# Patient Record
Sex: FEMALE | Race: WHITE | NOT HISPANIC OR LATINO | Employment: OTHER | URBAN - METROPOLITAN AREA
[De-identification: names, ages, dates, MRNs, and addresses within clinical notes are randomized per-mention and may not be internally consistent; named-entity substitution may affect disease eponyms.]

---

## 2024-02-21 ENCOUNTER — MEDICAL CORRESPONDENCE (OUTPATIENT)
Dept: HEALTH INFORMATION MANAGEMENT | Facility: CLINIC | Age: 39
End: 2024-02-21

## 2024-02-23 ENCOUNTER — REFERRAL (OUTPATIENT)
Dept: TRANSPLANT | Facility: CLINIC | Age: 39
End: 2024-02-23

## 2024-02-23 DIAGNOSIS — K86.1 CHRONIC RECURRENT PANCREATITIS (H): Primary | ICD-10-CM

## 2024-02-23 NOTE — LETTER
Ciera Perkins  [address] [facility] [address] [address]    Dear Ciera,    Thank you for your interest in the Transplant Center at [facility]. We look forward to being a part of your care team and assisting you through the transplant process.    As we discussed, your transplant coordinator is Vidal Perdue ([place]).  You may call your coordinator at any time with questions or concerns.  Your first scheduled call will be on 3/5 between 9:00-12:00 .  If this needs to change, call [phone].    Please complete the following.    Fill out and return the enclosed forms  Authorization for Electronic Communication  Authorization to Discuss Protected Health Information  Authorization for Release of Protected Health Information    Sign up for:  [email or web address], access to your electronic medical record (see enclosed pamphlet)       My Transplant Place     You can use these tools to learn more about your transplant, communicate with your care team, and track your medical details  Sincerely,  [facility] Transplant  [facility]  cc: Referring Physician PCP

## 2024-02-28 VITALS — WEIGHT: 137 LBS | HEIGHT: 66 IN | BODY MASS INDEX: 22.02 KG/M2

## 2024-02-28 PROBLEM — K86.1 CHRONIC RECURRENT PANCREATITIS (H): Status: ACTIVE | Noted: 2021-11-25

## 2024-02-28 PROBLEM — K86.89 DIABETES MELLITUS SECONDARY TO PANCREATIC INSUFFICIENCY (H): Status: ACTIVE | Noted: 2023-01-31

## 2024-02-28 PROBLEM — E08.9 DIABETES MELLITUS SECONDARY TO PANCREATIC INSUFFICIENCY (H): Status: ACTIVE | Noted: 2023-01-31

## 2024-02-28 NOTE — TELEPHONE ENCOUNTER
The following questions were asked during patients intake call.       PCP: Dr Houston    GI: Dr Weiss   Pain Management: Jacob aLuren   Endocrinologist: Marcelle  Mental Health Provider: marcelle  Other Specialists: no     1. Why are you considering a total pancreatectomy/islet auto-transplant?  constant pain frequent hospitalization    2. Have you been in the hospital in the last six months? yes  If yes:  a. Please give the name and address of the hospital:  [facility]              b. When were you there and why? pacnritis  3. Have you had any surgeries or procedures? yes     4. Have you ever smoked? former         5. Do you drink alcohol? Rarely in the past    6. Drug use Past Present Frequency: no recreational    7. Do you have a history of alcohol or other drug abuse?  no  If yes:  a. How long have you been sober or drug-free? no     8. PMH  Previous transplant - no  Heart disease - no  Diabetes - yes  Cancer - no  Biopsy - pancreas  Genetic testing - yes, CFTR gene      Insurance Information: pt was inpatient and unable to provide complete info  [facility]  Medicare A&B     Referral intake process completed.  Patient is aware that after financial approval is received, medical records will be requested.   Confirmed coordinator will discuss evaluation process in more detail at the time of their call.   Patient instructed to call assigned coordinator Vidal with questions.      Patient gave verbal consent during intake call to obtain medical records and documents outside of [facility]:  yes    Patient gave verbal consent during intake call to speak to  Dinesh Perkins regarding patient's medical condition.

## 2024-03-05 ENCOUNTER — TELEPHONE (OUTPATIENT)
Dept: TRANSPLANT | Facility: CLINIC | Age: 39
End: 2024-03-05

## 2024-03-05 NOTE — TELEPHONE ENCOUNTER
Ciera Perkins is a 39 y.o. 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 No stenting has been done. First flare was in early 2021 when she went into ED for abdominal pain and was diagnosed with upper respiratory infection and Lipase was 3,915.  She has daily now since October of last year.     She also received ketamine on an admission which helped with pain. She has previously been on methadone, suboxone, and po dilaudid alone for pain control without success and multiple celiac plexus blocks in the past.  She has been admitted 17 times within the year for acute on chronic abdominal pain, 3 attempts with celiac plexus block without sustained relief. She currently takes pain medications daily (6-7/10 with pain medications), she manages flares with dilaudid 4mg Q3 hours daily since December, and her team is talking about Ketamine now.  She used to be a smoker and quit cigarettes with her first attack in 2021 and stopped drinking then too.  She still does vape nicotine. Fecal elastase was 177. She was told once that she is pre diabetic but is on no medications currently.  She had an NJ placed in past that did not work and 3 failed GJ tubes attempts through [facility]    A1C 3/12/24: 5.8  F Elastase 5/6/21: 177    CT Aorta Abdomen Pelvis 12/3/23   IMPRESSION:   1. Mural thickening of distal small bowel loops (approximately 20 cm   upstream of the terminal ileum) similar to prior study concerning for   chronic bowel inflammation. No pneumatosis or portal venous gas. No   skip lesions, strictures,  or bowel obstruction.   2. Patent superior mesenteric artery.   3. Hepatic steatosis.     Deuce Valentine MD - 05/11/2021   EXAMINATION: MRI Abdomen without and with IV Contrast with Independent   MRCP: The gallbladder is normal and there are no gallstones. There is no intra or extrahepatic biliary dilatation.  The endoluminal contour of the ducts is smooth.  There are no ductal stones  or lesions.  The pancreatic duct is normal.     Pancreas: Previous CT of 01/08/2021 demonstrate changes of acute interstitial pancreatitis. There is currently evidence of mild pancreatic parenchymal atrophy. Within the anterior body of the pancreas, just anterior to the portal splenic confluence, there is a 10 mm localized lesion. This is somewhat heterogeneous on the T2-weighted images, with some areas of decreased T2 signal and minimal peripheral increased T2 signal. This is not simple fluid signal. However following contrast administration, a sharply defined area of nonenhancement measuring 10 mm is noted. This likely reflects a small area of walled off necrosis. There is no pancreatic ductal dilatation. There are couple of less than 5 mm nonenhancing areas in the tail the pancreas which may reflect similar findings.     CT AP w/ IVC 1/2021 noted mild acute pancreatitis, No pseudocyst or abscess, pancreas enhances normally. Also notation of fatty liver. RUQ US noted no cholelithiasis, gallbladder wall thickening, or biliary dilatation. She was seen in the ED 5/4 and 5/5 for abd pain similar to this previous episode of pancreatitis.

## 2024-03-05 NOTE — TELEPHONE ENCOUNTER
[facility] Call Center    Phone Message    May a detailed message be left on voicemail: yes     Reason for Call: Other: patient calling in to speak with coordinator. She stated she is needing all the information on what is needed to be done before surgery. Please call patient back.     Action Taken: Message routed to:  Other: RNCC    Travel Screening: Not Applicable

## 2024-03-10 ENCOUNTER — HEALTH MAINTENANCE LETTER (OUTPATIENT)
Age: 39
End: 2024-03-10

## 2024-04-24 DIAGNOSIS — E08.9 DIABETES MELLITUS SECONDARY TO PANCREATIC INSUFFICIENCY (H): ICD-10-CM

## 2024-04-24 DIAGNOSIS — M79.7 FIBROMYALGIA: ICD-10-CM

## 2024-04-24 DIAGNOSIS — F19.20 OTHER PSYCHOACTIVE SUBSTANCE DEPENDENCE, UNCOMPLICATED (H): ICD-10-CM

## 2024-04-24 DIAGNOSIS — Z51.81 ENCOUNTER FOR THERAPEUTIC DRUG LEVEL MONITORING: ICD-10-CM

## 2024-04-24 DIAGNOSIS — K86.89 DIABETES MELLITUS SECONDARY TO PANCREATIC INSUFFICIENCY (H): ICD-10-CM

## 2024-04-24 DIAGNOSIS — R79.89 OTHER SPECIFIED ABNORMAL FINDINGS OF BLOOD CHEMISTRY: ICD-10-CM

## 2024-04-24 DIAGNOSIS — R97.8 OTHER ABNORMAL TUMOR MARKERS: ICD-10-CM

## 2024-04-24 DIAGNOSIS — K86.1 CHRONIC RECURRENT PANCREATITIS (H): Primary | ICD-10-CM

## 2024-05-08 ENCOUNTER — VIRTUAL VISIT (OUTPATIENT)
Dept: PSYCHOLOGY | Facility: CLINIC | Age: 39
End: 2024-05-08
Attending: PEDIATRICS
Payer: COMMERCIAL

## 2024-05-08 ENCOUNTER — LAB (OUTPATIENT)
Dept: LAB | Facility: CLINIC | Age: 39
End: 2024-05-08
Payer: COMMERCIAL

## 2024-05-08 ENCOUNTER — ALLIED HEALTH/NURSE VISIT (OUTPATIENT)
Dept: TRANSPLANT | Facility: CLINIC | Age: 39
End: 2024-05-08
Attending: SURGERY
Payer: COMMERCIAL

## 2024-05-08 VITALS — BODY MASS INDEX: 25.05 KG/M2 | WEIGHT: 155.2 LBS

## 2024-05-08 DIAGNOSIS — R79.89 OTHER SPECIFIED ABNORMAL FINDINGS OF BLOOD CHEMISTRY: ICD-10-CM

## 2024-05-08 DIAGNOSIS — E08.9 DIABETES MELLITUS SECONDARY TO PANCREATIC INSUFFICIENCY (H): ICD-10-CM

## 2024-05-08 DIAGNOSIS — K86.89 DIABETES MELLITUS SECONDARY TO PANCREATIC INSUFFICIENCY (H): ICD-10-CM

## 2024-05-08 DIAGNOSIS — K86.1 CHRONIC RECURRENT PANCREATITIS (H): ICD-10-CM

## 2024-05-08 DIAGNOSIS — F19.20 OTHER PSYCHOACTIVE SUBSTANCE DEPENDENCE, UNCOMPLICATED (H): ICD-10-CM

## 2024-05-08 DIAGNOSIS — R97.8 OTHER ABNORMAL TUMOR MARKERS: ICD-10-CM

## 2024-05-08 DIAGNOSIS — Z51.81 ENCOUNTER FOR THERAPEUTIC DRUG LEVEL MONITORING: ICD-10-CM

## 2024-05-08 DIAGNOSIS — M79.7 FIBROMYALGIA: ICD-10-CM

## 2024-05-08 DIAGNOSIS — K86.1 CHRONIC RECURRENT PANCREATITIS (H): Primary | ICD-10-CM

## 2024-05-08 LAB
ALBUMIN SERPL BCG-MCNC: 4 G/DL (ref 3.5–5.2)
ALP SERPL-CCNC: 108 U/L (ref 40–150)
ALT SERPL W P-5'-P-CCNC: 38 U/L (ref 0–50)
AMYLASE SERPL-CCNC: 12 U/L (ref 28–100)
ANION GAP SERPL CALCULATED.3IONS-SCNC: 8 MMOL/L (ref 7–15)
AST SERPL W P-5'-P-CCNC: 36 U/L (ref 0–45)
BASOPHILS # BLD AUTO: 0 10E3/UL (ref 0–0.2)
BASOPHILS NFR BLD AUTO: 1 %
BILIRUB SERPL-MCNC: 0.5 MG/DL
BUN SERPL-MCNC: 7.2 MG/DL (ref 6–20)
C PEPTIDE SERPL-MCNC: 4.3 NG/ML (ref 0.9–6.9)
CALCIUM SERPL-MCNC: 9.2 MG/DL (ref 8.6–10)
CEA SERPL-MCNC: 1.9 NG/ML
CHLORIDE SERPL-SCNC: 104 MMOL/L (ref 98–107)
CHOLEST SERPL-MCNC: 128 MG/DL
CREAT SERPL-MCNC: 0.71 MG/DL (ref 0.51–0.95)
DEPRECATED HCO3 PLAS-SCNC: 28 MMOL/L (ref 22–29)
EGFRCR SERPLBLD CKD-EPI 2021: >90 ML/MIN/1.73M2
EOSINOPHIL # BLD AUTO: 0.2 10E3/UL (ref 0–0.7)
EOSINOPHIL NFR BLD AUTO: 4 %
ERYTHROCYTE [DISTWIDTH] IN BLOOD BY AUTOMATED COUNT: 12.6 % (ref 10–15)
FASTING STATUS PATIENT QL REPORTED: ABNORMAL
FASTING STATUS PATIENT QL REPORTED: YES
FERRITIN SERPL-MCNC: 274 NG/ML (ref 6–175)
GLUCOSE SERPL-MCNC: 122 MG/DL (ref 70–99)
GLUCOSE SERPL-MCNC: 188 MG/DL (ref 70–99)
GLUCOSE SERPL-MCNC: 198 MG/DL (ref 70–99)
HBA1C MFR BLD: 6.2 %
HCT VFR BLD AUTO: 35.5 % (ref 35–47)
HCV AB SERPL QL IA: NONREACTIVE
HDLC SERPL-MCNC: 42 MG/DL
HGB BLD-MCNC: 12.1 G/DL (ref 11.7–15.7)
IMM GRANULOCYTES # BLD: 0 10E3/UL
IMM GRANULOCYTES NFR BLD: 1 %
IRON BINDING CAPACITY (ROCHE): 299 UG/DL (ref 240–430)
IRON SATN MFR SERPL: 13 % (ref 15–46)
IRON SERPL-MCNC: 38 UG/DL (ref 37–145)
LDLC SERPL CALC-MCNC: 72 MG/DL
LIPASE SERPL-CCNC: 7 U/L (ref 13–60)
LYMPHOCYTES # BLD AUTO: 1.9 10E3/UL (ref 0.8–5.3)
LYMPHOCYTES NFR BLD AUTO: 29 %
MCH RBC QN AUTO: 29.7 PG (ref 26.5–33)
MCHC RBC AUTO-ENTMCNC: 34.1 G/DL (ref 31.5–36.5)
MCV RBC AUTO: 87 FL (ref 78–100)
MONOCYTES # BLD AUTO: 0.4 10E3/UL (ref 0–1.3)
MONOCYTES NFR BLD AUTO: 6 %
NEUTROPHILS # BLD AUTO: 3.9 10E3/UL (ref 1.6–8.3)
NEUTROPHILS NFR BLD AUTO: 59 %
NONHDLC SERPL-MCNC: 86 MG/DL
NRBC # BLD AUTO: 0 10E3/UL
NRBC BLD AUTO-RTO: 0 /100
PLATELET # BLD AUTO: 165 10E3/UL (ref 150–450)
POTASSIUM SERPL-SCNC: 4.5 MMOL/L (ref 3.4–5.3)
PROT SERPL-MCNC: 7.2 G/DL (ref 6.4–8.3)
RBC # BLD AUTO: 4.08 10E6/UL (ref 3.8–5.2)
SODIUM SERPL-SCNC: 140 MMOL/L (ref 135–145)
TRIGL SERPL-MCNC: 69 MG/DL
WBC # BLD AUTO: 6.5 10E3/UL (ref 4–11)

## 2024-05-08 PROCEDURE — 82728 ASSAY OF FERRITIN: CPT | Performed by: PATHOLOGY

## 2024-05-08 PROCEDURE — 36415 COLL VENOUS BLD VENIPUNCTURE: CPT | Performed by: PATHOLOGY

## 2024-05-08 PROCEDURE — 86301 IMMUNOASSAY TUMOR CA 19-9: CPT | Mod: 90 | Performed by: PATHOLOGY

## 2024-05-08 PROCEDURE — 83540 ASSAY OF IRON: CPT | Performed by: PATHOLOGY

## 2024-05-08 PROCEDURE — 83036 HEMOGLOBIN GLYCOSYLATED A1C: CPT | Performed by: PEDIATRICS

## 2024-05-08 PROCEDURE — 99000 SPECIMEN HANDLING OFFICE-LAB: CPT | Performed by: PATHOLOGY

## 2024-05-08 PROCEDURE — 82378 CARCINOEMBRYONIC ANTIGEN: CPT | Performed by: PEDIATRICS

## 2024-05-08 PROCEDURE — 84446 ASSAY OF VITAMIN E: CPT | Mod: 90 | Performed by: PATHOLOGY

## 2024-05-08 PROCEDURE — G0480 DRUG TEST DEF 1-7 CLASSES: HCPCS | Mod: 90 | Performed by: PATHOLOGY

## 2024-05-08 PROCEDURE — 82306 VITAMIN D 25 HYDROXY: CPT | Performed by: PEDIATRICS

## 2024-05-08 PROCEDURE — 83550 IRON BINDING TEST: CPT | Performed by: PATHOLOGY

## 2024-05-08 PROCEDURE — 80061 LIPID PANEL: CPT | Performed by: PATHOLOGY

## 2024-05-08 PROCEDURE — 99207 PR NO CHARGE LOS: CPT | Mod: 95 | Performed by: STUDENT IN AN ORGANIZED HEALTH CARE EDUCATION/TRAINING PROGRAM

## 2024-05-08 PROCEDURE — 82947 ASSAY GLUCOSE BLOOD QUANT: CPT | Mod: XU | Performed by: PATHOLOGY

## 2024-05-08 PROCEDURE — 86803 HEPATITIS C AB TEST: CPT | Performed by: PEDIATRICS

## 2024-05-08 PROCEDURE — 86341 ISLET CELL ANTIBODY: CPT | Mod: 90 | Performed by: PATHOLOGY

## 2024-05-08 PROCEDURE — 84681 ASSAY OF C-PEPTIDE: CPT | Performed by: PEDIATRICS

## 2024-05-08 PROCEDURE — 86337 INSULIN ANTIBODIES: CPT | Mod: 90 | Performed by: PATHOLOGY

## 2024-05-08 PROCEDURE — 85025 COMPLETE CBC W/AUTO DIFF WBC: CPT | Performed by: PATHOLOGY

## 2024-05-08 PROCEDURE — 80053 COMPREHEN METABOLIC PANEL: CPT | Performed by: PATHOLOGY

## 2024-05-08 PROCEDURE — 84590 ASSAY OF VITAMIN A: CPT | Mod: 90 | Performed by: PATHOLOGY

## 2024-05-08 PROCEDURE — 82150 ASSAY OF AMYLASE: CPT | Performed by: PATHOLOGY

## 2024-05-08 PROCEDURE — 83690 ASSAY OF LIPASE: CPT | Performed by: PATHOLOGY

## 2024-05-08 ASSESSMENT — PATIENT HEALTH QUESTIONNAIRE - PHQ9
10. IF YOU CHECKED OFF ANY PROBLEMS, HOW DIFFICULT HAVE THESE PROBLEMS MADE IT FOR YOU TO DO YOUR WORK, TAKE CARE OF THINGS AT HOME, OR GET ALONG WITH OTHER PEOPLE: VERY DIFFICULT
SUM OF ALL RESPONSES TO PHQ QUESTIONS 1-9: 9
SUM OF ALL RESPONSES TO PHQ QUESTIONS 1-9: 9

## 2024-05-08 ASSESSMENT — ANXIETY QUESTIONNAIRES
GAD7 TOTAL SCORE: 2
2. NOT BEING ABLE TO STOP OR CONTROL WORRYING: SEVERAL DAYS
4. TROUBLE RELAXING: NOT AT ALL
GAD7 TOTAL SCORE: 2
IF YOU CHECKED OFF ANY PROBLEMS ON THIS QUESTIONNAIRE, HOW DIFFICULT HAVE THESE PROBLEMS MADE IT FOR YOU TO DO YOUR WORK, TAKE CARE OF THINGS AT HOME, OR GET ALONG WITH OTHER PEOPLE: NOT DIFFICULT AT ALL
7. FEELING AFRAID AS IF SOMETHING AWFUL MIGHT HAPPEN: NOT AT ALL
GAD7 TOTAL SCORE: 2
3. WORRYING TOO MUCH ABOUT DIFFERENT THINGS: NOT AT ALL
1. FEELING NERVOUS, ANXIOUS, OR ON EDGE: SEVERAL DAYS
5. BEING SO RESTLESS THAT IT IS HARD TO SIT STILL: NOT AT ALL
8. IF YOU CHECKED OFF ANY PROBLEMS, HOW DIFFICULT HAVE THESE MADE IT FOR YOU TO DO YOUR WORK, TAKE CARE OF THINGS AT HOME, OR GET ALONG WITH OTHER PEOPLE?: NOT DIFFICULT AT ALL
6. BECOMING EASILY ANNOYED OR IRRITABLE: NOT AT ALL
7. FEELING AFRAID AS IF SOMETHING AWFUL MIGHT HAPPEN: NOT AT ALL

## 2024-05-08 NOTE — PROGRESS NOTES
Chief Complaint   Patient presents with    Nurse Visit     CMA Boost test     Weight 70.4 kg (155 lb 3.3 oz).    Administered Boost: 9:31am    FBS: 122    Called lab with boost time :     10:33am    11:33am    Patient is aware and given time to return to lab.    Qing Sánchez, YAHAIRA

## 2024-05-08 NOTE — PROGRESS NOTES
Presurgical evaluation partially completed, need additional information that we were not able to gather at this time due to timing of lab draws.

## 2024-05-09 ENCOUNTER — OFFICE VISIT (OUTPATIENT)
Dept: TRANSPLANT | Facility: CLINIC | Age: 39
End: 2024-05-09
Attending: SURGERY
Payer: COMMERCIAL

## 2024-05-09 ENCOUNTER — ALLIED HEALTH/NURSE VISIT (OUTPATIENT)
Dept: TRANSPLANT | Facility: CLINIC | Age: 39
End: 2024-05-09
Attending: SURGERY
Payer: COMMERCIAL

## 2024-05-09 ENCOUNTER — ALLIED HEALTH/NURSE VISIT (OUTPATIENT)
Dept: TRANSPLANT | Facility: CLINIC | Age: 39
End: 2024-05-09
Attending: SURGERY
Payer: MEDICARE

## 2024-05-09 ENCOUNTER — OFFICE VISIT (OUTPATIENT)
Dept: TRANSPLANT | Facility: CLINIC | Age: 39
End: 2024-05-09
Attending: PEDIATRICS
Payer: COMMERCIAL

## 2024-05-09 VITALS
WEIGHT: 155 LBS | DIASTOLIC BLOOD PRESSURE: 88 MMHG | OXYGEN SATURATION: 99 % | HEART RATE: 71 BPM | HEIGHT: 66 IN | SYSTOLIC BLOOD PRESSURE: 132 MMHG | BODY MASS INDEX: 24.91 KG/M2

## 2024-05-09 VITALS
SYSTOLIC BLOOD PRESSURE: 132 MMHG | OXYGEN SATURATION: 99 % | BODY MASS INDEX: 24.33 KG/M2 | TEMPERATURE: 97.5 F | HEIGHT: 67 IN | DIASTOLIC BLOOD PRESSURE: 88 MMHG | HEART RATE: 71 BPM | WEIGHT: 155 LBS

## 2024-05-09 DIAGNOSIS — K86.1 CHRONIC RECURRENT PANCREATITIS (H): Primary | ICD-10-CM

## 2024-05-09 LAB
C PEPTIDE SERPL-MCNC: 7.1 NG/ML (ref 0.9–6.9)
C PEPTIDE SERPL-MCNC: 7.8 NG/ML (ref 0.9–6.9)
CANCER AG19-9 SERPL IA-ACNC: 11 U/ML

## 2024-05-09 PROCEDURE — 97802 MEDICAL NUTRITION INDIV IN: CPT | Mod: XU | Performed by: DIETITIAN, REGISTERED

## 2024-05-09 PROCEDURE — 99211 OFF/OP EST MAY X REQ PHY/QHP: CPT | Mod: 27 | Performed by: SURGERY

## 2024-05-09 PROCEDURE — 99417 PROLNG OP E/M EACH 15 MIN: CPT | Performed by: PEDIATRICS

## 2024-05-09 PROCEDURE — 99207 PR DROP WITH A PROCEDURE: CPT

## 2024-05-09 PROCEDURE — G0463 HOSPITAL OUTPT CLINIC VISIT: HCPCS | Performed by: PEDIATRICS

## 2024-05-09 PROCEDURE — 99204 OFFICE O/P NEW MOD 45 MIN: CPT | Performed by: SURGERY

## 2024-05-09 PROCEDURE — 99205 OFFICE O/P NEW HI 60 MIN: CPT | Performed by: PEDIATRICS

## 2024-05-09 PROCEDURE — 99213 OFFICE O/P EST LOW 20 MIN: CPT | Performed by: PEDIATRICS

## 2024-05-09 RX ORDER — HYDROMORPHONE HYDROCHLORIDE 4 MG/1
4 TABLET ORAL
Status: ON HOLD | COMMUNITY
Start: 2024-05-04 | End: 2024-09-07

## 2024-05-09 NOTE — LETTER
5/9/2024         RE: Ciera Perkins  [address]  [facility] [address]        Dear Colleague,    Thank you for referring your patient, Ciera Perkins, to the [facility]. Please see a copy of my visit note below.    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  Chronic pancreatitis- following an episode of severe acute necrotizing pancreatitis in 2021 with persistent disabling pain.    Ciera is a 39 year old with pancreatitis, considering surgical management.  She has glycemic testing that is diagnostic for pre-diabetes, with notable high post-MMTT glucoses (not diagnostic but trending into DM range).  What is somewhat reassuring is that her C-peptide response, although inadequate to control glycemia, is fairly robust, meaning she has functional islets there that we can likely isolate to benefit glycemic control.  We did talk about my expectation if she has surgery is that she will be on insulin but with the goal of retaining islet function for near normal glycemic control.    We did also talk quite a bit about the long duration of pain she has experienced, and daily opioid use, and how she is likely to have central sensitization and opioid induced hyperalgesisa that will be present after surgery.  This is very common and the goal of surgery would be reduce pain to a point that she is functional and ideally can wean off opioids (slowly) but that she is unlikely to be pain free, aiming for a place where pain level is low enough that she stays out of the hospital and does 'normal life stuff'.  She has an excellent care team at home including a pain management physician and PCP who are both involved, and she does seem motivated to wean opioids and would like to become more  active after surgery.   We did also discuss physical conditioning going into surgery and would recommend being able to walk for ~1 mile per day (even if that is 1/4 mile 4 x per day around the house or block).   Will need to discontinue vaping before she could move ahead with TPIAT.    The primary purpose of today's visit was to review the patient's endocrine history and provide counseling on islet autotransplantation.  We discussed the procedure of islet autotransplant, the post-operative management, and the prognosis.  We specifically discussed that all patients are on insulin after this procedure, typically for at least several months.  About 30% of patients will become insulin independent.  However, there remains a very high lifetime risk for diabetes.  Of the patients who require life long insulin therapy, most will have some graft function and a significant portion can maintain reasonable glycemic control on once injection per day of basal insulin.  However, about  30-40% of patients will require 4-6 injections per day or an insulin pump for management.  Only 10% have no or minimal islet function;  These patients are at higher risk for a labile form of diabetes mellitus that can be difficult to manage.  The main goal of the islet autotransplant procedure is to preserve enough beta cell mass to make diabetes manageable.  Because of the high risk of diabetes mellitus, all patients must be willing to accept diabetes and insulin injections as a trade off for relief from pancreatic pain.    All surgical consults are reviewed by our multi-disciplinary team to determine if surgery is an appropriate next option.  CHRISTOPHER Marin MD  [facility]  Phone:  [phone]  Fax:  [phone]    75 minutes spent by me on the date of the encounter doing chart review, history and exam, documentation and further activities per the  note        CC:  Chronic pancreatitis, surgical evaluation    HPI:  Ciera Perkins is a 39 year old female referred by Dr. Misty Brito  for new patient consultation of endocrine function in the setting of chronic pancreatitis.  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     Pancreatitis history from the patient is as follows:  Ciera is a 39 year old female who reports a history of obesity and fertility/ gynecology complications but feels she was otherwise was doing quite well until she developed a respiratory infection 2 years ago, was treated with prednisone.  While on prednisone in 1/2021, she had acute pancreatitis, subsequently evolving into necrotizing pancreatitis which she attributes to this prednisone course.  Since then she has never recovered.     I reviewed records from 2021 from [place] and noted CT 1/2021 reported as showing mild acute pancreatitis, 3/14/21 and 4/21 showing acute necrotizing pancreatitis.   She is now in the hospital 2-3 times per month, will stay for 5 days, go home a few days, and then get readmitted.  She had genetic testing and was found to carry CFTR mutations.  She has pain daily and is on dilaudid for pain control.  She lost 50 pounds in 2021. She had a feeding tube x 3 but had a number of complications at the GJ site that led to discontinuation, though she did tolerate feeds OK for the time she was on it.  She has had 4 celiac blocks and had pain relief for a few months with the first but not the others.   No ERCP procedures.  Many relatives on mom's side with DM:  both maternal grandparents, an aunt and uncle.   Dad's side of family not known.   She has  pre-diabetes--  after Boost glucoses are notably 180-190s.  However C-peptide is moderately high.  She is motivated to get off opioids and also would like to get to running long-distances after surgical recovery.      Evaluation/ imaging/ treatments:  Elevated amylase and lipase by history:  Yes, AP confirmed by lab and imaging (at least 2-3 separate episodes vs prolonged continuous episode?) from 1/2021- 1/2022.  First lipase in Jan 2021 below.  At least 5 CT in this time period with AP.  Lipase  Order: [id number]  Component  Ref Range & Units 3 yr ago   Lipase  <300 U/L 3,915 High    Resulting Agency [facility]       Etiology of disease: there was initially a question of drug-related, but she has since been found to carry CFTR (need genetic report to confirm mutations).  Number of hospitalizations in last 1 year: too many to count- several per month  Recent imaging studies:   4/24/24 CT scan  PANCREAS: Parenchymal atrophy. Homogeneous enhancement. Similar fat   stranding surrounding the splenic and common hepatic artery origins. No   peripancreatic fluid collection. No solid mass or main ductal   dilatation.   MRI 5/5/22 (MR angiogram and not MRCP):  PANCREAS: Fatty atrophy of the pancreas. No mass or ductal dilatation.   SPLEEN: Splenomegaly measuring 15.7 cm.     Abdominal aorta:Normal in caliber.   Celiac:Widely patent.   Common hepatic: Separate origin directly from the aorta. No evidence of   stenosis.   SMA: Moderate stenosis of the proximal superior mesenteric artery with   poststenotic dilatation. Soft tissue thickening surrounding the proximal   superior mesenteric artery on prior CT exam is not as well visualized on   this MR examination due to protocol.   XAVI: Mild stenosis at the origin.   Renal arteries: There are two renal arteries bilaterally and widely   patent.     Medical treatment(s): pain management, GJ  ERCP procedures: NO;     Prior pancreatic surgery: no  + celiac  nerve block as noted above  No cholecystectomy    She did have a history of insulin treatment during necrotizing pancreatitis      Review of Systems:  A comprehensive 10 point review of systems was performed and was negative except as noted in the HPI above.    Past Medical History:  No past medical history on file.  Past Surgical History:   Procedure Laterality Date     IR NG TUBE PLACEMENT REQ RAD & FLUORO  5/24/2021     IR NG TUBE PLACEMENT REQ RAD & FLUORO  5/21/2021   PAST MEDICAL HISTORY  Past Medical History     Past Medical History:   Diagnosis Date   Acute necrotizing pancreatitis 11/16/2023   Acute on chronic pancreatitis (CMS/HCC) 11/03/2021   Allergy 1990   Aspirin and bee stings   Anxiety 2022   Chronic, continuous use of opioids 11/13/2021   COVID-19 vaccine series started   Pfizer 8/2021 x 1 dose then stated went into bradycardia and was advised not to get 2nd dose   Cyst of ovary 10/05/2021   Depression   6/15/2022: situational; no meds at present   Hyperlipidemia   Hypertriglyceridemia 10/05/2021   Last Assessment & Plan: Formatting of this note might be different from the original. Continue home statin Formatting of this note might be different from the original. Last Assessment & Plan: Formatting of this note might be different from the original. Continue home statin   Obesity with body mass index 30 or greater 10/05/2021   Pancreatitis 01/02/2021   Severe protein-calorie malnutrition (CMS/HCC) 01/19/2022   HARLAN (stress urinary incontinence, female) 07/02/2019   Formatting of this note might be different from the original. Formatting of this note might be different from the original. Recommend 3 sets of 10 Kegel's and vaginal weights daily. Formatting of this note might be different from the original. Recommend 3 sets of 10 Kegel's and vaginal weights daily.   Superior mesenteric artery stenosis (CMS/HCC) 06/23/2021   Formatting of this note might be different from the original. Last Assessment &  Plan: Formatting of this note might be different from the original. Continue home statin Has been evaluated by vascular surgery; felt that her stenosis is related to inflammation from her pancreatitis Consider re-evaluation with CT angiogram of the abdomen if patient has ongoing pain Last Assessment & Plan: Formattin   Tobacco dependence 07/02/2019   quit 2021; 1/2ppd x 8 years   Type 2 diabetes mellitus treated with insulin (CMS/[facility]) 06/14/2021   2/2 pancreatic insufficiency On amaryl 1mg daily Recent nighttime blood sugars in the 90's, not really eating or taking enteral nutrition levemir 7u nightly with blood glucose over 150 POC A1c was 5.8 today Last Assessment & Plan: Formatting of this note might be different from the original. Please stop amaryl Continue current guid   Visceral hyperalgesia 09/01/2023       PAST SURGICAL HISTORY  Past Surgical History     Past Surgical History:   Procedure Laterality Date   ANGIOGRAM N/A 7/5/2022   Procedure: ANGIOGRAM Mesenteric Fem Access (SMA), poss L brachial access, poss angioplasty, poss stent; Surgeon: Josy Casiano MD; Location: [facility] OR; Service: Vascular Surgery; Laterality: N/A;   APPENDECTOMY 09/2005   CHOLECYSTECTOMY 08/2021   IR ANGIOGRAM VISCERAL N/A 7/5/2022   Procedure: IR Visceral Angiogram; Surgeon: Josy Casiano MD; Location: [facility] OR; Service: Vascular Surgery; Laterality: N/A;   IR CT ANGIO ABDOMEN N/A 8/31/2023   Procedure: IR CT Abdomen; Surgeon: Tani Tan MD; Location:  Interventional Radiology; Service: Radiology; Laterality: N/A;   IR NERVE BLOCK N/A 8/31/2023   Procedure: IR Nerve Block; Surgeon: Tani Tan MD; Location:  Interventional Radiology; Service: Radiology; Laterality: N/A;   IR PICC INSERTION TBD in OR 6/14/2023   Procedure: IR Picc Line Insertion; Surgeon: Vernon Faith PA-C; Location:  Interventional Radiology; Service: Radiology; Laterality: TBD in OR;   IR PICC INSERTION TBD in OR  6/7/2023   Procedure: IR Picc Line Insertion; Surgeon: Anmol Kearns PA-C; Location:  Interventional Radiology; Service: Radiology; Laterality: TBD in OR;   IR PORT PLACEMENT TBD in OR 7/3/2023   Procedure: IR Port Placement; Surgeon: Vernon Faith PA-C; Location:  Interventional Radiology; Service: Radiology; Laterality: TBD in OR;   IR ULTRASOUND VASCULAR GUIDANCE N/A 6/7/2023   Procedure: IR Ultrasound Vascular Guidance; Surgeon: Anmol Kearns PA-C; Location:  Interventional Radiology; Service: Radiology; Laterality: N/A;   IR ULTRASOUND VASCULAR GUIDANCE N/A 7/3/2023   Procedure: IR Ultrasound Vascular Guidance; Surgeon: Vernon Faith PA-C; Location:  Interventional Radiology; Service: Radiology; Laterality: N/A;   LAPAROSCOPIC GJ TUBE CREATION Midline 08/2021   was removed on september 2021   PC-TOTAL ABDOMINAL HYSTERECTOMY (CORPUS & CERVIX), W/WO REMOVAL OF TUBE OR OVARY 2015   TONSILLECTOMY AND ADENOIDECTOMY   TONSILLECTOMY AND ADENOIDECTOMY 1996   VAGINAL HYSTERECTOMY 04/2016     Current medications:  Current Outpatient Medications   Medication Sig Dispense Refill     HYDROmorphone (DILAUDID) 4 MG tablet Take 4 mg by mouth every 3 hours         Family History:  The family history was reviewed with particular attention to diabetes and pancreatitis history, and updated by me as pertinent, and is as documented below.      Family History: family history includes Aneurysm in her mother; Cancer in her maternal aunt, maternal grandfather, maternal grandmother, and mother; Diabetes in her maternal aunt and maternal grandfather; High cholesterol in her maternal aunt, maternal grandfather, and mother; Stroke in her mother.     Social History:  Social History     Social History Narrative     Not on file   From [place], here with significant other.    Physical Exam:  Vitals: /88 (BP Location: Right arm, Patient Position: Chair, Cuff Size: Adult Regular)   Pulse 71   Temp 97.5  " F (36.4  C) (Oral)   Ht 1.7 m (5' 6.93\")   Wt 70.3 kg (155 lb)   SpO2 99%   BMI 24.33 kg/m    BMI= Body mass index is 24.33 kg/m .  General: in pain, laying on table  HEENT:  NC/AT, sclera appear white  Neck:  No obvious thyromegaly  CV/Lungs:  Non distressed breathing  Skin:  No apparent rashes  Neuro:  Normal mental status  Psych:  Normal affect      Results:  Mixed Meal Test:  C Peptide   Date Value Ref Range Status   05/08/2024 7.8 (H) 0.9 - 6.9 ng/mL Final   05/08/2024 7.1 (H) 0.9 - 6.9 ng/mL Final   05/08/2024 4.3 0.9 - 6.9 ng/mL Final     Glucose   Date Value Ref Range Status   05/08/2024 188 (H) 70 - 99 mg/dL Final   05/08/2024 198 (H) 70 - 99 mg/dL Final   05/08/2024 122 (H) 70 - 99 mg/dL Final       Hemoglobin A1c  Lab Results   Component Value Date    A1C 6.2 05/08/2024       Liver enzymes  Lab Results   Component Value Date    AST 36 05/08/2024     Lab Results   Component Value Date    ALT 38 05/08/2024     No results found for: \"BILICONJ\"   Lab Results   Component Value Date    BILITOTAL 0.5 05/08/2024     Lab Results   Component Value Date    ALBUMIN 4.0 05/08/2024     Lab Results   Component Value Date    PROTTOTAL 7.2 05/08/2024      Lab Results   Component Value Date    ALKPHOS 108 05/08/2024       Creatinine:  Creatinine   Date Value Ref Range Status   05/08/2024 0.71 0.51 - 0.95 mg/dL Final   ]    Complete Blood Count:  CBC RESULTS:   Recent Labs   Lab Test 05/08/24  0853   WBC 6.5   RBC 4.08   HGB 12.1   HCT 35.5   MCV 87   MCH 29.7   MCHC 34.1   RDW 12.6          Cholesterol  Lab Results   Component Value Date    CHOL 128 05/08/2024     Lab Results   Component Value Date    HDL 42 05/08/2024     Lab Results   Component Value Date    LDL 72 05/08/2024     Lab Results   Component Value Date    TRIG 69 05/08/2024     No results found for: \"CHOLHDLRATIO\"      Lab on 05/08/2024   Component Date Value Ref Range Status     Sodium 05/08/2024 140  135 - 145 mmol/L Final    Reference intervals " for this test were updated on 09/26/2023 to more accurately reflect our healthy population. There may be differences in the flagging of prior results with similar values performed with this method. Interpretation of those prior results can be made in the context of the updated reference intervals.      Potassium 05/08/2024 4.5  3.4 - 5.3 mmol/L Final     Carbon Dioxide (CO2) 05/08/2024 28  22 - 29 mmol/L Final     Anion Gap 05/08/2024 8  7 - 15 mmol/L Final     Urea Nitrogen 05/08/2024 7.2  6.0 - 20.0 mg/dL Final     Creatinine 05/08/2024 0.71  0.51 - 0.95 mg/dL Final     GFR Estimate 05/08/2024 >90  >60 mL/min/1.73m2 Final     Calcium 05/08/2024 9.2  8.6 - 10.0 mg/dL Final     Chloride 05/08/2024 104  98 - 107 mmol/L Final     Glucose 05/08/2024 122 (H)  70 - 99 mg/dL Final     Alkaline Phosphatase 05/08/2024 108  40 - 150 U/L Final    Reference intervals for this test were updated on 11/14/2023 to more accurately reflect our healthy population. There may be differences in the flagging of prior results with similar values performed with this method. Interpretation of those prior results can be made in the context of the updated reference intervals.     AST 05/08/2024 36  0 - 45 U/L Final    Reference intervals for this test were updated on 6/12/2023 to more accurately reflect our healthy population. There may be differences in the flagging of prior results with similar values performed with this method. Interpretation of those prior results can be made in the context of the updated reference intervals.     ALT 05/08/2024 38  0 - 50 U/L Final    Reference intervals for this test were updated on 6/12/2023 to more accurately reflect our healthy population. There may be differences in the flagging of prior results with similar values performed with this method. Interpretation of those prior results can be made in the context of the updated reference intervals.       Protein Total 05/08/2024 7.2  6.4 - 8.3 g/dL Final      Albumin 05/08/2024 4.0  3.5 - 5.2 g/dL Final     Bilirubin Total 05/08/2024 0.5  <=1.2 mg/dL Final     Patient Fasting > 8hrs? 05/08/2024 Yes   Final     Vitamin A 05/08/2024 0.41  0.30 - 1.20 mg/L Final     Retinol Palmitate 05/08/2024 <0.02  0.00 - 0.10 mg/L Final     Vitamin A Interp 05/08/2024 Normal   Final      This test was developed and its performance characteristics   determined by [facility]. It has not been cleared or   approved by the US Food and Drug Administration. This test   was performed in a CLIA certified laboratory and is   intended for clinical purposes.  Performed By: [facility]  [address] [place] [address]  : Marco Mayer MD, PhD  CLIA Number: [id number]     Vitamin E 05/08/2024 5.6  5.5 - 18.0 mg/L Final      This test was developed and its performance characteristics   determined by [facility]. It has not been cleared or   approved by the US Food and Drug Administration. This test   was performed in a CLIA certified laboratory and is   intended for clinical purposes.     Vitamin E Gamma 05/08/2024 1.8  0.0 - 6.0 mg/L Final    Performed By: [facility]  [address] [place] [address]  : Marco Mayer MD, PhD  CLIA Number: [id number]     Hemoglobin A1C 05/08/2024 6.2 (H)  <5.7 % Final    Normal <5.7%   Prediabetes 5.7-6.4%    Diabetes 6.5% or higher     Note: Adopted from ADA consensus guidelines.     Lipase 05/08/2024 7 (L)  13 - 60 U/L Final     Amylase 05/08/2024 12 (L)  28 - 100 U/L Final     Cholesterol 05/08/2024 128  <200 mg/dL Final     Triglycerides 05/08/2024 69  <150 mg/dL Final     Direct Measure HDL 05/08/2024 42 (L)  >=50 mg/dL Final     LDL Cholesterol Calculated 05/08/2024 72  <=100 mg/dL Final     Non HDL Cholesterol 05/08/2024 86  <130 mg/dL Final     Patient Fasting > 8hrs? 05/08/2024 Yes   Final     25 OH Vitamin D2 05/08/2024 <5  ug/L Final     25 OH Vitamin D3  05/08/2024 23  ug/L Final     25 OH Vit D Total 05/08/2024 <28  20 - 75 ug/L Final    Season, race, dietary intake, and treatment affect the concentration of 25-hydroxy-Vitamin D. Values may decrease during winter months and increase during summer months. Values 20-29 ug/L may indicate Vitamin D insufficiency and values <20 ug/L may indicate Vitamin D deficiency.     CEA 05/08/2024 1.9  ng/mL Final    Nonsmoker (past/never) <=5.0 ng/mL   Current smoker <=6.5 ng/mL     This result is obtained using the Roche Elecsys CEA method on the charbel [id number] immunoassay analyzer. Results obtained with different assay methods or kits cannot be used interchangeably.     Cancer Antigen 19-9 05/08/2024 11  <=35 U/mL Final    INTERPRETIVE INFORMATION: Cancer Antigen-GI (CA 19-9)    This test uses Roche CA 19-9 electrochemiluminescent   immunoassay. Results obtained with different test methods   or kits cannot be used interchangeably. CA 19-9 value is   useful in monitoring pancreatic, hepatobiliary, gastric,   hepatocellular, and colorectal cancer. CA 19-9 value,   regardless of level, should not be interpreted as absolute   evidence of the presence or absence of malignant disease.  Performed By: [email or web address]  [address] [id number]  : Marco Mayer MD, PhD  CLIA Number: [id number]     Glutamic Acid Decarboxylase Antibo* 05/08/2024 <5.0  0.0 - 5.0 IU/mL Final    INTERPRETIVE INFORMATION:  Glutamic Acid Decarboxylase   Antibody    A value greater than 5.0 IU/mL is considered positive for   Glutamic Acid Decarboxylase Antibody (FLORENTINO Ab). This assay   is intended for the semi-quantitative determination of the   FLORENTINO Ab in human serum. Results should be interpreted within   the context of clinical symptoms.  Performed By: [email or web address]  [address] [place] [address]  : Marco Mayer MD, PhD  CLIA Number: [id number]     Islet Cell Antibody IgG 05/08/2024  <1:4  <1:4 Final    INTERPRETIVE INFORMATION: Islet Cell Ab, IgG    Islet cell antibodies (ICAs) are associated with type 1   diabetes (TID), an autoimmune endocrine disorder. ICAs may   be present years before the onset of clinical symptoms. To   calculate Juvenile Diabetes Foundation (JDF) units:   multiply the titer x 5 (1:8  8 x 5 = 40 JDF Units).    This test was developed and its performance characteristics   determined by [facility]. It has not been cleared or   approved by the US Food and Drug Administration. This test   was performed in a CLIA certified laboratory and is   intended for clinical purposes.  Performed By: [facility]  [address] [place] [id number]  : Marco Mayer MD, PhD  CLIA Number: [id number]     C Peptide 05/08/2024 4.3  0.9 - 6.9 ng/mL Final     Iron 05/08/2024 38  37 - 145 ug/dL Final     Iron Binding Capacity 05/08/2024 299  240 - 430 ug/dL Final     Iron Sat Index 05/08/2024 13 (L)  15 - 46 % Final     Ferritin 05/08/2024 274 (H)  6 - 175 ng/mL Final     Hepatitis C Antibody 05/08/2024 Nonreactive  Nonreactive Final    A nonreactive screening test result does not exclude the possibility of exposure to or infection with HCV. Nonreactive screening test results in individuals with prior exposure to HCV may be due to antibody levels below the limit of detection of this assay or lack of reactivity to the HCV antigens used in this assay. Patients with recent HCV infections (<3 months from time of exposure) may have false-negative HCV antibody results due to the time needed for seroconversion (average of 8 to 9 weeks).     Cotinine Confirm 05/08/2024 >2000  ng/mL Final     Nicotine Confirmation Urine 05/08/2024 398  ng/mL Final    Comment: INTERPRETIVE INFORMATION: Nicotine and Metabolites,                             Urine, Quantitative    Methodology: Quantitative Liquid Chromatography-Tandem Mass   Spectrometry    Positive  cutoff:  Nicotine  15 ng/mL  Cotinine  15 ng/mL  3-OH-Cotinine 50 ng/mL  Anabasine        5 ng/mL    For medical purposes only; not valid for forensic use.     This test is designed to evaluate recent use of   nicotine-containing products.  Passive and active exposure   cannot be discriminated definitively, although a cutoff of   100 ng/mL cotinine is frequently used for surgery   qualification purposes.  For smoking cessation programs or   compliance testing, the absence of expected drug(s) and/or   drug metabolite(s) may indicate non-compliance,   inappropriate timing of specimen collection relative to   drug administration, poor drug absorption,   diluted/adulterated urine, or limitations of testing. The   concentration value must be greater than or equal to the   cutoff to be reported as                            positive. Anabasine is included as   a biomarker of tobacco use, versus nicotine replacement.    Interpretive questions should be directed to the   laboratory.     This test was developed and its performance characteristics   determined by [facility]. It has not been cleared or   approved by the US Food and Drug Administration. This test   was performed in a CLIA certified laboratory and is   intended for clinical purposes.  Performed By: [facility]  [address] [place], [place] [id number]  : Marco Mayer MD, PhD  CLIA Number: [id number]     2-QK-Yjgbdjbs, Urn, Quant 05/08/2024 >2000  ng/mL Final     Anabasine, Urn, Quant 05/08/2024 <5  ng/mL Final     PEth 16:0/18:1 (POPEth) 05/08/2024 <10  ng/mL Final    PEth 16:0/18:1 (POPEth)  Less than 10 ng/mL............Not detected  Less than 20 ng/mL............Abstinence or light alcohol   consumption  20 - 200 ng/mL................Moderate alcohol consumption  Greater than 200 ng/mL........Heavy alcohol consumption or   chronic alcohol use    (Reference: ERIK Kelly and MIKY Duncan 2018 J. Forensic Sci)     PEth  16:0/18:2 (PLPEth) 05/08/2024 <10  ng/mL Final    Reference ranges are not well established.     EER Phosphatidylethanol (PETH) 05/08/2024 See Note   Final    Authorized individuals can access the [employer]   Enhanced Report using the following link:      [email or web address]     PEth Interpretation 05/08/2024 See Comment   Final    Comment: Phosphatidylethanol (PEth) is a group of phospholipids   formed in the presence of ethanol, phospholipase D and   phosphatidylcholine. PEth is known to be a direct alcohol   biomarker. The predominant PEth homologues are PEth   16:0/18:1 (POPEth) and PEth 16:0/18:2 (PLPEth), which   account for 37-46% and 26-28% of the total PEth homologues,   respectively. PEth is incorporated into the phospholipid   membrane of red blood cells and has a general half-life of   4-10 days and a window of detection of 2-4 weeks. However,   the window of detection is longer in individuals who   chronically or excessively consume alcohol. The limit of   quantification is 10 ng/mL. Serial monitoring of PEth may   be helpful in monitoring alcohol abstinence over time. PEth   results should be interpreted in the context of the   patient's clinical and behavioral history.  Patients with advanced liver disease may have falsely   elevated PEth concentrations (Olivia SMITH et al 2018,   Alcoholism Clinical &                            Experimental Research).    This test was developed and its performance characteristics   determined by [facility]. It has not been cleared or   approved by the U.S. Food and Drug Administration. This   test was performed in a CLIA-certified laboratory and is   intended for clinical purposes.  Performed By: [facility]  [address] [place] [id number]  : Marco Mayer MD, PhD  CLIA Number: [id number]     WBC Count 05/08/2024 6.5  4.0 - 11.0 10e3/uL Final     RBC Count 05/08/2024 4.08  3.80 - 5.20 10e6/uL Final      Hemoglobin 05/08/2024 12.1  11.7 - 15.7 g/dL Final     Hematocrit 05/08/2024 35.5  35.0 - 47.0 % Final     MCV 05/08/2024 87  78 - 100 fL Final     MCH 05/08/2024 29.7  26.5 - 33.0 pg Final     MCHC 05/08/2024 34.1  31.5 - 36.5 g/dL Final     RDW 05/08/2024 12.6  10.0 - 15.0 % Final     Platelet Count 05/08/2024 165  150 - 450 10e3/uL Final     % Neutrophils 05/08/2024 59  % Final     % Lymphocytes 05/08/2024 29  % Final     % Monocytes 05/08/2024 6  % Final     % Eosinophils 05/08/2024 4  % Final     % Basophils 05/08/2024 1  % Final     % Immature Granulocytes 05/08/2024 1  % Final     NRBCs per 100 WBC 05/08/2024 0  <1 /100 Final     Absolute Neutrophils 05/08/2024 3.9  1.6 - 8.3 10e3/uL Final     Absolute Lymphocytes 05/08/2024 1.9  0.8 - 5.3 10e3/uL Final     Absolute Monocytes 05/08/2024 0.4  0.0 - 1.3 10e3/uL Final     Absolute Eosinophils 05/08/2024 0.2  0.0 - 0.7 10e3/uL Final     Absolute Basophils 05/08/2024 0.0  0.0 - 0.2 10e3/uL Final     Absolute Immature Granulocytes 05/08/2024 0.0  <=0.4 10e3/uL Final     Absolute NRBCs 05/08/2024 0.0  10e3/uL Final     C Peptide 05/08/2024 7.1 (H)  0.9 - 6.9 ng/mL Final     Glucose 05/08/2024 198 (H)  70 - 99 mg/dL Final     Patient Fasting > 8hrs? 05/08/2024 Unknown   Final     C Peptide 05/08/2024 7.8 (H)  0.9 - 6.9 ng/mL Final     Glucose 05/08/2024 188 (H)  70 - 99 mg/dL Final     Patient Fasting > 8hrs? 05/08/2024 Yes   Final   Again, thank you for allowing me to participate in the care of your patient.        Sincerely,        Dinah Marin MD

## 2024-05-09 NOTE — Clinical Note
5/9/2024         RE: Ciera Perkins  [address]  [place] [address]        Dear Colleague,    Thank you for referring your patient, Ciera Perkins, to the [facility]. Please see a copy of my visit note below.    No notes on file    Again, thank you for allowing me to participate in the care of your patient.        Sincerely,        Carlos Carmichael MD

## 2024-05-09 NOTE — PATIENT INSTRUCTIONS
Pancho Vang,  The Creon copay card website/program is accessible by [employer]  I put recommendations for dosing Creon in my note, which should be sent to your provider back home     If you have questions, please let me know    Dominique Benjamin  Registered Dietitian

## 2024-05-09 NOTE — NURSING NOTE
"Chief Complaint   Patient presents with    Pre-Op Exam     New auto islet consult       Vital signs:  Temp: 97.5  F (36.4  C) Temp src: Oral BP: 132/88 Pulse: 71     SpO2: 99 %     Height: 170 cm (5' 6.93\") Weight: 70.3 kg (155 lb)  Estimated body mass index is 24.33 kg/m  as calculated from the following:    Height as of this encounter: 1.7 m (5' 6.93\").    Weight as of this encounter: 70.3 kg (155 lb).      Zac Duncan RN on 5/9/2024 at 11:45 AM    "
within normal limits

## 2024-05-09 NOTE — PROGRESS NOTES
"[facility]  OUTPATIENT MNT: TP AIT EVALUATION    Current BMI: 25 (HT 66 in,  lbs/70 kg)  Goal BMI for TP AIT <30     TIME SPENT: 30 minutes  VISIT TYPE: Initial   REFERRING PHYSICIAN: Amol   PT ACCOMPANIED BY: her      NUTRITION ASSESSMENT  Pt has flares weekly and flares last a few days     - Previous RD education: yes  - Appetite: variable- some days can't eat at all x few days (during a flare)  - Food allergies/intolerances: none   - Issues chewing or swallowing: none   - H/o nutrition support:   Naso FT- vomits it up  GJ tube- several placements/replacements 2 years ago with many post placement issues (including wound vac)- never got to the point of being able to do feeds     Symptoms:  - Pain: 8/10 at baseline   - N/V: during a flare, compazine (helps)  - Bloating: occasional   - D/C: diarrhea daily x 5-6 x/day x 3 years- oily in nature   *Pt reports imodium or fiber have not been recommended or discussed at all     Vitamins, Supplements, Pertinent Meds: MVI, vit C, vit D  Herbal Medicines/Supplements: none    Protein Supplements: [place] Instant BF - 1x/week     PERT: Sancho Pep x 3 [id number] for ~1 month but did not feel it was helpful so came off (1071 ul/kg/meal- within therapeutic range)  Had been on Creon in the past (worked well), but too expensive so has never pursued this  **Fecal elastase LOW- 183 - 11/2023     Weight hx:   - slow weight loss of 80 lbs x 3 years  - weight continues to slowly trend down    PHYSICAL ACTIVITY  None d/t pain. Is in bed a lot.   Does own ADLs, but may need assistance if recently discharged from the hospital    DIET RECALL  On a \"good day\" eats 1 meal/day- spaghetti or pastas (includes protein- chicken, beef, pork); roast; stew  Snacks on fruit, veggies, popcorn, chips, some sweets  Katya- water, rare pop, no sports drinks (causes flare)  No alcohol     NUTRITION DIAGNOSIS  Inadequate oral intake related to altered GI function " related to chronic pancreatitis as evidenced by >80 lb weight loss x 3 years, s/sx of steatorrhea but not on PERT.    NUTRITION INTERVENTION  Nutrition education provided:  - Pt would benefit from PERT now. Recommend home provider prescribe Creon 24000 x 2 with meals and snacks (provides 686 ul/kg/meal), can titrate up as needed. Take WITH meals. Discussed Creon copay card system- [employer]   - Consider some other beverage to help provide electrolytes with diarrhea- pt suggested coconut water.     Brief overview of what to expect from nutrition standpoint post TP AIT:   -- Enteral nutrition regimen and anticipated diet advancement   -- Ongoing need for PERT  -- Vitamin/mineral needs, assessing for fat-soluble vitamin deficiencies  -- Need for DM education and brief overview of cho counting    Patient Understanding: Pt verbalized understanding of education provided.  Expected Engagement: Good  Follow-Up Plans: PRN     NUTRITION GOALS  Start Creon to help with EPI sx     Dominique Benjamin, RD, LD, CCTD

## 2024-05-10 LAB
GAD65 AB SER IA-ACNC: <5 IU/ML
LABORATORY REPORT: NORMAL
PANC ISLET CELL AB TITR SER: NORMAL {TITER}
PETH INTERPRETATION: NORMAL
PLPETH BLD-MCNC: <10 NG/ML
POPETH BLD-MCNC: <10 NG/ML

## 2024-05-11 LAB
A-TOCOPHEROL VIT E SERPL-MCNC: 5.6 MG/L
ANABASINE UR-MCNC: <5 NG/ML
ANNOTATION COMMENT IMP: NORMAL
BETA+GAMMA TOCOPHEROL SERPL-MCNC: 1.8 MG/L
COTININE UR-MCNC: >2000 NG/ML
DEPRECATED CALCIDIOL+CALCIFEROL SERPL-MC: <28 UG/L (ref 20–75)
NICOTINE UR-MCNC: 398 NG/ML
RETINYL PALMITATE SERPL-MCNC: <0.02 MG/L
TRANS-3-OH-COTININE UR-MCNC: >2000 NG/ML
VIT A SERPL-MCNC: 0.41 MG/L
VITAMIN D2 SERPL-MCNC: <5 UG/L
VITAMIN D3 SERPL-MCNC: 23 UG/L

## 2024-05-11 NOTE — PROGRESS NOTES
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  Chronic pancreatitis- following an episode of severe acute necrotizing pancreatitis in 2021 with persistent disabling pain.    Ciera is a 39 year old with pancreatitis, considering surgical management.  She has glycemic testing that is diagnostic for pre-diabetes, with notable high post-MMTT glucoses (not diagnostic but trending into DM range).  What is somewhat reassuring is that her C-peptide response, although inadequate to control glycemia, is fairly robust, meaning she has functional islets there that we can likely isolate to benefit glycemic control.  We did talk about my expectation if she has surgery is that she will be on insulin but with the goal of retaining islet function for near normal glycemic control.    We did also talk quite a bit about the long duration of pain she has experienced, and daily opioid use, and how she is likely to have central sensitization and opioid induced hyperalgesisa that will be present after surgery.  This is very common and the goal of surgery would be reduce pain to a point that she is functional and ideally can wean off opioids (slowly) but that she is unlikely to be pain free, aiming for a place where pain level is low enough that she stays out of the hospital and does 'normal life stuff'.  She has an excellent care team at home including a pain management physician and PCP who are both involved, and she does seem motivated to wean opioids and would like to become more active after surgery.   We did also discuss physical conditioning going into surgery and would recommend being able to walk for ~1 mile per day (even if that is 1/4 mile 4 x per day around the house or block).   Will need to discontinue vaping before she could  move ahead with TPIAT.    The primary purpose of today's visit was to review the patient's endocrine history and provide counseling on islet autotransplantation.  We discussed the procedure of islet autotransplant, the post-operative management, and the prognosis.  We specifically discussed that all patients are on insulin after this procedure, typically for at least several months.  About 30% of patients will become insulin independent.  However, there remains a very high lifetime risk for diabetes.  Of the patients who require life long insulin therapy, most will have some graft function and a significant portion can maintain reasonable glycemic control on once injection per day of basal insulin.  However, about  30-40% of patients will require 4-6 injections per day or an insulin pump for management.  Only 10% have no or minimal islet function;  These patients are at higher risk for a labile form of diabetes mellitus that can be difficult to manage.  The main goal of the islet autotransplant procedure is to preserve enough beta cell mass to make diabetes manageable.  Because of the high risk of diabetes mellitus, all patients must be willing to accept diabetes and insulin injections as a trade off for relief from pancreatic pain.    All surgical consults are reviewed by our multi-disciplinary team to determine if surgery is an appropriate next option.  CHRISTOPHER Marin MD  [facility] [place]  Phone:  [phone]  Fax:  [phone]    75 minutes spent by me on the date of the encounter doing chart review, history and exam, documentation and further activities per the note        CC:  Chronic pancreatitis, surgical evaluation    HPI:  Ciera Perkins is a 39 year old female referred by Dr. Misty Brito  for new patient consultation of endocrine function in the setting of chronic pancreatitis.  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     Pancreatitis history from the patient is as follows:  Ciera is a 39 year old female who reports a history of obesity and fertility/ gynecology complications but feels she was otherwise was doing quite well until she developed a respiratory infection 2 years ago, was treated with prednisone.  While on prednisone in 1/2021, she had acute pancreatitis, subsequently evolving into necrotizing pancreatitis which she attributes to this prednisone course.  Since then she has never recovered.     I reviewed records from 2021 from [place] and noted CT 1/2021 reported as showing mild acute pancreatitis, 3/14/21 and 4/21 showing acute necrotizing pancreatitis.   She is now in the hospital 2-3 times per month, will stay for 5 days, go home a few days, and then get readmitted.  She had genetic testing and was found to carry CFTR mutations.  She has pain daily and is on dilaudid for pain control.  She lost 50 pounds in 2021. She had a feeding tube x 3 but had a number of complications at the GJ site that led to discontinuation, though she did tolerate feeds OK for the time she was on it.  She has had 4 celiac blocks and had pain relief for a few months with the first but not the others.   No ERCP procedures.  Many relatives on mom's side with DM:  both maternal grandparents, an aunt and uncle.   Dad's side of family not known.   She has pre-diabetes--  after Boost glucoses are notably 180-190s.  However C-peptide is moderately high.  She is motivated to get off opioids and also would like to get to running long-distances after surgical recovery.      Evaluation/ imaging/ treatments:  Elevated amylase and lipase  by history:  Yes, AP confirmed by lab and imaging (at least 2-3 separate episodes vs prolonged continuous episode?) from 1/2021- 1/2022.  First lipase in Jan 2021 below.  At least 5 CT in this time period with AP.  Lipase  Order: [id number]  Component  Ref Range & Units 3 yr ago   Lipase  <300 U/L 3,915 High    Resulting Agency [facility]       Etiology of disease: there was initially a question of drug-related, but she has since been found to carry CFTR (need genetic report to confirm mutations).  Number of hospitalizations in last 1 year: too many to count- several per month  Recent imaging studies:   4/24/24 CT scan  PANCREAS: Parenchymal atrophy. Homogeneous enhancement. Similar fat   stranding surrounding the splenic and common hepatic artery origins. No   peripancreatic fluid collection. No solid mass or main ductal   dilatation.   MRI 5/5/22 (MR angiogram and not MRCP):  PANCREAS: Fatty atrophy of the pancreas. No mass or ductal dilatation.   SPLEEN: Splenomegaly measuring 15.7 cm.     Abdominal aorta:Normal in caliber.   Celiac:Widely patent.   Common hepatic: Separate origin directly from the aorta. No evidence of   stenosis.   SMA: Moderate stenosis of the proximal superior mesenteric artery with   poststenotic dilatation. Soft tissue thickening surrounding the proximal   superior mesenteric artery on prior CT exam is not as well visualized on   this MR examination due to protocol.   XAVI: Mild stenosis at the origin.   Renal arteries: There are two renal arteries bilaterally and widely   patent.     Medical treatment(s): pain management, GJ  ERCP procedures: NO;     Prior pancreatic surgery: no  + celiac nerve block as noted above  No cholecystectomy    She did have a history of insulin treatment during necrotizing pancreatitis      Review of Systems:  A comprehensive 10 point review of systems was performed and was negative except as noted in the HPI above.    Past Medical  History:  No past medical history on file.  Past Surgical History:   Procedure Laterality Date    IR NG TUBE PLACEMENT REQ RAD & FLUORO  5/24/2021    IR NG TUBE PLACEMENT REQ RAD & FLUORO  5/21/2021   PAST MEDICAL HISTORY  Past Medical History     Past Medical History:   Diagnosis Date   Acute necrotizing pancreatitis 11/16/2023   Acute on chronic pancreatitis (CMS/HCC) 11/03/2021   Allergy 1990   Aspirin and bee stings   Anxiety 2022   Chronic, continuous use of opioids 11/13/2021   COVID-19 vaccine series started   Pfizer 8/2021 x 1 dose then stated went into bradycardia and was advised not to get 2nd dose   Cyst of ovary 10/05/2021   Depression   6/15/2022: situational; no meds at present   Hyperlipidemia   Hypertriglyceridemia 10/05/2021   Last Assessment & Plan: Formatting of this note might be different from the original. Continue home statin Formatting of this note might be different from the original. Last Assessment & Plan: Formatting of this note might be different from the original. Continue home statin   Obesity with body mass index 30 or greater 10/05/2021   Pancreatitis 01/02/2021   Severe protein-calorie malnutrition (CMS/HCC) 01/19/2022   HARLAN (stress urinary incontinence, female) 07/02/2019   Formatting of this note might be different from the original. Formatting of this note might be different from the original. Recommend 3 sets of 10 Kegel's and vaginal weights daily. Formatting of this note might be different from the original. Recommend 3 sets of 10 Kegel's and vaginal weights daily.   Superior mesenteric artery stenosis (CMS/HCC) 06/23/2021   Formatting of this note might be different from the original. Last Assessment & Plan: Formatting of this note might be different from the original. Continue home statin Has been evaluated by vascular surgery; felt that her stenosis is related to inflammation from her pancreatitis Consider re-evaluation with CT angiogram of the abdomen if patient has  ongoing pain Last Assessment & Plan: Formattin   Tobacco dependence 07/02/2019   quit 2021; 1/2ppd x 8 years   Type 2 diabetes mellitus treated with insulin (CMS/[facility]) 06/14/2021   2/2 pancreatic insufficiency On amaryl 1mg daily Recent nighttime blood sugars in the 90's, not really eating or taking enteral nutrition levemir 7u nightly with blood glucose over 150 POC A1c was 5.8 today Last Assessment & Plan: Formatting of this note might be different from the original. Please stop amaryl Continue current guid   Visceral hyperalgesia 09/01/2023       PAST SURGICAL HISTORY  Past Surgical History     Past Surgical History:   Procedure Laterality Date   ANGIOGRAM N/A 7/5/2022   Procedure: ANGIOGRAM Mesenteric Fem Access (SMA), poss L brachial access, poss angioplasty, poss stent; Surgeon: Josy Casiano MD; Location: [facility] OR; Service: Vascular Surgery; Laterality: N/A;   APPENDECTOMY 09/2005   CHOLECYSTECTOMY 08/2021   IR ANGIOGRAM VISCERAL N/A 7/5/2022   Procedure: IR Visceral Angiogram; Surgeon: Joys Casiano MD; Location: [facility] OR; Service: Vascular Surgery; Laterality: N/A;   IR CT ANGIO ABDOMEN N/A 8/31/2023   Procedure: IR CT Abdomen; Surgeon: Tani Tan MD; Location:  Interventional Radiology; Service: Radiology; Laterality: N/A;   IR NERVE BLOCK N/A 8/31/2023   Procedure: IR Nerve Block; Surgeon: Tani Tan MD; Location:  Interventional Radiology; Service: Radiology; Laterality: N/A;   IR PICC INSERTION TBD in OR 6/14/2023   Procedure: IR Picc Line Insertion; Surgeon: Vernon Faith PA-C; Location:  Interventional Radiology; Service: Radiology; Laterality: TBD in OR;   IR PICC INSERTION TBD in OR 6/7/2023   Procedure: IR Picc Line Insertion; Surgeon: Anmol Kearns PA-C; Location:  Interventional Radiology; Service: Radiology; Laterality: TBD in OR;   IR PORT PLACEMENT TBD in OR 7/3/2023   Procedure: IR Port Placement; Surgeon: Vernon Faith PA-C;  "Location:  Interventional Radiology; Service: Radiology; Laterality: TBD in OR;   IR ULTRASOUND VASCULAR GUIDANCE N/A 6/7/2023   Procedure: IR Ultrasound Vascular Guidance; Surgeon: Anmol Kearns PA-C; Location:  Interventional Radiology; Service: Radiology; Laterality: N/A;   IR ULTRASOUND VASCULAR GUIDANCE N/A 7/3/2023   Procedure: IR Ultrasound Vascular Guidance; Surgeon: Vernon Faith PA-C; Location:  Interventional Radiology; Service: Radiology; Laterality: N/A;   LAPAROSCOPIC GJ TUBE CREATION Midline 08/2021   was removed on september 2021   PC-TOTAL ABDOMINAL HYSTERECTOMY (CORPUS & CERVIX), W/WO REMOVAL OF TUBE OR OVARY 2015   TONSILLECTOMY AND ADENOIDECTOMY   TONSILLECTOMY AND ADENOIDECTOMY 1996   VAGINAL HYSTERECTOMY 04/2016     Current medications:  Current Outpatient Medications   Medication Sig Dispense Refill    HYDROmorphone (DILAUDID) 4 MG tablet Take 4 mg by mouth every 3 hours         Family History:  The family history was reviewed with particular attention to diabetes and pancreatitis history, and updated by me as pertinent, and is as documented below.      Family History: family history includes Aneurysm in her mother; Cancer in her maternal aunt, maternal grandfather, maternal grandmother, and mother; Diabetes in her maternal aunt and maternal grandfather; High cholesterol in her maternal aunt, maternal grandfather, and mother; Stroke in her mother.     Social History:  Social History     Social History Narrative    Not on file   From [place], here with significant other.    Physical Exam:  Vitals: /88 (BP Location: Right arm, Patient Position: Chair, Cuff Size: Adult Regular)   Pulse 71   Temp 97.5  F (36.4  C) (Oral)   Ht 1.7 m (5' 6.93\")   Wt 70.3 kg (155 lb)   SpO2 99%   BMI 24.33 kg/m    BMI= Body mass index is 24.33 kg/m .  General: in pain, laying on table  HEENT:  NC/AT, sclera appear white  Neck:  No obvious thyromegaly  CV/Lungs:  Non distressed " "breathing  Skin:  No apparent rashes  Neuro:  Normal mental status  Psych:  Normal affect      Results:  Mixed Meal Test:  C Peptide   Date Value Ref Range Status   05/08/2024 7.8 (H) 0.9 - 6.9 ng/mL Final   05/08/2024 7.1 (H) 0.9 - 6.9 ng/mL Final   05/08/2024 4.3 0.9 - 6.9 ng/mL Final     Glucose   Date Value Ref Range Status   05/08/2024 188 (H) 70 - 99 mg/dL Final   05/08/2024 198 (H) 70 - 99 mg/dL Final   05/08/2024 122 (H) 70 - 99 mg/dL Final       Hemoglobin A1c  Lab Results   Component Value Date    A1C 6.2 05/08/2024       Liver enzymes  Lab Results   Component Value Date    AST 36 05/08/2024     Lab Results   Component Value Date    ALT 38 05/08/2024     No results found for: \"BILICONJ\"   Lab Results   Component Value Date    BILITOTAL 0.5 05/08/2024     Lab Results   Component Value Date    ALBUMIN 4.0 05/08/2024     Lab Results   Component Value Date    PROTTOTAL 7.2 05/08/2024      Lab Results   Component Value Date    ALKPHOS 108 05/08/2024       Creatinine:  Creatinine   Date Value Ref Range Status   05/08/2024 0.71 0.51 - 0.95 mg/dL Final   ]    Complete Blood Count:  CBC RESULTS:   Recent Labs   Lab Test 05/08/24  0853   WBC 6.5   RBC 4.08   HGB 12.1   HCT 35.5   MCV 87   MCH 29.7   MCHC 34.1   RDW 12.6          Cholesterol  Lab Results   Component Value Date    CHOL 128 05/08/2024     Lab Results   Component Value Date    HDL 42 05/08/2024     Lab Results   Component Value Date    LDL 72 05/08/2024     Lab Results   Component Value Date    TRIG 69 05/08/2024     No results found for: \"CHOLHDLRATIO\"      Lab on 05/08/2024   Component Date Value Ref Range Status    Sodium 05/08/2024 140  135 - 145 mmol/L Final    Reference intervals for this test were updated on 09/26/2023 to more accurately reflect our healthy population. There may be differences in the flagging of prior results with similar values performed with this method. Interpretation of those prior results can be made in the context of " the updated reference intervals.     Potassium 05/08/2024 4.5  3.4 - 5.3 mmol/L Final    Carbon Dioxide (CO2) 05/08/2024 28  22 - 29 mmol/L Final    Anion Gap 05/08/2024 8  7 - 15 mmol/L Final    Urea Nitrogen 05/08/2024 7.2  6.0 - 20.0 mg/dL Final    Creatinine 05/08/2024 0.71  0.51 - 0.95 mg/dL Final    GFR Estimate 05/08/2024 >90  >60 mL/min/1.73m2 Final    Calcium 05/08/2024 9.2  8.6 - 10.0 mg/dL Final    Chloride 05/08/2024 104  98 - 107 mmol/L Final    Glucose 05/08/2024 122 (H)  70 - 99 mg/dL Final    Alkaline Phosphatase 05/08/2024 108  40 - 150 U/L Final    Reference intervals for this test were updated on 11/14/2023 to more accurately reflect our healthy population. There may be differences in the flagging of prior results with similar values performed with this method. Interpretation of those prior results can be made in the context of the updated reference intervals.    AST 05/08/2024 36  0 - 45 U/L Final    Reference intervals for this test were updated on 6/12/2023 to more accurately reflect our healthy population. There may be differences in the flagging of prior results with similar values performed with this method. Interpretation of those prior results can be made in the context of the updated reference intervals.    ALT 05/08/2024 38  0 - 50 U/L Final    Reference intervals for this test were updated on 6/12/2023 to more accurately reflect our healthy population. There may be differences in the flagging of prior results with similar values performed with this method. Interpretation of those prior results can be made in the context of the updated reference intervals.      Protein Total 05/08/2024 7.2  6.4 - 8.3 g/dL Final    Albumin 05/08/2024 4.0  3.5 - 5.2 g/dL Final    Bilirubin Total 05/08/2024 0.5  <=1.2 mg/dL Final    Patient Fasting > 8hrs? 05/08/2024 Yes   Final    Vitamin A 05/08/2024 0.41  0.30 - 1.20 mg/L Final    Retinol Palmitate 05/08/2024 <0.02  0.00 - 0.10 mg/L Final    Vitamin A  Interp 05/08/2024 Normal   Final      This test was developed and its performance characteristics   determined by [facility]. It has not been cleared or   approved by the US Food and Drug Administration. This test   was performed in a CLIA certified laboratory and is   intended for clinical purposes.  Performed By: [facility]  [address] [place] [address]  : Marco Mayer MD, PhD  CLIA Number: [id number]    Vitamin E 05/08/2024 5.6  5.5 - 18.0 mg/L Final      This test was developed and its performance characteristics   determined by [facility]. It has not been cleared or   approved by the US Food and Drug Administration. This test   was performed in a CLIA certified laboratory and is   intended for clinical purposes.    Vitamin E Gamma 05/08/2024 1.8  0.0 - 6.0 mg/L Final    Performed By: [facility]  [address] [place] [address]  : Marco Mayer MD, PhD  CLIA Number: [id number]    Hemoglobin A1C 05/08/2024 6.2 (H)  <5.7 % Final    Normal <5.7%   Prediabetes 5.7-6.4%    Diabetes 6.5% or higher     Note: Adopted from ADA consensus guidelines.    Lipase 05/08/2024 7 (L)  13 - 60 U/L Final    Amylase 05/08/2024 12 (L)  28 - 100 U/L Final    Cholesterol 05/08/2024 128  <200 mg/dL Final    Triglycerides 05/08/2024 69  <150 mg/dL Final    Direct Measure HDL 05/08/2024 42 (L)  >=50 mg/dL Final    LDL Cholesterol Calculated 05/08/2024 72  <=100 mg/dL Final    Non HDL Cholesterol 05/08/2024 86  <130 mg/dL Final    Patient Fasting > 8hrs? 05/08/2024 Yes   Final    25 OH Vitamin D2 05/08/2024 <5  ug/L Final    25 OH Vitamin D3 05/08/2024 23  ug/L Final    25 OH Vit D Total 05/08/2024 <28  20 - 75 ug/L Final    Season, race, dietary intake, and treatment affect the concentration of 25-hydroxy-Vitamin D. Values may decrease during winter months and increase during summer months. Values 20-29 ug/L may indicate Vitamin D  insufficiency and values <20 ug/L may indicate Vitamin D deficiency.    CEA 05/08/2024 1.9  ng/mL Final    Nonsmoker (past/never) <=5.0 ng/mL   Current smoker <=6.5 ng/mL     This result is obtained using the Roche Elecsys CEA method on the charbel [id number] immunoassay analyzer. Results obtained with different assay methods or kits cannot be used interchangeably.    Cancer Antigen 19-9 05/08/2024 11  <=35 U/mL Final    INTERPRETIVE INFORMATION: Cancer Antigen-GI (CA 19-9)    This test uses Roche CA 19-9 electrochemiluminescent   immunoassay. Results obtained with different test methods   or kits cannot be used interchangeably. CA 19-9 value is   useful in monitoring pancreatic, hepatobiliary, gastric,   hepatocellular, and colorectal cancer. CA 19-9 value,   regardless of level, should not be interpreted as absolute   evidence of the presence or absence of malignant disease.  Performed By: [facility]  [address] [place] [address]  : Marco Mayer MD, PhD  CLIA Number: [id number]    Glutamic Acid Decarboxylase Antibo* 05/08/2024 <5.0  0.0 - 5.0 IU/mL Final    INTERPRETIVE INFORMATION:  Glutamic Acid Decarboxylase   Antibody    A value greater than 5.0 IU/mL is considered positive for   Glutamic Acid Decarboxylase Antibody (FLORENTINO Ab). This assay   is intended for the semi-quantitative determination of the   FLORENTINO Ab in human serum. Results should be interpreted within   the context of clinical symptoms.  Performed By: [facility]  [address] [place] [address]  : Marco Mayer MD, PhD  CLIA Number: [id number]    Islet Cell Antibody IgG 05/08/2024 <1:4  <1:4 Final    INTERPRETIVE INFORMATION: Islet Cell Ab, IgG    Islet cell antibodies (ICAs) are associated with type 1   diabetes (TID), an autoimmune endocrine disorder. ICAs may   be present years before the onset of clinical symptoms. To   calculate Juvenile Diabetes Foundation (JDF) units:    multiply the titer x 5 (1:8  8 x 5 = 40 JDF Units).    This test was developed and its performance characteristics   determined by [facility]. It has not been cleared or   approved by the US Food and Drug Administration. This test   was performed in a CLIA certified laboratory and is   intended for clinical purposes.  Performed By: [facility]  [address] [place] [id number]  : Marco Mayer MD, PhD  CLIA Number: [id number]    C Peptide 05/08/2024 4.3  0.9 - 6.9 ng/mL Final    Iron 05/08/2024 38  37 - 145 ug/dL Final    Iron Binding Capacity 05/08/2024 299  240 - 430 ug/dL Final    Iron Sat Index 05/08/2024 13 (L)  15 - 46 % Final    Ferritin 05/08/2024 274 (H)  6 - 175 ng/mL Final    Hepatitis C Antibody 05/08/2024 Nonreactive  Nonreactive Final    A nonreactive screening test result does not exclude the possibility of exposure to or infection with HCV. Nonreactive screening test results in individuals with prior exposure to HCV may be due to antibody levels below the limit of detection of this assay or lack of reactivity to the HCV antigens used in this assay. Patients with recent HCV infections (<3 months from time of exposure) may have false-negative HCV antibody results due to the time needed for seroconversion (average of 8 to 9 weeks).    Cotinine Confirm 05/08/2024 >2000  ng/mL Final    Nicotine Confirmation Urine 05/08/2024 398  ng/mL Final    Comment: INTERPRETIVE INFORMATION: Nicotine and Metabolites,                             Urine, Quantitative    Methodology: Quantitative Liquid Chromatography-Tandem Mass   Spectrometry    Positive cutoff:  Nicotine  15 ng/mL  Cotinine  15 ng/mL  3-OH-Cotinine 50 ng/mL  Anabasine        5 ng/mL    For medical purposes only; not valid for forensic use.     This test is designed to evaluate recent use of   nicotine-containing products.  Passive and active exposure   cannot be discriminated definitively, although a  cutoff of   100 ng/mL cotinine is frequently used for surgery   qualification purposes.  For smoking cessation programs or   compliance testing, the absence of expected drug(s) and/or   drug metabolite(s) may indicate non-compliance,   inappropriate timing of specimen collection relative to   drug administration, poor drug absorption,   diluted/adulterated urine, or limitations of testing. The   concentration value must be greater than or equal to the   cutoff to be reported as                            positive. Anabasine is included as   a biomarker of tobacco use, versus nicotine replacement.    Interpretive questions should be directed to the   laboratory.     This test was developed and its performance characteristics   determined by [employer]. It has not been cleared or   approved by the US Food and Drug Administration. This test   was performed in a CLIA certified laboratory and is   intended for clinical purposes.  Performed By: [employer]  [address] [place] [address]  : Marco Mayer MD, PhD  CLIA Number: [id number]    3-YC-Ypyemjwu, Urn, Quant 05/08/2024 >2000  ng/mL Final    Anabasine, Urn, Quant 05/08/2024 <5  ng/mL Final    PEth 16:0/18:1 (POPEth) 05/08/2024 <10  ng/mL Final    PEth 16:0/18:1 (POPEth)  Less than 10 ng/mL............Not detected  Less than 20 ng/mL............Abstinence or light alcohol   consumption  20 - 200 ng/mL................Moderate alcohol consumption  Greater than 200 ng/mL........Heavy alcohol consumption or   chronic alcohol use    (Reference: ERIK Kelly and MIKY Duncan 2018 J. Forensic Sci)    PEth 16:0/18:2 (PLPEth) 05/08/2024 <10  ng/mL Final    Reference ranges are not well established.    EER Phosphatidylethanol (PETH) 05/08/2024 See Note   Final    Authorized individuals can access the [email or web address]   Enhanced Report using the following link:      [email or web address]    PEth Interpretation  05/08/2024 See Comment   Final    Comment: Phosphatidylethanol (PEth) is a group of phospholipids   formed in the presence of ethanol, phospholipase D and   phosphatidylcholine. PEth is known to be a direct alcohol   biomarker. The predominant PEth homologues are PEth   16:0/18:1 (POPEth) and PEth 16:0/18:2 (PLPEth), which   account for 37-46% and 26-28% of the total PEth homologues,   respectively. PEth is incorporated into the phospholipid   membrane of red blood cells and has a general half-life of   4-10 days and a window of detection of 2-4 weeks. However,   the window of detection is longer in individuals who   chronically or excessively consume alcohol. The limit of   quantification is 10 ng/mL. Serial monitoring of PEth may   be helpful in monitoring alcohol abstinence over time. PEth   results should be interpreted in the context of the   patient's clinical and behavioral history.  Patients with advanced liver disease may have falsely   elevated PEth concentrations (Olivia SMITH et al 2018,   Alcoholism Clinical &                            Experimental Research).    This test was developed and its performance characteristics   determined by [facility]. It has not been cleared or   approved by the U.S. Food and Drug Administration. This   test was performed in a CLIA-certified laboratory and is   intended for clinical purposes.  Performed By: [facility]  [address] [place] [id number]  : Marco Mayer MD, PhD  CLIA Number: [id number]    WBC Count 05/08/2024 6.5  4.0 - 11.0 10e3/uL Final    RBC Count 05/08/2024 4.08  3.80 - 5.20 10e6/uL Final    Hemoglobin 05/08/2024 12.1  11.7 - 15.7 g/dL Final    Hematocrit 05/08/2024 35.5  35.0 - 47.0 % Final    MCV 05/08/2024 87  78 - 100 fL Final    MCH 05/08/2024 29.7  26.5 - 33.0 pg Final    MCHC 05/08/2024 34.1  31.5 - 36.5 g/dL Final    RDW 05/08/2024 12.6  10.0 - 15.0 % Final    Platelet Count 05/08/2024 165  150 - 450  10e3/uL Final    % Neutrophils 05/08/2024 59  % Final    % Lymphocytes 05/08/2024 29  % Final    % Monocytes 05/08/2024 6  % Final    % Eosinophils 05/08/2024 4  % Final    % Basophils 05/08/2024 1  % Final    % Immature Granulocytes 05/08/2024 1  % Final    NRBCs per 100 WBC 05/08/2024 0  <1 /100 Final    Absolute Neutrophils 05/08/2024 3.9  1.6 - 8.3 10e3/uL Final    Absolute Lymphocytes 05/08/2024 1.9  0.8 - 5.3 10e3/uL Final    Absolute Monocytes 05/08/2024 0.4  0.0 - 1.3 10e3/uL Final    Absolute Eosinophils 05/08/2024 0.2  0.0 - 0.7 10e3/uL Final    Absolute Basophils 05/08/2024 0.0  0.0 - 0.2 10e3/uL Final    Absolute Immature Granulocytes 05/08/2024 0.0  <=0.4 10e3/uL Final    Absolute NRBCs 05/08/2024 0.0  10e3/uL Final    C Peptide 05/08/2024 7.1 (H)  0.9 - 6.9 ng/mL Final    Glucose 05/08/2024 198 (H)  70 - 99 mg/dL Final    Patient Fasting > 8hrs? 05/08/2024 Unknown   Final    C Peptide 05/08/2024 7.8 (H)  0.9 - 6.9 ng/mL Final    Glucose 05/08/2024 188 (H)  70 - 99 mg/dL Final    Patient Fasting > 8hrs? 05/08/2024 Yes   Final

## 2024-05-14 NOTE — PROGRESS NOTES
Psychosocial Assessment For Total Pancreatectomy and Auto Islet Cell Transplant  Patient Name/ Age: Ciera Perkins 39 year old   Medical Record #: [id number]  Duration of Interview:     60 min  Process:   Face-to-Face Interview                (counseling < 50%)   Present at Appointment: Ciera and her , Dinesh         : KOKO Barrera  Date:  May 14, 2024            Current Living Situation    Location:   [address] [place] [address]  With Whom: lives with their family       Family/ Social Support:    Pt and her  live together in their home located in [place]. Their 24 year old son, Dinesh ESTEBAN lives with them too.  available, helpful   Committed relationship: Pt is  to Dinesh      stable/supportive   Other supports: Pt and Dinesh identify their close friend, Yamile as a primary support post transplant. They also mention Yamile's daughter, Ayesha is also available and willing to help with support. Pt and Dinesh report Yamile or Ayesha will primarily be the ones staying with pt post surgery. Additionally, they name friends, Rinku and Richardson as supports.   available, helpful       Activities/ Functional Ability    Current level: Pt reports it is not unusual for her to spend 90 percent of her days in bed or in the ED due to ongoing health issues. independent with ADL's     Transportation other:       Vocational/Employment/Financial     Employment   unemployed and disabled   Job Description      Income   SSD   Insurance. Medicare part A & B and BCBS through 's employer      At this time, patient can afford medication costs:  Yes  private insurance and Medicare       Medical Status    Complications Diabetes mellitus        Behavioral    Tobacco Use  Pt reports she has stopped using cigarettes but does vape. She is working on limiting use.  Chemical Dependency No         Psychiatric Impairment No. Pt denies any hx of formal MH diagnoses. No hx of ED  visits related to MH. Not currently taking MH medication or seeing OP MH providers.       Reading Ability Good  Education Level: not asked  Recent Legal History No      Coping Style/Strategies: crying, taking a nap, verbally processing with her .        Ability to Adhere to Complex Medical Regime: Yes     Adherence History: Pt reports she attends appointments as scheduled and takes medication as prescribed.        Education  _X_ Rehabilitation  _X_ Community resources  _X_ Post discharge physician (Pain Specialist, Endocrinologist)  _X_ Post discharge housing  _X_ Financial resources  _X_ Medical insurance options  _X_ Psych adjustment  _X_ Family adjustment  _X_ Health Care Directive - Provided information. NOK - , Dinesh.         Notable Items:   Discussed the need to remain locally for up to four weeks post discharge from the hospital and what lodging options are available. Also discussed availability and cost of weekly and monthly parking passes if needed. Provided patient with the Pre TP-IAT Cheat Sheet.       Final Evaluation/Assessment   Patient seemed to process information well. Appeared well informed, motivated and able to follow post transplant requirements. Behavior was appropriate during interview. Has adequate income and insurance coverage. Adequate social support. No major contraindications noted for transplant.       Recommendation  Acceptable       Signature: KOKO Barrera    Title: Clinical

## 2024-05-15 ENCOUNTER — DOCUMENTATION ONLY (OUTPATIENT)
Dept: TRANSPLANT | Facility: CLINIC | Age: 39
End: 2024-05-15
Payer: COMMERCIAL

## 2024-05-16 ENCOUNTER — TELEPHONE (OUTPATIENT)
Dept: TRANSPLANT | Facility: CLINIC | Age: 39
End: 2024-05-16
Payer: COMMERCIAL

## 2024-05-16 NOTE — TELEPHONE ENCOUNTER
[facility] Call Center    Phone Message    May a detailed message be left on voicemail: yes     Reason for Call: Other: Patient calling looking for information on the outcome of last nights meeting regarding her surgery. Patient is requesting a call back to discuss outcomes.     Action Taken: Message routed to:  Other: RNCC    Travel Screening: Not Applicable

## 2024-05-19 ENCOUNTER — HEALTH MAINTENANCE LETTER (OUTPATIENT)
Age: 39
End: 2024-05-19

## 2024-05-22 LAB — INSULIN AB SER IA-ACNC: <0.4 U/ML

## 2024-05-31 ENCOUNTER — DOCUMENTATION ONLY (OUTPATIENT)
Dept: TRANSPLANT | Facility: CLINIC | Age: 39
End: 2024-05-31
Payer: COMMERCIAL

## 2024-05-31 NOTE — PROGRESS NOTES
Patient's case discussed at [facility] Pancreatitis Working [facility] on 5/15/24.     After team discussion, it was determined that patient is a candidate for a Total Pancreatectomy-Islet Auto Transplant pending pre-hab requirements below.     Patient requirements before scheduling surgery:     -Needs to provide team with genetic testing results   -Needs hepatology consult and fibroscan  -Needs to work with PT on strength and conditioning--working towards walking one mile daily  -QUIT VAPING    Patient's primary coordinator will review prehab plan with her.       Mahnaz Buckner RN BSN  Transplant coordinator   Total Pancreatectomy and Islet Auto Transplant program     Phone: [phone]  Toll Free: [phone]  Fax: [phone]  Pager: [phone]

## 2024-06-05 ENCOUNTER — PREP FOR PROCEDURE (OUTPATIENT)
Dept: TRANSPLANT | Facility: CLINIC | Age: 39
End: 2024-06-05
Payer: COMMERCIAL

## 2024-06-05 DIAGNOSIS — K86.1 CHRONIC PANCREATITIS (H): Primary | ICD-10-CM

## 2024-06-05 DIAGNOSIS — R10.9 ABDOMINAL PAIN: ICD-10-CM

## 2024-06-06 ENCOUNTER — TRANSFERRED RECORDS (OUTPATIENT)
Dept: HEALTH INFORMATION MANAGEMENT | Facility: CLINIC | Age: 39
End: 2024-06-06
Payer: COMMERCIAL

## 2024-07-05 ENCOUNTER — HOME INFUSION (PRE-WILLOW HOME INFUSION) (OUTPATIENT)
Dept: PHARMACY | Facility: CLINIC | Age: 39
End: 2024-07-05
Payer: COMMERCIAL

## 2024-07-05 NOTE — PROGRESS NOTES
Therapy: Enteral Nutrition (Relizorb)  Insurance: [facility]    This patient has coverage for Enteral Nutrition (Relizorb) through their [facility] [place] plan, patient has a deductible of $750.00 met $750.00, once pt meets the deductible they are covered at 80%. Patient has an out of pocket of $4750.00 met $4750.00. Once the out of pocket was met pt is covered at 100%.     Enteral nutrition must require prescription. Any OTC enteral nutrition available without prescription are not covered.     In reference to Auto-Islet referral.  Please contact Intake with any questions, [phone] or In Basket pool, [facility] ([phone]).

## 2024-07-17 NOTE — PROGRESS NOTES
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 Evaluation to discuss surgical options.    Operative approach: pancreas atrophic, small duct, no evident ductal disconnect or large stone burden. Hepatic arterial anatomy normal, splenic separates from celiac very proximally. Portal, splenic, and superior mesenteric veins open but does have some collaterals around uncinate process of unknown significance.  -optimal surgery would be TPIAT with cholecystectomy. Not a drainage or partial resection candidate    Hepatic steatosis: will need hepatology consult and fibroscan prior to moving forward with TPIAT to verify safe to infuse islets into liver. On CT liver does not look cirrhotic or scarred.     Smoking cessation: will need to stop prior to surgery    Analgesic management: currently on chronic narcotics but hopeful to stop following surgery. Advised that this may persist following surgery but the goal is at least to stop acute pain episodes and make her pain manageable, if not completely resolve it over time. We will work with her home pain providers on pain management and weaning schedules after surgery.     Functional status: has had significant weight loss and debility with pancreatitis. Will need to increase exercise tolerance prior to surgery to facilitate recovery    Medical Decision Making: High  Total time: 60 minutes, 30 of which was spent in face to face counseling    Carlos Carmichael MD     __________________________________________________________________  Transplant History: Assessment of recurrent acute on chronic pancreatitis and ongoing pain demands    Interval History:   Ciera Perkins is a very pleasant 40 yo F from [place] here for assessment of recurrent acute on chronic pancreatitis. She was in her usual state of health prior to 2021, when she was treated with prednisone for an  "upper respiratory infection. During that time, she developed acute pancreatitis with evolution into necrotizing pancreatitis. Since that time, she has had a recurring/ongoing pain syndrome.    She does have baseline pain for which she uses dilaudid at home. During pain episodes, she has severe mid/upper epigastric pain with radiation into her back. She also notes nausea and limited ability to eat and drink during these episodes. Eating does not seem to trigger them, but this is unclear given her nausea and other concerns. She has had feeding tubes and celiac plexus blocks in the past in attempts to manage these symptoms without any durable success. She has never had endoscopic ductal management as her disease process does not seem to have involved ductal obstruction or stone burdens.     ROS:   A 10-point review of systems was negative except as noted above.    Curent Meds:  No current facility-administered medications for this visit.       Physical Exam:     Admit Weight: 70.3 kg (155 lb)    Current Vitals:   /88   Pulse 71   Ht 1.676 m (5' 6\")   Wt 70.3 kg (155 lb)   SpO2 99%   BMI 25.02 kg/m           Vital sign ranges:       No data found.  General Appearance: in no apparent distress.   Skin: normal, warm, dry, no suspicious lesions or rashes  Heart: regular rate and rhythm, normal S1 and S2  Lungs: clear to auscultation  Abdomen: The abdomen is abdomen soft, slightly rounded. Tender to palpation in upper/midepigastrium. No hernia. Incisions consistent with surgical history   Extremities: edema: absent.     Data:   CMP@LABRCNTIPR(na:2,potassium:2,chloride:2,co2:2,g,bun:2,cr:2,gfrestimated:2,gfrestblack:2,dany:2,icapoc:2,icaw:2,ma,phos:2,amylase:2,lipase:2,uamy24:3,albumin:2,bilitotal:2,biliconj:2,bilidelta:2,alkphos:2,ast:2,alt:2,fbili:1)@  CBC@LABRCNTIPR(hgb:2,wbc:2,a,plt:2,a1c:1)@  Coags@LABRCNTIPR(inr:2,ptt:2,Xa:2)@   Urinalysis  No lab results found.    CT reviewed personally. Findings " in assessment/plan section

## 2024-08-02 ENCOUNTER — TELEPHONE (OUTPATIENT)
Dept: TRANSPLANT | Facility: CLINIC | Age: 39
End: 2024-08-02
Payer: COMMERCIAL

## 2024-08-02 DIAGNOSIS — Z51.81 ENCOUNTER FOR THERAPEUTIC DRUG LEVEL MONITORING: ICD-10-CM

## 2024-08-02 DIAGNOSIS — K86.89 DIABETES MELLITUS SECONDARY TO PANCREATIC INSUFFICIENCY (H): ICD-10-CM

## 2024-08-02 DIAGNOSIS — E08.9 DIABETES MELLITUS SECONDARY TO PANCREATIC INSUFFICIENCY (H): ICD-10-CM

## 2024-08-02 DIAGNOSIS — K86.1 CHRONIC RECURRENT PANCREATITIS (H): Primary | ICD-10-CM

## 2024-08-02 DIAGNOSIS — E44.1 MILD PROTEIN-CALORIE MALNUTRITION (H): ICD-10-CM

## 2024-08-02 DIAGNOSIS — K86.1 CHRONIC PANCREATITIS (H): ICD-10-CM

## 2024-08-02 NOTE — TELEPHONE ENCOUNTER
Talked with Ciera to make sure things are going okay.  She has stopped all smoking and vaping now and its been a couple of weeks.  She is up to date with vaccines and her PCP will be faxing that to use.  She has lost some weight and is currently at about 140 pounds.

## 2024-08-06 ENCOUNTER — TELEPHONE (OUTPATIENT)
Dept: ENDOCRINOLOGY | Facility: CLINIC | Age: 39
End: 2024-08-06
Payer: COMMERCIAL

## 2024-08-06 NOTE — TELEPHONE ENCOUNTER
FUTURE VISIT INFORMATION      SURGERY INFORMATION:  Date: 24  Location: uu or  Surgeon:  Carlos Carmichael MD   Anesthesia Type:  general with block  Procedure: Total pancreatectomy with autologous islet transplant, cholecystectomy, splenectomy, gastrojejunostomy placement, and possible incidental appendectomy     RECORDS REQUESTED FROM:       Primary Care Provider: Hayden Houston MD - [facility]     Most recent EKG+ Tracin24- [facility]

## 2024-08-06 NOTE — TELEPHONE ENCOUNTER
Patient confirmed scheduled appointment:  Date: 10/4   Time: 3:30 pm   Visit type: New Diabetes   Provider: Becca   [place] [facility]  Testing/imaging: NA   Additional notes: Spoke to pt and scheduled soonest avail appt within time frame per below message   Irma Schultz, RN  [facility]  Please help schedule per Dr Marin ([facility] end[facility]) Post Pancreatectomy and has requested that this patient be seen within 3-3.5 weeks from surgery date of 9/13/2024. Any provider who manages diabetes/post pancreatectomy (Miko would be good fit)    Kristel Kaur on 8/6/2024 at 11:49 AM

## 2024-08-15 ENCOUNTER — TELEPHONE (OUTPATIENT)
Dept: TRANSPLANT | Facility: CLINIC | Age: 39
End: 2024-08-15
Payer: COMMERCIAL

## 2024-08-15 PROCEDURE — 99207 PR NO BILLABLE SERVICE THIS VISIT: CPT

## 2024-08-16 ENCOUNTER — TELEPHONE (OUTPATIENT)
Dept: EDUCATION SERVICES | Facility: CLINIC | Age: 39
End: 2024-08-16
Payer: COMMERCIAL

## 2024-08-16 NOTE — TELEPHONE ENCOUNTER
UTURE VISIT INFORMATION        SURGERY INFORMATION:  Date: 24  Location: uu or  Surgeon:  Carlos Carmichael MD   Anesthesia Type:  general with block  Procedure: Total pancreatectomy with autologous islet transplant, cholecystectomy, splenectomy, gastrojejunostomy placement, and possible incidental appendectomy      RECORDS REQUESTED FROM:         Primary Care Provider: Hayden Houston MD - [facility]      Most recent EKG+ Tracin24- [facility]

## 2024-08-16 NOTE — TELEPHONE ENCOUNTER
Patient confirmed scheduled appointment:  Date: 9/5   Time: 1 pm   Visit type: pre pancreectomy   Provider: shelby   Location: [facility]  Testing/imaging: NA   Additional notes: Spoke to pt and re-master 9/12 appt to 9/5 due to surgery date changing to 9/6 per below message   Peyton Clark, RN  [facility]  Could you reach out to patient and see if they can see Shelbyvamsi Renae that day.  Her schedule is held for resource so there are a variety of times that can be offered.  It is a two hour appt.    Thanks!  Peyton Kaur on 8/16/2024 at 1:12 PM

## 2024-08-20 ENCOUNTER — DOCUMENTATION ONLY (OUTPATIENT)
Dept: TRANSPLANT | Facility: CLINIC | Age: 39
End: 2024-08-20
Payer: COMMERCIAL

## 2024-08-20 ENCOUNTER — TELEPHONE (OUTPATIENT)
Dept: TRANSPLANT | Facility: CLINIC | Age: 39
End: 2024-08-20
Payer: COMMERCIAL

## 2024-08-20 NOTE — TELEPHONE ENCOUNTER
Patient Call: General  Route to LPN    Reason for call: Call from Radha White of [employer], the employer of pt's .  requesting FMLA to be pt's caretaker, but Radha has the date of pt's surgery listed as 9/13. Writer advised that pt's surgery is scheduled for 9/6, but company would like confirmation from medical provider.    Please call back to confirm, or fax a letter stating date of surgery to f:[phone]    Call back needed? Yes    Return Call Needed  Same as documented in contacts section  When to return call?: Same day: Route High Priority

## 2024-09-02 LAB
ABO/RH(D): NORMAL
ANTIBODY SCREEN: NEGATIVE
SPECIMEN EXPIRATION DATE: NORMAL

## 2024-09-03 ENCOUNTER — LAB (OUTPATIENT)
Dept: LAB | Facility: CLINIC | Age: 39
End: 2024-09-03
Attending: PEDIATRICS
Payer: COMMERCIAL

## 2024-09-03 ENCOUNTER — ALLIED HEALTH/NURSE VISIT (OUTPATIENT)
Dept: TRANSPLANT | Facility: CLINIC | Age: 39
End: 2024-09-03
Attending: PEDIATRICS
Payer: MEDICARE

## 2024-09-03 VITALS — BODY MASS INDEX: 25.41 KG/M2 | WEIGHT: 157.41 LBS

## 2024-09-03 DIAGNOSIS — K86.89 DIABETES MELLITUS SECONDARY TO PANCREATIC INSUFFICIENCY (H): ICD-10-CM

## 2024-09-03 DIAGNOSIS — E08.9 DIABETES MELLITUS SECONDARY TO PANCREATIC INSUFFICIENCY (H): ICD-10-CM

## 2024-09-03 DIAGNOSIS — Z51.81 ENCOUNTER FOR THERAPEUTIC DRUG LEVEL MONITORING: ICD-10-CM

## 2024-09-03 DIAGNOSIS — K86.1 CHRONIC PANCREATITIS (H): ICD-10-CM

## 2024-09-03 DIAGNOSIS — K86.1 CHRONIC RECURRENT PANCREATITIS (H): ICD-10-CM

## 2024-09-03 DIAGNOSIS — E44.1 MILD PROTEIN-CALORIE MALNUTRITION (H): ICD-10-CM

## 2024-09-03 LAB
ALBUMIN SERPL BCG-MCNC: 4.1 G/DL (ref 3.5–5.2)
ALBUMIN UR-MCNC: 10 MG/DL
ALP SERPL-CCNC: 89 U/L (ref 40–150)
ALT SERPL W P-5'-P-CCNC: 32 U/L (ref 0–50)
ANION GAP SERPL CALCULATED.3IONS-SCNC: 12 MMOL/L (ref 7–15)
APPEARANCE UR: CLEAR
AST SERPL W P-5'-P-CCNC: 37 U/L (ref 0–45)
BASOPHILS # BLD AUTO: 0 10E3/UL (ref 0–0.2)
BASOPHILS NFR BLD AUTO: 1 %
BILIRUB SERPL-MCNC: 0.7 MG/DL
BILIRUB UR QL STRIP: NEGATIVE
BUN SERPL-MCNC: 9.6 MG/DL (ref 6–20)
C PEPTIDE SERPL-MCNC: 4.3 NG/ML (ref 0.9–6.9)
CALCIUM SERPL-MCNC: 9.1 MG/DL (ref 8.8–10.4)
CHLORIDE SERPL-SCNC: 105 MMOL/L (ref 98–107)
CHOLEST SERPL-MCNC: 148 MG/DL
COLOR UR AUTO: YELLOW
CREAT SERPL-MCNC: 0.62 MG/DL (ref 0.51–0.95)
CREAT UR-MCNC: 270 MG/DL
EGFRCR SERPLBLD CKD-EPI 2021: >90 ML/MIN/1.73M2
EOSINOPHIL # BLD AUTO: 0.1 10E3/UL (ref 0–0.7)
EOSINOPHIL NFR BLD AUTO: 2 %
ERYTHROCYTE [DISTWIDTH] IN BLOOD BY AUTOMATED COUNT: 13.2 % (ref 10–15)
FASTING STATUS PATIENT QL REPORTED: ABNORMAL
FASTING STATUS PATIENT QL REPORTED: ABNORMAL
FASTING STATUS PATIENT QL REPORTED: NO
FASTING STATUS PATIENT QL REPORTED: NO
GLUCOSE SERPL-MCNC: 179 MG/DL (ref 70–99)
GLUCOSE SERPL-MCNC: 181 MG/DL (ref 70–99)
GLUCOSE SERPL-MCNC: 207 MG/DL (ref 70–99)
GLUCOSE UR STRIP-MCNC: NEGATIVE MG/DL
HBA1C MFR BLD: 7 %
HCO3 SERPL-SCNC: 21 MMOL/L (ref 22–29)
HCT VFR BLD AUTO: 35.4 % (ref 35–47)
HDLC SERPL-MCNC: 43 MG/DL
HGB BLD-MCNC: 12.1 G/DL (ref 11.7–15.7)
HGB UR QL STRIP: NEGATIVE
IMM GRANULOCYTES # BLD: 0.1 10E3/UL
IMM GRANULOCYTES NFR BLD: 1 %
INR PPP: 1.15 (ref 0.85–1.15)
KETONES UR STRIP-MCNC: NEGATIVE MG/DL
LDLC SERPL CALC-MCNC: 76 MG/DL
LEUKOCYTE ESTERASE UR QL STRIP: NEGATIVE
LYMPHOCYTES # BLD AUTO: 1.6 10E3/UL (ref 0.8–5.3)
LYMPHOCYTES NFR BLD AUTO: 21 %
MCH RBC QN AUTO: 28.9 PG (ref 26.5–33)
MCHC RBC AUTO-ENTMCNC: 34.2 G/DL (ref 31.5–36.5)
MCV RBC AUTO: 85 FL (ref 78–100)
MICROALBUMIN UR-MCNC: <12 MG/L
MICROALBUMIN/CREAT UR: NORMAL MG/G{CREAT}
MONOCYTES # BLD AUTO: 0.5 10E3/UL (ref 0–1.3)
MONOCYTES NFR BLD AUTO: 7 %
MUCOUS THREADS #/AREA URNS LPF: PRESENT /LPF
NEUTROPHILS # BLD AUTO: 5 10E3/UL (ref 1.6–8.3)
NEUTROPHILS NFR BLD AUTO: 68 %
NITRATE UR QL: NEGATIVE
NONHDLC SERPL-MCNC: 105 MG/DL
NRBC # BLD AUTO: 0 10E3/UL
NRBC BLD AUTO-RTO: 0 /100
PH UR STRIP: 5.5 [PH] (ref 5–7)
PLATELET # BLD AUTO: 179 10E3/UL (ref 150–450)
POTASSIUM SERPL-SCNC: 3.8 MMOL/L (ref 3.4–5.3)
PREALB SERPL-MCNC: 20.9 MG/DL (ref 20–40)
PROT SERPL-MCNC: 7.3 G/DL (ref 6.4–8.3)
RBC # BLD AUTO: 4.18 10E6/UL (ref 3.8–5.2)
RBC URINE: <1 /HPF
SODIUM SERPL-SCNC: 138 MMOL/L (ref 135–145)
SP GR UR STRIP: 1.03 (ref 1–1.03)
SQUAMOUS EPITHELIAL: 10 /HPF
TRIGL SERPL-MCNC: 143 MG/DL
UROBILINOGEN UR STRIP-MCNC: NORMAL MG/DL
WBC # BLD AUTO: 7.3 10E3/UL (ref 4–11)
WBC URINE: 2 /HPF

## 2024-09-03 PROCEDURE — 99000 SPECIMEN HANDLING OFFICE-LAB: CPT | Performed by: PATHOLOGY

## 2024-09-03 PROCEDURE — 86900 BLOOD TYPING SEROLOGIC ABO: CPT

## 2024-09-03 PROCEDURE — 85025 COMPLETE CBC W/AUTO DIFF WBC: CPT | Performed by: PATHOLOGY

## 2024-09-03 PROCEDURE — 81001 URINALYSIS AUTO W/SCOPE: CPT | Performed by: PATHOLOGY

## 2024-09-03 PROCEDURE — 80061 LIPID PANEL: CPT | Performed by: PATHOLOGY

## 2024-09-03 PROCEDURE — 84590 ASSAY OF VITAMIN A: CPT | Mod: 90 | Performed by: PATHOLOGY

## 2024-09-03 PROCEDURE — 84681 ASSAY OF C-PEPTIDE: CPT | Performed by: PEDIATRICS

## 2024-09-03 PROCEDURE — 80053 COMPREHEN METABOLIC PANEL: CPT | Performed by: PATHOLOGY

## 2024-09-03 PROCEDURE — 85610 PROTHROMBIN TIME: CPT | Performed by: PATHOLOGY

## 2024-09-03 PROCEDURE — 82043 UR ALBUMIN QUANTITATIVE: CPT | Performed by: PEDIATRICS

## 2024-09-03 PROCEDURE — 84446 ASSAY OF VITAMIN E: CPT | Mod: 90 | Performed by: PATHOLOGY

## 2024-09-03 PROCEDURE — 82306 VITAMIN D 25 HYDROXY: CPT | Performed by: PEDIATRICS

## 2024-09-03 PROCEDURE — 36415 COLL VENOUS BLD VENIPUNCTURE: CPT | Performed by: PATHOLOGY

## 2024-09-03 PROCEDURE — 84134 ASSAY OF PREALBUMIN: CPT | Performed by: PEDIATRICS

## 2024-09-03 PROCEDURE — 83036 HEMOGLOBIN GLYCOSYLATED A1C: CPT | Performed by: PEDIATRICS

## 2024-09-03 NOTE — PROGRESS NOTES
Chief Complaint   Patient presents with    Allied Health Visit     Boost     Administered Boost: 9:26pm    FBS: 181 (Approved by Dr. Marin to still precede to do boost testing)    Called lab with boost time :     10:30am    11:30am    Patient is aware and given time to return to lab.    Qing Sánchez CMA

## 2024-09-04 ENCOUNTER — PRE VISIT (OUTPATIENT)
Dept: SURGERY | Facility: CLINIC | Age: 39
End: 2024-09-04

## 2024-09-04 ENCOUNTER — OFFICE VISIT (OUTPATIENT)
Dept: SURGERY | Facility: CLINIC | Age: 39
End: 2024-09-04
Payer: COMMERCIAL

## 2024-09-04 ENCOUNTER — ANESTHESIA EVENT (OUTPATIENT)
Dept: SURGERY | Facility: CLINIC | Age: 39
End: 2024-09-04
Payer: COMMERCIAL

## 2024-09-04 VITALS
DIASTOLIC BLOOD PRESSURE: 73 MMHG | HEART RATE: 69 BPM | HEIGHT: 67 IN | RESPIRATION RATE: 16 BRPM | BODY MASS INDEX: 24.71 KG/M2 | OXYGEN SATURATION: 99 % | WEIGHT: 157.4 LBS | TEMPERATURE: 98.3 F | SYSTOLIC BLOOD PRESSURE: 122 MMHG

## 2024-09-04 DIAGNOSIS — K86.1 CHRONIC RECURRENT PANCREATITIS (H): Primary | ICD-10-CM

## 2024-09-04 DIAGNOSIS — R10.9 ABDOMINAL PAIN, UNSPECIFIED ABDOMINAL LOCATION: ICD-10-CM

## 2024-09-04 LAB
C PEPTIDE SERPL-MCNC: 7.8 NG/ML (ref 0.9–6.9)
C PEPTIDE SERPL-MCNC: 9.2 NG/ML (ref 0.9–6.9)

## 2024-09-04 PROCEDURE — 99205 OFFICE O/P NEW HI 60 MIN: CPT | Performed by: NURSE PRACTITIONER

## 2024-09-04 RX ORDER — HYDROMORPHONE HYDROCHLORIDE 1 MG/ML
5 SOLUTION ORAL EVERY 4 HOURS PRN
Qty: 210 ML | Refills: 0 | Status: ON HOLD | OUTPATIENT
Start: 2024-09-04 | End: 2024-09-17

## 2024-09-04 ASSESSMENT — LIFESTYLE VARIABLES: TOBACCO_USE: 1

## 2024-09-04 ASSESSMENT — PAIN SCALES - GENERAL: PAINLEVEL: SEVERE PAIN (7)

## 2024-09-04 NOTE — PATIENT INSTRUCTIONS
Preparing for Your Surgery      Name:  Ciera Perkins   MRN:  [id number]   :  1985   Today's Date:  2024       Arriving for surgery:  Surgery date:  24  Arrival time:  5.30AM    Please come to:     Please come to:       [facility] [place] [facility]    [address] [place]  [address]     The [facility] [place] Patient/Visitor Ramp is at [address]. Patients and visitors who self-park will receive the reduced hospital parking rate. If the Patient /Visitor Ramp is full, please follow the signs to the [place] car park located at the main hospital entrance.       parking is available (24 hours/ 7 days a week)      Discounted parking pass options are available for patients and visitors. They can be purchased at the [employer] desk at the main hospital entrance.     -    Stop at the security desk and they will direct surgery patients to the Surgery Check in and Family [facility]. [phone]        - If you need directions, a wheelchair or an escort please stop at the Information/security desk in the lobby.     What can I eat or drink?  -  You may eat and drink normally up to 8 hours prior to arrival time. (Until 9.30PM)  -  You may have clear liquids until 2 hours prior to arrival time. (Until 3.30AM)    Examples of clear liquids:  Water  Clear broth  Juices (apple, white grape, white cranberry  and cider) without pulp  Noncarbonated, powder based beverages  (lemonade and Dewayne-Aid)  Sodas (Sprite, 7-Up, ginger ale and seltzer)  Coffee or tea (without milk or cream)  Gatorade    -  No Alcohol or cannabis products for at least 24 hours before surgery.     Which medicines can I take?    Hold Aspirin for 7 days before surgery.   Hold Multivitamins for 7 days before surgery.  Hold Supplements for 7 days before surgery.  Hold Ibuprofen (Advil, Motrin) for 1 day(s) before surgery--unless otherwise  directed by surgeon.  Hold Naproxen (Aleve) for 4 days before surgery.    -  DO NOT take these medications the day of surgery:  None     -  PLEASE TAKE these medications the day of surgery:  Dilaudid as needed.    How do I prepare myself?  - Please take 2 showers (one the night prior to surgery and one the morning of surgery) using Scrubcare or Hibiclens soap.    Use this soap only from the neck to your toes.     Leave the soap on your skin for one minute--then rinse thoroughly.      You may use your own shampoo and conditioner. No other hair products.   - Please remove all jewelry and body piercings.  - No lotions, deodorants or fragrance.  - No makeup or fingernail polish.   - Bring your ID and insurance card.    -If you use a CPAP machine, please bring the CPAP machine, tubing, and mask to hospital.    -If you have a Deep Brain Stimulator, Spinal Cord Stimulator, or any Neuro Stimulator device---you must bring the remote control to the hospital.      ALL PATIENTS GOING HOME THE SAME DAY OF SURGERY ARE REQUIRED TO HAVE A RESPONSIBLE ADULT TO DRIVE AND BE IN ATTENDANCE WITH THEM FOR 24 HOURS FOLLOWING SURGERY.    Covid testing policy as of 12/06/2022  Your surgeon will notify and schedule you for a COVID test if one is needed before surgery--please direct any questions or COVID symptoms to your surgeon      Questions or Concerns:    - For any questions regarding the day of surgery or your hospital stay, please contact the Pre Admission Nursing Office at [phone].       - If you have health changes between today and your surgery, please call your surgeon.       - For questions after surgery, please call your surgeons office.           Current Visitor Guidelines    You may have 2 visitors in the pre op area.    Visiting hours: 8 a.m. to 8:30 p.m.    Patients confirmed or suspected to have symptoms of COVID 19 or flu:     No visitors allowed for adult patients.   Children (under age 18) can have 1 named  visitor.     People who are sick or showing symptoms of COVID 19 or flu:    Are not allowed to visit patients--we can only make exceptions in special situations.       Please follow these guidelines for your visit:          Please maintain social distance          Masking is optional--however at times you may be asked to wear a mask for the safety of yourself and others     Clean your hands with alcohol hand . Do this when you arrive at and leave the building and patient room,    And again after you touch your mask or anything in the room.     Go directly to and from the room you are visiting.     Stay in the patient s room during your visit. Limit going to other places in the hospital as much as possible     Leave bags and jackets at home or in the car.     For everyone s health, please don t come and go during your visit. That includes for smoking   during your visit.

## 2024-09-05 ENCOUNTER — OFFICE VISIT (OUTPATIENT)
Dept: EDUCATION SERVICES | Facility: CLINIC | Age: 39
End: 2024-09-05
Attending: PEDIATRICS
Payer: COMMERCIAL

## 2024-09-05 ENCOUNTER — OFFICE VISIT (OUTPATIENT)
Dept: TRANSPLANT | Facility: CLINIC | Age: 39
End: 2024-09-05
Attending: SURGERY
Payer: MEDICARE

## 2024-09-05 VITALS
OXYGEN SATURATION: 99 % | WEIGHT: 156.2 LBS | TEMPERATURE: 98 F | BODY MASS INDEX: 25.1 KG/M2 | RESPIRATION RATE: 18 BRPM | DIASTOLIC BLOOD PRESSURE: 71 MMHG | SYSTOLIC BLOOD PRESSURE: 110 MMHG | HEART RATE: 70 BPM | HEIGHT: 66 IN

## 2024-09-05 DIAGNOSIS — K86.1 CHRONIC PANCREATITIS (H): ICD-10-CM

## 2024-09-05 DIAGNOSIS — E08.9 DIABETES MELLITUS SECONDARY TO PANCREATIC INSUFFICIENCY (H): ICD-10-CM

## 2024-09-05 DIAGNOSIS — K86.1 CHRONIC RECURRENT PANCREATITIS (H): ICD-10-CM

## 2024-09-05 DIAGNOSIS — K86.89 DIABETES MELLITUS SECONDARY TO PANCREATIC INSUFFICIENCY (H): ICD-10-CM

## 2024-09-05 DIAGNOSIS — K86.1 CHRONIC RECURRENT PANCREATITIS (H): Primary | ICD-10-CM

## 2024-09-05 DIAGNOSIS — E11.9 TYPE 2 DIABETES MELLITUS WITHOUT COMPLICATION, UNSPECIFIED WHETHER LONG TERM INSULIN USE (H): ICD-10-CM

## 2024-09-05 LAB
A-TOCOPHEROL VIT E SERPL-MCNC: 5.6 MG/L
ANNOTATION COMMENT IMP: NORMAL
BETA+GAMMA TOCOPHEROL SERPL-MCNC: 1.5 MG/L
RETINYL PALMITATE SERPL-MCNC: <0.02 MG/L
VIT A SERPL-MCNC: 0.4 MG/L

## 2024-09-05 PROCEDURE — 99207 PR PREOP VISIT IN GLOBAL PKG: CPT | Performed by: SURGERY

## 2024-09-05 PROCEDURE — 99207 PR DIABETES SELF MANAGEMENT: CPT | Performed by: DIETITIAN, REGISTERED

## 2024-09-05 PROCEDURE — G0463 HOSPITAL OUTPT CLINIC VISIT: HCPCS | Performed by: SURGERY

## 2024-09-05 ASSESSMENT — PAIN SCALES - GENERAL: PAINLEVEL: EXTREME PAIN (8)

## 2024-09-05 ASSESSMENT — LIFESTYLE VARIABLES: TOBACCO_USE: 1

## 2024-09-05 NOTE — NURSING NOTE
"Chief Complaint   Patient presents with    Pre-Op Exam     pre-op for TPIAT      Vital signs:  Temp: 98  F (36.7  C) Temp src: Oral BP: 110/71 Pulse: 70   Resp: 18 SpO2: 99 %     Height: 168.9 cm (5' 6.5\") Weight: 70.9 kg (156 lb 3.2 oz)  Estimated body mass index is 24.84 kg/m  as calculated from the following:    Height as of this encounter: 1.689 m (5' 6.5\").    Weight as of this encounter: 70.9 kg (156 lb 3.2 oz).      Rosalba Woods, YAHAIRA  9/5/2024 10:54 AM    "

## 2024-09-05 NOTE — PROGRESS NOTES
Ciera Perkins presents today for education related to diabetes secondary to total pancreatectomy with auto-islet cell transplant planned for 9.6.2024  Ciera Perknis is accompanied by spouse, Dinesh    Diagnosed with diabetes secondary to pancreatitis in the past.  Recent A1c 7.0%.  Has checked sugars in the past, but not recently.  Not on any diabetes medication.    Chronic pancreatitis diagnosed in 2021    PATIENT CONCERNS:   Interested in the glucose sensor.  Curious about the insulin pump.         ASSESSMENT:    Ciera is confident in her diabetes knowledge, checking her sugars and giving herself an insulin injection.  She has checked her sugars and given herself insulin in the past.  She was a CNA prior to getting sick and is familiar with signs/symptoms of low blood sugar and high blood sugar.  She has administered glucagon.  She understands DKA.      EDUCATION and INSTRUCTION PROVIDED AT THIS VISIT:     Expectations following surgery related to taking insulin, checking blood glucose, and need for excellent control in the period immediately following surgery and beyond.   Reviewed the normal endocrine function of the pancreas, and how exogenous insulin differs from endogenous insulin   Explained the difference between short and long acting insulin, including onset, duration, and action.   Introduced the difference between meal dosing insulin and correction insulin, and explained how she will be using both a long and short acting insulin to control BG's following surgery.   Demonstrated the administration of insulin using an insulin pen as a review so that patient's  can see the process.   Explained the difference and similarities between a blood glucose meter and glucose sensor  Reviewed needle disposal.  Reviewed BG normals and BG goals and emphasized the importance of contacting medical team if goals are not being met.   Explained the recognition and treatment of hypoglycemia . Instructed on  glucagon - injectable and nasal spray.    Ciera and her  verbalized understanding of instructions.     PLAN:  See patient instructions  AVS sent in impokt    FOLLOW-UP:      Follow up visit with RN and RD CDE's scheduled for 3 weeks from date of surgery.  Goal at that visit with be to teach carbohydrate counting and administration of glucagon, as well as other diabetes education    Time spent with patient at today's visit was 30 minutes.      Robyn Renae RDN, LD, [facility]  Dietitian and Diabetes  - Endocrinology  [facility]

## 2024-09-05 NOTE — ANESTHESIA PREPROCEDURE EVALUATION
Anesthesia Pre-Procedure Evaluation    Patient: Ciera Perkins   MRN: [id number] : 1985        Procedure : Procedure(s):  Total pancreatectomy with autologous islet transplant, cholecystectomy, splenectomy, gastrojejunostomy placement, and possible incidental appendectomy          Past Medical History:   Diagnosis Date    Chronic abdominal pain     Chronic pancreatitis (H)     Cystic fibrosis of pancreas (H)     Diabetes mellitus (H)     History of smoking     Nicotine dependence due to vaping non-tobacco product     Port-A-Cath in place     Superior mesenteric artery stenosis (H24)       Past Surgical History:   Procedure Laterality Date    APPENDECTOMY      CHOLECYSTECTOMY      COLONOSCOPY      EGD      ENDOSCOPIC ULTRASOUND UPPER GASTROINTESTINAL TRACT (GI)      HYSTERECTOMY      IR FEEDING TUBE PLACEMENT W FLUORO/MD      IR NG TUBE PLACEMENT REQ RAD & FLUORO  2021    IR NG TUBE PLACEMENT REQ RAD & FLUORO  2021    TONSILLECTOMY        Allergies   Allergen Reactions    Aspirin Anaphylaxis and Hives     HIVES (UTICARIA)    Has tolerated toradol injections during 23 admission    Bee Venom Anaphylaxis    Adhesive Tape Blisters      Social History     Tobacco Use    Smoking status: Former     Current packs/day: 0.00     Average packs/day: 1 pack/day for 8.0 years (8.0 ttl pk-yrs)     Types: Cigarettes     Start date: 2012     Quit date: 2020     Years since quittin.6    Smokeless tobacco: Never    Tobacco comments:     Currently Vapes   Substance Use Topics    Alcohol use: Not Currently      Wt Readings from Last 1 Encounters:   24 70.9 kg (156 lb 3.2 oz)        Anesthesia Evaluation   Pt has had prior anesthetic.     No history of anesthetic complications       ROS/MED HX  ENT/Pulmonary:     (+)                tobacco use, Past use,                    (-) [place] risk factors and recent URI   Neurologic:  - neg neurologic ROS     Cardiovascular:     (+)  - -   -  - -                                  Previous cardiac testing   Echo: Date: Results:    Stress Test:  Date: Results:    ECG Reviewed:  Date: 8/18/24 Results:  Sinus bradycardia  Cath:  Date: Results:      METS/Exercise Tolerance:  Comment: Physical activity is limited due to chronic abdominal pain.  Can walk 1 or 2 blocks at a time without stopping and can participate in sustained sexual activity without exertional dyspnea or angina.     Hematologic:     (+)       history of blood transfusion, no previous transfusion reaction,        Musculoskeletal:  - neg musculoskeletal ROS     GI/Hepatic: Comment: Malnutrition    Chronic abdominal pain    Pancreatic insufficient cystic fibrosis - only GI related symptoms.  No respiratory issues.        (-) GERD   Renal/Genitourinary:  - neg Renal ROS     Endo: Comment: Chronic pancreatitis    Pancreatic insufficiency related diabetes type 3C    (+)  type II DM, Last HgA1c: 7.0, date: 9/4/24, Not using insulin, - not using insulin pump.                Psychiatric/Substance Use:  - neg psychiatric ROS   (+)    H/O chronic opiod use .  (-) psychiatric history   Infectious Disease:  - neg infectious disease ROS     Malignancy:  - neg malignancy ROS     Other:  - neg other ROS   (-) Any chance pregnant       Physical Exam    Airway        Mallampati: II   TM distance: > 3 FB   Neck ROM: full   Mouth opening: > 3 cm    Respiratory Devices and Support         Dental       (+) Minor Abnormalities - some fillings, tiny chips      Cardiovascular          Rhythm and rate: regular and normal     Pulmonary                   OUTSIDE LABS:  CBC:   Lab Results   Component Value Date    WBC 7.3 09/03/2024    WBC 6.5 05/08/2024    HGB 12.1 09/03/2024    HGB 12.1 05/08/2024    HCT 35.4 09/03/2024    HCT 35.5 05/08/2024     09/03/2024     05/08/2024     BMP:   Lab Results   Component Value Date     09/03/2024     05/08/2024    POTASSIUM 3.8 09/03/2024    POTASSIUM 4.5 05/08/2024     "CHLORIDE 105 09/03/2024    CHLORIDE 104 05/08/2024    CO2 21 (L) 09/03/2024    CO2 28 05/08/2024    BUN 9.6 09/03/2024    BUN 7.2 05/08/2024    CR 0.62 09/03/2024    CR 0.71 05/08/2024     (H) 09/03/2024     (H) 09/03/2024     COAGS:   Lab Results   Component Value Date    INR 1.15 09/03/2024     POC: No results found for: \"BGM\", \"HCG\", \"HCGS\"  HEPATIC:   Lab Results   Component Value Date    ALBUMIN 4.1 09/03/2024    PROTTOTAL 7.3 09/03/2024    ALT 32 09/03/2024    AST 37 09/03/2024    ALKPHOS 89 09/03/2024    BILITOTAL 0.7 09/03/2024     OTHER:   Lab Results   Component Value Date    A1C 7.0 (H) 09/03/2024    CARLA 9.1 09/03/2024    LIPASE 7 (L) 05/08/2024    AMYLASE 12 (L) 05/08/2024       Anesthesia Plan    ASA Status:  3       Anesthesia Type: General.     - Airway: ETT   Induction: Intravenous.   Maintenance: Balanced.   Techniques and Equipment:     - Lines/Monitors: 2nd IV, Arterial Line     Consents    Anesthesia Plan(s) and associated risks, benefits, and realistic alternatives discussed. Questions answered and patient/representative(s) expressed understanding.     - Discussed: Risks, Benefits and Alternatives for BOTH SEDATION and the PROCEDURE were discussed     - Discussed with:  Patient, Spouse      - Extended Intubation/Ventilatory Support Discussed: No.      - Patient is DNR/DNI Status: No     Use of blood products discussed: No .     Postoperative Care    Pain management: IV analgesics, Peripheral nerve block (Continuous), Multi-modal analgesia.     - Plan for long acting post-op opioid use   PONV prophylaxis: Ondansetron (or other 5HT-3), Dexamethasone or Solumedrol     Comments:               Segundo Tucker MD    I have reviewed the pertinent notes and labs in the chart from the past 30 days and (re)examined the patient.  Any updates or changes from those notes are reflected in this note.              # DMII: A1C = 7.0 % (Ref range: <5.7 %) within past 6 months    I have seen and " evaluated the patient and made changes to the note as necessary.  OK to proceed.    Fabrice Chavis MD    Department of Anesthesiology

## 2024-09-05 NOTE — PATIENT INSTRUCTIONS
Pancho Vang,    It was a pleasure meeting with you and your  today!    Here is a summary of our visit:    After surgery:  *Your blood glucose goals are 70 - 120 mg/dl    *The surgery team will give you guidelines for when to check your sugars.  You can talk with them about getting a Dexcom glucose sensor  *You will be taking a daily injection of long acting insulin.  *You will have quick acting insulin to take to correct high sugars.  When you start eating, you will also use quick acting insulin before any meals or snacks with carbohydrate.  *Always carry with you something to treat a low blood sugar, such as glucose tablets, smarties, fruit juice or fruit snacks    Follow Up Plan:    Diabetes Education on Oct. 3rd with La Lerma (dietitian) and Doreen Goodwin (nurse)    Thank you,    Robyn Renae RDN, MUSHTAQN, LI  Dietitian and Diabetes Education - Endocrinology  [facility]  [address] [place] [address]  Phone: [phone]  Email: [email or web address]    Contact information:   To schedule a diabetes education appointment:[phone].  For questions or concerns, please send a Rhapsody message or call the clinic at [phone].    For more urgent concerns that do not require 580, please call [phone] after hours/weekends and ask to speak with the Endocrinologist on call.       Please let us know if you are having low blood sugars less than 70 or over 300 mg/dL.  Do not wait until your next appointment if this is happening.

## 2024-09-05 NOTE — LETTER
9/5/2024      Ciera Perkins  [address]  [facility] [address]      Dear Colleague,    Thank you for referring your patient, Ciera Perkins, to the [facility]. Please see a copy of my visit note below.    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 Evaluation to discuss surgical options.    Operative approach: pancreas atrophic, small duct, no evident ductal disconnect or large stone burden. Hepatic arterial anatomy normal, splenic separates from celiac very proximally. Portal, splenic, and superior mesenteric veins open but does have some collaterals around uncinate process of unknown significance.  -optimal surgery would be TPIAT with cholecystectomy. Not a drainage or partial resection candidate  Seen today in preop for planned TPIAT tomorrow. Procedure reviewed, no questions     Hepatic steatosis: will need hepatology consult and fibroscan prior to moving forward with TPIAT to verify safe to infuse islets into liver. On CT liver does not look cirrhotic or scarred.   Care established with hepatology. Should be safe for islet infusion, no other issues    Smoking cessation: will need to stop prior to surgery  completed    Analgesic management: currently on chronic narcotics but hopeful to stop following surgery. Advised that this may persist following surgery but the goal is at least to stop acute pain episodes and make her pain manageable, if not completely resolve it over time. We will work with her home pain providers on pain management and weaning schedules after surgery.   On diaudid PO QID. Understands weaning is a slow process after surgery but is hopeful to come off    Functional status: has had significant weight loss and debility with pancreatitis. Will need to increase exercise tolerance prior to surgery to facilitate  "recovery  improved    Medical Decision Making: High  Total time: 30 minutes    Carlos Carmichael MD     __________________________________________________________________  Transplant History: Assessment of recurrent acute on chronic pancreatitis and ongoing pain demands    Interval History:   Ciera Perkins is a very pleasant 38 yo F from [place] here for assessment of recurrent acute on chronic pancreatitis. She was in her usual state of health prior to 2021, when she was treated with prednisone for an upper respiratory infection. During that time, she developed acute pancreatitis with evolution into necrotizing pancreatitis. Since that time, she has had a recurring/ongoing pain syndrome.    She does have baseline pain for which she uses dilaudid at home. During pain episodes, she has severe mid/upper epigastric pain with radiation into her back. She also notes nausea and limited ability to eat and drink during these episodes. Eating does not seem to trigger them, but this is unclear given her nausea and other concerns. She has had feeding tubes and celiac plexus blocks in the past in attempts to manage these symptoms without any durable success. She has never had endoscopic ductal management as her disease process does not seem to have involved ductal obstruction or stone burdens.   Interval history:    Continues with ongoing pain, some intermittent relief with PO dilaudid. PO intake limited but has been maintaining weight. Eager to proceed with surgery tomorrow.       ROS:   A 10-point review of systems was negative except as noted above.    Curent Meds:  No current facility-administered medications for this visit.       Physical Exam:     Admit Weight: 70.3 kg (155 lb)    Current Vitals:   /71 (BP Location: Left arm, Patient Position: Sitting, Cuff Size: Adult Regular)   Pulse 70   Temp 98  F (36.7  C) (Oral)   Resp 18   Ht 1.689 m (5' 6.5\")   Wt 70.9 kg (156 lb 3.2 oz)   LMP  (LMP Unknown)   " SpO2 99%   BMI 24.84 kg/m           Vital sign ranges:    Temp:  [98.5  F (36.9  C)-99.3  F (37.4  C)] 98.6  F (37  C)  Pulse:  [82-97] 82  Resp:  [16-20] 16  BP: ()/(59-83) 115/75  SpO2:  [91 %-100 %] 100 %  No data found.  General Appearance: in no apparent distress.   Skin: normal, warm, dry, no suspicious lesions or rashes  Heart: regular rate and rhythm, normal S1 and S2  Lungs: clear to auscultation  Abdomen: The abdomen is abdomen soft, slightly rounded. Tender to palpation in upper/midepigastrium. No hernia. Incisions consistent with surgical history   Extremities: edema: absent.     Data:   CMP@LABRCNTIPR(na:2,potassium:2,chloride:2,co2:2,g,bun:2,cr:2,gfrestimated:2,gfrestblack:2,dany:2,icapoc:2,icaw:2,ma,phos:2,amylase:2,lipase:2,uamy24:3,albumin:2,bilitotal:2,biliconj:2,bilidelta:2,alkphos:2,ast:2,alt:2,fbili:1)@  CBC@LABRCNTIPR(hgb:2,wbc:2,a,plt:2,a1c:1)@  Coags@LABRCNTIPR(inr:2,ptt:2,Xa:2)@   Urinalysis  Recent Labs   Lab Test 24  0912   COLOR Yellow   APPEARANCE Clear   URINEGLC Negative   URINEBILI Negative   URINEKETONE Negative   SG 1.026   UBLD Negative   URINEPH 5.5   PROTEIN 10*   NITRITE Negative   LEUKEST Negative   RBCU <1   WBCU 2       CT reviewed personally. Findings in assessment/plan section      Again, thank you for allowing me to participate in the care of your patient.        Sincerely,        Carlos Carmichael MD

## 2024-09-05 NOTE — Clinical Note
Pancho Vang is a TPIAT patient and she scheduled to see both of you Oct 3rd, La in person and Doreen virtually, back to back appointments.  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  Her  may have more questions, but I'm not sure if both appointments are needed.  Thoughts?  Robyn

## 2024-09-06 ENCOUNTER — ANCILLARY PROCEDURE (OUTPATIENT)
Dept: ULTRASOUND IMAGING | Facility: CLINIC | Age: 39
End: 2024-09-06
Payer: COMMERCIAL

## 2024-09-06 ENCOUNTER — ANESTHESIA (OUTPATIENT)
Dept: SURGERY | Facility: CLINIC | Age: 39
End: 2024-09-06
Payer: COMMERCIAL

## 2024-09-06 ENCOUNTER — HOSPITAL ENCOUNTER (INPATIENT)
Facility: CLINIC | Age: 39
LOS: 11 days | Discharge: HOME | End: 2024-09-17
Attending: SURGERY | Admitting: SURGERY
Payer: COMMERCIAL

## 2024-09-06 ENCOUNTER — APPOINTMENT (OUTPATIENT)
Dept: ULTRASOUND IMAGING | Facility: CLINIC | Age: 39
End: 2024-09-06
Attending: SURGERY
Payer: COMMERCIAL

## 2024-09-06 DIAGNOSIS — E89.1 DIABETES MELLITUS SECONDARY TO PANCREATECTOMY (H): ICD-10-CM

## 2024-09-06 DIAGNOSIS — E13.9 DIABETES MELLITUS SECONDARY TO PANCREATECTOMY (H): ICD-10-CM

## 2024-09-06 DIAGNOSIS — E44.0 MODERATE MALNUTRITION (H): ICD-10-CM

## 2024-09-06 DIAGNOSIS — K21.9 GASTROESOPHAGEAL REFLUX DISEASE, UNSPECIFIED WHETHER ESOPHAGITIS PRESENT: ICD-10-CM

## 2024-09-06 DIAGNOSIS — Z90.410 DIABETES MELLITUS SECONDARY TO PANCREATECTOMY (H): ICD-10-CM

## 2024-09-06 DIAGNOSIS — G89.18 ACUTE POST-OPERATIVE PAIN: ICD-10-CM

## 2024-09-06 DIAGNOSIS — I48.91 ATRIAL FIBRILLATION WITH RVR (H): ICD-10-CM

## 2024-09-06 DIAGNOSIS — Z90.410 HISTORY OF PANCREATECTOMY: Chronic | ICD-10-CM

## 2024-09-06 DIAGNOSIS — T14.8XXA OPEN WOUND: ICD-10-CM

## 2024-09-06 DIAGNOSIS — K86.1 CHRONIC RECURRENT PANCREATITIS (H): Primary | ICD-10-CM

## 2024-09-06 LAB
ABO/RH(D): NORMAL
ALBUMIN SERPL BCG-MCNC: 2.9 G/DL (ref 3.5–5.2)
ALP SERPL-CCNC: 47 U/L (ref 40–150)
ALT SERPL W P-5'-P-CCNC: 68 U/L (ref 0–50)
ANION GAP SERPL CALCULATED.3IONS-SCNC: 10 MMOL/L (ref 7–15)
ANTIBODY SCREEN: NEGATIVE
APTT PPP: 33 SECONDS (ref 22–38)
APTT PPP: >240 SECONDS (ref 22–38)
AST SERPL W P-5'-P-CCNC: 96 U/L (ref 0–45)
BACTERIA SPEC CULT: NORMAL
BACTERIA SPEC CULT: NORMAL
BASE EXCESS BLDA CALC-SCNC: -0.4 MMOL/L (ref -3–3)
BASE EXCESS BLDA CALC-SCNC: -0.7 MMOL/L (ref -3–3)
BASE EXCESS BLDA CALC-SCNC: -0.8 MMOL/L (ref -3–3)
BASE EXCESS BLDA CALC-SCNC: -1.7 MMOL/L (ref -3–3)
BASE EXCESS BLDA CALC-SCNC: -2.2 MMOL/L (ref -3–3)
BASE EXCESS BLDA CALC-SCNC: -2.7 MMOL/L (ref -3–3)
BASE EXCESS BLDA CALC-SCNC: -3 MMOL/L (ref -3–3)
BILIRUB SERPL-MCNC: 0.9 MG/DL
BLD PROD TYP BPU: NORMAL
BLOOD COMPONENT TYPE: NORMAL
BUN SERPL-MCNC: 7.4 MG/DL (ref 6–20)
CA-I BLD-MCNC: 4.1 MG/DL (ref 4.4–5.2)
CA-I BLD-MCNC: 4.2 MG/DL (ref 4.4–5.2)
CA-I BLD-MCNC: 4.2 MG/DL (ref 4.4–5.2)
CA-I BLD-MCNC: 4.4 MG/DL (ref 4.4–5.2)
CA-I BLD-MCNC: 4.4 MG/DL (ref 4.4–5.2)
CA-I BLD-MCNC: 4.5 MG/DL (ref 4.4–5.2)
CA-I BLD-MCNC: 4.5 MG/DL (ref 4.4–5.2)
CALCIUM SERPL-MCNC: 7.7 MG/DL (ref 8.8–10.4)
CHLORIDE SERPL-SCNC: 107 MMOL/L (ref 98–107)
CODING SYSTEM: NORMAL
CREAT SERPL-MCNC: 0.56 MG/DL (ref 0.51–0.95)
CROSSMATCH: NORMAL
EGFRCR SERPLBLD CKD-EPI 2021: >90 ML/MIN/1.73M2
ERYTHROCYTE [DISTWIDTH] IN BLOOD BY AUTOMATED COUNT: 13.4 % (ref 10–15)
FIBRINOGEN PPP-MCNC: 278 MG/DL (ref 170–510)
GLUCOSE BLD-MCNC: 110 MG/DL (ref 70–99)
GLUCOSE BLD-MCNC: 111 MG/DL (ref 70–99)
GLUCOSE BLD-MCNC: 125 MG/DL (ref 70–99)
GLUCOSE BLD-MCNC: 125 MG/DL (ref 70–99)
GLUCOSE BLD-MCNC: 134 MG/DL (ref 70–99)
GLUCOSE BLD-MCNC: 141 MG/DL (ref 70–99)
GLUCOSE BLD-MCNC: 161 MG/DL (ref 70–99)
GLUCOSE BLDC GLUCOMTR-MCNC: 119 MG/DL (ref 70–99)
GLUCOSE BLDC GLUCOMTR-MCNC: 121 MG/DL (ref 70–99)
GLUCOSE BLDC GLUCOMTR-MCNC: 125 MG/DL (ref 70–99)
GLUCOSE BLDC GLUCOMTR-MCNC: 134 MG/DL (ref 70–99)
GLUCOSE BLDC GLUCOMTR-MCNC: 134 MG/DL (ref 70–99)
GLUCOSE BLDC GLUCOMTR-MCNC: 143 MG/DL (ref 70–99)
GLUCOSE BLDC GLUCOMTR-MCNC: 159 MG/DL (ref 70–99)
GLUCOSE BLDC GLUCOMTR-MCNC: 161 MG/DL (ref 70–99)
GLUCOSE BLDC GLUCOMTR-MCNC: 164 MG/DL (ref 70–99)
GLUCOSE BLDC GLUCOMTR-MCNC: 180 MG/DL (ref 70–99)
GLUCOSE BLDC GLUCOMTR-MCNC: 189 MG/DL (ref 70–99)
GLUCOSE SERPL-MCNC: 148 MG/DL (ref 70–99)
GRAM STAIN RESULT: NORMAL
GRAM STAIN RESULT: NORMAL
HBA1C MFR BLD: 6.7 %
HCO3 BLDA-SCNC: 22 MMOL/L (ref 21–28)
HCO3 BLDA-SCNC: 22 MMOL/L (ref 21–28)
HCO3 BLDA-SCNC: 23 MMOL/L (ref 21–28)
HCO3 BLDA-SCNC: 24 MMOL/L (ref 21–28)
HCO3 BLDA-SCNC: 24 MMOL/L (ref 21–28)
HCO3 SERPL-SCNC: 20 MMOL/L (ref 22–29)
HCT VFR BLD AUTO: 28.1 % (ref 35–47)
HGB BLD-MCNC: 10.1 G/DL (ref 11.7–15.7)
HGB BLD-MCNC: 11 G/DL (ref 11.7–15.7)
HGB BLD-MCNC: 11.6 G/DL (ref 11.7–15.7)
HGB BLD-MCNC: 9.6 G/DL (ref 11.7–15.7)
HGB BLD-MCNC: 9.8 G/DL (ref 11.7–15.7)
HGB BLD-MCNC: 9.9 G/DL (ref 11.7–15.7)
INR PPP: 1.54 (ref 0.85–1.15)
ISSUE DATE AND TIME: NORMAL
LACTATE BLD-SCNC: 1.4 MMOL/L (ref 0.7–2)
LACTATE BLD-SCNC: 1.5 MMOL/L (ref 0.7–2)
LACTATE BLD-SCNC: 1.7 MMOL/L (ref 0.7–2)
LACTATE BLD-SCNC: 1.9 MMOL/L (ref 0.7–2)
LACTATE BLD-SCNC: 2.1 MMOL/L (ref 0.7–2)
LACTATE BLD-SCNC: 2.5 MMOL/L (ref 0.7–2)
LACTATE BLD-SCNC: 3.7 MMOL/L (ref 0.7–2)
MCH RBC QN AUTO: 29.9 PG (ref 26.5–33)
MCHC RBC AUTO-ENTMCNC: 34.2 G/DL (ref 31.5–36.5)
MCV RBC AUTO: 88 FL (ref 78–100)
O2/TOTAL GAS SETTING VFR VENT: 30 %
O2/TOTAL GAS SETTING VFR VENT: 40 %
O2/TOTAL GAS SETTING VFR VENT: 50 %
OXYHGB MFR BLDA: 97 % (ref 92–100)
OXYHGB MFR BLDA: 97 % (ref 92–100)
OXYHGB MFR BLDA: 98 % (ref 92–100)
PCO2 BLDA: 33 MM HG (ref 35–45)
PCO2 BLDA: 35 MM HG (ref 35–45)
PCO2 BLDA: 36 MM HG (ref 35–45)
PCO2 BLDA: 39 MM HG (ref 35–45)
PCO2 BLDA: 39 MM HG (ref 35–45)
PCO2 BLDA: 40 MM HG (ref 35–45)
PCO2 BLDA: 41 MM HG (ref 35–45)
PH BLDA: 7.36 [PH] (ref 7.35–7.45)
PH BLDA: 7.37 [PH] (ref 7.35–7.45)
PH BLDA: 7.37 [PH] (ref 7.35–7.45)
PH BLDA: 7.38 [PH] (ref 7.35–7.45)
PH BLDA: 7.42 [PH] (ref 7.35–7.45)
PH BLDA: 7.42 [PH] (ref 7.35–7.45)
PH BLDA: 7.46 [PH] (ref 7.35–7.45)
PLATELET # BLD AUTO: 168 10E3/UL (ref 150–450)
PO2 BLDA: 141 MM HG (ref 80–105)
PO2 BLDA: 144 MM HG (ref 80–105)
PO2 BLDA: 150 MM HG (ref 80–105)
PO2 BLDA: 152 MM HG (ref 80–105)
PO2 BLDA: 154 MM HG (ref 80–105)
PO2 BLDA: 158 MM HG (ref 80–105)
PO2 BLDA: 212 MM HG (ref 80–105)
POTASSIUM BLD-SCNC: 3.5 MMOL/L (ref 3.4–5.3)
POTASSIUM BLD-SCNC: 3.5 MMOL/L (ref 3.4–5.3)
POTASSIUM BLD-SCNC: 3.6 MMOL/L (ref 3.4–5.3)
POTASSIUM BLD-SCNC: 3.7 MMOL/L (ref 3.4–5.3)
POTASSIUM BLD-SCNC: 4 MMOL/L (ref 3.4–5.3)
POTASSIUM BLD-SCNC: 4 MMOL/L (ref 3.4–5.3)
POTASSIUM BLD-SCNC: 4.3 MMOL/L (ref 3.4–5.3)
POTASSIUM SERPL-SCNC: 4.7 MMOL/L (ref 3.4–5.3)
PROT SERPL-MCNC: 4.5 G/DL (ref 6.4–8.3)
RBC # BLD AUTO: 3.21 10E6/UL (ref 3.8–5.2)
SAO2 % BLDA: 100 % (ref 96–97)
SAO2 % BLDA: 100 % (ref 96–97)
SAO2 % BLDA: >100 % (ref 96–97)
SODIUM BLD-SCNC: 136 MMOL/L (ref 135–145)
SODIUM BLD-SCNC: 137 MMOL/L (ref 135–145)
SODIUM BLD-SCNC: 137 MMOL/L (ref 135–145)
SODIUM BLD-SCNC: 138 MMOL/L (ref 135–145)
SODIUM BLD-SCNC: 139 MMOL/L (ref 135–145)
SODIUM SERPL-SCNC: 137 MMOL/L (ref 135–145)
SPECIMEN EXPIRATION DATE: NORMAL
UNIT ABO/RH: NORMAL
UNIT NUMBER: NORMAL
UNIT STATUS: NORMAL
UNIT TYPE ISBT: 5100
WBC # BLD AUTO: 26.9 10E3/UL (ref 4–11)

## 2024-09-06 PROCEDURE — P9016 RBC LEUKOCYTES REDUCED: HCPCS | Performed by: STUDENT IN AN ORGANIZED HEALTH CARE EDUCATION/TRAINING PROGRAM

## 2024-09-06 PROCEDURE — 811N000005 HC ACQUISITON PANCREAS AUTOLOGOUS ISLET

## 2024-09-06 PROCEDURE — 47785 FUSE BILE DUCTS AND BOWEL: CPT | Mod: 22 | Performed by: SURGERY

## 2024-09-06 PROCEDURE — 258N000003 HC RX IP 258 OP 636: Performed by: ANESTHESIOLOGY

## 2024-09-06 PROCEDURE — 83036 HEMOGLOBIN GLYCOSYLATED A1C: CPT | Performed by: SURGERY

## 2024-09-06 PROCEDURE — 76705 ECHO EXAM OF ABDOMEN: CPT | Mod: 26 | Performed by: STUDENT IN AN ORGANIZED HEALTH CARE EDUCATION/TRAINING PROGRAM

## 2024-09-06 PROCEDURE — 82330 ASSAY OF CALCIUM: CPT

## 2024-09-06 PROCEDURE — 250N000013 HC RX MED GY IP 250 OP 250 PS 637: Performed by: STUDENT IN AN ORGANIZED HEALTH CARE EDUCATION/TRAINING PROGRAM

## 2024-09-06 PROCEDURE — 999N000141 HC STATISTIC PRE-PROCEDURE NURSING ASSESSMENT: Performed by: SURGERY

## 2024-09-06 PROCEDURE — 86901 BLOOD TYPING SEROLOGIC RH(D): CPT | Performed by: STUDENT IN AN ORGANIZED HEALTH CARE EDUCATION/TRAINING PROGRAM

## 2024-09-06 PROCEDURE — 271N000002 HC RX 271

## 2024-09-06 PROCEDURE — 250N000025 HC SEVOFLURANE, PER MIN: Performed by: SURGERY

## 2024-09-06 PROCEDURE — 85730 THROMBOPLASTIN TIME PARTIAL: CPT | Performed by: STUDENT IN AN ORGANIZED HEALTH CARE EDUCATION/TRAINING PROGRAM

## 2024-09-06 PROCEDURE — 86923 COMPATIBILITY TEST ELECTRIC: CPT

## 2024-09-06 PROCEDURE — 86923 COMPATIBILITY TEST ELECTRIC: CPT | Performed by: STUDENT IN AN ORGANIZED HEALTH CARE EDUCATION/TRAINING PROGRAM

## 2024-09-06 PROCEDURE — 0FTG0ZZ RESECTION OF PANCREAS, OPEN APPROACH: ICD-10-PCS | Performed by: SURGERY

## 2024-09-06 PROCEDURE — 370N000017 HC ANESTHESIA TECHNICAL FEE, PER MIN: Performed by: SURGERY

## 2024-09-06 PROCEDURE — 250N000011 HC RX IP 250 OP 636: Performed by: STUDENT IN AN ORGANIZED HEALTH CARE EDUCATION/TRAINING PROGRAM

## 2024-09-06 PROCEDURE — 272N000001 HC OR GENERAL SUPPLY STERILE: Performed by: SURGERY

## 2024-09-06 PROCEDURE — 258N000003 HC RX IP 258 OP 636: Performed by: STUDENT IN AN ORGANIZED HEALTH CARE EDUCATION/TRAINING PROGRAM

## 2024-09-06 PROCEDURE — C1751 CATH, INF, PER/CENT/MIDLINE: HCPCS | Performed by: SURGERY

## 2024-09-06 PROCEDURE — 0DHA0UZ INSERTION OF FEEDING DEVICE INTO JEJUNUM, OPEN APPROACH: ICD-10-PCS | Performed by: SURGERY

## 2024-09-06 PROCEDURE — 258N000003 HC RX IP 258 OP 636

## 2024-09-06 PROCEDURE — 88305 TISSUE EXAM BY PATHOLOGIST: CPT | Mod: 26 | Performed by: PATHOLOGY

## 2024-09-06 PROCEDURE — 200N000002 HC R&B ICU UMMC

## 2024-09-06 PROCEDURE — 710N000011 HC RECOVERY PHASE 1, LEVEL 3, PER MIN: Performed by: SURGERY

## 2024-09-06 PROCEDURE — 250N000013 HC RX MED GY IP 250 OP 250 PS 637: Performed by: SURGERY

## 2024-09-06 PROCEDURE — 250N000011 HC RX IP 250 OP 636: Performed by: SURGERY

## 2024-09-06 PROCEDURE — 88307 TISSUE EXAM BY PATHOLOGIST: CPT | Mod: 26 | Performed by: PATHOLOGY

## 2024-09-06 PROCEDURE — 84132 ASSAY OF SERUM POTASSIUM: CPT | Performed by: SURGERY

## 2024-09-06 PROCEDURE — 250N000011 HC RX IP 250 OP 636

## 2024-09-06 PROCEDURE — 0WQF0ZZ REPAIR ABDOMINAL WALL, OPEN APPROACH: ICD-10-PCS | Performed by: SURGERY

## 2024-09-06 PROCEDURE — 250N000011 HC RX IP 250 OP 636: Performed by: NURSE PRACTITIONER

## 2024-09-06 PROCEDURE — 93976 VASCULAR STUDY: CPT

## 2024-09-06 PROCEDURE — 85384 FIBRINOGEN ACTIVITY: CPT | Performed by: SURGERY

## 2024-09-06 PROCEDURE — 87103 BLOOD FUNGUS CULTURE: CPT | Performed by: NURSE PRACTITIONER

## 2024-09-06 PROCEDURE — 87205 SMEAR GRAM STAIN: CPT | Performed by: NURSE PRACTITIONER

## 2024-09-06 PROCEDURE — 250N000009 HC RX 250: Performed by: ANESTHESIOLOGY

## 2024-09-06 PROCEDURE — 85027 COMPLETE CBC AUTOMATED: CPT | Performed by: SURGERY

## 2024-09-06 PROCEDURE — 0DNW0ZZ RELEASE PERITONEUM, OPEN APPROACH: ICD-10-PCS | Performed by: SURGERY

## 2024-09-06 PROCEDURE — 48160 PANCREAS REMOVAL/TRANSPLANT: CPT | Performed by: STUDENT IN AN ORGANIZED HEALTH CARE EDUCATION/TRAINING PROGRAM

## 2024-09-06 PROCEDURE — 3E030U0 INTRODUCTION OF AUTOLOGOUS PANCREATIC ISLET CELLS INTO PERIPHERAL VEIN, OPEN APPROACH: ICD-10-PCS | Performed by: SURGERY

## 2024-09-06 PROCEDURE — 360N000077 HC SURGERY LEVEL 4, PER MIN: Performed by: SURGERY

## 2024-09-06 PROCEDURE — 85730 THROMBOPLASTIN TIME PARTIAL: CPT | Performed by: SURGERY

## 2024-09-06 PROCEDURE — 258N000003 HC RX IP 258 OP 636: Performed by: SURGERY

## 2024-09-06 PROCEDURE — 86900 BLOOD TYPING SEROLOGIC ABO: CPT | Performed by: STUDENT IN AN ORGANIZED HEALTH CARE EDUCATION/TRAINING PROGRAM

## 2024-09-06 PROCEDURE — 250N000009 HC RX 250: Performed by: STUDENT IN AN ORGANIZED HEALTH CARE EDUCATION/TRAINING PROGRAM

## 2024-09-06 PROCEDURE — 48160 PANCREAS REMOVAL/TRANSPLANT: CPT | Mod: GC | Performed by: SURGERY

## 2024-09-06 PROCEDURE — 93976 VASCULAR STUDY: CPT | Mod: 26 | Performed by: STUDENT IN AN ORGANIZED HEALTH CARE EDUCATION/TRAINING PROGRAM

## 2024-09-06 PROCEDURE — 250N000009 HC RX 250

## 2024-09-06 PROCEDURE — P9045 ALBUMIN (HUMAN), 5%, 250 ML: HCPCS | Performed by: STUDENT IN AN ORGANIZED HEALTH CARE EDUCATION/TRAINING PROGRAM

## 2024-09-06 PROCEDURE — 99291 CRITICAL CARE FIRST HOUR: CPT | Mod: GC | Performed by: INTERNAL MEDICINE

## 2024-09-06 PROCEDURE — 07TP0ZZ RESECTION OF SPLEEN, OPEN APPROACH: ICD-10-PCS | Performed by: SURGERY

## 2024-09-06 PROCEDURE — 250N000011 HC RX IP 250 OP 636: Performed by: ANESTHESIOLOGY

## 2024-09-06 PROCEDURE — 85610 PROTHROMBIN TIME: CPT | Performed by: SURGERY

## 2024-09-06 PROCEDURE — 0F190ZB BYPASS COMMON BILE DUCT TO SMALL INTESTINE, OPEN APPROACH: ICD-10-PCS | Performed by: SURGERY

## 2024-09-06 PROCEDURE — 88305 TISSUE EXAM BY PATHOLOGIST: CPT | Mod: TC | Performed by: SURGERY

## 2024-09-06 RX ORDER — NALOXONE HYDROCHLORIDE 0.4 MG/ML
0.4 INJECTION, SOLUTION INTRAMUSCULAR; INTRAVENOUS; SUBCUTANEOUS
Status: DISCONTINUED | OUTPATIENT
Start: 2024-09-06 | End: 2024-09-06 | Stop reason: HOSPADM

## 2024-09-06 RX ORDER — DEXTROSE, SODIUM CHLORIDE, SODIUM LACTATE, POTASSIUM CHLORIDE, AND CALCIUM CHLORIDE 5; .6; .31; .03; .02 G/100ML; G/100ML; G/100ML; G/100ML; G/100ML
INJECTION, SOLUTION INTRAVENOUS CONTINUOUS
Status: DISCONTINUED | OUTPATIENT
Start: 2024-09-06 | End: 2024-09-08

## 2024-09-06 RX ORDER — HEPARIN SODIUM 1000 [USP'U]/ML
INJECTION, SOLUTION INTRAVENOUS; SUBCUTANEOUS PRN
Status: DISCONTINUED | OUTPATIENT
Start: 2024-09-06 | End: 2024-09-06

## 2024-09-06 RX ORDER — HYDROMORPHONE HCL IN WATER/PF 6 MG/30 ML
0.4 PATIENT CONTROLLED ANALGESIA SYRINGE INTRAVENOUS EVERY 5 MIN PRN
Status: DISCONTINUED | OUTPATIENT
Start: 2024-09-06 | End: 2024-09-06 | Stop reason: HOSPADM

## 2024-09-06 RX ORDER — ERTAPENEM 1 G/1
1 INJECTION, POWDER, LYOPHILIZED, FOR SOLUTION INTRAMUSCULAR; INTRAVENOUS
Status: COMPLETED | OUTPATIENT
Start: 2024-09-06 | End: 2024-09-06

## 2024-09-06 RX ORDER — HEPARIN SODIUM 10000 [USP'U]/100ML
200 INJECTION, SOLUTION INTRAVENOUS CONTINUOUS
Status: DISCONTINUED | OUTPATIENT
Start: 2024-09-06 | End: 2024-09-07

## 2024-09-06 RX ORDER — PROCHLORPERAZINE 25 MG
25 SUPPOSITORY, RECTAL RECTAL EVERY 12 HOURS PRN
Status: DISCONTINUED | OUTPATIENT
Start: 2024-09-06 | End: 2024-09-17

## 2024-09-06 RX ORDER — ALBUMIN HUMAN 5 %
INTRAVENOUS SOLUTION INTRAVENOUS PRN
Status: DISCONTINUED | OUTPATIENT
Start: 2024-09-06 | End: 2024-09-06

## 2024-09-06 RX ORDER — HYDROMORPHONE HCL IN WATER/PF 6 MG/30 ML
0.2 PATIENT CONTROLLED ANALGESIA SYRINGE INTRAVENOUS EVERY 5 MIN PRN
Status: DISCONTINUED | OUTPATIENT
Start: 2024-09-06 | End: 2024-09-06 | Stop reason: HOSPADM

## 2024-09-06 RX ORDER — NALOXONE HYDROCHLORIDE 0.4 MG/ML
0.4 INJECTION, SOLUTION INTRAMUSCULAR; INTRAVENOUS; SUBCUTANEOUS
Status: DISCONTINUED | OUTPATIENT
Start: 2024-09-06 | End: 2024-09-17 | Stop reason: HOSPADM

## 2024-09-06 RX ORDER — LIDOCAINE 40 MG/G
CREAM TOPICAL
Status: DISCONTINUED | OUTPATIENT
Start: 2024-09-06 | End: 2024-09-06 | Stop reason: HOSPADM

## 2024-09-06 RX ORDER — SODIUM CHLORIDE 9 MG/ML
INJECTION, SOLUTION INTRAVENOUS CONTINUOUS PRN
Status: DISCONTINUED | OUTPATIENT
Start: 2024-09-06 | End: 2024-09-06

## 2024-09-06 RX ORDER — FENTANYL CITRATE 50 UG/ML
50 INJECTION, SOLUTION INTRAMUSCULAR; INTRAVENOUS EVERY 5 MIN PRN
Status: DISCONTINUED | OUTPATIENT
Start: 2024-09-06 | End: 2024-09-06 | Stop reason: HOSPADM

## 2024-09-06 RX ORDER — ONDANSETRON 2 MG/ML
4 INJECTION INTRAMUSCULAR; INTRAVENOUS EVERY 6 HOURS PRN
Status: DISCONTINUED | OUTPATIENT
Start: 2024-09-06 | End: 2024-09-06

## 2024-09-06 RX ORDER — PROCHLORPERAZINE 25 MG
25 SUPPOSITORY, RECTAL RECTAL EVERY 12 HOURS PRN
Status: DISCONTINUED | OUTPATIENT
Start: 2024-09-06 | End: 2024-09-06

## 2024-09-06 RX ORDER — METOPROLOL TARTRATE 1 MG/ML
1-2 INJECTION, SOLUTION INTRAVENOUS EVERY 5 MIN PRN
Status: DISCONTINUED | OUTPATIENT
Start: 2024-09-06 | End: 2024-09-06 | Stop reason: HOSPADM

## 2024-09-06 RX ORDER — PROPOFOL 10 MG/ML
INJECTION, EMULSION INTRAVENOUS PRN
Status: DISCONTINUED | OUTPATIENT
Start: 2024-09-06 | End: 2024-09-06

## 2024-09-06 RX ORDER — PROCHLORPERAZINE MALEATE 10 MG
10 TABLET ORAL EVERY 6 HOURS PRN
Status: DISCONTINUED | OUTPATIENT
Start: 2024-09-06 | End: 2024-09-06

## 2024-09-06 RX ORDER — NALOXONE HYDROCHLORIDE 0.4 MG/ML
0.2 INJECTION, SOLUTION INTRAMUSCULAR; INTRAVENOUS; SUBCUTANEOUS
Status: DISCONTINUED | OUTPATIENT
Start: 2024-09-06 | End: 2024-09-06 | Stop reason: HOSPADM

## 2024-09-06 RX ORDER — ACETAMINOPHEN 325 MG/1
650 TABLET ORAL EVERY 4 HOURS PRN
Status: DISCONTINUED | OUTPATIENT
Start: 2024-09-09 | End: 2024-09-06

## 2024-09-06 RX ORDER — SODIUM CHLORIDE, SODIUM LACTATE, POTASSIUM CHLORIDE, CALCIUM CHLORIDE 600; 310; 30; 20 MG/100ML; MG/100ML; MG/100ML; MG/100ML
INJECTION, SOLUTION INTRAVENOUS CONTINUOUS
Status: DISCONTINUED | OUTPATIENT
Start: 2024-09-06 | End: 2024-09-06 | Stop reason: HOSPADM

## 2024-09-06 RX ORDER — FLUMAZENIL 0.1 MG/ML
0.2 INJECTION, SOLUTION INTRAVENOUS
Status: DISCONTINUED | OUTPATIENT
Start: 2024-09-06 | End: 2024-09-06 | Stop reason: HOSPADM

## 2024-09-06 RX ORDER — KETAMINE HYDROCHLORIDE 10 MG/ML
INJECTION INTRAMUSCULAR; INTRAVENOUS PRN
Status: DISCONTINUED | OUTPATIENT
Start: 2024-09-06 | End: 2024-09-06

## 2024-09-06 RX ORDER — FAMOTIDINE 40 MG/5ML
20 POWDER, FOR SUSPENSION ORAL 2 TIMES DAILY
Status: DISCONTINUED | OUTPATIENT
Start: 2024-09-06 | End: 2024-09-07

## 2024-09-06 RX ORDER — NICOTINE POLACRILEX 4 MG
15-30 LOZENGE BUCCAL
Status: DISCONTINUED | OUTPATIENT
Start: 2024-09-06 | End: 2024-09-17 | Stop reason: HOSPADM

## 2024-09-06 RX ORDER — GABAPENTIN 250 MG/5ML
300 SOLUTION ORAL 2 TIMES DAILY
Status: DISCONTINUED | OUTPATIENT
Start: 2024-09-07 | End: 2024-09-17 | Stop reason: HOSPADM

## 2024-09-06 RX ORDER — EPHEDRINE SULFATE 50 MG/ML
INJECTION, SOLUTION INTRAMUSCULAR; INTRAVENOUS; SUBCUTANEOUS PRN
Status: DISCONTINUED | OUTPATIENT
Start: 2024-09-06 | End: 2024-09-06

## 2024-09-06 RX ORDER — CALCIUM CHLORIDE 100 MG/ML
INJECTION INTRAVENOUS; INTRAVENTRICULAR PRN
Status: DISCONTINUED | OUTPATIENT
Start: 2024-09-06 | End: 2024-09-06

## 2024-09-06 RX ORDER — LIDOCAINE HYDROCHLORIDE 20 MG/ML
INJECTION, SOLUTION INFILTRATION; PERINEURAL PRN
Status: DISCONTINUED | OUTPATIENT
Start: 2024-09-06 | End: 2024-09-06

## 2024-09-06 RX ORDER — FENTANYL CITRATE 50 UG/ML
25 INJECTION, SOLUTION INTRAMUSCULAR; INTRAVENOUS EVERY 5 MIN PRN
Status: DISCONTINUED | OUTPATIENT
Start: 2024-09-06 | End: 2024-09-06 | Stop reason: HOSPADM

## 2024-09-06 RX ORDER — ERTAPENEM 1 G/1
1 INJECTION, POWDER, LYOPHILIZED, FOR SOLUTION INTRAMUSCULAR; INTRAVENOUS EVERY 8 HOURS PRN
Status: DISCONTINUED | OUTPATIENT
Start: 2024-09-06 | End: 2024-09-06 | Stop reason: HOSPADM

## 2024-09-06 RX ORDER — POLYETHYLENE GLYCOL 3350 17 G/17G
17 POWDER, FOR SOLUTION ORAL DAILY
Status: DISCONTINUED | OUTPATIENT
Start: 2024-09-07 | End: 2024-09-06

## 2024-09-06 RX ORDER — ONDANSETRON HYDROCHLORIDE 4 MG/5ML
4 SOLUTION ORAL EVERY 6 HOURS PRN
Status: DISCONTINUED | OUTPATIENT
Start: 2024-09-06 | End: 2024-09-17 | Stop reason: HOSPADM

## 2024-09-06 RX ORDER — ONDANSETRON 4 MG/1
4 TABLET, ORALLY DISINTEGRATING ORAL EVERY 30 MIN PRN
Status: DISCONTINUED | OUTPATIENT
Start: 2024-09-06 | End: 2024-09-06 | Stop reason: HOSPADM

## 2024-09-06 RX ORDER — BISACODYL 10 MG
10 SUPPOSITORY, RECTAL RECTAL DAILY PRN
Status: DISCONTINUED | OUTPATIENT
Start: 2024-09-09 | End: 2024-09-17 | Stop reason: HOSPADM

## 2024-09-06 RX ORDER — AMOXICILLIN 250 MG
1 CAPSULE ORAL 2 TIMES DAILY
Status: DISCONTINUED | OUTPATIENT
Start: 2024-09-06 | End: 2024-09-06

## 2024-09-06 RX ORDER — HYDRALAZINE HYDROCHLORIDE 20 MG/ML
2.5-5 INJECTION INTRAMUSCULAR; INTRAVENOUS EVERY 10 MIN PRN
Status: DISCONTINUED | OUTPATIENT
Start: 2024-09-06 | End: 2024-09-06 | Stop reason: HOSPADM

## 2024-09-06 RX ORDER — ONDANSETRON 2 MG/ML
INJECTION INTRAMUSCULAR; INTRAVENOUS PRN
Status: DISCONTINUED | OUTPATIENT
Start: 2024-09-06 | End: 2024-09-06

## 2024-09-06 RX ORDER — AMOXICILLIN 250 MG
2 CAPSULE ORAL 2 TIMES DAILY
Status: DISCONTINUED | OUTPATIENT
Start: 2024-09-06 | End: 2024-09-06

## 2024-09-06 RX ORDER — NALOXONE HYDROCHLORIDE 0.4 MG/ML
0.1 INJECTION, SOLUTION INTRAMUSCULAR; INTRAVENOUS; SUBCUTANEOUS
Status: DISCONTINUED | OUTPATIENT
Start: 2024-09-06 | End: 2024-09-06 | Stop reason: HOSPADM

## 2024-09-06 RX ORDER — ONDANSETRON 2 MG/ML
4 INJECTION INTRAMUSCULAR; INTRAVENOUS EVERY 30 MIN PRN
Status: DISCONTINUED | OUTPATIENT
Start: 2024-09-06 | End: 2024-09-06 | Stop reason: HOSPADM

## 2024-09-06 RX ORDER — DEXAMETHASONE SODIUM PHOSPHATE 4 MG/ML
4 INJECTION, SOLUTION INTRA-ARTICULAR; INTRALESIONAL; INTRAMUSCULAR; INTRAVENOUS; SOFT TISSUE
Status: DISCONTINUED | OUTPATIENT
Start: 2024-09-06 | End: 2024-09-06 | Stop reason: HOSPADM

## 2024-09-06 RX ORDER — METHOCARBAMOL 750 MG/1
750 TABLET, FILM COATED ORAL EVERY 6 HOURS PRN
Status: DISCONTINUED | OUTPATIENT
Start: 2024-09-06 | End: 2024-09-13

## 2024-09-06 RX ORDER — DEXTROSE MONOHYDRATE 25 G/50ML
25-50 INJECTION, SOLUTION INTRAVENOUS
Status: DISCONTINUED | OUTPATIENT
Start: 2024-09-06 | End: 2024-09-17 | Stop reason: HOSPADM

## 2024-09-06 RX ORDER — PEDIATRIC MULTIVIT 61/D3/VIT K 1500-800
1.5 CAPSULE ORAL DAILY
Status: DISCONTINUED | OUTPATIENT
Start: 2024-09-08 | End: 2024-09-11

## 2024-09-06 RX ORDER — SODIUM CHLORIDE, SODIUM GLUCONATE, SODIUM ACETATE, POTASSIUM CHLORIDE AND MAGNESIUM CHLORIDE 526; 502; 368; 37; 30 MG/100ML; MG/100ML; MG/100ML; MG/100ML; MG/100ML
INJECTION, SOLUTION INTRAVENOUS CONTINUOUS PRN
Status: DISCONTINUED | OUTPATIENT
Start: 2024-09-06 | End: 2024-09-06

## 2024-09-06 RX ORDER — DEXTROSE MONOHYDRATE 100 MG/ML
INJECTION, SOLUTION INTRAVENOUS CONTINUOUS PRN
Status: DISCONTINUED | OUTPATIENT
Start: 2024-09-06 | End: 2024-09-17 | Stop reason: HOSPADM

## 2024-09-06 RX ORDER — ACETAMINOPHEN 325 MG/1
975 TABLET ORAL ONCE
Status: COMPLETED | OUTPATIENT
Start: 2024-09-06 | End: 2024-09-06

## 2024-09-06 RX ORDER — ONDANSETRON 4 MG/1
4 TABLET, ORALLY DISINTEGRATING ORAL EVERY 6 HOURS PRN
Status: DISCONTINUED | OUTPATIENT
Start: 2024-09-06 | End: 2024-09-06

## 2024-09-06 RX ORDER — ERTAPENEM 1 G/1
1 INJECTION, POWDER, LYOPHILIZED, FOR SOLUTION INTRAMUSCULAR; INTRAVENOUS ONCE
Status: DISCONTINUED | OUTPATIENT
Start: 2024-09-07 | End: 2024-09-07

## 2024-09-06 RX ORDER — NALOXONE HYDROCHLORIDE 0.4 MG/ML
0.2 INJECTION, SOLUTION INTRAMUSCULAR; INTRAVENOUS; SUBCUTANEOUS
Status: DISCONTINUED | OUTPATIENT
Start: 2024-09-06 | End: 2024-09-17 | Stop reason: HOSPADM

## 2024-09-06 RX ORDER — HYDROXYZINE HYDROCHLORIDE 25 MG/1
25 TABLET, FILM COATED ORAL EVERY 6 HOURS PRN
Status: DISCONTINUED | OUTPATIENT
Start: 2024-09-06 | End: 2024-09-08

## 2024-09-06 RX ORDER — ONDANSETRON 2 MG/ML
4 INJECTION INTRAMUSCULAR; INTRAVENOUS EVERY 6 HOURS PRN
Status: DISCONTINUED | OUTPATIENT
Start: 2024-09-06 | End: 2024-09-17 | Stop reason: HOSPADM

## 2024-09-06 RX ORDER — FENTANYL CITRATE 50 UG/ML
25-50 INJECTION, SOLUTION INTRAMUSCULAR; INTRAVENOUS
Status: DISCONTINUED | OUTPATIENT
Start: 2024-09-06 | End: 2024-09-06 | Stop reason: HOSPADM

## 2024-09-06 RX ORDER — POLYETHYLENE GLYCOL 3350 17 G/17G
17 POWDER, FOR SOLUTION ORAL DAILY
Status: DISCONTINUED | OUTPATIENT
Start: 2024-09-07 | End: 2024-09-17 | Stop reason: HOSPADM

## 2024-09-06 RX ORDER — LIDOCAINE 40 MG/G
CREAM TOPICAL
Status: DISCONTINUED | OUTPATIENT
Start: 2024-09-06 | End: 2024-09-17 | Stop reason: HOSPADM

## 2024-09-06 RX ADMIN — Medication 10 MG: at 12:00

## 2024-09-06 RX ADMIN — SODIUM CHLORIDE, SODIUM GLUCONATE, SODIUM ACETATE, POTASSIUM CHLORIDE AND MAGNESIUM CHLORIDE: 526; 502; 368; 37; 30 INJECTION, SOLUTION INTRAVENOUS at 11:46

## 2024-09-06 RX ADMIN — FENTANYL CITRATE 50 MCG: 50 INJECTION, SOLUTION INTRAMUSCULAR; INTRAVENOUS at 18:54

## 2024-09-06 RX ADMIN — Medication 10 MG: at 12:51

## 2024-09-06 RX ADMIN — ERTAPENEM SODIUM 1 G: 1 INJECTION, POWDER, LYOPHILIZED, FOR SOLUTION INTRAMUSCULAR; INTRAVENOUS at 16:01

## 2024-09-06 RX ADMIN — Medication 20 MG: at 10:17

## 2024-09-06 RX ADMIN — PHENYLEPHRINE HYDROCHLORIDE 200 MCG: 10 INJECTION INTRAVENOUS at 07:50

## 2024-09-06 RX ADMIN — LIDOCAINE HYDROCHLORIDE 100 MG: 20 INJECTION, SOLUTION INFILTRATION; PERINEURAL at 07:50

## 2024-09-06 RX ADMIN — PHENYLEPHRINE HYDROCHLORIDE 200 MCG: 10 INJECTION INTRAVENOUS at 08:37

## 2024-09-06 RX ADMIN — Medication 10 MG: at 17:28

## 2024-09-06 RX ADMIN — CALCIUM CHLORIDE INJECTION 0.5 G: 100 INJECTION, SOLUTION INTRAVENOUS at 12:07

## 2024-09-06 RX ADMIN — PHENYLEPHRINE HYDROCHLORIDE 100 MCG: 10 INJECTION INTRAVENOUS at 08:31

## 2024-09-06 RX ADMIN — PHENYLEPHRINE HYDROCHLORIDE 100 MCG: 10 INJECTION INTRAVENOUS at 12:15

## 2024-09-06 RX ADMIN — Medication: at 20:09

## 2024-09-06 RX ADMIN — SUGAMMADEX 100 MG: 100 INJECTION, SOLUTION INTRAVENOUS at 18:14

## 2024-09-06 RX ADMIN — Medication 10 MG: at 13:55

## 2024-09-06 RX ADMIN — HYDROMORPHONE HYDROCHLORIDE 0.4 MG: 0.2 INJECTION, SOLUTION INTRAMUSCULAR; INTRAVENOUS; SUBCUTANEOUS at 19:30

## 2024-09-06 RX ADMIN — ALBUMIN (HUMAN) 250 ML: 12.5 SOLUTION INTRAVENOUS at 11:38

## 2024-09-06 RX ADMIN — SODIUM CHLORIDE: 0.9 INJECTION, SOLUTION INTRAVENOUS at 16:38

## 2024-09-06 RX ADMIN — PHENYLEPHRINE HYDROCHLORIDE 100 MCG: 10 INJECTION INTRAVENOUS at 12:02

## 2024-09-06 RX ADMIN — SODIUM CHLORIDE, POTASSIUM CHLORIDE, SODIUM LACTATE AND CALCIUM CHLORIDE: 600; 310; 30; 20 INJECTION, SOLUTION INTRAVENOUS at 14:33

## 2024-09-06 RX ADMIN — PHENYLEPHRINE HYDROCHLORIDE 100 MCG: 10 INJECTION INTRAVENOUS at 11:44

## 2024-09-06 RX ADMIN — Medication 10 MG: at 14:51

## 2024-09-06 RX ADMIN — Medication 20 MG: at 15:22

## 2024-09-06 RX ADMIN — HYDROMORPHONE HYDROCHLORIDE 0.4 MG: 0.2 INJECTION, SOLUTION INTRAMUSCULAR; INTRAVENOUS; SUBCUTANEOUS at 19:53

## 2024-09-06 RX ADMIN — HYDROMORPHONE HYDROCHLORIDE 0.4 MG: 0.2 INJECTION, SOLUTION INTRAMUSCULAR; INTRAVENOUS; SUBCUTANEOUS at 19:07

## 2024-09-06 RX ADMIN — HYDROMORPHONE HYDROCHLORIDE 0.4 MG: 0.2 INJECTION, SOLUTION INTRAMUSCULAR; INTRAVENOUS; SUBCUTANEOUS at 19:46

## 2024-09-06 RX ADMIN — MIDAZOLAM 2 MG: 1 INJECTION INTRAMUSCULAR; INTRAVENOUS at 07:50

## 2024-09-06 RX ADMIN — HYDROMORPHONE HYDROCHLORIDE 0.4 MG: 0.2 INJECTION, SOLUTION INTRAMUSCULAR; INTRAVENOUS; SUBCUTANEOUS at 19:17

## 2024-09-06 RX ADMIN — INSULIN HUMAN 1 UNITS/HR: 1 INJECTION, SOLUTION INTRAVENOUS at 10:26

## 2024-09-06 RX ADMIN — Medication 10 MG: at 12:15

## 2024-09-06 RX ADMIN — Medication 10 MG: at 09:45

## 2024-09-06 RX ADMIN — SODIUM CHLORIDE, POTASSIUM CHLORIDE, SODIUM LACTATE AND CALCIUM CHLORIDE: 600; 310; 30; 20 INJECTION, SOLUTION INTRAVENOUS at 07:38

## 2024-09-06 RX ADMIN — PHENYLEPHRINE HYDROCHLORIDE 200 MCG: 10 INJECTION INTRAVENOUS at 08:46

## 2024-09-06 RX ADMIN — CALCIUM CHLORIDE 500 MG: 100 INJECTION INTRAVENOUS; INTRAVENTRICULAR at 17:21

## 2024-09-06 RX ADMIN — ONDANSETRON 4 MG: 2 INJECTION INTRAMUSCULAR; INTRAVENOUS at 17:53

## 2024-09-06 RX ADMIN — HYDROMORPHONE HYDROCHLORIDE 0.5 MG: 1 INJECTION, SOLUTION INTRAMUSCULAR; INTRAVENOUS; SUBCUTANEOUS at 17:57

## 2024-09-06 RX ADMIN — ACETAMINOPHEN 975 MG: 325 TABLET ORAL at 06:15

## 2024-09-06 RX ADMIN — ERTAPENEM SODIUM 1 G: 1 INJECTION, POWDER, LYOPHILIZED, FOR SOLUTION INTRAMUSCULAR; INTRAVENOUS at 08:00

## 2024-09-06 RX ADMIN — Medication 10 MG: at 09:51

## 2024-09-06 RX ADMIN — SUGAMMADEX 200 MG: 100 INJECTION, SOLUTION INTRAVENOUS at 18:08

## 2024-09-06 RX ADMIN — SODIUM CHLORIDE, SODIUM GLUCONATE, SODIUM ACETATE, POTASSIUM CHLORIDE AND MAGNESIUM CHLORIDE: 526; 502; 368; 37; 30 INJECTION, SOLUTION INTRAVENOUS at 09:49

## 2024-09-06 RX ADMIN — PHENYLEPHRINE HYDROCHLORIDE 50 MCG: 10 INJECTION INTRAVENOUS at 09:23

## 2024-09-06 RX ADMIN — HEPARIN SODIUM 200 UNITS/HR: 10000 INJECTION, SOLUTION INTRAVENOUS at 23:09

## 2024-09-06 RX ADMIN — FAMOTIDINE 20 MG: 40 POWDER, FOR SUSPENSION ORAL at 23:57

## 2024-09-06 RX ADMIN — PHENYLEPHRINE HYDROCHLORIDE 100 MCG: 10 INJECTION INTRAVENOUS at 17:23

## 2024-09-06 RX ADMIN — HYDROMORPHONE HYDROCHLORIDE 0.5 MG: 1 INJECTION, SOLUTION INTRAMUSCULAR; INTRAVENOUS; SUBCUTANEOUS at 15:25

## 2024-09-06 RX ADMIN — ACETAMINOPHEN 975 MG: 160 LIQUID ORAL at 23:57

## 2024-09-06 RX ADMIN — Medication 7 ML/HR: at 11:25

## 2024-09-06 RX ADMIN — PHENYLEPHRINE HYDROCHLORIDE 100 MCG: 10 INJECTION INTRAVENOUS at 11:26

## 2024-09-06 RX ADMIN — PHENYLEPHRINE HYDROCHLORIDE 100 MCG: 10 INJECTION INTRAVENOUS at 17:32

## 2024-09-06 RX ADMIN — EPHEDRINE SULFATE 5 MG: 5 INJECTION INTRAVENOUS at 13:43

## 2024-09-06 RX ADMIN — PHENYLEPHRINE HYDROCHLORIDE 100 MCG: 10 INJECTION INTRAVENOUS at 13:34

## 2024-09-06 RX ADMIN — PHENYLEPHRINE HYDROCHLORIDE 100 MCG: 10 INJECTION INTRAVENOUS at 09:27

## 2024-09-06 RX ADMIN — PHENYLEPHRINE HYDROCHLORIDE 200 MCG: 10 INJECTION INTRAVENOUS at 08:05

## 2024-09-06 RX ADMIN — DEXMEDETOMIDINE HYDROCHLORIDE 12 MCG: 100 INJECTION, SOLUTION INTRAVENOUS at 07:00

## 2024-09-06 RX ADMIN — Medication 20 MG: at 13:55

## 2024-09-06 RX ADMIN — PHENYLEPHRINE HYDROCHLORIDE 100 MCG: 10 INJECTION INTRAVENOUS at 09:33

## 2024-09-06 RX ADMIN — Medication 50 MG: at 07:51

## 2024-09-06 RX ADMIN — PHENYLEPHRINE HYDROCHLORIDE 100 MCG: 10 INJECTION INTRAVENOUS at 13:09

## 2024-09-06 RX ADMIN — Medication 20 MG: at 08:31

## 2024-09-06 RX ADMIN — PHENYLEPHRINE HYDROCHLORIDE 100 MCG: 10 INJECTION INTRAVENOUS at 11:05

## 2024-09-06 RX ADMIN — FENTANYL CITRATE 100 MCG: 50 INJECTION INTRAMUSCULAR; INTRAVENOUS at 18:23

## 2024-09-06 RX ADMIN — HEPARIN SODIUM 3000 UNITS: 1000 INJECTION INTRAVENOUS; SUBCUTANEOUS at 17:19

## 2024-09-06 RX ADMIN — Medication 20 MG: at 10:59

## 2024-09-06 RX ADMIN — SODIUM CHLORIDE, SODIUM GLUCONATE, SODIUM ACETATE, POTASSIUM CHLORIDE AND MAGNESIUM CHLORIDE: 526; 502; 368; 37; 30 INJECTION, SOLUTION INTRAVENOUS at 13:56

## 2024-09-06 RX ADMIN — FENTANYL CITRATE 50 MCG: 50 INJECTION, SOLUTION INTRAMUSCULAR; INTRAVENOUS at 18:48

## 2024-09-06 RX ADMIN — PHENYLEPHRINE HYDROCHLORIDE 50 MCG: 10 INJECTION INTRAVENOUS at 10:16

## 2024-09-06 RX ADMIN — Medication 20 MG: at 09:12

## 2024-09-06 RX ADMIN — PHENYLEPHRINE HYDROCHLORIDE 0.1 MCG/KG/MIN: 10 INJECTION INTRAVENOUS at 09:35

## 2024-09-06 RX ADMIN — FENTANYL CITRATE 100 MCG: 50 INJECTION INTRAMUSCULAR; INTRAVENOUS at 07:50

## 2024-09-06 RX ADMIN — EPHEDRINE SULFATE 5 MG: 5 INJECTION INTRAVENOUS at 07:15

## 2024-09-06 RX ADMIN — PHENYLEPHRINE HYDROCHLORIDE 200 MCG: 10 INJECTION INTRAVENOUS at 08:14

## 2024-09-06 RX ADMIN — SODIUM CHLORIDE, SODIUM LACTATE, POTASSIUM CHLORIDE, CALCIUM CHLORIDE AND DEXTROSE MONOHYDRATE: 5; 600; 310; 30; 20 INJECTION, SOLUTION INTRAVENOUS at 20:13

## 2024-09-06 RX ADMIN — Medication 30 MG: at 15:59

## 2024-09-06 RX ADMIN — DEXMEDETOMIDINE HYDROCHLORIDE 8 MCG: 100 INJECTION, SOLUTION INTRAVENOUS at 18:03

## 2024-09-06 RX ADMIN — ALBUMIN (HUMAN) 250 ML: 12.5 SOLUTION INTRAVENOUS at 14:46

## 2024-09-06 RX ADMIN — PHENYLEPHRINE HYDROCHLORIDE 100 MCG: 10 INJECTION INTRAVENOUS at 08:23

## 2024-09-06 RX ADMIN — SODIUM CHLORIDE, POTASSIUM CHLORIDE, SODIUM LACTATE AND CALCIUM CHLORIDE: 600; 310; 30; 20 INJECTION, SOLUTION INTRAVENOUS at 09:49

## 2024-09-06 RX ADMIN — Medication 10 MG: at 12:58

## 2024-09-06 RX ADMIN — PROPOFOL 50 MG: 10 INJECTION, EMULSION INTRAVENOUS at 07:51

## 2024-09-06 RX ADMIN — Medication 10 MG: at 10:59

## 2024-09-06 RX ADMIN — SODIUM CHLORIDE, POTASSIUM CHLORIDE, SODIUM LACTATE AND CALCIUM CHLORIDE 1000 ML: 600; 310; 30; 20 INJECTION, SOLUTION INTRAVENOUS at 19:03

## 2024-09-06 RX ADMIN — SODIUM CHLORIDE, SODIUM GLUCONATE, SODIUM ACETATE, POTASSIUM CHLORIDE AND MAGNESIUM CHLORIDE: 526; 502; 368; 37; 30 INJECTION, SOLUTION INTRAVENOUS at 07:38

## 2024-09-06 RX ADMIN — PHENYLEPHRINE HYDROCHLORIDE 100 MCG: 10 INJECTION INTRAVENOUS at 08:55

## 2024-09-06 ASSESSMENT — ACTIVITIES OF DAILY LIVING (ADL)
ADLS_ACUITY_SCORE: 22
ADLS_ACUITY_SCORE: 20
ADLS_ACUITY_SCORE: 34
ADLS_ACUITY_SCORE: 22
ADLS_ACUITY_SCORE: 20
ADLS_ACUITY_SCORE: 22

## 2024-09-06 NOTE — ANESTHESIA CARE TRANSFER NOTE
Patient: Ciera Perkins    Procedure: Procedure(s):  Total pancreatectomy with autologous islet transplant, splenectomy, gastrojejunostomy placement, Lysis of Adhesion       Diagnosis: Chronic pancreatitis (H) [K86.1]  Abdominal pain [R10.9]  Diagnosis Additional Information: No value filed.    Anesthesia Type:   General     Note:    Oropharynx: oropharynx clear of all foreign objects and spontaneously breathing  Level of Consciousness: awake  Oxygen Supplementation: face mask  Level of Supplemental Oxygen (L/min / FiO2): 6  Independent Airway: airway patency satisfactory and stable  Dentition: dentition unchanged  Vital Signs Stable: post-procedure vital signs reviewed and stable  Report to RN Given: handoff report given  Patient transferred to: PACU    Handoff Report: Identifed the Patient, Identified the Reponsible Provider, Reviewed the pertinent medical history, Discussed the surgical course, Reviewed Intra-OP anesthesia mangement and issues during anesthesia, Set expectations for post-procedure period and Allowed opportunity for questions and acknowledgement of understanding      Vitals:  Vitals Value Taken Time   BP     Temp     Pulse 82 09/06/24 1834   Resp     SpO2 100 % 09/06/24 1834   Vitals shown include unfiled device data.    Electronically Signed By: PATTI Pelayo CRNA  September 6, 2024  6:34 PM

## 2024-09-06 NOTE — OR NURSING
Islet Transplant Notes:    Infusion Start: 1726  Infusion End: 1731    Portal Pressure Start: -1  Amber Pressure End: 10

## 2024-09-06 NOTE — H&P
SURGICAL ICU ADMISSION NOTE  09/07/2024      PRIMARY TEAM: Transplant Surgery   PRIMARY PHYSICIAN: Dr. Carmichael   REASON FOR CRITICAL CARE ADMISSION: Hemodynamic Monitoring Post-Operative    ADMITTING PHYSICIAN: Dr Tilley  Date of Service (when I saw the patient): 09/07/2024    ASSESSMENT:  Ciera Perkins is a 39 year old female with PMH of cystic fibrosis of pancreas, pancreatic insufficiency, malnutrition, SMA stenosis s/p angioplasty, MASLD, and chronic pancreatitis who was admitted on 9/6/2024 with cystic fibrosis of the pancreas now s/p Total pancreatectomy with autologous islet transplant, cholecystectomy,  G/J placement, and primary repair of small incisional hernia. Ciera is being admitted to the SICU for postoperative cares including hemodynamic monitoring pain control, and close monitoring.      PLAN:    Neurological:  # Acute Postoperative Pain   - hydromorphone PCA   - Ropivacaine 0.2% paravertebral block at  7 mL per hour   - Acetaminophen 975 mg q8 hrs scheduled     # Sedation plan   - No sedation  - Monitor neurological status. Delirium preventions and precautions.        Pulmonary:   # Post operative ventilatory support   - Extubate post op, on 2LNC on arrival to SICU  - Supplemental oxygen to keep saturation above 92 %.  - Incentive spirometer every 15- 30 minutes when awake.  - wean O2 as able    Cardiovascular:    # History of Superior Mesenteric Artery Stenosis  s/p Balloon angioplasty   - Monitor hemodynamic status.   - MAP goal >65      Gastroenterology/Nutrition:  #GERD without Esophagitis   - Resume PTA omeprazole 20 mg every day      # Chronic malnutrition  # Gastrojejunostomy tube placement (9/6/2024)    - Relizorb with Tube feeds   - Bowel Regimen: Miralax, senna-docusate every day     Renal/Fluids/Electrolytes:   # Fluid losses 2/2 prolonged open operation  Maintenance IVF: D5LR @ 100 mL/hr     Electrolyte replacement protocols PRN   - Urine output is appropriate   - Will continue  to monitor intake and output.  - IV fluid hydration as above       Endocrine:  # Cystic Fibrosis of the pancreas s/p Total Pancreatectomy with Autologous Islet Transplant   # Chronic Pancreatitis requiring Opioid Use   - insulin gtt at all times  - D10 gtt for hypoglycaemia with BG < 70  - pain control as above     # Exocrine and Endocrine Pancreatic Insufficiency   # Pancreatitic Insufficiency related Diabetes mellitus   # Stress hyperglycemia   - last HgbA1C - 7.0% (9/3/2024)    - insulin gtt at all times  - Goal to keep BG< 180 for optimal wound healing       ID:  # Leukocytosis   - ertapenem periop   - leucocytosis to 26.9 likely 2/2 post-op inflammatory process    Heme:     # Acute blood loss anemia    # Anemia of critical illness    - Hemoglobin 9.6 in PACU  - Transfuse if hgb <7.0 or signs/symptoms of hypoperfusion. Monitor and trend.     Musculoskeletal:  # deconditioning 2/2 acute illness   - Physical and occupational therapy consult       General Cares/Prophylaxis:    DVT Prophylaxis: patient on low intensity heparin gtt  GI Prophylaxis: PPI  Restraints: Restraints for medical healing needed: NO  Activity: PT/OT consults     Lines/ tubes/ drains:  - Portacath  - PIV x2  - art line  - martinez  - NGT  - GJ  - KELLY drain    Disposition:  -  Surgical ICU    Patient seen, findings and plan discussed with surgical ICU staff, Dr. Tilley.      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 Per chart review, she began having acute abdominal pain flares starting in early 2021. Her first flare resulted in an ED visit  where she had a Lipase of 3,915. Genetic testing confirmed CFTR gene mutation on 2/24/2022. Since diagnosis, she has been admitted numerous times for acute on chronic pancreatitis with at least 15 admission times in the last year along with. These episodes have been treated with ketamine, methadone, Suboxone, PO dilaudid, and multiple celiac plexus blocks. Her most recent admission was 8/18/2024. Prior to this present admission she was on PO hydromorphone 4 mg q3 hrs for daily pain control. As a complication of her pancreatic insufficiency, she has also experienced malnutrition requiring prior G/J tube placements. She is being admitted to the SICU following a planned Total pancreatectomy with Autologous Islet Transplantation (TPAIT).   On arrival to SICU, patient in pain but eventually settled with dPCA. No n/v, no cp, sob.   REVIEW OF SYSTEMS: 10 point ROS neg other than the symptoms noted above in the HPI.    PAST MEDICAL HISTORY:   Past Medical History:   Diagnosis Date    Chronic abdominal pain     Chronic pancreatitis (H)     Cystic fibrosis of pancreas (H)     Diabetes mellitus (H)     History of smoking     Nicotine dependence due to vaping non-tobacco product     Port-A-Cath in place     Superior mesenteric artery stenosis (H24)        SURGICAL HISTORY:   Past Surgical History:   Procedure Laterality Date    APPENDECTOMY      CHOLECYSTECTOMY      COLONOSCOPY      EGD      ENDOSCOPIC ULTRASOUND UPPER GASTROINTESTINAL TRACT (GI)      HYSTERECTOMY      IR FEEDING TUBE PLACEMENT W ARUN/MD      IR NG TUBE PLACEMENT REQ RAD & FLUORO  05/24/2021    IR NG TUBE PLACEMENT REQ RAD & FLUORO  05/21/2021    TONSILLECTOMY         SOCIAL HISTORY:   Social History     Socioeconomic History    Marital status:      Spouse name: None    Number of children: 2    Years of education: None    Highest education level: None   Occupational History    Occupation: on disability   Tobacco Use    Smoking status: Former      Current packs/day: 0.00     Average packs/day: 1 pack/day for 8.0 years (8.0 ttl pk-yrs)     Types: Cigarettes     Start date: 2012     Quit date: 2020     Years since quittin.6    Smokeless tobacco: Never    Tobacco comments:     Currently Vapes   Substance and Sexual Activity    Alcohol use: Not Currently    Drug use: Never     Social Determinants of Health     Financial Resource Strain: Low Risk  (2021)    Received from [facility]    Overall Financial Resource Strain (CARDIA)     Difficulty of Paying Living Expenses: Not very hard   Food Insecurity: No Food Insecurity (2024)    Received from [facility]    Hunger Vital Sign     Worried About Running Out of Food in the Last Year: Never true     Ran Out of Food in the Last Year: Never true   Transportation Needs: No Transportation Needs (2024)    Received from [facility]    PRAPARE - Transportation     Lack of Transportation (Medical): No     Lack of Transportation (Non-Medical): No   Physical Activity: Medium Risk (2022)    Received from [facility], [facility]    Physical Activity     Physical Activity: 10   Stress: No Stress Concern Present (2021)    Received from [facility]    [employer] [place] [employer] - Occupational Stress Questionnaire     Feeling of Stress : Not at all   Social Connections: Socially Isolated (2021)    Received from [facility]    Social Connection and Isolation Panel [NHANES]     Frequency of Communication with Friends and Family: Once a week     Frequency of Social Gatherings with Friends and Family: Once a week     Attends Shinto Services: Never     Active Member of Clubs or Organizations: No     Attends Club or Organization Meetings: Never     Marital Status:    Interpersonal Safety: Low  Risk  (9/6/2024)    Interpersonal Safety     Do you feel physically and emotionally safe where you currently live?: Yes     Within the past 12 months, have you been hit, slapped, kicked or otherwise physically hurt by someone?: No     Within the past 12 months, have you been humiliated or emotionally abused in other ways by your partner or ex-partner?: No   Housing Stability: Low Risk  (7/23/2021)    [facility], [facility]    Housing Stability Vital Sign     Unable to Pay for Housing in the Last Year: No     Number of Places Lived in the Last Year: 1     In the last 12 months, was there a time when you did not have a steady place to sleep or slept in a shelter (including now)?: No       ALLERGIES:   Allergies   Allergen Reactions    Aspirin Anaphylaxis and Hives     HIVES (UTICARIA)    Has tolerated toradol injections during 11/8/23 admission    Bee Venom Anaphylaxis    Adhesive Tape Blisters       MEDICATIONS:  Current Facility-Administered Medications   Medication Dose Route Frequency Provider Last Rate Last Admin    acetaminophen *SUGAR FREE* (TYLENOL) solution 650 mg  650 mg Per J Tube Q4H PRN Carlos Carmichael MD        acetaminophen *SUGAR FREE* (TYLENOL) solution 975 mg  975 mg Per J Tube Q8H Carlos Carmichael MD   975 mg at 09/06/24 2357    [START ON 9/9/2024] bisacodyl (DULCOLAX) suppository 10 mg  10 mg Rectal Daily PRN Carlos Carmichael MD        dextrose 10% infusion   Intravenous Continuous PRCarlos Frank MD        dextrose 5% in lactated ringers infusion   Intravenous Continuous Carlos Carmichael  mL/hr at 09/07/24 0600 Rate Verify at 09/07/24 0600    glucose gel 15-30 g  15-30 g Oral Q15 Min PRCarlos Frank MD        Or    dextrose 50 % injection 25-50 mL  25-50 mL Intravenous Q15 Min PRCarlos Frank MD        Or    glucagon injection 1 mg  1  mg Subcutaneous Q15 Min PRN Carlos Carmichael MD        sennosides (SENOKOT) syrup 5 mL  5 mL Per J Tube BID Carlos Carmichael MD        And    docusate (COLACE) 50 MG/5ML liquid 50 mg  50 mg Per J Tube BID Carlos Carmichael MD        ertapenem (INVanz) 1 g vial to attach to  mL bag  1 g Intravenous Once Carlos Carmichael MD        famotidine (PEPCID) suspension 20 mg  20 mg Per J Tube BID Carlos Carmichael MD   20 mg at 09/06/24 2357    Or    famotidine (PEPCID) injection 20 mg  20 mg Intravenous BID Carlos Carmichael MD        gabapentin (NEURONTIN) solution 300 mg  300 mg Oral or J tube BID Cralos Carmichael MD        heparin (porcine) 100 unit/mL in 0.45% Sodium Chloride ANTICOAGULANT infusion  200 Units/hr Intravenous Continuous Carlos Carmichael MD 2 mL/hr at 09/07/24 0602 200 Units/hr at 09/07/24 0602    HYDROmorphone (DILAUDID) PCA 0.2 mg/mL OPIOID TOLERANT   Intravenous Continuous Heather-Mine Combs MD   Rate Verify at 09/07/24 0000    hydrOXYzine HCl (ATARAX) tablet 25 mg  25 mg Oral Q6H PRN Carlos Carmichael MD        insulin 1 unit/1mL in saline (NovoLIN-Regular) infusion- SOT TPIAT algorithm syringe  0-24 Units/hr Intravenous Continuous Carlos Carmichael MD 1.5 mL/hr at 09/07/24 0602 1.5 Units/hr at 09/07/24 0602    lidocaine (LMX4) cream   Topical Q1H PRN Carlos Carmichael MD        lidocaine 1 % 0.1-1 mL  0.1-1 mL Other Q1H PRN Carlos Carmichael MD        [START ON 9/8/2024] magnesium hydroxide (MILK OF MAGNESIA) suspension 30 mL  30 mL Per J Tube Daily PRN Carlos Carmichael MD        methocarbamol (ROBAXIN) tablet 750 mg  750 mg Oral Q6H PRN Carlos Carmichael MD        [START ON 9/8/2024] mvw complete formulation (PEDIATRIC) oral solution 1.5 mL  1.5 mL Oral or J tube  Daily Carlos Carmichael MD        naloxone (NARCAN) injection 0.2 mg  0.2 mg Intravenous Q2 Min PRN Carlos Carmichael MD        Or    naloxone (NARCAN) injection 0.4 mg  0.4 mg Intravenous Q2 Min PRN Carlos Carmichael MD        Or    naloxone (NARCAN) injection 0.2 mg  0.2 mg Intramuscular Q2 Min PRN Carlos Carmichael MD        Or    naloxone (NARCAN) injection 0.4 mg  0.4 mg Intramuscular Q2 Min PRN Carlos Carmichael MD        NO anticoagulation unless approved by Anesthesia Provider   Does not apply Continuous PRN Carlos Carmichael MD        ondansetron (ZOFRAN) solution 4 mg  4 mg Per J Tube Q6H PRN Carlos Carmichael MD        Or    ondansetron (ZOFRAN) injection 4 mg  4 mg Intravenous Q6H PRN Carlos Carmichael MD        pantoprazole (PROTONIX) 2 mg/mL suspension 40 mg  40 mg Oral or J tube Daily Carlos Carmichael MD        polyethylene glycol (MIRALAX) Packet 17 g  17 g Per J Tube Daily Carlos Carmichael MD        prochlorperazine (COMPAZINE) injection 10 mg  10 mg Intravenous Q6H PRN Carlos Carmichael MD        prochlorperazine (COMPAZINE) suppository 25 mg  25 mg Rectal Q12H PRN Carlos Carmichael MD        ROPivacaine 0.2% in sodium chloride 0.9% PERINEURAL infusion   Perineural Continuous Nerve Block Carlos Carmichael MD 7 mL/hr at 09/07/24 0346 Rate Verify at 09/07/24 0346    ROPivacaine 0.2% in sodium chloride 0.9% PERINEURAL infusion   Perineural Continuous Nerve Block Carlos Carmichael MD 7 mL/hr at 09/07/24 0346 Rate Verify at 09/07/24 0346    sodium chloride (PF) 0.9% PF flush 3 mL  3 mL Intracatheter Q8H Carlos Carmichael MD   3 mL at 09/06/24 8568    sodium chloride (PF) 0.9% PF flush 3 mL  3 mL Intracatheter q1 min prn Carlos Carmichael MD            PHYSICAL EXAMINATION:  Temp:  [97.8  F (36.6  C)-98.9  F (37.2  C)] 98.9  F (37.2  C)  Pulse:  [] 92  Resp:  [9-30] 16  BP: ()/(43-93) 132/77  MAP:  [40 mmHg-116 mmHg] 96 mmHg  Arterial Line BP: (100-156)/() 136/75  SpO2:  [97 %-100 %] 100 %    General: laying in bed, asleep but awakens to voice, appears in discomfort  Pulm/Resp: NLB on 2LNC   CV: RRR on radial palpation, SBP   Abdomen: Soft, non-distended, exquisitely TTP, some voluntary guarding, incision cdi, GJ in place, no oozing from insertion site, KELLY with thin serosang (more sanguinous) output in bulb  :  martinez catheter in place, urine yellow and clear  MSK/Extremities: no peripheral edema, extremities well perfused    LABS: Reviewed.   Arterial Blood Gases   Recent Labs   Lab 09/06/24  1701 09/06/24  1454 09/06/24  1358 09/06/24  1249   PH 7.37 7.36 7.37 7.42   PCO2 39 40 39 36   PO2 212* 141* 150* 158*   HCO3 22 23 23 24     Complete Blood Count   Recent Labs   Lab 09/07/24  0358 09/06/24  1852 09/06/24  1701 09/06/24  1454 09/06/24  1000 09/03/24  0830   WBC 24.7* 26.9*  --   --   --  7.3   HGB 9.9* 9.6* 10.1* 9.9*   < > 12.1    168  --   --   --  179    < > = values in this interval not displayed.     Basic Metabolic Panel  Recent Labs   Lab 09/07/24  0601 09/07/24  0501 09/07/24  0358 09/07/24  0357 09/06/24  1940 09/06/24  1852 09/06/24  1740 09/06/24  1701 09/06/24  1536 09/06/24  1454 09/03/24  1037 09/03/24  0830   NA  --   --  137  --   --  137  --  136  --  137   < > 138   POTASSIUM  --   --  4.2  --   --  4.7  --  4.3  --  4.0   < > 3.8   CHLORIDE  --   --  106  --   --  107  --   --   --   --   --  105   CO2  --   --  22  --   --  20*  --   --   --   --   --  21*   BUN  --   --  5.6*  --   --  7.4  --   --   --   --   --  9.6   CR  --   --  0.64  --   --  0.56  --   --   --   --   --  0.62   GLC 98 113* 133* 129*   < > 148*   < > 125*   < > 125*   < > 181*    < > = values in this interval not displayed.     Liver  Function Tests  Recent Labs   Lab 09/07/24  0358 09/06/24  1852 09/03/24  0830   * 96* 37   * 68* 32   ALKPHOS 48 47 89   BILITOTAL 0.6 0.9 0.7   ALBUMIN 2.9* 2.9* 4.1   INR  --  1.54* 1.15     Pancreatic Enzymes  Recent Labs   Lab 09/07/24  0358   LIPASE 85*   AMYLASE 61     Coagulation Profile  Recent Labs   Lab 09/07/24  0358 09/06/24  2309 09/06/24  1852 09/03/24  0830   INR  --   --  1.54* 1.15   PTT 35 33 >240*  --        IMAGING:  Recent Results (from the past 24 hour(s))   US Abdomen Limited w Abdomen Doppler Limited    Narrative    EXAMINATION: US ABDOMEN LIMITED W ABDOMEN DOPPLER LIMITED, 9/6/2024  7:47 PM     COMPARISON: None.    HISTORY: s.p TPIAT - please assess hepatic vascular structure    TECHNIQUE:  Gray-scale, color Doppler and spectral flow analysis.    FINDINGS:   There is no ascites.    Liver:   The liver parenchyma is echogenic. Liver measures 16.6 cm. No  evidence of a focal hepatic mass.     Kidneys:   Right kidney:  The right kidney demonstrates normal echotexture with  no evidence of a shadowing stone, focal mass or hydronephrosis.   10.7  cm in long axis dimension.    LIVER DOPPLER:  Splenic vein: Surgically absent.  Extrahepatic portal vein:  Patent continuous antegrade flow, 45  cm/sec.  Right portal vein flow is antegrade, measuring 20 cm/sec.  Left portal vein flow is antegrade, measuring 18 cm/sec.    Inferior vena cava: patent with flow toward the heart throughout..    Right, mid, left hepatic veins: Patent with flow towards the inferior  vena cava.    The hepatic artery appears tortuous.  Extrahepatic hepatic artery: Low resistance waveform with flow towards  the liver. 366 cm/sec maximum velocity with resistive index 0.64.  Right hepatic artery: 107 cm/sec with resistive index 0.59.  Left hepatic artery: 55 cm/sec with resistive index 0.64.      Impression    Impression:   1. Normal hepatic artery resistive indices. Elevated hepatic artery  velocities are likely  artifactual due to its tortuous course.  2. Patent portal vein.    I have personally reviewed the examination and initial interpretation  and I agree with the findings.    AYANA COYLE DO         SYSTEM ID:  [id number]

## 2024-09-06 NOTE — LETTER
Transition Communication Hand-off for Care Transitions to Next Level of Care Provider    Name: Ciera Perkins  : 1985  MRN #: [id number]  Primary Care Provider: Hayden Houston     Primary Clinic: [address] [facility]  [facility] [address]     Reason for Hospitalization:  Chronic pancreatitis (H) [K86.1]  Abdominal pain [R10.9]  Admit Date/Time: 2024  5:19 AM  Discharge Date:   Payor Source: Payor: [facility] / Plan: BLUE CROSS ISLET TRANSPLANT / Product Type: Indemnity /            Reason for Communication Hand-off Referral: Other      Discharge Plan:       Concern for non-adherence with plan of care:   Y/N N  Discharge Needs Assessment:  Needs      Flowsheet Row Most Recent Value   Equipment Currently Used at Home none   # of Referrals Placed by CM External Care Coordination, Home Infusion            Follow-up plan:    Future Appointments   Date Time Provider Department Center   2024  8:00 AM [facility] INFUSION NURSE [facility]   2024 12:00 PM Carlos Carmichael MD [facility]   2024  1:30 PM Carlos Carmichael MD [facility]   10/3/2024  8:00 AM [address], LD [facility]   10/3/2024  9:15 AM Doreen Goodwin RN [facility]   10/3/2024  1:00 PM Carlos Carmichael MD [facility]   10/4/2024  3:30 PM Lupillo Hudson MBBS [facility]       Any outstanding tests or procedures:        Referrals       Future Labs/Procedures    Home Infusion Referral               Key Recommendations:      Rosibel Marcum RN    AVS/Discharge Summary is the source of truth; this is a helpful guide for improved communication of patient story

## 2024-09-06 NOTE — ANESTHESIA PROCEDURE NOTES
Airway       Patient location during procedure: OR       Procedure Start/Stop Times: 9/6/2024 7:58 AM  Staff -        Anesthesiologist:  Dhruv Chavis MD       Resident/Fellow: Segundo Tucker MD       Performed By: resident  Consent for Airway        Urgency: elective  Indications and Patient Condition       Indications for airway management: shar-procedural       Induction type:intravenous       Mask difficulty assessment: 1 - vent by mask    Final Airway Details       Final airway type: endotracheal airway       Successful airway: ETT - single  Endotracheal Airway Details        ETT size (mm): 7.0       Cuffed: yes       Cuff volume (mL): 8       Successful intubation technique: direct laryngoscopy       DL Blade Type: MAC 3       Grade View of Cords: 2 (2b)       Adjucts: stylet       Position: Right       Measured from: lips       Secured at (cm): 24       Bite block used: Soft    Post intubation assessment        Placement verified by: capnometry        Number of attempts at approach: 1       Number of other approaches attempted: 0       Secured with: tape       Ease of procedure: easy       Dentition: Intact    Medication(s) Administered   Medication Administration Time: 9/6/2024 7:58 AM

## 2024-09-06 NOTE — LETTER
Transition Communication Hand-off for Care Transitions to Next Level of Care Provider    Name: Ciera Perkins  : 1985  MRN #: [id number]  Primary Care Provider: Hayden Houston     Primary Clinic: [address] [facility]  [facility] [address]     Reason for Hospitalization:  Chronic pancreatitis (H) [K86.1]  Abdominal pain [R10.9]  Admit Date/Time: 2024  5:19 AM  Discharge Date:   Payor Source: Payor: [facility] / Plan: BLUE CROSS ISLET TRANSPLANT / Product Type: Indemnity /     Reason for Communication Hand-off Referral: Other      Discharge Plan:       Concern for non-adherence with plan of care:   Y/N N  Discharge Needs Assessment:  Needs      Flowsheet Row Most Recent Value   Equipment Currently Used at Home none   # of Referrals Placed by CM External Care Coordination, Home Infusion            Follow-up plan:    Future Appointments   Date Time Provider Department Center   2024  8:00 AM [facility] INFUSION NURSE [facility]   2024 12:00 PM Carlos Carmichael MD [facility]   2024  1:30 PM Carlos Carmichael MD [facility]   10/3/2024  8:00 AM [address], LD [facility]   10/3/2024  9:15 AM Doreen Goodwin RN [facility]   10/3/2024  1:00 PM Carlos Carmichael MD [facility]   10/4/2024  3:30 PM Lupillo Hudson MBBS [facility]       Any outstanding tests or procedures:        Referrals       Future Labs/Procedures    Home Infusion Referral               Key Recommendations:      Rosibel Marcum RN    AVS/Discharge Summary is the source of truth; this is a helpful guide for improved communication of patient story

## 2024-09-06 NOTE — ANESTHESIA PROCEDURE NOTES
Paravertebral Procedure Note    Pre-Procedure   Staff -        Anesthesiologist:  Rock Gandhi MD       Resident/Fellow: Milagros Quiros DO       Other Anesthesia Staff: Ramya Alvarez DO       Performed By: resident and fellow       Location: pre-op       Procedure Start/Stop Times: 9/6/2024 7:00 AM and 9/6/2024 7:25 AM       Pre-Anesthestic Checklist: patient identified, IV checked, site marked, risks and benefits discussed, informed consent, monitors and equipment checked, pre-op evaluation, at physician/surgeon's request and post-op pain management  Timeout:       Correct Patient: Yes        Correct Procedure: Yes        Correct Site: Yes        Correct Position: Yes        Correct Laterality: Yes        Site Marked: Yes  Procedure Documentation  Procedure: Paravertebral       Diagnosis: ANALGESIA       Laterality: bilateral       Patient Position: prone       Patient Prep/Sterile Barriers: sterile gloves, mask, patient draped       Skin prep: Chloraprep       Local skin infiltrated with 5 mL of 1% lidocaine.        Insertion Site: T6-7.       Needle Type: Touhy needle       Needle Gauge: 17.        Needle Length (Inches): 3.13        Catheter: 19 G.          Catheter threaded easily.             Ultrasound guided       1. Ultrasound was used to identify targeted nerve, plexus, vascular marker, or fascial plane and place a needle adjacent to it in real-time.       2. Ultrasound was used to visualize the spread of anesthetic in close proximity to the above referenced structure.       3. A permanent image is entered into the patient's record.    Assessment/Narrative         The placement was negative for: blood aspirated, painful injection and site bleeding       Paresthesias: No.       Bolus given via catheter. no blood aspirated via catheter.        Secured via Tegaderm and Dermabond.        Insertion/Infusion Method: Continuous Infusion       Complications: none    Medication(s) Administered   Medication  "Administration Time: 9/6/2024 7:00 AM      FOR [facility] ONLY:   Pain Team Contact information: please page the Pain Team Via [employer]. Search \"Pain\". During daytime hours, please page the attending first. At night please page the resident first.      "

## 2024-09-06 NOTE — ANESTHESIA PROCEDURE NOTES
Arterial Line Procedure Note    Pre-Procedure   Staff -        Anesthesiologist:  Dhruv Chavis MD       Resident/Fellow: Segundo Tucker MD       Performed By: resident       Location: OR       Pre-Anesthestic Checklist: patient identified, IV checked, risks and benefits discussed, informed consent, monitors and equipment checked, pre-op evaluation and at physician/surgeon's request  Timeout:       Correct Patient: Yes        Correct Procedure: Yes        Correct Site: Yes        Correct Position: Yes   Line Placement:   This line was placed Post Induction  Procedure   Procedure: arterial line       Laterality: left       Insertion Site: radial.  Sterile Prep        Standard elements of sterile barrier followed       Skin prep: Chloraprep  Insertion/Injection        Technique: ultrasound guided and Seldinger Technique        1. Ultrasound was used to evaluate the access site.       2. Artery evaluated via ultrasound for patency/adequacy.       3. Using real-time ultrasound the needle/catheter was observed entering the artery/vein.       Catheter Type/Size: 20 G, 1.75 in/4.5 cm quick cath (integral wire)  Narrative         Secured by: suture       Tegaderm dressing used.       Complications: None apparent,        Arterial waveform: Yes

## 2024-09-06 NOTE — OR NURSING
Temp:  [97.8  F (36.6  C)-98  F (36.7  C)] 97.8  F (36.6  C)  Pulse:  [56-70] 58  Resp:  [16-18] 18  BP: (100-110)/(61-71) 100/61  SpO2:  [99 %-100 %] 100 %    Bilateral paravertebral block performed. Pt became hypotensive during block (70s/40s, MAPs 50s), so 5mg Ephedrine was given by provider. For pain control, 12mcg precedex was given at start of block. SB, 2-6L O2 via NC d/t SpO2 drop to 80s in the middle of procedure.  Pt tolerated fairly well.  Will continue to monitor.

## 2024-09-06 NOTE — OP NOTE
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 MD- Fellow. Dr. Dugan was the primary assistant for the procedure and participated in all aspects of the case under my superivision. His presence was necessary due to lack of suitable level resident to serve as first assist.  Jus Clements MD- Fellow. Dr. Clements was a secondary assistant and participated in the bowel anastomoses as a .   Mahnaz De Luna MD- Resident. Dr. De Luna was a secondary assistant who participated in the dissection and all bowel anastomoses.   ANESTHESIA:  General endotracheal.   SPECIMENS: pancreas to the islet lab   Spleen, duodenum and jejunum for permanent  DRAINS: Saran drain.  EBL: 600 ml  UO: 1665 ml  FLUIDS: ~6500 mL crystalloid  COMPLICATIONS: None.  FINDINGS: pancreas firm and scarred. Dense adhesions in head and neck of pancreas overlying portal vein and portal structures. Bile duct very inflamed and thick walled.   Autotransplant data:   Patient weight: 71.8 kg  Tissue mass: 3.0 ml  Total Islet number: [id number]  Total Islet number/k.  Islet equivalents: [id number]  Islet equivalents/kilogram: 2937  Pre-infusion portal pressure: -1cmH20  Pressure in mid-infusion: n/a  Peak portal pressure: 11cm H20  Post-rinse (final) portal pressure: 11cm H20     INDICATIONS OF THE PROCEDURE: recurrent relapsing pancreatitis affecting quality of life     DESCRIPTION OF THE PROCEDURE: After obtaining informed consent, the patient was brought to the operative room and placed in a supine position. General endotracheal intubation and anesthesia was induced.  After this, an arterial line and a central line were placed under sterile condition by the anesthesia team. A briefing was performed. SCD's and Sousa catheter were placed. The abdomen was prepped and draped in the usual fashion. The patient received preoperative IV  antibiotics. A pause for the cause was performed.    An midline incision was made sharply and carried down through the subcutaneous tissue.We identified a small incisional hernia that was included in our midline incision, measured 2X1cm dimension. The peritoneum was opened under direct visualization and after this, we performed lysis of adhesions for the next 90 minutes due to scarring from her prior surgeries and abdominal infection.. After this, the abdominal retractor was placed in the upper abdomen. We proceeded to open the lesser sac and found the pancreas to be firm and scarred, with significant firmness in the head and uncinate process and scarring along the memo hepatis with difficult identification of the portal structures. The short gastric vessels and splenic attachments to the colon were divided. We then mobilized the tail, body and neck of the pancreas. During this maneouver we were able to identify the splenic artery, splenic vein, SMV, IMV and portal vein. The extrapancreatic portions of the splenic artery and vein were circumferentially cleared of investing tissue, and the anterior surface of the portal vein was cleared. The duodenum was Kocherized, completely mobilizing the head of the pancreas. At the superior border of the pancreas, the distal common bile duct and gastroduodenal artery were isolated. The proximal duodenum was divided at the level of the pancreas and the jejunum was transected near the ligament of Treitz.  The gallbladder had previously been excised. The appendix had also previously been excised.  The uncinate process of the pancreas was then dissected free from the retroperitoneum. The fibrofatty tissue connecting the  uncinate process to the portal vein and SMA was thickened and inflamed, not amenable to stapling. We took this with serial firing from the ligasure, and verified a preserved SMA pulse as well as a preserved replaced right hepatic artery pulse and hemostasis between firings. The bile duct was divided as described above.  At this point, the pancreas was essentially only attached through the vessels and the retroperitoneum. The retroperitoneal attachments were divided with electrocautery and ligation.  The splenic artery and the splenic vein were both ligated and divided. The GDA was then ligated and divided and confirming a hepatic artery pulse after clamping of the GDA and the pancreas was transferred into ice cold saline for further preparation on the back table. We verified that the operative field was hemostatic. We verified that the fibrofatty tissue below the uncinate was hemostatic and that the portal vein and SMA retained normal flow.      The pancreas was prepared by resecting all the non-pancreatic tissue sharply.  The pancreas was packed in iced cold preservation solution and transferred to the awaiting islet team.      We then performed a thorough irrigation and complete hemostasis and began the reconstruction. I measuyred the total length of his small bowel to be ~450cm. The jejunum was divided, and a hand sewn side to side functional end to end jejunojejunostomy was created to allow for a 100cm Lan limb.  The mesenteric defect was closed with 4-0 prolene sutures.  The proximal jejunum was mobilized and passed in a transmesocolic fashion into the right upper quadrant through the prior ligament of treitz defect. We then performed an end-to-side choledochojejunostomy with 5-0 PDS running. The size of the bile duct was approximately 7 mm, and it was inflamed and thick walled. We noted healthy bile flow. A biliary stent was not placed. 3-0 silk sutures were used to pexy the bowel to the hilum to take tension  off our anastomosis.  The biliary limb was then secured to the transverse mesocolon with a 3-0 silk suture.  The distal jejunum was brought in a retrocolic fashion via a defect in the transverse mesocolon to the duodenum and was anastomosed in an end to side, hand sewn 2-layered fashion.   After this, we pexied the serosa of the jejunum to the mesentery of the colon. We introduced an 22Fr gastrojejunostomy tube into the left upper abdominal wall.  4-0 prolene sutures were used to place a double pursestring in the anterior wall of the body of the stomach.  The lumen of the stomach was entered with cautery through the center of the pursestring.  The tube was passed across the duodenojejunostomy anastomosis and fed distally. The balloon was inflated with 10cc of sterile water. The gastric serosa was pexied to the abdominal wall peritoneum with 3 stitches of 3-0 silk. The end of the tube was secured in place using a chromic suture across the wall of the jejunum.    We placed a Saran drain into the right upper quadrant of the abdomen and positioned it behind the choledochojejunostomy.   Irrigation and hemostasis of the abdomen was completed, and the abdomen was packed while awaiting arrival of the islets. Upon arrival of the islets the patient was heparinized.      We placed a micropuncture needle and wire into the previously ligated splenic vein, and using Seldinger technique we advanced a 5F catheter. Through this, we infused the islet cells into the portal vein.  All the islets were infused into the portal system.  See findings for blood flow and portal pressure measurements.  The catheter was removed and the puncture site tied oversewn with 6-0 prolene to achieve hemostasis of the splenic vein.  A needle core liver biopsy was not done.  The abdomen was lavaged, then the midline fascia was closed with 0 looped PDS, the subcutaneous tissue was irrigated, hemostasis was obtained and the skin was closed with staples and  a dry dressing. At the end of the case, all the sponge and needle counts were correct. Faculty was present during the entire procedure. The patient tolerated the procedure well and  was transferred in stable and satisfactory condition to the PACU.

## 2024-09-06 NOTE — PROGRESS NOTES
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 She is now s/p TPAIT, splenectomy, and G/J placement with repair of small incisional hernia on 9/6/24 with Dr. Carmichael.     s/p TPAIT 9/6/24: POD#1. Islet yield Islet equivalents/kilogram: 2937.   -Post-op US   1. Normal hepatic artery resistive indices. Elevated hepatic artery  velocities are likely artifactual due to its tortuous course.  2. Patent portal vein.  KELLY drain x 1 with serosang output.     Cardiorespiratory: No issues, stable on 0.5L oxygen via NC.     GI/Nutrition:   GERD: PPI daily  Pancreatic insufficiency: Relizorb with TF.   Moderate malnutrition in the context of chronic illness/disease: GJ tube placed. Start tube feeds at 10cc/hour today. NPO.     Fluid/Electrolytes: IVF: D5LR@100/hr; electrolyte replacements per ICU protocol.     : Sousa in place    Endocrine:  Post-pancreatectomy diabetes/stress hyperglycemia: Endocrine consulted. Continue insulin gtt.     Infection:   Leukocytosis: WBC 24.7, likely 2/2 stress post operatively. Ertapenema for surgical ppx.     Prophylaxis: Anticoagulation: Heparin gtt increase from 200 to 400 units/hr    Pain control: PTA managed with PO hydromorphone 4 mg Q3H. Poor pain control  this AM.   Current regimen:    - Ropivacaine paravertebral block managed by pain   - Dilaudid pca, increase dose to 0.4 mg/hr basal and 0.4 bolus   - Neurontin   - Acetaminophen   - Add Ketamine     Activity: PT/OT consulted    Disposition: SICU    KEYA/Fellow/Resident Provider: Christina Calvin PA-C     Faculty: Carlos Carmichael MD  __________________________________________________________________  Transplant History: Admitted 9/6/2024 for TPAIT  N/A, Postoperative day:      Interval  "History: History is obtained from the patient  Overnight events: Pain poorly controlled.     ROS:   A 10-point review of systems was negative except as noted above.    Curent Meds:  Current Facility-Administered Medications   Medication Dose Route Frequency Provider Last Rate Last Admin    sodium chloride (PF) 0.9% PF flush 3 mL  3 mL Intracatheter Q8H Yodit Kaur, NP        sodium chloride (PF) 0.9% PF flush 3 mL  3 mL Intracatheter Q8H Segundo Tucker MD           Physical Exam:     Admit Weight: 71.8 kg (158 lb 4.6 oz)    Current Vitals:   /62   Pulse 81   Temp 97.8  F (36.6  C) (Oral)   Resp 30   Ht 1.676 m (5' 6\")   Wt 71.8 kg (158 lb 4.6 oz)   SpO2 100%   BMI 25.55 kg/m           Vital sign ranges:    Temp:  [97.8  F (36.6  C)] 97.8  F (36.6  C)  Pulse:  [53-81] 81  Resp:  [11-30] 30  BP: ()/(43-71) 109/62  SpO2:  [97 %-100 %] 100 %  Patient Vitals for the past 24 hrs:   BP Temp Temp src Pulse Resp SpO2 Height Weight   09/06/24 0725 109/62 -- -- 81 30 100 % -- --   09/06/24 0720 100/64 -- -- 68 20 100 % -- --   09/06/24 0718 -- -- -- 74 22 97 % -- --   09/06/24 0715 106/64 -- -- 55 13 100 % -- --   09/06/24 0710 (!) 88/44 -- -- 54 12 100 % -- --   09/06/24 0705 (!) 79/52 -- -- 55 14 100 % -- --   09/06/24 0703 90/48 -- -- 55 14 100 % -- --   09/06/24 0700 (!) 75/44 -- -- 58 11 100 % -- --   09/06/24 0656 90/43 -- -- 56 12 100 % -- --   09/06/24 0655 (!) 77/47 -- -- 53 12 100 % -- --   09/06/24 0650 (!) 87/54 -- -- 58 13 100 % -- --   09/06/24 0645 96/58 -- -- 60 13 100 % -- --   09/06/24 0641 100/61 -- -- 58 18 100 % -- --   09/06/24 0600 106/71 97.8  F (36.6  C) Oral 56 16 100 % -- --   09/06/24 0540 -- -- -- -- -- -- 1.676 m (5' 6\") 71.8 kg (158 lb 4.6 oz)     General Appearance: uncomfortable.   Skin: normal, no suspicious lesions or rashes  Heart: regular rate and rhythm  Lungs: NLB on 0.5L via NC  Abdomen: The abdomen is rounded, and  moderately tender. The wound is Healing well. " Tube/Drain sites are: PEGJ in place.  KELLY: serosanguinous, moderate   : martinez is present.    Extremities: edema: present bilaterally. 2+    Data:   CMP  Recent Labs   Lab 09/06/24  1249 09/06/24  1155 09/03/24  1037 09/03/24  0830    137   < > 138   POTASSIUM 3.7 3.5   < > 3.8   CHLORIDE  --   --   --  105   CO2  --   --   --  21*   * 141*   < > 181*   BUN  --   --   --  9.6   CR  --   --   --  0.62   GFRESTIMATED  --   --   --  >90   CARLA  --   --   --  9.1   ICAW 4.5 4.2*   < >  --    ALBUMIN  --   --   --  4.1   BILITOTAL  --   --   --  0.7   ALKPHOS  --   --   --  89   AST  --   --   --  37   ALT  --   --   --  32    < > = values in this interval not displayed.     CBC  Recent Labs   Lab 09/06/24  1249 09/06/24  1155 09/06/24  1000 09/03/24  0830   HGB 10.1* 9.8*   < > 12.1   WBC  --   --   --  7.3   PLT  --   --   --  179   A1C  --   --   --  7.0*    < > = values in this interval not displayed.     Coags  Recent Labs   Lab 09/03/24  0830   INR 1.15      Urinalysis  Recent Labs   Lab Test 09/03/24  0912   COLOR Yellow   APPEARANCE Clear   URINEGLC Negative   URINEBILI Negative   URINEKETONE Negative   SG 1.026   UBLD Negative   URINEPH 5.5   PROTEIN 10*   NITRITE Negative   LEUKEST Negative   RBCU <1   WBCU 2

## 2024-09-06 NOTE — OR NURSING
Pt having 9/10 pain and requesting pain medication prior to having Paravertebral block. Enomaly message was sent to anesthesiologist Dr. Chavis inquiring if pt can have more than pre-op tylenol. A phone call was placed as well with no response.    This writer spoke with Block team, who elected to give pt precedex prior to paravertebral block. Pt understanding and notes that she normally tolerates a high level of pain, with a baseline rating of 7/10 pain in her LUQ.

## 2024-09-07 ENCOUNTER — APPOINTMENT (OUTPATIENT)
Dept: PHYSICAL THERAPY | Facility: CLINIC | Age: 39
End: 2024-09-07
Attending: SURGERY
Payer: COMMERCIAL

## 2024-09-07 LAB
ALBUMIN SERPL BCG-MCNC: 2.9 G/DL (ref 3.5–5.2)
ALP SERPL-CCNC: 48 U/L (ref 40–150)
ALT SERPL W P-5'-P-CCNC: 125 U/L (ref 0–50)
AMYLASE SERPL-CCNC: 61 U/L (ref 28–100)
ANION GAP SERPL CALCULATED.3IONS-SCNC: 9 MMOL/L (ref 7–15)
APTT PPP: 35 SECONDS (ref 22–38)
AST SERPL W P-5'-P-CCNC: 162 U/L (ref 0–45)
BASOPHILS # BLD AUTO: 0.1 10E3/UL (ref 0–0.2)
BASOPHILS NFR BLD AUTO: 0 %
BILIRUB DIRECT SERPL-MCNC: <0.2 MG/DL (ref 0–0.3)
BILIRUB SERPL-MCNC: 0.6 MG/DL
BUN SERPL-MCNC: 5.6 MG/DL (ref 6–20)
CALCIUM SERPL-MCNC: 7.7 MG/DL (ref 8.8–10.4)
CHLORIDE SERPL-SCNC: 106 MMOL/L (ref 98–107)
CREAT SERPL-MCNC: 0.64 MG/DL (ref 0.51–0.95)
EGFRCR SERPLBLD CKD-EPI 2021: >90 ML/MIN/1.73M2
EOSINOPHIL # BLD AUTO: 0 10E3/UL (ref 0–0.7)
EOSINOPHIL NFR BLD AUTO: 0 %
ERYTHROCYTE [DISTWIDTH] IN BLOOD BY AUTOMATED COUNT: 13.4 % (ref 10–15)
ERYTHROCYTE [DISTWIDTH] IN BLOOD BY AUTOMATED COUNT: 13.6 % (ref 10–15)
GLUCOSE BLDC GLUCOMTR-MCNC: 104 MG/DL (ref 70–99)
GLUCOSE BLDC GLUCOMTR-MCNC: 104 MG/DL (ref 70–99)
GLUCOSE BLDC GLUCOMTR-MCNC: 107 MG/DL (ref 70–99)
GLUCOSE BLDC GLUCOMTR-MCNC: 110 MG/DL (ref 70–99)
GLUCOSE BLDC GLUCOMTR-MCNC: 113 MG/DL (ref 70–99)
GLUCOSE BLDC GLUCOMTR-MCNC: 120 MG/DL (ref 70–99)
GLUCOSE BLDC GLUCOMTR-MCNC: 121 MG/DL (ref 70–99)
GLUCOSE BLDC GLUCOMTR-MCNC: 122 MG/DL (ref 70–99)
GLUCOSE BLDC GLUCOMTR-MCNC: 122 MG/DL (ref 70–99)
GLUCOSE BLDC GLUCOMTR-MCNC: 128 MG/DL (ref 70–99)
GLUCOSE BLDC GLUCOMTR-MCNC: 129 MG/DL (ref 70–99)
GLUCOSE BLDC GLUCOMTR-MCNC: 133 MG/DL (ref 70–99)
GLUCOSE BLDC GLUCOMTR-MCNC: 138 MG/DL (ref 70–99)
GLUCOSE BLDC GLUCOMTR-MCNC: 156 MG/DL (ref 70–99)
GLUCOSE BLDC GLUCOMTR-MCNC: 77 MG/DL (ref 70–99)
GLUCOSE BLDC GLUCOMTR-MCNC: 79 MG/DL (ref 70–99)
GLUCOSE BLDC GLUCOMTR-MCNC: 88 MG/DL (ref 70–99)
GLUCOSE BLDC GLUCOMTR-MCNC: 89 MG/DL (ref 70–99)
GLUCOSE BLDC GLUCOMTR-MCNC: 94 MG/DL (ref 70–99)
GLUCOSE BLDC GLUCOMTR-MCNC: 94 MG/DL (ref 70–99)
GLUCOSE BLDC GLUCOMTR-MCNC: 98 MG/DL (ref 70–99)
GLUCOSE BLDC GLUCOMTR-MCNC: 98 MG/DL (ref 70–99)
GLUCOSE SERPL-MCNC: 133 MG/DL (ref 70–99)
HCO3 SERPL-SCNC: 22 MMOL/L (ref 22–29)
HCT VFR BLD AUTO: 27.9 % (ref 35–47)
HCT VFR BLD AUTO: 30 % (ref 35–47)
HGB BLD-MCNC: 9.2 G/DL (ref 11.7–15.7)
HGB BLD-MCNC: 9.9 G/DL (ref 11.7–15.7)
IMM GRANULOCYTES # BLD: 0.2 10E3/UL
IMM GRANULOCYTES NFR BLD: 1 %
LIPASE SERPL-CCNC: 85 U/L (ref 13–60)
LYMPHOCYTES # BLD AUTO: 1.5 10E3/UL (ref 0.8–5.3)
LYMPHOCYTES NFR BLD AUTO: 6 %
MAGNESIUM SERPL-MCNC: 1.7 MG/DL (ref 1.7–2.3)
MCH RBC QN AUTO: 28.9 PG (ref 26.5–33)
MCH RBC QN AUTO: 29.1 PG (ref 26.5–33)
MCHC RBC AUTO-ENTMCNC: 33 G/DL (ref 31.5–36.5)
MCHC RBC AUTO-ENTMCNC: 33 G/DL (ref 31.5–36.5)
MCV RBC AUTO: 88 FL (ref 78–100)
MCV RBC AUTO: 88 FL (ref 78–100)
MONOCYTES # BLD AUTO: 2.5 10E3/UL (ref 0–1.3)
MONOCYTES NFR BLD AUTO: 10 %
NEUTROPHILS # BLD AUTO: 20.4 10E3/UL (ref 1.6–8.3)
NEUTROPHILS NFR BLD AUTO: 83 %
NRBC # BLD AUTO: 0 10E3/UL
NRBC BLD AUTO-RTO: 0 /100
PHOSPHATE SERPL-MCNC: 3.7 MG/DL (ref 2.5–4.5)
PLATELET # BLD AUTO: 195 10E3/UL (ref 150–450)
PLATELET # BLD AUTO: 195 10E3/UL (ref 150–450)
POTASSIUM SERPL-SCNC: 4.2 MMOL/L (ref 3.4–5.3)
PROT SERPL-MCNC: 4.7 G/DL (ref 6.4–8.3)
RBC # BLD AUTO: 3.16 10E6/UL (ref 3.8–5.2)
RBC # BLD AUTO: 3.43 10E6/UL (ref 3.8–5.2)
SODIUM SERPL-SCNC: 137 MMOL/L (ref 135–145)
WBC # BLD AUTO: 24.7 10E3/UL (ref 4–11)
WBC # BLD AUTO: 28 10E3/UL (ref 4–11)

## 2024-09-07 PROCEDURE — 82150 ASSAY OF AMYLASE: CPT | Performed by: SURGERY

## 2024-09-07 PROCEDURE — 84100 ASSAY OF PHOSPHORUS: CPT | Performed by: SURGERY

## 2024-09-07 PROCEDURE — 250N000012 HC RX MED GY IP 250 OP 636 PS 637: Performed by: SURGERY

## 2024-09-07 PROCEDURE — 97162 PT EVAL MOD COMPLEX 30 MIN: CPT | Mod: GP

## 2024-09-07 PROCEDURE — 83690 ASSAY OF LIPASE: CPT | Performed by: SURGERY

## 2024-09-07 PROCEDURE — 83735 ASSAY OF MAGNESIUM: CPT | Performed by: SURGERY

## 2024-09-07 PROCEDURE — 82248 BILIRUBIN DIRECT: CPT | Performed by: SURGERY

## 2024-09-07 PROCEDURE — 250N000011 HC RX IP 250 OP 636: Performed by: SURGERY

## 2024-09-07 PROCEDURE — 97530 THERAPEUTIC ACTIVITIES: CPT | Mod: GP

## 2024-09-07 PROCEDURE — 250N000013 HC RX MED GY IP 250 OP 250 PS 637: Performed by: SURGERY

## 2024-09-07 PROCEDURE — 200N000002 HC R&B ICU UMMC

## 2024-09-07 PROCEDURE — 271N000002 HC RX 271: Performed by: SURGERY

## 2024-09-07 PROCEDURE — 85025 COMPLETE CBC W/AUTO DIFF WBC: CPT | Performed by: SURGERY

## 2024-09-07 PROCEDURE — 85027 COMPLETE CBC AUTOMATED: CPT | Performed by: PHYSICIAN ASSISTANT

## 2024-09-07 PROCEDURE — 258N000003 HC RX IP 258 OP 636: Performed by: SURGERY

## 2024-09-07 PROCEDURE — 85730 THROMBOPLASTIN TIME PARTIAL: CPT | Performed by: SURGERY

## 2024-09-07 RX ORDER — HEPARIN SODIUM 10000 [USP'U]/100ML
400 INJECTION, SOLUTION INTRAVENOUS CONTINUOUS
Status: DISCONTINUED | OUTPATIENT
Start: 2024-09-07 | End: 2024-09-11

## 2024-09-07 RX ORDER — DEXTROSE MONOHYDRATE 100 MG/ML
INJECTION, SOLUTION INTRAVENOUS CONTINUOUS PRN
Status: DISCONTINUED | OUTPATIENT
Start: 2024-09-07 | End: 2024-09-17 | Stop reason: HOSPADM

## 2024-09-07 RX ORDER — ERTAPENEM 1 G/1
1 INJECTION, POWDER, LYOPHILIZED, FOR SOLUTION INTRAMUSCULAR; INTRAVENOUS ONCE
Status: COMPLETED | OUTPATIENT
Start: 2024-09-07 | End: 2024-09-07

## 2024-09-07 RX ADMIN — SODIUM CHLORIDE 1.5 UNITS/HR: 9 INJECTION, SOLUTION INTRAVENOUS at 13:49

## 2024-09-07 RX ADMIN — SODIUM CHLORIDE, SODIUM LACTATE, POTASSIUM CHLORIDE, CALCIUM CHLORIDE AND DEXTROSE MONOHYDRATE: 5; 600; 310; 30; 20 INJECTION, SOLUTION INTRAVENOUS at 05:11

## 2024-09-07 RX ADMIN — SENNOSIDES 5 ML: 8.8 LIQUID ORAL at 19:57

## 2024-09-07 RX ADMIN — ACETAMINOPHEN 975 MG: 160 LIQUID ORAL at 06:52

## 2024-09-07 RX ADMIN — POLYETHYLENE GLYCOL 3350 17 G: 17 POWDER, FOR SOLUTION ORAL at 08:12

## 2024-09-07 RX ADMIN — ACETAMINOPHEN 975 MG: 160 LIQUID ORAL at 22:56

## 2024-09-07 RX ADMIN — ERTAPENEM SODIUM 1 G: 1 INJECTION, POWDER, LYOPHILIZED, FOR SOLUTION INTRAMUSCULAR; INTRAVENOUS at 12:19

## 2024-09-07 RX ADMIN — ACETAMINOPHEN 975 MG: 160 LIQUID ORAL at 16:21

## 2024-09-07 RX ADMIN — DOCUSATE SODIUM 50 MG: 50 LIQUID ORAL at 08:12

## 2024-09-07 RX ADMIN — METHOCARBAMOL 750 MG: 750 TABLET ORAL at 22:03

## 2024-09-07 RX ADMIN — Medication: at 22:09

## 2024-09-07 RX ADMIN — FAMOTIDINE 20 MG: 40 POWDER, FOR SUSPENSION ORAL at 08:13

## 2024-09-07 RX ADMIN — Medication: at 22:11

## 2024-09-07 RX ADMIN — GABAPENTIN 300 MG: 250 SUSPENSION ORAL at 08:12

## 2024-09-07 RX ADMIN — METHOCARBAMOL 750 MG: 750 TABLET ORAL at 09:36

## 2024-09-07 RX ADMIN — METHOCARBAMOL 750 MG: 750 TABLET ORAL at 15:41

## 2024-09-07 RX ADMIN — Medication 40 MG: at 08:13

## 2024-09-07 RX ADMIN — Medication: at 13:38

## 2024-09-07 RX ADMIN — GABAPENTIN 300 MG: 250 SUSPENSION ORAL at 19:57

## 2024-09-07 RX ADMIN — DOCUSATE SODIUM 50 MG: 50 LIQUID ORAL at 19:57

## 2024-09-07 ASSESSMENT — ACTIVITIES OF DAILY LIVING (ADL)
ADLS_ACUITY_SCORE: 29
ADLS_ACUITY_SCORE: 30
ADLS_ACUITY_SCORE: 33
ADLS_ACUITY_SCORE: 30
ADLS_ACUITY_SCORE: 30
ADLS_ACUITY_SCORE: 29
ADLS_ACUITY_SCORE: 34
ADLS_ACUITY_SCORE: 30
ADLS_ACUITY_SCORE: 29
ADLS_ACUITY_SCORE: 34
ADLS_ACUITY_SCORE: 29
ADLS_ACUITY_SCORE: 30
ADLS_ACUITY_SCORE: 30
ADLS_ACUITY_SCORE: 33

## 2024-09-07 NOTE — PROGRESS NOTES
SURGICAL ICU PROGRESS NOTE  09/07/2024        Date of Service (when I saw the patient): 09/07/2024    ASSESSMENT:  Ciera Perkins is a 39 year old female with PMH of cystic fibrosis of pancreas, pancreatic insufficiency, malnutrition, SMA stenosis s/p angioplasty, MASLD, and chronic pancreatitis who was admitted on 9/6/2024 with cystic fibrosis of the pancreas now s/p Total pancreatectomy with autologous islet transplant, splenectomy, adhesiolysis, and G/J placement.  Ciera is being admitted to the SICU for postoperative cares including hemodynamic monitoring, pain control, and close monitoring.      CHANGES and MAJOR EVENTS TODAY:   - Escalate pain plan: Add Ketamine bolus with xx mg   - Increase Hydromorphone infusion to 0.4 mg/hr basal and to 0.4 mg/hr pt bolus    - add gabapentin 300 mg TID   - discontinue famotidine  - increasing heparin gtt to 400 units/hr   - last dose of prophylactic ertapenem scheduled for today  - start trickle tube feeds - start at 10 mL/hr and advance by 10 mL q24 hrs to goal of 45 mL/hr     PLAN:    Neurological:  # Acute Postoperative Pain   - hydromorphone PCA at 0.4 mg/hr infusion with 0.4 mg push/10 min   - Ropivacaine 0.2% paravertebral block at  7 mL per hour   - Acetaminophen 975 mg q8 hrs scheduled    - Gabapentin 300 mg TID  - Bowel Regimen: Miralax, senna-docusate every day     # No sedation Planned   Monitor neurological status with delirium preventions and precautions in place.     Pulmonary: No Acute Issues   - Supplemental oxygen to keep saturation above 92 %.  - Patient stating pain is limiting her from deep breaths.   - Incentive spirometer while awake     Cardiovascular:  No Acute Issues   - Monitor hemodynamic status. Radial Arterial Line placed.   - MAP goal >65     GI/Nutrition:    Orders Placed This Encounter      NPO for Medical/Clinical Reasons Except for: Ice Chips    #GERD without Esophagitis   - Pantoprazole 40 mg every day  - discontinue famotidine 20 mg  BID as patient already on PPI      # Chronic malnutrition  # Gastrojejunostomy tube placement (9/6/2024)    - Nutrition Consult   - start trickle Tube feeds: start at 10 mL/hr and increase by 10 mL q24 hours. Goal of 45 mL/hr.   - Relizorb with Tube feeds   - No indication for parenteral nutrition.    Fluids/Electrolytes:  No acute issues   - D5LR for IV fluid hydration at 100 mL/hr    - electrolyte replacement protocol  In place.     Renal:   - Urine output: 1.00 mL/kg/hr . Currently appropriate   - Strict intake and output monitoring   - Sousa catheter in place (9/6/2024)     Intake/Output Summary (Last 24 hours) at 9/7/2024 0636  Last data filed at 9/7/2024 0602  Gross per 24 hour   Intake 9281.29 ml   Output 3775 ml   Net 5506.29 ml        Endocrine:  # Cystic Fibrosis of the pancreas s/p Total Pancreatectomy with Autologous Islet Transplant   # Chronic Pancreatitis requiring Opioid Use   - insulin gtt at all times   - D10 gtt for hypoglycemia when BG <70  - pain control as above   - Amylase and Lipase to be drawn on POD #5     # Exocrine and Endocrine Pancreatic Insufficiency   # Pancreatitic Insufficiency related Diabetes mellitus   # Stress hyperglycemia   - last HgbA1C - 7.0% (9/3/2024)    - Goal to keep BG< 180 for optimal wound healing      Recent Labs   Lab 09/07/24  0601 09/07/24  0501 09/07/24  0358 09/07/24  0357 09/07/24  0257 09/07/24  0201 09/07/24  0058 09/06/24  2351 09/06/24  2254 09/06/24  2152 09/06/24  2045 09/06/24  1940   GLC 98 113* 133* 129* 128* 133* 156* 180* 189* 164* 143* 121*        Infectious disease:   # Leukocytosis   Likely acute stress response due to surgery.   - Continue to trend and monitor     - Antibiotics:  - ertapenem 1 g - to be given 6 hours after last intra-op dose for post-operative prophylaxis     Hematology:    # Acute blood loss anemia    - Hemoglobin 9.9. Monitor and trend.   - Threshold for transfusion if hgb <7.0 or signs/symptoms of hypoperfusion.        Musculoskeletal:  - Physical and occupational therapy consults.    Skin:  # Surgical Site Incisions  - diligent cares to prevent skin breakdown and wound formation.      General Cares/Prophylaxis:    DVT Prophylaxis: Heparin gtt as above  GI Prophylaxis: PPI   Restraints: Restraints for medical healing needed: NO   Activity: PT/OT consults     Lines/ tubes/ drains:  - Portacath  - PIV x2  - Left Radial Arterial Line  - G/J Tube   - KELLY drain     Disposition:  - Surgical ICU     Patient seen, findings and plan discussed with surgical ICU staff, Dr. Sow.    Reginald Calvillo  Resident/Fellow Attestation   I, Fe Solis MD, was present with the medical student who participated in the service and in the documentation of the note.  I have verified the history. I agree with the assessment and plan of care as documented in the note.      Fe Solis MD  PGY1  Date of Service (when I saw the patient): 09/07/24     ====================================  INTERVAL HISTORY: Ciera Perkins is a 39 year old female who was admitted to SICU s/p Total Pancreatectomy and Autologous Islet Transplantation (TPAIT). No acute overnight events as patient just was admitted to floor.     This morning Ciera states she is having extreme abdominal pain which the current pain regimen is not addressing. She was having difficulty sleeping due to the pain. When asked what has helped in the past, she said dilaudid usually is the best option for her chronic pancreatitis. She endorsed ketamine being used in the past but that it only works when dilaudid is also being administrated. She also states she was feeling nauseous earlier in the night. No fevers or chills.     10 point ROS is negative unless otherwise stated in HPI.     OBJECTIVE:   1. VITAL SIGNS:   Temp:  [97.8  F (36.6  C)-98.9  F (37.2  C)] 98.9  F (37.2  C)  Pulse:  [] 92  Resp:  [9-30] 16  BP: ()/(43-93) 132/77  MAP:  [40 mmHg-116 mmHg] 96 mmHg  Arterial  Line BP: (100-156)/() 136/75  SpO2:  [97 %-100 %] 100 %  Resp: 16    2. INTAKE/ OUTPUT:   I/O last 3 completed shifts:  In: 8289.63 [I.V.:8189.63; IV Piggyback:100]  Out: 2770 [Urine:2090; Drains:130; Blood:550]    3. PHYSICAL EXAMINATION:  General: Lying in bed, in visible discomfort   HEENT: Mucous membranes pink, moist, atraumatic.   Neuro: Aox3. PERRL. EOMI. Moves extremities spontaneously. No dysarthria.    Pulm/Resp: Clear breath sounds bilaterally without rhonchi, crackles or wheezes. Breathing appears non-labored.   CV: Tachycardic with clear S1, S2 without murmurs or additional heart sounds. + palpable pulses present x 4.   Abdomen: Limited exam due to patient discomfort and voluntary guarding. Bowel sounds present in all quadrants   :  martinez catheter in place, urine yellow and clear  Incisions/Skin: KELLY drain with serosanguinous drainage. Incision site with island dressing overlying. No erythema surrounding dressing or drainage.   MSK/Extremities: Peripheral limbs are without edema, strong peripheral pulses throughout.     4. INVESTIGATIONS:   Arterial Blood Gases   Recent Labs   Lab 09/06/24  1701 09/06/24  1454 09/06/24  1358 09/06/24  1249   PH 7.37 7.36 7.37 7.42   PCO2 39 40 39 36   PO2 212* 141* 150* 158*   HCO3 22 23 23 24     Complete Blood Count   Recent Labs   Lab 09/07/24  0358 09/06/24  1852 09/06/24  1701 09/06/24  1454 09/06/24  1000 09/03/24  0830   WBC 24.7* 26.9*  --   --   --  7.3   HGB 9.9* 9.6* 10.1* 9.9*   < > 12.1    168  --   --   --  179    < > = values in this interval not displayed.     Basic Metabolic Panel  Recent Labs   Lab 09/07/24  0601 09/07/24  0501 09/07/24  0358 09/07/24  0357 09/06/24  1940 09/06/24  1852 09/06/24  1740 09/06/24  1701 09/06/24  1536 09/06/24  1454 09/03/24  1037 09/03/24  0830   NA  --   --  137  --   --  137  --  136  --  137   < > 138   POTASSIUM  --   --  4.2  --   --  4.7  --  4.3  --  4.0   < > 3.8   CHLORIDE  --   --  106  --   --   107  --   --   --   --   --  105   CO2  --   --  22  --   --  20*  --   --   --   --   --  21*   BUN  --   --  5.6*  --   --  7.4  --   --   --   --   --  9.6   CR  --   --  0.64  --   --  0.56  --   --   --   --   --  0.62   GLC 98 113* 133* 129*   < > 148*   < > 125*   < > 125*   < > 181*    < > = values in this interval not displayed.     Liver Function Tests  Recent Labs   Lab 09/07/24  0358 09/06/24  1852 09/03/24  0830   * 96* 37   * 68* 32   ALKPHOS 48 47 89   BILITOTAL 0.6 0.9 0.7   ALBUMIN 2.9* 2.9* 4.1   INR  --  1.54* 1.15     Pancreatic Enzymes  Recent Labs   Lab 09/07/24  0358   LIPASE 85*   AMYLASE 61     Coagulation Profile  Recent Labs   Lab 09/07/24  0358 09/06/24  2309 09/06/24  1852 09/03/24  0830   INR  --   --  1.54* 1.15   PTT 35 33 >240*  --          5. RADIOLOGY:   Recent Results (from the past 24 hour(s))   US Abdomen Limited w Abdomen Doppler Limited    Narrative    EXAMINATION: US ABDOMEN LIMITED W ABDOMEN DOPPLER LIMITED, 9/6/2024  7:47 PM     COMPARISON: None.    HISTORY: s.p TPIAT - please assess hepatic vascular structure    TECHNIQUE:  Gray-scale, color Doppler and spectral flow analysis.    FINDINGS:   There is no ascites.    Liver:   The liver parenchyma is echogenic. Liver measures 16.6 cm. No  evidence of a focal hepatic mass.     Kidneys:   Right kidney:  The right kidney demonstrates normal echotexture with  no evidence of a shadowing stone, focal mass or hydronephrosis.   10.7  cm in long axis dimension.    LIVER DOPPLER:  Splenic vein: Surgically absent.  Extrahepatic portal vein:  Patent continuous antegrade flow, 45  cm/sec.  Right portal vein flow is antegrade, measuring 20 cm/sec.  Left portal vein flow is antegrade, measuring 18 cm/sec.    Inferior vena cava: patent with flow toward the heart throughout..    Right, mid, left hepatic veins: Patent with flow towards the inferior  vena cava.    The hepatic artery appears tortuous.  Extrahepatic hepatic artery:  Low resistance waveform with flow towards  the liver. 366 cm/sec maximum velocity with resistive index 0.64.  Right hepatic artery: 107 cm/sec with resistive index 0.59.  Left hepatic artery: 55 cm/sec with resistive index 0.64.      Impression    Impression:   1. Normal hepatic artery resistive indices. Elevated hepatic artery  velocities are likely artifactual due to its tortuous course.  2. Patent portal vein.    I have personally reviewed the examination and initial interpretation  and I agree with the findings.    AYANA COYLE DO         SYSTEM ID:  [id number]       =========================================

## 2024-09-07 NOTE — PHARMACY-CONSULT NOTE
Pharmacy Tube Feeding Consult    Medication reviewed for administration by feeding tube and for potential food/drug interactions.    Recommendation: No changes are needed at this time.     Pharmacy will continue to follow as new medications are ordered.    Mahnaz Herbert, RobertaD, BCCCP

## 2024-09-07 NOTE — PROGRESS NOTES
09/07/24 1400   Appointment Info   Signing Clinician's Name / Credentials (PT) Mohan Mota DPT   Living Environment   People in Home spouse   Current Living Arrangements house   Living Environment Comments Patient lives with  in [place], will be staying locally at [facility] at discharge.   Self-Care   Usual Activity Tolerance moderate   Current Activity Tolerance poor   Equipment Currently Used at Home walker, rolling;wheelchair, manual   Fall history within last six months no   Activity/Exercise/Self-Care Comment Patient is IND with mobility and ADLs at baseline, intermittently uses 4WW or wheelchair in community to prevent a flair up of pain.   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 She is now s/p TPAIT, splenectomy, and G/J placement with repair of small incisional hernia on 9/6/24 with Dr. Carmichael.   Existing Precautions/Restrictions abdominal   Cognition   Affect/Mental Status (Cognition) WFL;low arousal/lethargic   Orientation Status (Cognition) oriented x 4   Follows Commands (Cognition) WFL   Pain Assessment   Patient Currently in Pain Yes, see Vital Sign flowsheet   Integumentary/Edema   Integumentary/Edema Comments abdominal incision, mild BLE edema   Range of Motion (ROM)   ROM Comment BLE WFL   Strength (Manual Muscle Testing)   Strength Comments BLE WFL, generalized deconditioning   Bed Mobility   Comment, (Bed Mobility) supine>sit min-mod A   Transfers   Comment, (Transfers) sit<>stand CGA-Raquel   Gait/Stairs  (Locomotion)   Comment, (Gait/Stairs) Not assessed, small steps in room with 1UE support and CGA   Balance   Balance Comments good sitting balance, fair standing balance- currently benefits from UE support fo stability during standing   Sensory Examination   Sensory Perception patient reports no sensory changes   Clinical Impression   Criteria for Skilled Therapeutic Intervention Yes, treatment indicated   PT Diagnosis (PT) impaired functional mobiltiy   Influenced by the following impairments strength, balance, activity tolerance, pain   Functional limitations due to impairments bed mobility, transfers, gait, functional endurance   Clinical Presentation (PT Evaluation Complexity) evolving   Clinical Presentation Rationale PMH/comorbidities, clinical judgement   Clinical Decision Making (Complexity) moderate complexity   Planned Therapy Interventions (PT) balance training;bed mobility training;gait training;patient/family education;strengthening;transfer training;progressive activity/exercise;risk factor education;home program guidelines   Risk & Benefits of therapy have been explained evaluation/treatment results reviewed;care plan/treatment goals reviewed;risks/benefits reviewed;participants voiced agreement with care plan;participants included;patient;spouse/significant other   Physical Therapy Goals   PT Frequency 5x/week   PT Predicted Duration/Target Date for Goal Attainment 09/20/24   PT Goals Bed Mobility;Transfers;Gait   PT: Bed Mobility Modified independent;Supine to/from sit;Within precautions   PT: Transfers Modified independent;Sit to/from stand;Within precautions   PT: Gait Modified independent;Greater than 200 feet   PT Discharge Planning   PT Discharge Recommendation (DC Rec) home with assist   PT Rationale for DC Rec Patient below functional baseline, primarily limited by significant pain but over overall moving well. Anticipate will progress to be able to discharge to local apartment with PRN  assist from .

## 2024-09-07 NOTE — PHARMACY-ADMISSION MEDICATION HISTORY
Pharmacy Intern Admission Medication History    Admission medication history is complete. The information provided in this note is only as accurate as the sources available at the time of the update.    Information Source(s): Patient and CareEverywhere/SureScripts via in-person    Pertinent Information:   Upon My Visit:  Patient mentioned she only takes DILAUDID as a solution. She denied taking it as a tablet.   Based on the dispense report, patient mentioned she currently just has the naloxone spray as needed. She also mentioned she has finished taking omeprazole.     Changes made to PTA medication list:  Added: naloxone (NARCAN) 4 MG/0.1ML nasal spray.  Deleted:   Hydromorphone (DILAUDID) 4 MG tablet  Changed: None    Allergies reviewed with patient and updates made in EHR: yes    Medication History Completed By: Evelyn Barry 9/7/2024 5:59 PM    PTA Med List   Medication Sig Last Dose    HYDROmorphone, STANDARD CONC, (DILAUDID) 1 MG/ML oral solution Take 5 mLs (5 mg) by mouth every 4 hours as needed for moderate to severe pain. 9/6/2024    naloxone (NARCAN) 4 MG/0.1ML nasal spray Spray 4 mg into one nostril alternating nostrils as needed for opioid reversal. every 2-3 minutes until assistance arrives

## 2024-09-07 NOTE — PROGRESS NOTES
Admitted/transferred from: PACU at 2130  Reason for admission/transfer: Post op care  2 RN skin assessment: completed by Amanda Oro RN and Stacia RN  Result of skin assessment and interventions/actions: Abdominal incision. No other skin issues noted.  Height, weight, drug calc weight: Done  Patient belongings (see Flowsheet)  MDRO education added to care planN/A  ?

## 2024-09-07 NOTE — PROGRESS NOTES
Pain Service Progress Note  [facility]  Date: 09/07/2024       Patient Name: Ciera Perkins  MRN: [id number]  Age: 39 year old  Sex: female      Assessment:   Ciera Perkins is a 39 year old female with PMH of cystic fibrosis of pancreas, pancreatic insufficiency, malnutrition, SMA stenosis s/p angioplasty, MASLD, and chronic pancreatitis who was admitted on 9/6/2024 with cystic fibrosis of the pancreas now s/p Total pancreatectomy with autologous islet transplant, cholecystectomy,  G/J placement, and appendectomy on 9/6/24.    Procedure: Total pancreatectomy with autologous islet transplant, cholecystectomy,  G/J placement, and appendectomy on 9/6/24.    Date of Surgery: 9/6/24    Date of Catheter Placement: 9/6/24    Plan/Recommendations:  1. Regional Anesthesia/Analgesia  -Continuous Catheter Type/Site: bilateral paravertebral (PV) T6-7  Infusate: Ropivacaine 0.2%  Programmed Intermittent Bolus (PIB) at 7 mL Q60 min via each catheter, total infusion rate of 14 mL/hr    Given 7cc bolus in each catheter at 1435 with helped with pain.     Plan to maintain catheter, max of 7 days    2. Anticoagulation  -Please contact Inpatient Pain Service before ordering or making any anticoagulation changes    Currently on heparin drip       3. Multimodal Analgesia  - Dilaudid PCA per primary team    Pain Service will continue to follow.    Discussed with attending anesthesiologist    Bernardo Mendoza MD  09/07/2024     Overnight Events: NAEO.     Tubes/Drains: Yes  Bilateral PV Catheters   GJ Tube    Subjective:  Im in 10/10 pain   Nausea: No  Vomiting: No  Pruritus: No  Symptoms of LAST: No    Pain Location:  Abdomen    Pain Intensity:    Pain at Rest: 9/10   Pain with Activity: 10/10  Comfort Goal: 6/10   Baseline Pain: 2/10   Satisfied with your level of pain control: No    Diet: NPO for Medical/Clinical Reasons Except for: Ice Chips    Relevant Labs:  Recent Labs   Lab Test 09/07/24  0354  "09/06/24 2309 09/06/24  1852   INR  --   --  1.54*     --  168   PTT 35   < > >240*   BUN 5.6*  --  7.4    < > = values in this interval not displayed.       Physical Exam:  Vitals: /77   Pulse 84   Temp 37.3  C (99.2  F) (Oral)   Resp 18   Ht 1.676 m (5' 6\")   Wt 77.1 kg (169 lb 15.6 oz)   SpO2 100%   BMI 27.43 kg/m      Physical Exam:   Orientation:  Alert, oriented, and in no acute distress: Yes  Sedation: No    Motor Examination:  5/5 Strength in lower extremities: Yes    Sensory Level:   Decrease in sensation: No    Catheter Site:   Catheter entry site is clean/dry/intact: Yes    Tender: Yes      Relevant Medications:  Current Pain Medications:  Medications related to Pain Management (From now, onward)      Start     Dose/Rate Route Frequency Ordered Stop    09/09/24 0000  bisacodyl (DULCOLAX) suppository 10 mg         10 mg Rectal DAILY PRN 09/06/24 2158 09/08/24 0000  magnesium hydroxide (MILK OF MAGNESIA) suspension 30 mL         30 mL Per J Tube DAILY PRN 09/06/24 2158 09/07/24 0800  gabapentin (NEURONTIN) solution 300 mg         300 mg Oral or J tube 2 TIMES DAILY 09/06/24 2158 09/07/24 0800  polyethylene glycol (MIRALAX) Packet 17 g         17 g Per J Tube DAILY 09/06/24 2158 09/07/24 0800  sennosides (SENOKOT) syrup 5 mL        Placed in \"And\" Linked Group    5 mL Per J Tube 2 TIMES DAILY 09/06/24 2254 09/07/24 0800  docusate (COLACE) 50 MG/5ML liquid 50 mg        Placed in \"And\" Linked Group    50 mg Per J Tube 2 TIMES DAILY 09/06/24 2254 09/06/24 2330  HYDROmorphone (DILAUDID) PCA 0.2 mg/mL OPIOID TOLERANT          Intravenous CONTINUOUS 09/06/24 2311 09/06/24 2300  acetaminophen *SUGAR FREE* (TYLENOL) solution 975 mg         975 mg Per J Tube EVERY 8 HOURS 09/06/24 2158 09/09/24 2259 09/06/24 2158  acetaminophen *SUGAR FREE* (TYLENOL) solution 650 mg         650 mg Per J Tube EVERY 4 HOURS PRN 09/06/24 2158      09/06/24 2158  methocarbamol " "(ROBAXIN) tablet 750 mg         750 mg Oral EVERY 6 HOURS PRN 09/06/24 2158      09/06/24 2158  hydrOXYzine HCl (ATARAX) tablet 25 mg         25 mg Oral EVERY 6 HOURS PRN 09/06/24 2158      09/06/24 2158  lidocaine 1 % 0.1-1 mL         0.1-1 mL Other EVERY 1 HOUR PRN 09/06/24 2158      09/06/24 2158  lidocaine (LMX4) cream          Topical EVERY 1 HOUR PRN 09/06/24 2158      09/06/24 0830  ROPivacaine 0.2% in sodium chloride 0.9% PERINEURAL infusion          Perineural Continuous Nerve Block 09/06/24 0813      09/06/24 0830  ROPivacaine 0.2% in sodium chloride 0.9% PERINEURAL infusion          Perineural Continuous Nerve Block 09/06/24 0813              Primary Service Contacted with Recommendations? Yes      Please see A&P for additional details of medical decision making.      Acute Inpatient Pain Service [facility]  Hours of pain coverage 24/7   Page via [employer]- Please Page the Pain Team Via [facility]: \"PAIN MANAGEMENT ACUTE INPATIENT/ [facility]\"             "

## 2024-09-07 NOTE — ANESTHESIA POSTPROCEDURE EVALUATION
Patient: Ciera Perkins    Procedure: Procedure(s):  Total pancreatectomy with autologous islet transplant, splenectomy, gastrojejunostomy placement, Lysis of Adhesion       Anesthesia Type:  General    Note:  Disposition: Inpatient   Postop Pain Control: Challenging            Sign Out: ONGOING pain issues   PONV: No   Neuro/Psych: Uneventful            Sign Out: Acceptable/Baseline neuro status   Airway/Respiratory: Uneventful            Sign Out: Acceptable/Baseline resp. status   CV/Hemodynamics: Uneventful            Sign Out: Acceptable CV status; No obvious hypovolemia; No obvious fluid overload   Other NRE: NONE   DID A NON-ROUTINE EVENT OCCUR?            Last vitals:  Vitals Value Taken Time   /83 09/06/24 2000   Temp 36.6  C (97.8  F) 09/06/24 1845   Pulse 92 09/06/24 2013   Resp 3 09/06/24 2013   SpO2 100 % 09/06/24 2013   Vitals shown include unfiled device data.    Electronically Signed By: Kalli Rinaldi MD  September 6, 2024  8:14 PM

## 2024-09-07 NOTE — PLAN OF CARE
Major Shift Events:      Neuro: Neurologically intact. A&Ox4, PERESTELA LANGE. Frequent complaints of acute incisional pain. PCA hydromorphone increased to 0.4mg/hr with 0.4mg bolus doses available q10min, PRN robaxin given x2, paravetebral ropivicaine continued with bolus dosing per anesthesia. Pain decreased from 9/10 in the AM to 5-6/10 in the evening.     Card: SR/ST 80-110s, normo to hypertensive 110-130s sBP, no PRNs given this shift. Heparin gtt increased to 400units/hr.    Resp: Weaned to room air. IS to 500-750, used with encouragement.     GI/: Sousa in place with 50-75mL/hr output. Remains on IVF D5LR at 100mL/hr. Started TF at 10mL/hr trickle feeds with relizorb. Insulin titrated per MAR 1.5-6units/hr. No bowel movement this shift, passing flatus. Incision dressing C/D/I, KELLY drain with 50-90mL sersang output ever 2hrs.    Activity: Up with PT/nursing assist to chair for several hours.     Plan: Continue plan of care. Potential downgrade out of ICU tomorrow.   For vital signs and complete assessments, please see documentation flowsheets.      Problem: Pain Acute  Goal: Optimal Pain Control and Function  Intervention: Prevent or Manage Pain  Recent Flowsheet Documentation  Taken 9/7/2024 1600 by Renny Delgado RN  Medication Review/Management: medications reviewed  Taken 9/7/2024 1200 by Renny Delgado RN  Medication Review/Management: medications reviewed  Taken 9/7/2024 0800 by Renny Delgado RN  Medication Review/Management: medications reviewed     Problem: Surgery Nonspecified  Goal: Effective Oxygenation and Ventilation  Intervention: Optimize Oxygenation and Ventilation  Recent Flowsheet Documentation  Taken 9/7/2024 1600 by Renny Delgado RN  Head of Bed (HOB) Positioning: HOB at 30 degrees  Taken 9/7/2024 1200 by Renny Delgado RN  Head of Bed (HOB) Positioning: HOB at 30 degrees  Taken 9/7/2024 0800 by Renny Delgado RN  Head of Bed (HOB) Positioning: HOB at 30 degrees      Problem: Surgery Nonspecified  Goal: Blood Glucose Level Within Targeted Range  Outcome: Progressing     Problem: Adult Inpatient Plan of Care  Goal: Readiness for Transition of Care  Outcome: Progressing

## 2024-09-07 NOTE — PROGRESS NOTES
CLINICAL NUTRITION SERVICES - ASSESSMENT NOTE    RECOMMENDATIONS FOR MDs/PROVIDERS TO ORDER:  None currently     Malnutrition  Moderate malnutrition in the context of chronic illness/disease    Recommendations already ordered by Registered Dietitian (RD):  1.  Once confirm JT placement/approval for use post surgery, begin TF with Impact Peptide 1.5 @ 10 ml/hr and advance by 10 ml q 24 hrs (and/or ONLY advance rate upon MD approval after daily evaluation) to goal Impact Peptide 1.5 @ goal of  45ml/hr  (1080ml/day) provides: 1620 kcals, 101 g PRO, 833 ml free H20, 151 g CHO, 69 g fat, and 0 g fiber daily.      2. Entered the following within TF order:  RN: Change 1 RELIZORB cartridge every 24 hours with TF rate at 10-20 ml/hr.  Change 1 RELIZORB cartridge every 12 hours with TF rates >20 ml/hr.  RN: Obtain cartridges from 4E med room or call [phone] and provide peoplesoft #[phone].  (Relizorb is a immobilized lipase cartridge used to hydrolyze fat within the TF formula for easier absorption s/p total pancreatectomy)    3. Entered the following within a patient care order:  RELIZORB CARTRIDGES  *Change 1 cartridge every 24 hours with TF rate @ 10-20 ml/hr  *Change 1 cartridge every 12 hours with TF rate >20 ml/hr  *Supplies: Obtain cartridges in the 4E unit med room or call [phone] and provide peoplesoft #[phone]     4.  Once begin TFs, order multivitamin/mineral (15 ml/day via JT) to help ensure micronutrient needs being met with suspected hypermetabolic demands, anticipated slow adv of TFs to goal infusion and potential interruptions to TF infusions.    5.  Ordered to check Magnesium, Phosphorus levels over the next 5 days for monitoring with TF start     6.  Discontinued oral pancreatic enzyme replacement therapy (PERT) - not necessary during inpatient stay      Future/Additional Recommendations:  - follow up for NFPE  - monitor TF advancement/tolerance with advancement per MD  - monitor nutrition support related  "labs           REASON FOR ASSESSMENT  Ciera Perkins is a 39 year old female seen by Registered Dietitian for Provider Order - Registered Dietitian to Assess and Order TF per Medical Nutrition protocol    NUTRITION HISTORY  -Per review of EMR, pt previously seen by outpatient transplant RD on 5/09/24 and at that encounter reported variable appetite (some days can't eat at all during a flare, has N/V). Pt had GJ tube 2 years ago with many post placement issues (including wound vac)- never got to the point of being able to do feeds. Pt reported diarrhea 5-6x/day for the past 3 years, oily in nature. Pt was taking MVI, vit C and vit D and using [place] instant breakfast once weekly. For PERT patient was taking Sancho Pep x 3 [phone] for ~1 month but did not feel it was helpful so came off (1071 ul/kg/meal- within therapeutic range). Had been on Creon in the past (worked well), but too expensive so has never pursued this. Diet recall: On a \"good day\" eats 1 meal/day- spaghetti or pastas (includes protein- chicken, beef, pork); roast; stew  Snacks on fruit, veggies, popcorn, chips, some sweets  Katya- water, rare pop, no sports drinks (causes flare)  No alcohol     - patient seen by OSH RD on 7/29/24, per RD note: \"Pt reports mainly eating fruits/veg over the past month; occasional fish. Pt made mistake of eating a hot dog prior to admission leading to severe pain. Pt takes zenpep with meals at home but doesn't feel like it's helping d/t constant diarrhea. Pt also notes vomiting 4x/week on average. Pt drinks [place] Instant Breakfast twice daily at home with almond milk. Writer offered to order it for her here, she refused at this time.\"    CURRENT NUTRITION ORDERS  Diet:  NPO -  Anticipate prolonged NPO status post AIT    LABS  -Vitamin A 0.40 (WNL) 9/03/24  -Vitamin E 5.6 (WNL) 9/03/24  -Vitamin D < 28 (WNL) 5/08/24    MEDICATIONS  Sugar free tylenol solution Q8H  Senokot BID; colace BID; miralax daily   Pepcid  Mvw " "complete formulation 1.5 mL daily starting 9/08  Protonix  Continuous: IVF D5 @ 100 ml/hr; insulin     ANTHROPOMETRICS  Height: 5' 6\"    Most Recent Weight: 169 lbs 15.59 oz   IBW: 59.1 kg    BMI: 27.43 kg/m2;  Overweight BMI 25-29.9  Weight History:   Wt Readings from Last 20 Encounters:   09/06/24 77.1 kg (169 lb 15.6 oz)   09/05/24 70.9 kg (156 lb 3.2 oz)   09/04/24 71.4 kg (157 lb 6.4 oz)   09/03/24 71.4 kg (157 lb 6.5 oz)   05/09/24 70.3 kg (155 lb)   05/09/24 70.3 kg (155 lb)   05/08/24 70.4 kg (155 lb 3.3 oz)   02/28/24 62.1 kg (137 lb)     64.4 kg (141 lb 15.6 oz) 01/06/2024     70.5 kg (155 lb 6.8 oz) 12/21/2023       Dosing Weight: 63.6 kg (adjusted weight)    ASSESSED NUTRITION NEEDS  Estimated Energy Needs: 4949-2132 kcals (25-30 Kcal/Kg)  Justification: post-op and pending EN absorption post total pancreatectomy  Estimated Protein Needs:  grams protein (1.3-1.8 g pro/Kg)+  Justification: post-op and pending EN absorption post total pancreatectomy  Estimated Fluid Needs: 1 mL/Kcal or per MD pending fluid status    PHYSICAL FINDINGS  See malnutrition section below.        MALNUTRITION  % Intake: </=75% for >/= 1 month (severe)  % Weight Loss: Weight loss does not meet criteria for malnutrition   Subcutaneous Fat Loss: Unable to assess   Muscle Loss: Unable to assess  Fluid Accumulation/Edema: Mild  Malnutrition Diagnosis: Moderate malnutrition in the context of chronic illness/disease    NUTRITION DIAGNOSIS  Altered GI function r/t s/p total pancreatectomy, islet cell autotransplant, splenectomy, and gastrojejunostomy tube placement AEB reliance on enteral nutrition support to meet nutrition needs.       INTERVENTIONS  Implementation  -Enteral nutrition support: initiate     Goals  1.  Tolerate adv of TF to goal infusion without sx of refeeding within the next 5 days.  2.  Once TF at goal, total ave EN intakes provide minimum of 25 kcal/kg/day and 1.3 g/kg/day (per 63.6 kg)  " "    Monitoring/Evaluation  Progress toward goals will be monitored and evaluated per protocol.     Teresa Thomas, MS, RDN, LD  Vocera -   Weekend/Holiday RD - \"Weekend Clinical Dietitian\"                "

## 2024-09-07 NOTE — PLAN OF CARE
Major Shift Events:  Transfer from PACU. A&O x4, PALMER, follows commands. SBP goal <160. Sinus tachy 100s. 2L NC. Working on pain control; currently on dilaudid PCA, nerve block x2, scheduled tylenol. Ice packs and repositioning as tolerated for pain. Insulin gtt. Started heparin gtt at 200 units/hr. KELLY drain about 50 cc/hr. Sousa /hr.   Plan: Pain control  For vital signs and complete assessments, please see documentation flowsheets.     Goal Outcome Evaluation:      Plan of Care Reviewed With: patient, spouse    Overall Patient Progress: no changeOverall Patient Progress: no change    Outcome Evaluation: Pain control. Insulin gtt with hourly blood glucose checks. No pressors. ON 2L NC.

## 2024-09-07 NOTE — INTERIM SUMMARY
"Regional Anesthesia Catheter Evaluation    Assessed patient on evening pain rounds. Upon arrival, pt reporting increased pain in abdomen. Strength 5/5 in bilateral upper and lower extremities. Pt denied symptoms of LAST. Catheter Site intact, unobstructed and without abnormalities. Pt hemodynamically stable.     Bolus administered at 1435  - MEDICATION: PF bupivacaine 0.125% 7 mL via EACH PV catheter.  - PROCEDURE: Clinician bolus administered incrementally; negative aspirate.  No symptoms of local anesthetic systemic toxicity (LAST). Remained with and assessed patient for 10 min post-injection. BP, P and MAP stable  - POST-PROCEDURE: Bedside RN aware of need to continue BP, P and MAP monitoring Q 10 min for an additional 30 min. Contact RAPS (Please Page the Pain Team Via Amcom: \"PAIN MANAGEMENT ACUTE INPATIENT/ [facility]\") if experiencing any untoward effects or MAP less than 60      9/7/2024 4:52 PM  Bernardo Mendoza MD    Anesthesiology, CA[facility]     "

## 2024-09-08 LAB
ALBUMIN SERPL BCG-MCNC: 2.6 G/DL (ref 3.5–5.2)
ALP SERPL-CCNC: 55 U/L (ref 40–150)
ALT SERPL W P-5'-P-CCNC: 81 U/L (ref 0–50)
ANION GAP SERPL CALCULATED.3IONS-SCNC: 6 MMOL/L (ref 7–15)
ANION GAP SERPL CALCULATED.3IONS-SCNC: 7 MMOL/L (ref 7–15)
APTT PPP: 38 SECONDS (ref 22–38)
AST SERPL W P-5'-P-CCNC: 67 U/L (ref 0–45)
BASOPHILS # BLD AUTO: 0.1 10E3/UL (ref 0–0.2)
BASOPHILS NFR BLD AUTO: 0 %
BILIRUB DIRECT SERPL-MCNC: <0.2 MG/DL (ref 0–0.3)
BILIRUB SERPL-MCNC: 0.3 MG/DL
BUN SERPL-MCNC: 8.6 MG/DL (ref 6–20)
BUN SERPL-MCNC: 8.8 MG/DL (ref 6–20)
CA-I BLD-MCNC: 4.4 MG/DL (ref 4.4–5.2)
CALCIUM SERPL-MCNC: 7.3 MG/DL (ref 8.8–10.4)
CALCIUM SERPL-MCNC: 7.4 MG/DL (ref 8.8–10.4)
CHLORIDE SERPL-SCNC: 108 MMOL/L (ref 98–107)
CHLORIDE SERPL-SCNC: 111 MMOL/L (ref 98–107)
CREAT SERPL-MCNC: 0.69 MG/DL (ref 0.51–0.95)
CREAT SERPL-MCNC: 0.76 MG/DL (ref 0.51–0.95)
EGFRCR SERPLBLD CKD-EPI 2021: >90 ML/MIN/1.73M2
EGFRCR SERPLBLD CKD-EPI 2021: >90 ML/MIN/1.73M2
EOSINOPHIL # BLD AUTO: 0.3 10E3/UL (ref 0–0.7)
EOSINOPHIL NFR BLD AUTO: 1 %
ERYTHROCYTE [DISTWIDTH] IN BLOOD BY AUTOMATED COUNT: 13.7 % (ref 10–15)
ERYTHROCYTE [DISTWIDTH] IN BLOOD BY AUTOMATED COUNT: 13.8 % (ref 10–15)
GLUCOSE BLDC GLUCOMTR-MCNC: 100 MG/DL (ref 70–99)
GLUCOSE BLDC GLUCOMTR-MCNC: 100 MG/DL (ref 70–99)
GLUCOSE BLDC GLUCOMTR-MCNC: 102 MG/DL (ref 70–99)
GLUCOSE BLDC GLUCOMTR-MCNC: 102 MG/DL (ref 70–99)
GLUCOSE BLDC GLUCOMTR-MCNC: 103 MG/DL (ref 70–99)
GLUCOSE BLDC GLUCOMTR-MCNC: 105 MG/DL (ref 70–99)
GLUCOSE BLDC GLUCOMTR-MCNC: 107 MG/DL (ref 70–99)
GLUCOSE BLDC GLUCOMTR-MCNC: 110 MG/DL (ref 70–99)
GLUCOSE BLDC GLUCOMTR-MCNC: 114 MG/DL (ref 70–99)
GLUCOSE BLDC GLUCOMTR-MCNC: 118 MG/DL (ref 70–99)
GLUCOSE BLDC GLUCOMTR-MCNC: 119 MG/DL (ref 70–99)
GLUCOSE BLDC GLUCOMTR-MCNC: 125 MG/DL (ref 70–99)
GLUCOSE BLDC GLUCOMTR-MCNC: 126 MG/DL (ref 70–99)
GLUCOSE BLDC GLUCOMTR-MCNC: 142 MG/DL (ref 70–99)
GLUCOSE BLDC GLUCOMTR-MCNC: 148 MG/DL (ref 70–99)
GLUCOSE BLDC GLUCOMTR-MCNC: 71 MG/DL (ref 70–99)
GLUCOSE BLDC GLUCOMTR-MCNC: 76 MG/DL (ref 70–99)
GLUCOSE BLDC GLUCOMTR-MCNC: 84 MG/DL (ref 70–99)
GLUCOSE BLDC GLUCOMTR-MCNC: 89 MG/DL (ref 70–99)
GLUCOSE BLDC GLUCOMTR-MCNC: 92 MG/DL (ref 70–99)
GLUCOSE BLDC GLUCOMTR-MCNC: 93 MG/DL (ref 70–99)
GLUCOSE BLDC GLUCOMTR-MCNC: 95 MG/DL (ref 70–99)
GLUCOSE BLDC GLUCOMTR-MCNC: 95 MG/DL (ref 70–99)
GLUCOSE BLDC GLUCOMTR-MCNC: 99 MG/DL (ref 70–99)
GLUCOSE BLDC GLUCOMTR-MCNC: 99 MG/DL (ref 70–99)
GLUCOSE SERPL-MCNC: 109 MG/DL (ref 70–99)
GLUCOSE SERPL-MCNC: 87 MG/DL (ref 70–99)
HCO3 SERPL-SCNC: 25 MMOL/L (ref 22–29)
HCO3 SERPL-SCNC: 25 MMOL/L (ref 22–29)
HCT VFR BLD AUTO: 21.9 % (ref 35–47)
HCT VFR BLD AUTO: 24.1 % (ref 35–47)
HGB BLD-MCNC: 7 G/DL (ref 11.7–15.7)
HGB BLD-MCNC: 7.7 G/DL (ref 11.7–15.7)
IMM GRANULOCYTES # BLD: 0.5 10E3/UL
IMM GRANULOCYTES NFR BLD: 2 %
LYMPHOCYTES # BLD AUTO: 2 10E3/UL (ref 0.8–5.3)
LYMPHOCYTES NFR BLD AUTO: 6 %
MAGNESIUM SERPL-MCNC: 1.7 MG/DL (ref 1.7–2.3)
MAGNESIUM SERPL-MCNC: 1.7 MG/DL (ref 1.7–2.3)
MCH RBC QN AUTO: 29.2 PG (ref 26.5–33)
MCH RBC QN AUTO: 29.3 PG (ref 26.5–33)
MCHC RBC AUTO-ENTMCNC: 32 G/DL (ref 31.5–36.5)
MCHC RBC AUTO-ENTMCNC: 32 G/DL (ref 31.5–36.5)
MCV RBC AUTO: 91 FL (ref 78–100)
MCV RBC AUTO: 92 FL (ref 78–100)
MONOCYTES # BLD AUTO: 3.6 10E3/UL (ref 0–1.3)
MONOCYTES NFR BLD AUTO: 10 %
NEUTROPHILS # BLD AUTO: 28.7 10E3/UL (ref 1.6–8.3)
NEUTROPHILS NFR BLD AUTO: 81 %
NRBC # BLD AUTO: 0 10E3/UL
NRBC BLD AUTO-RTO: 0 /100
PHOSPHATE SERPL-MCNC: 2.5 MG/DL (ref 2.5–4.5)
PHOSPHATE SERPL-MCNC: 2.8 MG/DL (ref 2.5–4.5)
PLATELET # BLD AUTO: 210 10E3/UL (ref 150–450)
PLATELET # BLD AUTO: 217 10E3/UL (ref 150–450)
POTASSIUM SERPL-SCNC: 3.7 MMOL/L (ref 3.4–5.3)
POTASSIUM SERPL-SCNC: 3.8 MMOL/L (ref 3.4–5.3)
POTASSIUM SERPL-SCNC: 3.8 MMOL/L (ref 3.4–5.3)
PROT SERPL-MCNC: 4.5 G/DL (ref 6.4–8.3)
RBC # BLD AUTO: 2.39 10E6/UL (ref 3.8–5.2)
RBC # BLD AUTO: 2.64 10E6/UL (ref 3.8–5.2)
SODIUM SERPL-SCNC: 139 MMOL/L (ref 135–145)
SODIUM SERPL-SCNC: 143 MMOL/L (ref 135–145)
UFH PPP CHRO-ACNC: <0.1 IU/ML
WBC # BLD AUTO: 31.1 10E3/UL (ref 4–11)
WBC # BLD AUTO: 35.2 10E3/UL (ref 4–11)

## 2024-09-08 PROCEDURE — 250N000011 HC RX IP 250 OP 636

## 2024-09-08 PROCEDURE — 250N000011 HC RX IP 250 OP 636: Performed by: PHYSICIAN ASSISTANT

## 2024-09-08 PROCEDURE — 80048 BASIC METABOLIC PNL TOTAL CA: CPT | Performed by: SURGERY

## 2024-09-08 PROCEDURE — 85730 THROMBOPLASTIN TIME PARTIAL: CPT | Performed by: SURGERY

## 2024-09-08 PROCEDURE — 85027 COMPLETE CBC AUTOMATED: CPT

## 2024-09-08 PROCEDURE — 85520 HEPARIN ASSAY: CPT | Performed by: PHYSICIAN ASSISTANT

## 2024-09-08 PROCEDURE — 93010 ELECTROCARDIOGRAM REPORT: CPT | Performed by: INTERNAL MEDICINE

## 2024-09-08 PROCEDURE — 83735 ASSAY OF MAGNESIUM: CPT | Performed by: SURGERY

## 2024-09-08 PROCEDURE — 85025 COMPLETE CBC W/AUTO DIFF WBC: CPT | Performed by: SURGERY

## 2024-09-08 PROCEDURE — 250N000012 HC RX MED GY IP 250 OP 636 PS 637: Performed by: SURGERY

## 2024-09-08 PROCEDURE — 82330 ASSAY OF CALCIUM: CPT

## 2024-09-08 PROCEDURE — 250N000013 HC RX MED GY IP 250 OP 250 PS 637: Performed by: SURGERY

## 2024-09-08 PROCEDURE — 83735 ASSAY OF MAGNESIUM: CPT

## 2024-09-08 PROCEDURE — 84100 ASSAY OF PHOSPHORUS: CPT | Performed by: SURGERY

## 2024-09-08 PROCEDURE — 120N000011 HC R&B TRANSPLANT UMMC

## 2024-09-08 PROCEDURE — 99232 SBSQ HOSP IP/OBS MODERATE 35: CPT

## 2024-09-08 PROCEDURE — 258N000003 HC RX IP 258 OP 636: Performed by: SURGERY

## 2024-09-08 PROCEDURE — 82248 BILIRUBIN DIRECT: CPT

## 2024-09-08 PROCEDURE — 84132 ASSAY OF SERUM POTASSIUM: CPT

## 2024-09-08 PROCEDURE — 99255 IP/OBS CONSLTJ NEW/EST HI 80: CPT | Mod: 24 | Performed by: PHYSICIAN ASSISTANT

## 2024-09-08 PROCEDURE — 258N000003 HC RX IP 258 OP 636

## 2024-09-08 PROCEDURE — 250N000011 HC RX IP 250 OP 636: Performed by: SURGERY

## 2024-09-08 PROCEDURE — 93005 ELECTROCARDIOGRAM TRACING: CPT

## 2024-09-08 PROCEDURE — 258N000001 HC RX 258: Performed by: SURGERY

## 2024-09-08 PROCEDURE — 84100 ASSAY OF PHOSPHORUS: CPT

## 2024-09-08 RX ORDER — ERTAPENEM 1 G/1
1 INJECTION, POWDER, LYOPHILIZED, FOR SOLUTION INTRAMUSCULAR; INTRAVENOUS EVERY 24 HOURS
Status: DISCONTINUED | OUTPATIENT
Start: 2024-09-08 | End: 2024-09-10

## 2024-09-08 RX ORDER — SIMETHICONE 80 MG
80 TABLET,CHEWABLE ORAL EVERY 6 HOURS PRN
Status: DISCONTINUED | OUTPATIENT
Start: 2024-09-08 | End: 2024-09-17

## 2024-09-08 RX ORDER — SODIUM CHLORIDE, SODIUM LACTATE, POTASSIUM CHLORIDE, CALCIUM CHLORIDE 600; 310; 30; 20 MG/100ML; MG/100ML; MG/100ML; MG/100ML
INJECTION, SOLUTION INTRAVENOUS CONTINUOUS
Status: DISCONTINUED | OUTPATIENT
Start: 2024-09-08 | End: 2024-09-11

## 2024-09-08 RX ADMIN — METHOCARBAMOL 750 MG: 750 TABLET ORAL at 15:34

## 2024-09-08 RX ADMIN — Medication: at 03:43

## 2024-09-08 RX ADMIN — POLYETHYLENE GLYCOL 3350 17 G: 17 POWDER, FOR SOLUTION ORAL at 08:43

## 2024-09-08 RX ADMIN — ACETAMINOPHEN 650 MG: 160 LIQUID ORAL at 21:18

## 2024-09-08 RX ADMIN — DEXTROSE MONOHYDRATE 1000 ML: 100 INJECTION, SOLUTION INTRAVENOUS at 11:25

## 2024-09-08 RX ADMIN — METHOCARBAMOL 750 MG: 750 TABLET ORAL at 21:17

## 2024-09-08 RX ADMIN — GABAPENTIN 300 MG: 250 SUSPENSION ORAL at 22:40

## 2024-09-08 RX ADMIN — SODIUM CHLORIDE, SODIUM LACTATE, POTASSIUM CHLORIDE, CALCIUM CHLORIDE AND DEXTROSE MONOHYDRATE: 5; 600; 310; 30; 20 INJECTION, SOLUTION INTRAVENOUS at 00:35

## 2024-09-08 RX ADMIN — SODIUM CHLORIDE 4 UNITS/HR: 9 INJECTION, SOLUTION INTRAVENOUS at 09:00

## 2024-09-08 RX ADMIN — SENNOSIDES 5 ML: 8.8 LIQUID ORAL at 08:43

## 2024-09-08 RX ADMIN — Medication 40 MG: at 08:43

## 2024-09-08 RX ADMIN — SODIUM CHLORIDE, SODIUM LACTATE, POTASSIUM CHLORIDE, CALCIUM CHLORIDE AND DEXTROSE MONOHYDRATE: 5; 600; 310; 30; 20 INJECTION, SOLUTION INTRAVENOUS at 09:57

## 2024-09-08 RX ADMIN — ACETAMINOPHEN 975 MG: 160 LIQUID ORAL at 06:51

## 2024-09-08 RX ADMIN — ERTAPENEM SODIUM 1 G: 1 INJECTION, POWDER, LYOPHILIZED, FOR SOLUTION INTRAMUSCULAR; INTRAVENOUS at 12:38

## 2024-09-08 RX ADMIN — ACETAMINOPHEN 975 MG: 160 LIQUID ORAL at 15:37

## 2024-09-08 RX ADMIN — METHOCARBAMOL 750 MG: 750 TABLET ORAL at 05:47

## 2024-09-08 RX ADMIN — GABAPENTIN 300 MG: 250 SUSPENSION ORAL at 08:44

## 2024-09-08 RX ADMIN — DEXTROSE MONOHYDRATE 1000 ML: 100 INJECTION, SOLUTION INTRAVENOUS at 13:39

## 2024-09-08 RX ADMIN — Medication: at 19:19

## 2024-09-08 RX ADMIN — SODIUM CHLORIDE, POTASSIUM CHLORIDE, SODIUM LACTATE AND CALCIUM CHLORIDE: 600; 310; 30; 20 INJECTION, SOLUTION INTRAVENOUS at 22:28

## 2024-09-08 RX ADMIN — Medication 1.5 ML: at 08:42

## 2024-09-08 RX ADMIN — DOCUSATE SODIUM 50 MG: 50 LIQUID ORAL at 19:47

## 2024-09-08 RX ADMIN — SENNOSIDES 5 ML: 8.8 LIQUID ORAL at 19:47

## 2024-09-08 RX ADMIN — DOCUSATE SODIUM 50 MG: 50 LIQUID ORAL at 08:42

## 2024-09-08 RX ADMIN — SODIUM CHLORIDE, POTASSIUM CHLORIDE, SODIUM LACTATE AND CALCIUM CHLORIDE: 600; 310; 30; 20 INJECTION, SOLUTION INTRAVENOUS at 12:50

## 2024-09-08 ASSESSMENT — ACTIVITIES OF DAILY LIVING (ADL)
ADLS_ACUITY_SCORE: 30

## 2024-09-08 NOTE — PROGRESS NOTES
RN 0700-transfer   Transferred to: Unit 7A  at 1400  Belongings: Sent with patient,  at bedside and assisted with transfer.   Sousa removed? No: Sousa order to remove tonight.   Central line removed? No, port accessed and needed.  Chart and medications sent with patient Yes, extra insulin and meds/chart sent with patient.   Family notified: Yes,  at bedside for transfer.       EKG and labs ordered for run of afib w/RVR, D 10 utilized with drops in BG. TF increased per order, art line removed, NG removed. MIV to LR. Pain managed with PCA dilaudid with PRNs, pt states improved since yesterday. Report given to Romana LEWIS, no other concerns or questions, emphasized BG monitoring.

## 2024-09-08 NOTE — PLAN OF CARE
Major Shift Events:  Pt is sating well on 2L via oxy mask. Tachy into the 110s, BP has been <160. Neuros are intact. NG at 53 cm and set to LIS. Also has GJ tube, G is set to gravity - minimal output, J has tube feedings running at 10 mL/hr - advance every 24 hour, started around 1200 9/7. No BM, hypoactive BS. Sousa catheter w/ good output. Abdominal incision is covered w/ primapore and marked - small amount of drainage. KELLY x1 to abdomen, fair amount of serosanguinous output. Has R PIV has D5LR running at 100 mL/hr and dilaudid PCA at 0.4 mL/hr, R port has Ins gtt running at algorithm 4 at 2 units/hr, L PIV has Hep gtt running at 400 units/hr. Also has L radial art line.    Plan: Continue to encourage IS, continue w/ pain management, encourage working with therapies    For vital signs and complete assessments, please see documentation flowsheets.       Goal Outcome Evaluation:      Plan of Care Reviewed With: patient    Overall Patient Progress: no changeOverall Patient Progress: no change    Outcome Evaluation: Pain control is the main issue, pain stated to increase around 0000, anesthesia paged to give another bolus.

## 2024-09-08 NOTE — PROGRESS NOTES
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 She is now s/p TPAIT, splenectomy, and G/J placement with repair of small incisional hernia on 9/6/24 with Dr. Carmichael.     s/p TPAIT 9/6/24: POD#2. Islet yield Islet equivalents/kilogram: 2937.   -Post-op US   1. Normal hepatic artery resistive indices. Elevated hepatic artery  velocities are likely artifactual due to its tortuous course.  2. Patent portal vein.  KELLY drain x 1 with serosang output.     Cardiorespiratory: No issues, stable on 2L oxygen via NC.     GI/Nutrition:   GERD: PPI daily  Pancreatic insufficiency: Relizorb with TF.   Moderate malnutrition in the context of chronic illness/disease: GJ tube placed, clamp G tube as tolerated. Increase tube feeds to 20cc/hour today. NPO. Positive flatus. Start stool softeners.     Fluid/Electrolytes: IVF: LR@100/hr; electrolyte replacements per protocol.     : Sousa in place, remove this evening and check PVR    Endocrine:  Post-pancreatectomy diabetes/stress hyperglycemia: Endocrine consulted. Continue insulin gtt.     Infection:   Leukocytosis: WBC 35.2, likely 2/2 stress post operatively. Ertapenem for surgical ppx.     Prophylaxis: Anticoagulation: Heparin gtt 400 units/hr    Pain control: PTA managed with PO hydromorphone 4 mg Q3H.   Current regimen:    - Ropivacaine paravertebral block managed by pain   - Dilaudid pca, increase dose to 0.4 mg/hr basal and 0.4 bolus   - Neurontin   - Acetaminophen   -Atarax prn     Activity: PT/OT consulted    Disposition: SICU    KEYA/Fellow/Resident Provider: Christina Calvin PA-C     Faculty: Carlos Carmichael MD  __________________________________________________________________  Transplant History: Admitted 9/6/2024 for  "TPAIT  9/6/2024 (Islet), Postoperative day: 2     Interval History: History is obtained from the patient  Overnight events: Pain control improved. Positive flatus.     ROS:   A 10-point review of systems was negative except as noted above.    Curent Meds:  Current Facility-Administered Medications   Medication Dose Route Frequency Provider Last Rate Last Admin    acetaminophen *SUGAR FREE* (TYLENOL) solution 975 mg  975 mg Per J Tube Q8H Carlos Carmichael MD   975 mg at 09/08/24 0651    sennosides (SENOKOT) syrup 5 mL  5 mL Per J Tube BID Carlos Carmichael MD   5 mL at 09/08/24 0843    And    docusate (COLACE) 50 MG/5ML liquid 50 mg  50 mg Per J Tube BID Carlos Carmichael MD   50 mg at 09/08/24 0842    ertapenem (INVanz) 1 g vial to attach to  mL bag  1 g Intravenous Q24H Christina Calvin PA-C        gabapentin (NEURONTIN) solution 300 mg  300 mg Oral or J tube BID Carlos Carmichael MD   300 mg at 09/08/24 0844    mvw complete formulation (PEDIATRIC) oral solution 1.5 mL  1.5 mL Oral or J tube Daily Carlos Carmichael MD   1.5 mL at 09/08/24 0842    pantoprazole (PROTONIX) 2 mg/mL suspension 40 mg  40 mg Oral or J tube Daily Carlos Carmichael MD   40 mg at 09/08/24 0843    polyethylene glycol (MIRALAX) Packet 17 g  17 g Per J Tube Daily Carlos Carmichael MD   17 g at 09/08/24 0843    sodium chloride (PF) 0.9% PF flush 3 mL  3 mL Intracatheter Q8H Carlos Carmichael MD   3 mL at 09/07/24 2212       Physical Exam:     Admit Weight: 71.8 kg (158 lb 4.6 oz)    Current Vitals:   /65   Pulse 107   Temp 99.7  F (37.6  C) (Oral)   Resp 10   Ht 1.676 m (5' 6\")   Wt 77.1 kg (169 lb 15.6 oz)   SpO2 94%   BMI 27.43 kg/m           Vital sign ranges:    Temp:  [99.4  F (37.4  C)-100.2  F (37.9  C)] 99.7  F (37.6  C)  Pulse:  [] 107  Resp:  [10-20] 10  BP: ()/(55-77) " 103/65  MAP:  [70 mmHg-154 mmHg] 81 mmHg  Arterial Line BP: (102-174)/() 102/68  SpO2:  [90 %-100 %] 94 %  Patient Vitals for the past 24 hrs:   BP Temp Temp src Pulse Resp SpO2   09/08/24 1200 103/65 99.7  F (37.6  C) Oral 107 10 94 %   09/08/24 1145 113/73 -- -- 102 -- 93 %   09/08/24 1140 100/67 -- -- 105 -- 91 %   09/08/24 1135 101/56 -- -- 109 -- 94 %   09/08/24 1130 105/69 -- -- 109 -- 93 %   09/08/24 1115 97/55 -- -- 106 -- 92 %   09/08/24 1045 110/76 -- -- 109 -- 93 %   09/08/24 1000 117/60 -- -- 102 -- 93 %   09/08/24 0900 -- -- -- -- -- 90 %   09/08/24 0800 -- 99.5  F (37.5  C) Oral 105 16 99 %   09/08/24 0500 125/72 -- -- 111 -- 97 %   09/08/24 0400 117/77 99.4  F (37.4  C) Oral 108 20 100 %   09/08/24 0300 -- -- -- 106 -- 99 %   09/08/24 0200 -- -- -- (!) 126 -- 97 %   09/08/24 0130 -- -- -- 104 -- 99 %   09/08/24 0120 -- -- -- 107 -- 98 %   09/08/24 0110 -- -- -- 107 -- 99 %   09/08/24 0100 -- -- -- 111 -- 100 %   09/08/24 0000 -- 99.7  F (37.6  C) Oral 120 16 100 %   09/07/24 2300 -- -- -- 103 -- 100 %   09/07/24 2200 -- -- -- 110 -- 100 %   09/07/24 2100 -- -- -- 103 -- 100 %   09/07/24 2000 -- -- -- 105 -- 94 %   09/07/24 1900 -- -- -- 102 -- 97 %   09/07/24 1800 -- -- -- 107 16 92 %   09/07/24 1700 -- -- -- 108 14 90 %   09/07/24 1600 -- 100.2  F (37.9  C) Oral (!) 133 16 98 %   09/07/24 1545 -- -- -- 110 -- 98 %   09/07/24 1500 -- -- -- 106 18 91 %   09/07/24 1400 -- -- -- 96 -- 94 %   09/07/24 1300 -- -- -- 99 -- 94 %     General Appearance: NAD  Skin: normal, no suspicious lesions or rashes  Heart: regular rate and rhythm  Lungs: NLB on 2L via NC  Abdomen: The abdomen is rounded, and  moderately tender. The wound is Healing well. Tube/Drain sites are: GJ in place. KELLY: serosanguinous, moderate   : martinez is present.    Extremities: edema: present bilaterally. 2+    Data:   CMP  Recent Labs   Lab 09/08/24  1149 09/08/24  1117 09/08/24  0828 09/08/24  0820 09/08/24  0354 09/08/24  0348  09/07/24  0501 09/07/24  0358 09/06/24  1740 09/06/24  1701   NA  --   --   --   --   --  139  --  137   < > 136   POTASSIUM  --   --   --   --   --  3.7  --  4.2   < > 4.3   CHLORIDE  --   --   --   --   --  108*  --  106   < >  --    CO2  --   --   --   --   --  25  --  22   < >  --    GLC 89 76   < >  --    < > 109*   < > 133*   < > 125*   BUN  --   --   --   --   --  8.8  --  5.6*   < >  --    CR  --   --   --   --   --  0.76  --  0.64   < >  --    GFRESTIMATED  --   --   --   --   --  >90  --  >90   < >  --    CARLA  --   --   --   --   --  7.4*  --  7.7*   < >  --    ICAW  --   --   --  4.4  --   --   --   --   --  4.1*   MAG  --   --   --   --   --  1.7  --  1.7  --   --    PHOS  --   --   --   --   --  2.8  --  3.7  --   --    AMYLASE  --   --   --   --   --   --   --  61  --   --    LIPASE  --   --   --   --   --   --   --  85*  --   --    ALBUMIN  --   --   --   --   --  2.6*  --  2.9*   < >  --    BILITOTAL  --   --   --   --   --  0.3  --  0.6   < >  --    ALKPHOS  --   --   --   --   --  55  --  48   < >  --    AST  --   --   --   --   --  67*  --  162*   < >  --    ALT  --   --   --   --   --  81*  --  125*   < >  --     < > = values in this interval not displayed.     CBC  Recent Labs   Lab 09/08/24  0936 09/08/24  0348 09/07/24 0358 09/06/24  7184   HGB 7.0* 7.7*   < >  --    WBC 31.1* 35.2*   < >  --     217   < >  --    A1C  --   --   --  6.7*    < > = values in this interval not displayed.     Coags  Recent Labs   Lab 09/08/24  0348 09/07/24  0358 09/06/24  2309 09/06/24  1852 09/03/24  0830   INR  --   --   --  1.54* 1.15   PTT 38 35   < > >240*  --     < > = values in this interval not displayed.      Urinalysis  Recent Labs   Lab Test 09/03/24  0912   COLOR Yellow   APPEARANCE Clear   URINEGLC Negative   URINEBILI Negative   URINEKETONE Negative   SG 1.026   UBLD Negative   URINEPH 5.5   PROTEIN 10*   NITRITE Negative   LEUKEST Negative   RBCU <1   WBCU 2

## 2024-09-08 NOTE — INTERIM SUMMARY
"Regional Anesthesia Catheter Evaluation    Assessed patient on evening pain rounds. Upon arrival, pt reporting 9/10 pain. Pt relates her last bolus helped with pain.  Strength 5/5 in bilateral upper and lower extremities. Pt denied symptoms of LAST. Catheter Site intact, unobstructed and without abnormalities. Pt hemodynamically stable.     Bolus administered at 1300  - MEDICATION: PF bupivacaine 0.125% 7 mL via EACH PV catheter.  - PROCEDURE: Clinician bolus administered incrementally; negative aspirate.  No symptoms of local anesthetic systemic toxicity (LAST). Remained with and assessed patient for 10 min post-injection. BP, P and MAP stable  - POST-PROCEDURE: Bedside RN aware of need to continue BP, P and MAP monitoring Q 10 min for an additional 30 min. Contact RAPS (Please Page the Pain Team Via [employer]com: \"PAIN MANAGEMENT ACUTE INPATIENT/ [facility]\") if experiencing any untoward effects or MAP less than 60      9/8/2024 12:56 AM  Bernardo Mendoza MD  Anesthesiology, CA[facility]     "

## 2024-09-08 NOTE — PROGRESS NOTES
Pain Service Progress Note  [facility]  Date: 09/08/2024       Patient Name: Ciera Perkins  MRN: [id number]  Age: 39 year old  Sex: female      Assessment:   Ciera Perkisn is a 39 year old female with PMH of cystic fibrosis of pancreas, pancreatic insufficiency, malnutrition, SMA stenosis s/p angioplasty, MASLD, and chronic pancreatitis who was admitted on 9/6/2024 with cystic fibrosis of the pancreas now s/p Total pancreatectomy with autologous islet transplant, cholecystectomy,  G/J placement, and appendectomy on 9/6/24.    Procedure: Total pancreatectomy with autologous islet transplant, cholecystectomy,  G/J placement, and appendectomy on 9/6/24.    Date of Surgery: 9/6/24    Date of Catheter Placement: 9/6/24    Plan/Recommendations:  1. Regional Anesthesia/Analgesia  -Continuous Catheter Type/Site: bilateral paravertebral (PV) T6-7  Infusate: Ropivacaine 0.2%  Programmed Intermittent Bolus (PIB) at 7 mL Q60 min via each catheter, total infusion rate of 14 mL/hr    Both paravertebral catheters bolused with PF bupivacaine 0.125%, 7 mL incrementally with negative aspirate each 3.5 mL at 10:30 am without complication in anticipation of physical therapy session    no symptoms of local anesthetic toxicity (LAST).  Remained with and assessed patient for 5 min post-injection.  Bedside nurse aware she should continue monitor BP/P, MAP Q 10 min for next 30 minutes.     Plan to maintain catheter, max of 7 days    2. Anticoagulation  -Please contact Inpatient Pain Service before ordering or making any anticoagulation changes    AC: Heparin gtt     3. Multimodal Analgesia  - Dilaudid PCA per primary team    Pain Service will continue to follow.    Discussed with attending anesthesiologist    Sara Amaro MD  09/08/2024     Overnight Events: NAEO. Pain reportedly overall better today.    Tubes/Drains: Yes  Bilateral PV Catheters   GJ Tube    Subjective:  I'm doing well, much better  "today   Nausea: No  Vomiting: No  Pruritus: No  Symptoms of LAST: No    Pain Location:  Abdomen    Pain Intensity:    Pain at Rest: 6/10   Pain with Activity: 10/10  Comfort Goal: 6/10   Baseline Pain: 2/10   Satisfied with your level of pain control: Yes    Diet: NPO for Medical/Clinical Reasons Except for: Ice Chips  Adult Formula Drip Feeding: Continuous Other; Impact Peptide 1.5; Jejunostomy; Goal Rate: 45; mL/hr; Once confirm JT placement/approval for use post surgery, begin TF @ 10 ml/hr and advance by 10 ml q 24 hrs (and/or ONLY advance rate upon MD appro...    Relevant Labs:  Recent Labs   Lab Test 09/07/24  0358 09/06/24  2309 09/06/24  1852   INR  --   --  1.54*     --  168   PTT 35   < > >240*   BUN 5.6*  --  7.4    < > = values in this interval not displayed.       Physical Exam:  Vitals: /72   Pulse 111   Temp 37.4  C (99.4  F) (Oral)   Resp 20   Ht 1.676 m (5' 6\")   Wt 77.1 kg (169 lb 15.6 oz)   SpO2 97%   BMI 27.43 kg/m      Physical Exam:   Orientation:  Alert, oriented, and in no acute distress: Yes  Sedation: No    Motor Examination:  5/5 Strength in lower extremities: Yes    Sensory Level:   Decrease in sensation: No    Catheter Site:   Catheter entry site is clean/dry/intact: Yes    Tender: Yes      Relevant Medications:  Current Pain Medications:  Medications related to Pain Management (From now, onward)      Start     Dose/Rate Route Frequency Ordered Stop    09/09/24 0000  bisacodyl (DULCOLAX) suppository 10 mg         10 mg Rectal DAILY PRN 09/06/24 2158 09/08/24 0000  magnesium hydroxide (MILK OF MAGNESIA) suspension 30 mL         30 mL Per J Tube DAILY PRN 09/06/24 2158 09/07/24 0800  gabapentin (NEURONTIN) solution 300 mg         300 mg Oral or J tube 2 TIMES DAILY 09/06/24 2158 09/07/24 0800  polyethylene glycol (MIRALAX) Packet 17 g         17 g Per J Tube DAILY 09/06/24 2158 09/07/24 0800  sennosides (SENOKOT) syrup 5 mL        Placed in \"And\" " "Linked Group    5 mL Per J Tube 2 TIMES DAILY 09/06/24 2254 09/07/24 0800  docusate (COLACE) 50 MG/5ML liquid 50 mg        Placed in \"And\" Linked Group    50 mg Per J Tube 2 TIMES DAILY 09/06/24 2254 09/06/24 2330  HYDROmorphone (DILAUDID) PCA 0.2 mg/mL OPIOID TOLERANT          Intravenous CONTINUOUS 09/06/24 2311      09/06/24 2300  acetaminophen *SUGAR FREE* (TYLENOL) solution 975 mg         975 mg Per J Tube EVERY 8 HOURS 09/06/24 2158 09/09/24 2259 09/06/24 2158  acetaminophen *SUGAR FREE* (TYLENOL) solution 650 mg         650 mg Per J Tube EVERY 4 HOURS PRN 09/06/24 2158      09/06/24 2158  methocarbamol (ROBAXIN) tablet 750 mg         750 mg Oral EVERY 6 HOURS PRN 09/06/24 2158      09/06/24 2158  hydrOXYzine HCl (ATARAX) tablet 25 mg         25 mg Oral EVERY 6 HOURS PRN 09/06/24 2158      09/06/24 2158  lidocaine 1 % 0.1-1 mL         0.1-1 mL Other EVERY 1 HOUR PRN 09/06/24 2158      09/06/24 2158  lidocaine (LMX4) cream          Topical EVERY 1 HOUR PRN 09/06/24 2158      09/06/24 0830  ROPivacaine 0.2% in sodium chloride 0.9% PERINEURAL infusion          Perineural Continuous Nerve Block 09/06/24 0813      09/06/24 0830  ROPivacaine 0.2% in sodium chloride 0.9% PERINEURAL infusion          Perineural Continuous Nerve Block 09/06/24 0813              Primary Service Contacted with Recommendations? Yes      Please see A&P for additional details of medical decision making.      Acute Inpatient Pain Service [facility]  Hours of pain coverage 24/7   Page via [employer]- Please Page the Pain Team Via [employer]: \"PAIN MANAGEMENT ACUTE INPATIENT/ [facility]\"             "

## 2024-09-08 NOTE — PLAN OF CARE
Patient transferred via bed from  accompanied by her , Dinesh. Heparin at 400U/hr, Dilaudid PCA 0.4 mg Q 10 minutes, 0.4 mg basal. LR at 100cc/hr, Insulin drip algorithm 1. Glucoses have been intermittently below 80, D10 to be run below 80. Most recent glucose 99. Patient is sleepy but arouses to voice. Vitals are stable on room air, has need 2L per Oxymask at night (mouth breather). Sousa in place, (orders to discontinue at 2000). G/JT, tube feeds at 20cc/hr via JT. G tube to gravity bag.

## 2024-09-08 NOTE — CONSULTS
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 She is now s/p TPAIT, splenectomy, and G/J placement with repair of small incisional hernia on 9/6/24 with Dr. Carmichael.  Inpatient Diabetes Service consulted for management of diabetes.    History obtained via the patient, chart review and discussion with primary team.       Additional Historian:      Ciera Perkins is not known to the Inpatient Diabetes Service from past admission(s).She was seen by Dr Marin on 5/9/2024 in consult prior to pancreatectomy and TPIAT infusion. Dr Marin reports patient has glycemic testing that is diagnostic of prediabetes with high post MMTT.  About 1-2 years ago, per her , she was thought to have possible diabetes( worsening A1C up to 8) thought to be due to chronic pancreatitis.  She was unable to tolerate glipizide or metformin so was started on insulin for about 1 month and then high bg resolved.  Prior to her admission here she was admitted due to some erratic behavior and they thought her BG were elevated again.  She monitored her BG for bit and her  said her BG were in range. Ciera started TF yesterday at 10 ml, trickle and then advanced to 20 ml around noon today.  She was on LR with dextrose at 100cc that was stopped with advancement of tube feed.  She remains NPO.    Last dose insulin PTA: none   Current inpatient regimen:  TPIAT insulin drip    BG at time of consult:  119 on 4 units of insulin drip    Other Active/Contributory Medical Problems: necrotizing pancreatitis due to genetic mutation, post pancreatectomy and TPIAT infusion   Planned Procedures/Surgeries: none    Relevant Labs on Admission:  if contributory, otherwise see labs below  ROUTINE IP LABS (Last four results)  BMP  Recent Labs   Lab 09/08/24  1601 09/08/24  1500 09/08/24  1421 09/08/24  1338 09/08/24  1307 09/08/24  0354 09/08/24  0348 09/07/24  0501 09/07/24  0358 09/06/24  1940 09/06/24 1852   NA  --   --   --   --  143  --  139  --  137  --  137   POTASSIUM  --   --   --   --  3.8  3.8  --  3.7  --  4.2  --  4.7   CHLORIDE  --   --   --   --  111*  --  108*  --  106  --  107   CARLA  --   --   --   --  7.3*  --  7.4*  --  7.7*  --  7.7*   CO2  --   --   --   --  25  --  25  --  22  --  20*   BUN  --   --   --   --  8.6  --  8.8  --  5.6*  --  7.4   CR  --   --   --   --  0.69  --  0.76  --  0.64  --  0.56   * 105* 99 71 87   < > 109*   < > 133*   < > 148*    < > = values in this interval not displayed.     CBC  Recent Labs   Lab 09/08/24  0936 09/08/24  0348 09/07/24  1041 09/07/24  0358   WBC 31.1* 35.2* 28.0* 24.7*   RBC 2.39* 2.64* 3.16* 3.43*   HGB 7.0* 7.7* 9.2* 9.9*   HCT 21.9* 24.1* 27.9* 30.0*   MCV 92 91 88 88   MCH 29.3 29.2 29.1 28.9   MCHC 32.0 32.0 33.0 33.0   RDW 13.7 13.8 13.6 13.4    217 195 195     INR  Recent Labs   Lab 09/06/24  1852 09/03/24  0830   INR 1.54* 1.15            GAD65 antibody, zinc transporter 8 antibody, islet antibody, insulin antibody, a not available on epic search         Inpatient Glucose Trends:           Diabetes History:   Diabetes Type and Duration:   Prediabetes( as noted by Dr Marin) and there was a time they thought she had DM secondary to pancreatitis but this resolved per her   PTA Medication Regimen: none recently  Historical Diabetes Medications:     Basaglar 10 units  Tradjenta 5 mg daily  Metformin 1000 mg po  bid  Glimiperide    Glucose monitoring device and frequency: not recently but has checked her BG in the past--> 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 and check bg  Outpatient Diabetes Provider: Scheduled with  Formal Diabetes Education/Educator: Robny Renae RDN on 9.5.2024    ------------------------  Complications:  - peripheral neuropathy, -retinopathy (last eye exam: unsure), -nephropathy, -gastroparesis, -macrovascular disease    Last A1c: 6.7 on 9/6/2024( hgb=9.6) and 7 on 9/3/2024  ( hgb=12.1 on 9/3/2024)  Hemoglobin at time of last A1c: as above  RBC transfusion in past 3 months: none none      History of DKA: no    Hypoglycemia PTA: no      Safety Kit:   - Glucagon: -   - Ketone Strips: -      Diet/Lifestyle: unable to due much secondary to pancreatic pain    Ability to [place] Prescribed Regimen:  good, former CNA   Food/Housing Insecurity: none          Medications Impacting Glycemia:    Steroids: none  D5W containing solutions/medications: LR with dextrose at 100cc, now stopped  Other medications impacting glucose: TF         Diet/Nutrition:    Orders Placed This Encounter      NPO for Medical/Clinical Reasons Except for: Ice Chips     Supplements:  none  TF: 9/7/2024: Impact Peptide 1.5 @ 10 ml/hr and advance by 10 ml q 24 hrs (and/or ONLY advance rate upon MD approval after daily evaluation) to goal Impact Peptide 1.5 @ goal of  45ml/hr  (1080ml/day) provides:  151 g CHO   TPN: none          Review of Systems:    CC: patient very sleepy and unable to respond so  and RN answered  Constitutional: - fever and chills.  HEENT: - headache, vision changes, hearing changes, sinus congestion, and swollen glands.   Cardiac: - chest pain, palpitations, or racing heart.    Respiratory: - dyspnea  at rest.    GI: + abdominal pain, tenderness and bloating. - nausea and vomiting. Constipation     : - difficulty urinating, dysuria, and incontinence.    .    Endocrine: - polyuria, polydipsia            "Past Medical History:       Past Medical History:   Diagnosis Date    Chronic abdominal pain     Chronic pancreatitis (H)     Cystic fibrosis of pancreas (H)     Diabetes mellitus (H)     History of smoking     Nicotine dependence due to vaping non-tobacco product     Port-A-Cath in place     Superior mesenteric artery stenosis (H24)              Past Surgical History:      Past Surgical History:   Procedure Laterality Date    APPENDECTOMY      CHOLECYSTECTOMY      COLONOSCOPY      EGD      ENDOSCOPIC ULTRASOUND UPPER GASTROINTESTINAL TRACT (GI)      HYSTERECTOMY      IR FEEDING TUBE PLACEMENT W FLUORO/MD      IR NG TUBE PLACEMENT REQ RAD & FLUORO  2021    IR NG TUBE PLACEMENT REQ RAD & FLUORO  2021    TONSILLECTOMY               Social History:      Social History     Tobacco Use    Smoking status: Former     Current packs/day: 0.00     Average packs/day: 1 pack/day for 8.0 years (8.0 ttl pk-yrs)     Types: Cigarettes     Start date: 2012     Quit date: 2020     Years since quittin.6    Smokeless tobacco: Never    Tobacco comments:     Currently Vapes   Substance Use Topics    Alcohol use: Not Currently        History   Sexual Activity    Sexual activity: Not on file        Tobacco: former    Etoh: no       Marital Status:     Lives with [facility]         Family History:    Reviewed and non-contributory.    Family History of Diabetes: maternal aunt has diabetes    Family History   Problem Relation Age of Onset    Depression Mother     Cerebrovascular Disease Mother     Aneurysm Mother         brain    Osteoporosis Mother     Unknown/Adopted Father     Deep Vein Thrombosis Maternal Grandmother     Heart Failure Maternal Grandmother     Unknown/Adopted Paternal Grandmother     Unknown/Adopted Paternal Grandfather     Anesthesia Reaction No family hx of              Physical Exam:    /62   Pulse 97   Temp 99.7  F (37.6  C) (Oral)   Resp 10   Ht 1.676 m (5' 6\")   Wt 77.1 " kg (169 lb 15.6 oz)   SpO2 100%   BMI 27.43 kg/m     General:  sleepy, having some pain  HEENT:  NC/AT. dryMM, sclera not injected  Lungs:  unremarkable, no audible cough, no work of breathing, wearing NC oxygen  ABD:  rounded, soft, has FT  Skin:  warm and dry, no obvious lesions  MSK:   moving all extremities  Lymp:   no LE edema  Mental Status:  sleepy but oriented x3  Psych:   Cooperative, appropriate affect    Feet:    warm without discoloration,without evidence of wounds, corns, calluses, , pulses +           Laboratory Data:      Lab Results   Component Value Date    A1C 6.7 (H) 09/06/2024    A1C 7.0 (H) 09/03/2024    A1C 6.2 (H) 05/08/2024     Recent Labs   Lab Test 09/08/24  0936   WBC 31.1*   RBC 2.39*   HGB 7.0*   HCT 21.9*   MCV 92   MCH 29.3   MCHC 32.0   RDW 13.7        Recent Labs   Lab Test 09/08/24  1500 09/08/24  1421 09/08/24  1338 09/08/24  1307 09/08/24  0354 09/08/24  0348   NA  --   --   --  143  --  139   POTASSIUM  --   --   --  3.8  3.8  --  3.7   CHLORIDE  --   --   --  111*  --  108*   CO2  --   --   --  25  --  25   ANIONGAP  --   --   --  7  --  6*   * 99   < > 87   < > 109*   BUN  --   --   --  8.6  --  8.8   CR  --   --   --  0.69  --  0.76   CARLA  --   --   --  7.3*  --  7.4*    < > = values in this interval not displayed.     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 She is now s/p TPAIT, splenectomy, and G/J placement with repair of small incisional hernia on 9/6/24 with Dr. Carmichael.  Inpatient Diabetes Service consulted for management of diabetes.  Assessment:   Pre Diabetes Mellitus without complications   (A1c 6.7 %)    Post pancreatectomy diabetes and post TPIAT on 9/6/2024   Autosomal recessive hereditary pancreatitis with associated CFTR gene mutatio  Enteral Feed induced hyperglycemia  Stress induced hyperglycemia    Plan/Recommendations:    -Continue TPIAT insulin drip , keeping BG    - NPO so no Novolog Meal Coverage: - -No Novolog Correction Scale: as on insulin drip  - BG monitoring: Every 1-2 hours on insulin drip     - Hypoglycemia protocol    - Carb counting protocol     Discussion:  Just started Tube feeds at 10 ml yesterday, advance to 20 ml today.  Transferred from the ICU to  this afternoon.  Continue TPIAT insulin drip to keep BG .     Discharge Planning: (tentative)    Medications: TBD    Test Claims: put TC in on 9/8/2024   Education: will need education, knows how to check bg    Outpatient Follow-up:  recommend [facility] - here from [place].  Scheduled 10/4/2024: with Doreen Goodwin at 9:00 and with Dr Lupillo Hudson     Discussed plan with patient and , bedside RN, transplant KEYA  Thank you for this consult. IDS will continue to follow.      Please notify Inpatient Diabetes Service if changes are planned to steroids, nutrition, TPN/TF and anticipated procedures requiring prolonged NPO status.     Jessica Sánchez PA-C  Inpatient Diabetes Service  Vocera  [phone]    To contact Inpatient Diabetes Service:     7 AM - 5 PM: Page the IDS KEYA following the patient that day (see filed or incomplete progress notes/consult notes under Endocrinology)    OR if uncertain of provider assignment: page job code [phone]    5 PM - 7 AM: First call after hours is to primary service.    For urgent after-hours questions, page job code for on call fellow: [phone]     I spent a total of 80 minutes on the date of the encounter doing prep/post-work, chart review, history and exam, documentation and further activities per the note including lab review, multidisciplinary communication, counseling the patient  and/or coordinating care regarding acute hyper/hypoglycemic management, as well as discharge management and planning/communication.  See note for details.

## 2024-09-08 NOTE — PROGRESS NOTES
SURGICAL ICU PROGRESS NOTE  09/08/2024        Date of Service (when I saw the patient): 09/08/2024    ASSESSMENT:  Ciera Perkins is a 39 year old female with PMH of cystic fibrosis of pancreas, pancreatic insufficiency, malnutrition, SMA stenosis s/p angioplasty, MASLD, and chronic pancreatitis who was admitted on 9/6/2024 with cystic fibrosis of the pancreas now s/p Total pancreatectomy with autologous islet transplant, splenectomy, adhesiolysis, and G/J placement.  Ciera is being admitted to the SICU for postoperative cares including hemodynamic monitoring, pain control, and close monitoring.      CHANGES and MAJOR EVENTS TODAY:   - Remove a-line  - Change maintenance IVF  - Transfer off ICU service  - EKG, repeat labs     PLAN:    Neurological:  # Acute Postoperative Pain   - Hydromorphone PCA at 0.4 mg/hr infusion with 0.4 mg push/10 min   - Ropivacaine 0.2% paravertebral block at  7 mL per hour   - Acetaminophen 975 mg q8 hrs scheduled    - Gabapentin 300 mg TID  - PRN tylenol, robaxin  - Bowel Regimen: Miralax, senna-docusate every day     Pulmonary:   # Post-op supplemental oxygen needs   Pt states she feels pain is not limiting her from coughing/ deep breathing. On 2L oxymask overnight - appears to be a mouth breather and mostly for comfort  - Supplemental oxygen to keep saturation above 92 %.  - Aggressive pulmonary hygiene while awake    Cardiovascular:    # A-fib with RVR  New onset a-fib today with current rates in the 110s.   - Stat EKG confirms a-fib. Bedside monitor appears NSR with PACs   - Repeat EKG  - Recheck lytes  - Monitor hemodynamic status.   - MAP goal >65     GI/Nutrition:    # GERD without Esophagitis   # Chronic malnutrition  # Gastrojejunostomy tube placement (9/6/2024)    - Pantoprazole 40 mg every day  - Nutrition Consult   - Trickle Tube feeds: start at 10 mL/hr and increase by 10 mL q24 hours. Goal of 45 mL/hr.   - Relizorb with Tube feeds   - Bowel  regimen    Fluids/Electrolytes:  # Hypervolemia  # Hypomagnesemia, mild   - Stop D5LR and transition to LR at 100cc/hr given insulin needs  - Daily BMP   - Recheck lytes as above  - Electrolyte replacement protocol  In place.     Renal:   # No acute issues  Baseline Cr 0.6 - 0.8. Cr today at 0.76  - Strict intake and output monitoring   - Sousa catheter in place (9/6/2024)      Endocrine:  # Cystic Fibrosis of the pancreas s/p Total Pancreatectomy with Autologous Islet Transplant   # Chronic Pancreatitis requiring Opioid Use   # Exocrine and Endocrine Pancreatic Insufficiency   # Pancreatitic Insufficiency related Diabetes mellitus   # Stress hyperglycemia  HgbA1C - 7.0%   - Insulin gtt at all times   - PRN D10 gtt for hypoglycemia when BG <70  - Goal to keep BG< 180 for optimal wound healing  - Pain control and IVF as above   - Amylase and Lipase to be drawn on POD #5    Infectious disease:   # Leukocytosis   # S/p splenectomy  Likely acute stress response due to surgery and s/p splenectomy. WBC 35.2 from 28.0, Tmax 100.2  - Daily CBC  - Continue to trend and monitor   - Antibiotics: course completed 9/7     Hematology:    # Acute blood loss anemia    Hgb 7.7 from 9.2. No s/s bleeding  - Recheck Hgb at 1000  - Daily CBC  - Threshold for transfusion if hgb <7.0 or signs/symptoms of hypoperfusion.       Musculoskeletal:  - Physical and occupational therapy consults.    Skin:  # Surgical Site Incisions  - Diligent cares to prevent skin breakdown and wound formation.      General Cares/Prophylaxis:    DVT Prophylaxis: Heparin gtt as above  GI Prophylaxis: PPI   Restraints: Restraints for medical healing needed: NO   Activity: PT/OT consults     Lines/ tubes/ drains:  - Portacath  - PIV x2  - Left Radial Arterial Line - remove  - G/J Tube   - NG  - RUQ KELLY drain   - Bilateral paravertebral     Disposition: Transfer care to transplant service    Patient seen, findings and plan discussed with surgical ICU staff,   "Magi.    Time: 40 minutes   Lisa TanJohnyPATTI Arshad CNP    ====================================  INTERVAL HISTORY: Pt states abdominal pain is \"tolerable\" at an 8 and feels like current pain regimen is working for her. Denies SOB, nausea, numbness/tingling.  States she got some sleep overnight.      OBJECTIVE:   1. VITAL SIGNS:   Temp: 99.4  F (37.4  C) Temp src: Oral BP: 125/72 Pulse: 111   Resp: 20 SpO2: 97 % O2 Device: Oxymask Oxygen Delivery: 2 LPM Height: 167.6 cm (5' 6\") Weight: 77.1 kg (169 lb 15.6 oz)  Estimated body mass index is 27.43 kg/m  as calculated from the following:    Height as of this encounter: 1.676 m (5' 6\").    Weight as of this encounter: 77.1 kg (169 lb 15.6 oz).      2. INTAKE/ OUTPUT:   Intake/Output Summary (Last 24 hours) at 9/8/2024 0640  Last data filed at 9/8/2024 0600  Gross per 24 hour   Intake 3102.4 ml   Output 2247 ml   Net 855.4 ml       3. PHYSICAL EXAMINATION:  General: Lying in bed, appears to be somewhat uncomfortable   HEENT: Mucous membranes pink, moist, atraumatic. NG in right nare  Neuro: Aox3. PERRL. Moves extremities spontaneously. No dysarthria.    Pulm/Resp: Clear breath sounds bilaterally without rhonchi, crackles or wheezes. Breathing appears non-labored on oxymask  CV: Tachycardic with clear S1, S2 without murmurs or additional heart sounds. + palpable pulses present x 4.   Abdomen: Limited exam due to patient discomfort and voluntary guarding. Bowel sounds not appreciated upon auscultation.  :  martinez catheter in place, urine yellow and clear  Incisions/Skin: RUQ KELLY drain with serosanguinous drainage. Incision site with island dressing overlying with small amount of shadowing - areas outlined. GJ site appears to have old dried blood, healing. Running TF. No erythema surrounding dressing or drainage.   MSK/Extremities: BLE +1-2 edema, strong peripheral pulses throughout.     4. INVESTIGATIONS:   Arterial Blood Gases   Recent Labs   Lab " 09/06/24  1701 09/06/24  1454 09/06/24  1358 09/06/24  1249   PH 7.37 7.36 7.37 7.42   PCO2 39 40 39 36   PO2 212* 141* 150* 158*   HCO3 22 23 23 24     Complete Blood Count   Recent Labs   Lab 09/08/24  0348 09/07/24  1041 09/07/24  0358 09/06/24  1852   WBC 35.2* 28.0* 24.7* 26.9*   HGB 7.7* 9.2* 9.9* 9.6*    195 195 168     Basic Metabolic Panel  Recent Labs   Lab 09/08/24  0554 09/08/24  0506 09/08/24  0354 09/08/24  0348 09/07/24  0501 09/07/24  0358 09/06/24  1940 09/06/24  1852 09/06/24  1740 09/06/24  1701 09/03/24  1037 09/03/24  0830   NA  --   --   --  139  --  137  --  137  --  136   < > 138   POTASSIUM  --   --   --  3.7  --  4.2  --  4.7  --  4.3   < > 3.8   CHLORIDE  --   --   --  108*  --  106  --  107  --   --   --  105   CO2  --   --   --  25  --  22  --  20*  --   --   --  21*   BUN  --   --   --  8.8  --  5.6*  --  7.4  --   --   --  9.6   CR  --   --   --  0.76  --  0.64  --  0.56  --   --   --  0.62   * 102* 102* 109*   < > 133*   < > 148*   < > 125*   < > 181*    < > = values in this interval not displayed.     Liver Function Tests  Recent Labs   Lab 09/07/24 0358 09/06/24 1852 09/03/24  0830   * 96* 37   * 68* 32   ALKPHOS 48 47 89   BILITOTAL 0.6 0.9 0.7   ALBUMIN 2.9* 2.9* 4.1   INR  --  1.54* 1.15     Pancreatic Enzymes  Recent Labs   Lab 09/07/24  0358   LIPASE 85*   AMYLASE 61     Coagulation Profile  Recent Labs   Lab 09/08/24  0348 09/07/24  0358 09/06/24  2309 09/06/24  1852 09/03/24  0830   INR  --   --   --  1.54* 1.15   PTT 38 35 33 >240*  --          5. RADIOLOGY:   No results found for this or any previous visit (from the past 24 hour(s)).      =========================================

## 2024-09-09 ENCOUNTER — APPOINTMENT (OUTPATIENT)
Dept: CARDIOLOGY | Facility: CLINIC | Age: 39
End: 2024-09-09
Attending: PHYSICIAN ASSISTANT
Payer: COMMERCIAL

## 2024-09-09 ENCOUNTER — APPOINTMENT (OUTPATIENT)
Dept: PHYSICAL THERAPY | Facility: CLINIC | Age: 39
End: 2024-09-09
Attending: SURGERY
Payer: COMMERCIAL

## 2024-09-09 PROBLEM — E11.9 DIABETES MELLITUS, TYPE 2 (H): Status: ACTIVE | Noted: 2024-09-09

## 2024-09-09 LAB
ANION GAP SERPL CALCULATED.3IONS-SCNC: 7 MMOL/L (ref 7–15)
APTT PPP: 34 SECONDS (ref 22–38)
ATRIAL RATE - MUSE: 99 BPM
ATRIAL RATE - MUSE: NORMAL BPM
BASOPHILS # BLD AUTO: 0.1 10E3/UL (ref 0–0.2)
BASOPHILS NFR BLD AUTO: 0 %
BUN SERPL-MCNC: 8.4 MG/DL (ref 6–20)
CALCIUM SERPL-MCNC: 7.3 MG/DL (ref 8.8–10.4)
CHLORIDE SERPL-SCNC: 106 MMOL/L (ref 98–107)
CREAT SERPL-MCNC: 0.62 MG/DL (ref 0.51–0.95)
DIASTOLIC BLOOD PRESSURE - MUSE: NORMAL MMHG
DIASTOLIC BLOOD PRESSURE - MUSE: NORMAL MMHG
EGFRCR SERPLBLD CKD-EPI 2021: >90 ML/MIN/1.73M2
EOSINOPHIL # BLD AUTO: 0.7 10E3/UL (ref 0–0.7)
EOSINOPHIL NFR BLD AUTO: 3 %
ERYTHROCYTE [DISTWIDTH] IN BLOOD BY AUTOMATED COUNT: 13.6 % (ref 10–15)
ERYTHROCYTE [DISTWIDTH] IN BLOOD BY AUTOMATED COUNT: 14.1 % (ref 10–15)
GLUCOSE BLDC GLUCOMTR-MCNC: 103 MG/DL (ref 70–99)
GLUCOSE BLDC GLUCOMTR-MCNC: 105 MG/DL (ref 70–99)
GLUCOSE BLDC GLUCOMTR-MCNC: 105 MG/DL (ref 70–99)
GLUCOSE BLDC GLUCOMTR-MCNC: 110 MG/DL (ref 70–99)
GLUCOSE BLDC GLUCOMTR-MCNC: 114 MG/DL (ref 70–99)
GLUCOSE BLDC GLUCOMTR-MCNC: 117 MG/DL (ref 70–99)
GLUCOSE BLDC GLUCOMTR-MCNC: 118 MG/DL (ref 70–99)
GLUCOSE BLDC GLUCOMTR-MCNC: 121 MG/DL (ref 70–99)
GLUCOSE BLDC GLUCOMTR-MCNC: 128 MG/DL (ref 70–99)
GLUCOSE BLDC GLUCOMTR-MCNC: 128 MG/DL (ref 70–99)
GLUCOSE BLDC GLUCOMTR-MCNC: 131 MG/DL (ref 70–99)
GLUCOSE BLDC GLUCOMTR-MCNC: 132 MG/DL (ref 70–99)
GLUCOSE BLDC GLUCOMTR-MCNC: 135 MG/DL (ref 70–99)
GLUCOSE BLDC GLUCOMTR-MCNC: 85 MG/DL (ref 70–99)
GLUCOSE SERPL-MCNC: 129 MG/DL (ref 70–99)
HCO3 SERPL-SCNC: 24 MMOL/L (ref 22–29)
HCT VFR BLD AUTO: 21.3 % (ref 35–47)
HCT VFR BLD AUTO: 24.7 % (ref 35–47)
HGB BLD-MCNC: 6.8 G/DL (ref 11.7–15.7)
HGB BLD-MCNC: 8.1 G/DL (ref 11.7–15.7)
IMM GRANULOCYTES # BLD: 0.3 10E3/UL
IMM GRANULOCYTES NFR BLD: 1 %
INTERPRETATION ECG - MUSE: NORMAL
INTERPRETATION ECG - MUSE: NORMAL
LACTATE SERPL-SCNC: 0.5 MMOL/L (ref 0.7–2)
LVEF ECHO: NORMAL
LYMPHOCYTES # BLD AUTO: 1.4 10E3/UL (ref 0.8–5.3)
LYMPHOCYTES NFR BLD AUTO: 6 %
MAGNESIUM SERPL-MCNC: 1.7 MG/DL (ref 1.7–2.3)
MCH RBC QN AUTO: 29.1 PG (ref 26.5–33)
MCH RBC QN AUTO: 29.3 PG (ref 26.5–33)
MCHC RBC AUTO-ENTMCNC: 31.9 G/DL (ref 31.5–36.5)
MCHC RBC AUTO-ENTMCNC: 32.8 G/DL (ref 31.5–36.5)
MCV RBC AUTO: 89 FL (ref 78–100)
MCV RBC AUTO: 92 FL (ref 78–100)
MONOCYTES # BLD AUTO: 2.8 10E3/UL (ref 0–1.3)
MONOCYTES NFR BLD AUTO: 11 %
NEUTROPHILS # BLD AUTO: 20.9 10E3/UL (ref 1.6–8.3)
NEUTROPHILS NFR BLD AUTO: 79 %
NRBC # BLD AUTO: 0 10E3/UL
NRBC BLD AUTO-RTO: 0 /100
P AXIS - MUSE: 42 DEGREES
P AXIS - MUSE: NORMAL DEGREES
PHOSPHATE SERPL-MCNC: 2.6 MG/DL (ref 2.5–4.5)
PLATELET # BLD AUTO: 238 10E3/UL (ref 150–450)
PLATELET # BLD AUTO: 244 10E3/UL (ref 150–450)
POTASSIUM SERPL-SCNC: 3.6 MMOL/L (ref 3.4–5.3)
PR INTERVAL - MUSE: 138 MS
PR INTERVAL - MUSE: NORMAL MS
QRS DURATION - MUSE: 94 MS
QRS DURATION - MUSE: 94 MS
QT - MUSE: 352 MS
QT - MUSE: 504 MS
QTC - MUSE: 451 MS
QTC - MUSE: 672 MS
R AXIS - MUSE: 17 DEGREES
R AXIS - MUSE: 26 DEGREES
RBC # BLD AUTO: 2.32 10E6/UL (ref 3.8–5.2)
RBC # BLD AUTO: 2.78 10E6/UL (ref 3.8–5.2)
SODIUM SERPL-SCNC: 137 MMOL/L (ref 135–145)
SYSTOLIC BLOOD PRESSURE - MUSE: NORMAL MMHG
SYSTOLIC BLOOD PRESSURE - MUSE: NORMAL MMHG
T AXIS - MUSE: 35 DEGREES
T AXIS - MUSE: 38 DEGREES
UFH PPP CHRO-ACNC: <0.1 IU/ML
VENTRICULAR RATE- MUSE: 107 BPM
VENTRICULAR RATE- MUSE: 99 BPM
WBC # BLD AUTO: 26.2 10E3/UL (ref 4–11)
WBC # BLD AUTO: 29 10E3/UL (ref 4–11)

## 2024-09-09 PROCEDURE — 36591 DRAW BLOOD OFF VENOUS DEVICE: CPT | Performed by: SURGERY

## 2024-09-09 PROCEDURE — 83735 ASSAY OF MAGNESIUM: CPT | Performed by: SURGERY

## 2024-09-09 PROCEDURE — 93306 TTE W/DOPPLER COMPLETE: CPT | Mod: 26 | Performed by: INTERNAL MEDICINE

## 2024-09-09 PROCEDURE — 120N000011 HC R&B TRANSPLANT UMMC

## 2024-09-09 PROCEDURE — 250N000011 HC RX IP 250 OP 636: Performed by: PHYSICIAN ASSISTANT

## 2024-09-09 PROCEDURE — 250N000011 HC RX IP 250 OP 636

## 2024-09-09 PROCEDURE — 85730 THROMBOPLASTIN TIME PARTIAL: CPT | Performed by: SURGERY

## 2024-09-09 PROCEDURE — 271N000002 HC RX 271: Performed by: SURGERY

## 2024-09-09 PROCEDURE — 258N000003 HC RX IP 258 OP 636

## 2024-09-09 PROCEDURE — 999N000208 ECHOCARDIOGRAM COMPLETE

## 2024-09-09 PROCEDURE — 83605 ASSAY OF LACTIC ACID: CPT | Performed by: SURGERY

## 2024-09-09 PROCEDURE — 85520 HEPARIN ASSAY: CPT | Performed by: PHYSICIAN ASSISTANT

## 2024-09-09 PROCEDURE — 85027 COMPLETE CBC AUTOMATED: CPT | Performed by: SURGERY

## 2024-09-09 PROCEDURE — G0463 HOSPITAL OUTPT CLINIC VISIT: HCPCS | Mod: 25

## 2024-09-09 PROCEDURE — 250N000013 HC RX MED GY IP 250 OP 250 PS 637: Performed by: SURGERY

## 2024-09-09 PROCEDURE — 255N000002 HC RX 255 OP 636: Performed by: INTERNAL MEDICINE

## 2024-09-09 PROCEDURE — 97530 THERAPEUTIC ACTIVITIES: CPT | Mod: GP

## 2024-09-09 PROCEDURE — 85025 COMPLETE CBC W/AUTO DIFF WBC: CPT | Performed by: SURGERY

## 2024-09-09 PROCEDURE — C8929 TTE W OR WO FOL WCON,DOPPLER: HCPCS

## 2024-09-09 PROCEDURE — 250N000011 HC RX IP 250 OP 636: Performed by: SURGERY

## 2024-09-09 PROCEDURE — 84100 ASSAY OF PHOSPHORUS: CPT | Performed by: SURGERY

## 2024-09-09 PROCEDURE — P9016 RBC LEUKOCYTES REDUCED: HCPCS

## 2024-09-09 PROCEDURE — 999N000111 HC STATISTIC OT IP EVAL DEFER

## 2024-09-09 PROCEDURE — 99232 SBSQ HOSP IP/OBS MODERATE 35: CPT | Mod: 24 | Performed by: PHYSICIAN ASSISTANT

## 2024-09-09 PROCEDURE — 80048 BASIC METABOLIC PNL TOTAL CA: CPT | Performed by: SURGERY

## 2024-09-09 PROCEDURE — 36415 COLL VENOUS BLD VENIPUNCTURE: CPT | Performed by: SURGERY

## 2024-09-09 RX ADMIN — Medication: at 09:07

## 2024-09-09 RX ADMIN — Medication 1.5 ML: at 09:06

## 2024-09-09 RX ADMIN — Medication: at 07:35

## 2024-09-09 RX ADMIN — SODIUM CHLORIDE, POTASSIUM CHLORIDE, SODIUM LACTATE AND CALCIUM CHLORIDE: 600; 310; 30; 20 INJECTION, SOLUTION INTRAVENOUS at 08:24

## 2024-09-09 RX ADMIN — ACETAMINOPHEN 975 MG: 160 LIQUID ORAL at 09:06

## 2024-09-09 RX ADMIN — Medication: at 19:18

## 2024-09-09 RX ADMIN — SENNOSIDES 5 ML: 8.8 LIQUID ORAL at 20:13

## 2024-09-09 RX ADMIN — Medication 500 MG: at 09:06

## 2024-09-09 RX ADMIN — HUMAN ALBUMIN MICROSPHERES AND PERFLUTREN 6 ML: 10; .22 INJECTION, SOLUTION INTRAVENOUS at 09:03

## 2024-09-09 RX ADMIN — POLYETHYLENE GLYCOL 3350 17 G: 17 POWDER, FOR SOLUTION ORAL at 07:45

## 2024-09-09 RX ADMIN — Medication 40 MG: at 07:46

## 2024-09-09 RX ADMIN — GABAPENTIN 300 MG: 250 SUSPENSION ORAL at 07:46

## 2024-09-09 RX ADMIN — SENNOSIDES 5 ML: 8.8 LIQUID ORAL at 07:45

## 2024-09-09 RX ADMIN — ACETAMINOPHEN 975 MG: 160 LIQUID ORAL at 14:07

## 2024-09-09 RX ADMIN — GABAPENTIN 300 MG: 250 SUSPENSION ORAL at 20:13

## 2024-09-09 RX ADMIN — HEPARIN SODIUM 400 UNITS/HR: 10000 INJECTION, SOLUTION INTRAVENOUS at 11:46

## 2024-09-09 RX ADMIN — DOCUSATE SODIUM 50 MG: 50 LIQUID ORAL at 21:04

## 2024-09-09 RX ADMIN — DOCUSATE SODIUM 50 MG: 50 LIQUID ORAL at 07:45

## 2024-09-09 RX ADMIN — SODIUM CHLORIDE, POTASSIUM CHLORIDE, SODIUM LACTATE AND CALCIUM CHLORIDE: 600; 310; 30; 20 INJECTION, SOLUTION INTRAVENOUS at 18:31

## 2024-09-09 RX ADMIN — ERTAPENEM SODIUM 1 G: 1 INJECTION, POWDER, LYOPHILIZED, FOR SOLUTION INTRAMUSCULAR; INTRAVENOUS at 11:46

## 2024-09-09 RX ADMIN — METHOCARBAMOL 750 MG: 750 TABLET ORAL at 17:11

## 2024-09-09 ASSESSMENT — ACTIVITIES OF DAILY LIVING (ADL)
ADLS_ACUITY_SCORE: 29
DEPENDENT_IADLS:: INDEPENDENT
ADLS_ACUITY_SCORE: 29

## 2024-09-09 NOTE — PROGRESS NOTES
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 Evaluation to discuss surgical options.    Operative approach: pancreas atrophic, small duct, no evident ductal disconnect or large stone burden. Hepatic arterial anatomy normal, splenic separates from celiac very proximally. Portal, splenic, and superior mesenteric veins open but does have some collaterals around uncinate process of unknown significance.  -optimal surgery would be TPIAT with cholecystectomy. Not a drainage or partial resection candidate  Seen today in preop for planned TPIAT tomorrow. Procedure reviewed, no questions     Hepatic steatosis: will need hepatology consult and fibroscan prior to moving forward with TPIAT to verify safe to infuse islets into liver. On CT liver does not look cirrhotic or scarred.   Care established with hepatology. Should be safe for islet infusion, no other issues    Smoking cessation: will need to stop prior to surgery  completed    Analgesic management: currently on chronic narcotics but hopeful to stop following surgery. Advised that this may persist following surgery but the goal is at least to stop acute pain episodes and make her pain manageable, if not completely resolve it over time. We will work with her home pain providers on pain management and weaning schedules after surgery.   On diaudid PO QID. Understands weaning is a slow process after surgery but is hopeful to come off    Functional status: has had significant weight loss and debility with pancreatitis. Will need to increase exercise tolerance prior to surgery to facilitate recovery  improved    Medical Decision Making: High  Total time: 30 minutes    Carlos Carmichael MD     __________________________________________________________________  Transplant History: Assessment of recurrent acute on chronic pancreatitis and ongoing pain  "demands    Interval History:   Ciera Perkins is a very pleasant 40 yo F from [place] here for assessment of recurrent acute on chronic pancreatitis. She was in her usual state of health prior to 2021, when she was treated with prednisone for an upper respiratory infection. During that time, she developed acute pancreatitis with evolution into necrotizing pancreatitis. Since that time, she has had a recurring/ongoing pain syndrome.    She does have baseline pain for which she uses dilaudid at home. During pain episodes, she has severe mid/upper epigastric pain with radiation into her back. She also notes nausea and limited ability to eat and drink during these episodes. Eating does not seem to trigger them, but this is unclear given her nausea and other concerns. She has had feeding tubes and celiac plexus blocks in the past in attempts to manage these symptoms without any durable success. She has never had endoscopic ductal management as her disease process does not seem to have involved ductal obstruction or stone burdens.   Interval history:    Continues with ongoing pain, some intermittent relief with PO dilaudid. PO intake limited but has been maintaining weight. Eager to proceed with surgery tomorrow.       ROS:   A 10-point review of systems was negative except as noted above.    Curent Meds:  No current facility-administered medications for this visit.       Physical Exam:     Admit Weight: 70.3 kg (155 lb)    Current Vitals:   /71 (BP Location: Left arm, Patient Position: Sitting, Cuff Size: Adult Regular)   Pulse 70   Temp 98  F (36.7  C) (Oral)   Resp 18   Ht 1.689 m (5' 6.5\")   Wt 70.9 kg (156 lb 3.2 oz)   LMP  (LMP Unknown)   SpO2 99%   BMI 24.84 kg/m           Vital sign ranges:    Temp:  [98.5  F (36.9  C)-99.3  F (37.4  C)] 98.6  F (37  C)  Pulse:  [82-97] 82  Resp:  [16-20] 16  BP: ()/(59-83) 115/75  SpO2:  [91 %-100 %] 100 %  No data found.  General Appearance: in no apparent " distress.   Skin: normal, warm, dry, no suspicious lesions or rashes  Heart: regular rate and rhythm, normal S1 and S2  Lungs: clear to auscultation  Abdomen: The abdomen is abdomen soft, slightly rounded. Tender to palpation in upper/midepigastrium. No hernia. Incisions consistent with surgical history   Extremities: edema: absent.     Data:   CMP@LABRCNTIPR(na:2,potassium:2,chloride:2,co2:2,g,bun:2,cr:2,gfrestimated:2,gfrestblack:2,dany:2,icapoc:2,icaw:2,ma,phos:2,amylase:2,lipase:2,uamy24:3,albumin:2,bilitotal:2,biliconj:2,bilidelta:2,alkphos:2,ast:2,alt:2,fbili:1)@  CBC@LABRCNTIPR(hgb:2,wbc:2,a,plt:2,a1c:1)@  Coags@LABRCNTIPR(inr:2,ptt:2,Xa:2)@   Urinalysis  Recent Labs   Lab Test 24  0912   COLOR Yellow   APPEARANCE Clear   URINEGLC Negative   URINEBILI Negative   URINEKETONE Negative   SG 1.026   UBLD Negative   URINEPH 5.5   PROTEIN 10*   NITRITE Negative   LEUKEST Negative   RBCU <1   WBCU 2       CT reviewed personally. Findings in assessment/plan section

## 2024-09-09 NOTE — PROVIDER NOTIFICATION
Ciera Perkins, [id number]    Pt reporting 9/10 pain for the last half hour. Declined Tylenol. Can you come and assess? Too early for Robaxin.    Thanks,  DEBBIE Bishop  [phone]

## 2024-09-09 NOTE — PROGRESS NOTES
CLINICAL NUTRITION SERVICES - BRIEF NOTE     Nutrition Prescription    Recommendations already ordered by Registered Dietitian (RD):  Increase TF to 30 ml/hr now, then by 10 ml/hr q 12 hours to goal rate of 45 ml/hr.     Use relizorb with TF (change cartridge q 12 hours)       EVALUATION OF THE PROGRESS TOWARD GOALS   Diet: NPO  Nutrition Support: Impact Peptide 1.5 @ 20 ml/hr   Intake: TF started 9/7.      NEW FINDINGS   Electrolytes normal/stable    INTERVENTIONS  Per primary team, requesting TF advancement (see above for details).     Monitoring/Evaluation  Progress toward goals will be monitored and evaluated per protocol.     Saniya Flood, MS, RD, LD, CCTD, CNSC  7A + 7B (beds 8548-0043)  Available on Vocera [7A or 7B Clinical Dietitian]   Weekend/Holiday: Vocera [Weekend Clinical Dietitian]

## 2024-09-09 NOTE — PROVIDER NOTIFICATION
Ciera Perkins, [id number]     Pt reporting 9/10 pain for the last hour. Declined Tylenol. Paged pain team. No response. Too early for Robaxin.     Thanks,  DEBBIE Bishop  [phone]

## 2024-09-09 NOTE — PLAN OF CARE
"Goal Outcome Evaluation:      Plan of Care Reviewed With: patient    Overall Patient Progress: no changeOverall Patient Progress: no change    Outcome Evaluation: Pain control has been difficult, pain team paged    BP 96/59 (BP Location: Right arm)   Pulse 97   Temp 98.9  F (37.2  C) (Oral)   Resp 16   Ht 1.676 m (5' 6\")   Wt 77.1 kg (169 lb 15.6 oz)   SpO2 100%   BMI 27.43 kg/m      Shift: 0539-0252  Isolation Status: none  VS: softer pressures on 2L NC, afebrile  Neuro: Aox4, lethargic   Behaviors: calm and cooperative, pleasant   BG: On insulin drip q1hr blood sugars, on Alg #1  Labs: Labs reviewed   Respiratory: WDL, IS at bedside, pt encouraged to use  Cardiac: WDL  Pain/Nausea: 9/10 abdominal pain, denies nausea  PRN: Robaxin x2, Tyl x2  Diet: NPO except ice chips, TF @ 20 mL/hr (relizorb changed at 2200)  IV Access: R. Port a cath, R. & L. PIV  Infusion(s): Insulin gtt, LR @ 100 mL/hr, heparin at 400 units/hr, dilaudid PCA, ropivacaine   Lines/Drains: R. KELLY with 100 mL output  GI/: Passing flatus, no BM this shift, voiding via martinez (Ok to pull at 0600 tomorrow morning per on call provider)  Skin: Op dressing in place-CDI  Mobility: Assist 1  Plan: Continue plan of care and notify team of any changes       "

## 2024-09-09 NOTE — PROGRESS NOTES
"Pain Service Progress Note  [facility]  Date: 09/09/2024       Patient Name: Ciera Perkins  MRN: [id number]  Age: 39 year old  Sex: female      Assessment:  Ciera Perkins is a 39 year old female with PMH of cystic fibrosis of pancreas, pancreatic insufficiency, malnutrition, SMA stenosis s/p angioplasty, MASLD, and chronic pancreatitis who was admitted on 9/6/2024 with cystic fibrosis of the pancreas now s/p Total pancreatectomy with autologous islet transplant, cholecystectomy,  G/J placement, and appendectomy on 9/6/24.    Procedure: Total pancreatectomy with autologous islet transplant, cholecystectomy,  G/J placement, and appendectomy on 9/6/24.    Date of Surgery: 9/6/24    Date of Catheter Placement: 9/6/24    Plan/Recommendations:  1. Regional Anesthesia/Analgesia  -Continuous Catheter Type/Site: bilateral paravertebral (PV) T6-7  Infusate: Ropivacaine 0.2%  Programmed Intermittent Bolus (PIB) at 7 mL Q60 min via each catheter, total infusion rate of 14 mL/hr    Plan to maintain catheter, max of 7 days    2. Anticoagulation  -Please contact Inpatient Pain Service before ordering or making any anticoagulation changes    AC: Heparin gtt     3. Multimodal Analgesia  - Per primary team    Pain Service will continue to follow.    Discussed with attending anesthesiologist    Joel \"Logan\" MD [place]  Anesthesia resident, CA-1/PGY-2      Overnight Events: NAEO. Pain reportedly overall better today than yesterday    Tubes/Drains: Yes  Bilateral PV Catheters   GJ Tube    Subjective:  I'm doing well, better than yesterday   Nausea: No  Vomiting: No  Pruritus: No  Symptoms of LAST: No    Pain Location:  Abdomen    Pain Intensity:    Pain at Rest: 8/10   Comfort Goal: 6/10   Baseline Pain: 2/10   Satisfied with your level of pain control: Yes    Diet: NPO for Medical/Clinical Reasons Except for: Ice Chips  Adult Formula Drip Feeding: Continuous Other; Impact Peptide 1.5; " "Jejunostomy; Goal Rate: 45; mL/hr; Increase to 20cc/hour; Do not advance tube feeding rate unless K+ is = or > 3.0, Mg++ is = or > 1.5, and Phos is = or > 1.9    Relevant Labs:  Recent Labs   Lab Test 09/07/24  0358 09/06/24  2309 09/06/24  1852   INR  --   --  1.54*     --  168   PTT 35   < > >240*   BUN 5.6*  --  7.4    < > = values in this interval not displayed.       Physical Exam:  Vitals: /65 (BP Location: Left arm)   Pulse 89   Temp 99.3  F (37.4  C) (Oral)   Resp 20   Ht 1.676 m (5' 6\")   Wt 77.1 kg (169 lb 15.6 oz)   SpO2 97%   BMI 27.43 kg/m      Physical Exam:   Orientation:  Alert, oriented, and in no acute distress: Yes  Sedation: No    Motor Examination:  5/5 Strength in lower extremities: Yes    Sensory Level:   Decrease in sensation: No    Catheter Site:   Catheter entry site is clean/dry/intact: Yes    Tender: Yes      Relevant Medications:  Current Pain Medications:  Medications related to Pain Management (From now, onward)      Start     Dose/Rate Route Frequency Ordered Stop    09/09/24 0000  bisacodyl (DULCOLAX) suppository 10 mg         10 mg Rectal DAILY PRN 09/06/24 2158 09/08/24 0000  magnesium hydroxide (MILK OF MAGNESIA) suspension 30 mL         30 mL Per J Tube DAILY PRN 09/06/24 2158 09/07/24 0800  gabapentin (NEURONTIN) solution 300 mg         300 mg Oral or J tube 2 TIMES DAILY 09/06/24 2158 09/07/24 0800  polyethylene glycol (MIRALAX) Packet 17 g         17 g Per J Tube DAILY 09/06/24 2158 09/07/24 0800  sennosides (SENOKOT) syrup 5 mL        Placed in \"And\" Linked Group    5 mL Per J Tube 2 TIMES DAILY 09/06/24 2254 09/07/24 0800  docusate (COLACE) 50 MG/5ML liquid 50 mg        Placed in \"And\" Linked Group    50 mg Per J Tube 2 TIMES DAILY 09/06/24 2254 09/06/24 2330  HYDROmorphone (DILAUDID) PCA 0.2 mg/mL OPIOID TOLERANT          Intravenous CONTINUOUS 09/06/24 2311      09/06/24 2300  acetaminophen *SUGAR FREE* (TYLENOL) solution " "975 mg         975 mg Per J Tube EVERY 8 HOURS 09/06/24 2158 09/09/24 2259 09/06/24 2158  acetaminophen *SUGAR FREE* (TYLENOL) solution 650 mg         650 mg Per J Tube EVERY 4 HOURS PRN 09/06/24 2158      09/06/24 2158  methocarbamol (ROBAXIN) tablet 750 mg         750 mg Oral EVERY 6 HOURS PRN 09/06/24 2158      09/06/24 2158  hydrOXYzine HCl (ATARAX) tablet 25 mg         25 mg Oral EVERY 6 HOURS PRN 09/06/24 2158      09/06/24 2158  lidocaine 1 % 0.1-1 mL         0.1-1 mL Other EVERY 1 HOUR PRN 09/06/24 2158      09/06/24 2158  lidocaine (LMX4) cream          Topical EVERY 1 HOUR PRN 09/06/24 2158 09/06/24 0830  ROPivacaine 0.2% in sodium chloride 0.9% PERINEURAL infusion          Perineural Continuous Nerve Block 09/06/24 0813      09/06/24 0830  ROPivacaine 0.2% in sodium chloride 0.9% PERINEURAL infusion          Perineural Continuous Nerve Block 09/06/24 0813              Primary Service Contacted with Recommendations? No      Please see A&P for additional details of medical decision making.      Acute Inpatient Pain Service [facility]  Hours of pain coverage 24/7   Page via [employer]- Please Page the Pain Team Via [employer]: \"PAIN MANAGEMENT ACUTE INPATIENT/ [facility]\"  "

## 2024-09-09 NOTE — CONSULTS
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 She is now s/p TPAIT, splenectomy, and G/J placement with repair of small incisional hernia on 9/6/24 with Dr. Carmichael.      s/p TPAIT 9/6/24:. Islet yield Islet equivalents/kilogram: 2937.   -Post-op US   1. Normal hepatic artery resistive indices. Elevated hepatic artery  velocities are likely artifactual due to its tortuous course.  2. Patent portal vein.  KELLY drain x 1 with serosang output.    Assessment:      Areas visualized during today's visit:  under bilateral breasts. No open areas noted under left breast    Wound location: Under right breast    Last photo: 9/9/24  Wound due to: Unknown Etiology- likely MARSI vs allergic dermatitis per pt  Wound history/plan of care: Per pt she is very sensitive to adhesives and suspects she had a lead or bandage on during surgery that caused blister/skin irritation. Blister has since popped and area is very painful  Wound base: 80 % Dermis, 20 %  de-roofed blister/epidermis on medial aspect of wound     Palpation of the wound bed: normal      Drainage: moderate     Description of drainage: serosanguinous     Measurements (length x width x depth, in cm): 4  x 9  x  0.1 cm      Periwound skin: Intact      Color: normal and consistent with surrounding tissue      Temperature: normal   Odor: none  Pain: moderate, severe, and during dressing change, sharp, tender, and burning  Pain interventions prior to dressing change: soaking and slow and gentle cares   Treatment goal: Heal , Infection control/prevention, Maintain (prevention of deterioration), Pain control, and  Protection  STATUS: initial assessment  Supplies ordered: ordered xeroform, supplies stored on unit, and discussed with RN       Treatment Plan:     Under right breast wound(s): Every 3 days and PRN when soiled  Spray above wound with wound cleanser and allow it to run over wound- gently pat dry  Apply no sting barrier wipe to intact periwound skin  Cut piece of xeroform ([id number]) and lay over all open areas  Apply 4x4 mepilex over area- if not sticking ok to use sacral mepilex     Orders: Written    RECOMMEND PRIMARY TEAM ORDER: None, at this time  Education provided: plan of care and wound progress  Discussed plan of care with: Patient, Family, and Nurse  [facility] nurse follow-up plan: weekly  Notify WOC if wound(s) deteriorate.  Nursing to notify the Provider(s) and re-consult the [facility] Nurse if new skin concern.    DATA:     Current support surface: Standard  Standard gel mattress (Isoflex)  Containment of urine/stool: Incontinent pad in bed  BMI: Body mass index is 27.43 kg/m .   Active diet order: Orders Placed This Encounter      NPO for Medical/Clinical Reasons Except for: Ice Chips     Output: I/O last 3 completed shifts:  In: 1938.37 [P.O.:225; I.V.:1253.37; NG/GT:70]  Out: 1827 [Urine:1535; Emesis/NG output:25; Drains:267]     Labs:   Recent Labs   Lab 09/09/24  0554 09/08/24  0936 09/08/24  0348 09/07/24  0358 09/06/24  2254 09/06/24  1852 09/06/24  1000 09/03/24  0830   ALBUMIN  --   --  2.6*   < >  --  2.9*  --  4.1   PREALB  --   --   --   --   --   --   --  20.9   HGB 8.1*   < > 7.7*   < >  --  9.6*   < > 12.1   INR  --   --   --   --   --  1.54*  --  1.15   WBC 26.2*   < > 35.2*   < >  --  26.9*  --  7.3   A1C  --   --   --   --  6.7*  --   --  7.0*    < > = values in this interval not displayed.     Pressure injury risk assessment:   Sensory Perception: 3-->slightly limited  Moisture: 3-->occasionally moist  Activity: 2-->chairfast  Mobility: 3-->slightly limited  Nutrition: 2-->probably  inadequate  Friction and Shear: 2-->potential problem  Ignacio Score: 15    Christina Damon RN CWOCN  Pager no longer is use, please contact through [employer]   [employer] group: [facility] Nurse [place]  Dept. Office Number: *[phone]

## 2024-09-09 NOTE — PLAN OF CARE
Goal Outcome Evaluation:      Plan of Care Reviewed With: patient, spouse    Overall Patient Progress: no change    Overall Patient Progress: no change

## 2024-09-09 NOTE — PROGRESS NOTES
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 She is now s/p TPAIT, splenectomy, and G/J placement with repair of small incisional hernia on 9/6/24 with Dr. Carmichael.  Inpatient Diabetes Service consulted for management of diabetes.          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  Transferred from the ICU to  this yesterday.  Continue TPIAT insulin drip to keep BG .      Discharge Planning: (tentative)    Medications: TBD    Test Claims: put TC in on 9/8/2024   Education: will need education, knows how to check bg    Outpatient Follow-up:  recommend [facility] - here from [place].  Scheduled 10/4/2024: with Doreen Goodwin at 9:00 and with Dr Lupillo Hudson         Please notify Inpatient Diabetes Service if changes are planned to steroids, nutrition, TPN/TF and anticipated procedures requiring prolonged NPO status.         Interval History/Review of Systems :   The last 24 hours progress and nursing notes reviewed.  Doing much better today. Up with PT.  NO nausea or emesis.  No stool.    Planned Procedures/Surgeries: none    Inpatient Glucose Control:       Recent Labs   Lab  "09/09/24  0757 09/09/24  0646 09/09/24  0554 09/09/24  0551 09/09/24  0457 09/09/24  0358   * 128* 129* 128* 118* 131*             Medications Impacting Glycemia:   Steroids: none  D5W containing solutions/medications: LR with dextrose at 100cc, now stopped  Other medications impacting glucose: TF         Nutrition:   Orders Placed This Encounter      NPO for Medical/Clinical Reasons Except for: Ice Chips    Supplements:  none  TF: 9/7/2024: Impact Peptide 1.5 @ 10 ml/hr and advance by 10 ml q 24 hrs (and/or ONLY advance rate upon MD approval after daily evaluation) to goal Impact Peptide 1.5 @ goal of  45ml/hr  (1080ml/day) provides:  151 g CHO   TPN: none        Diabetes History: see full consult note for complete diabetes history   Diabetes Type and Duration:   Prediabetes( as noted by Dr Marin) and there was a time they thought she had DM secondary to pancreatitis but this resolved per her   PTA Medication Regimen: none recently  Historical Diabetes Medications:      Basaglar 10 units  Tradjenta 5 mg daily  Metformin 1000 mg po bid  Glimiperide     Glucose monitoring device and frequency: not recently but has checked her BG in the past--> 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 dryMM, sclera not injected  Lungs:  unremarkable, no audible cough, no work of breathing, wearing NC oxygen  ABD:  rounded, soft, has FT  Skin:  warm and dry, no obvious lesions  MSK:   moving all extremities  Lymp:   no LE edema  Mental Status:  alert and oriented x3  Psych:   Cooperative, appropriate affect     Feet:    warm without " discoloration,without evidence of wounds, corns, calluses, , pulses +           Data:     Lab Results   Component Value Date    A1C 6.7 (H) 09/06/2024    A1C 7.0 (H) 09/03/2024    A1C 6.2 (H) 05/08/2024       ROUTINE IP LABS (Last four results)  BMP  Recent Labs   Lab 09/09/24  0757 09/09/24  0646 09/09/24  0554 09/09/24  0551 09/08/24  1338 09/08/24  1307 09/08/24  0354 09/08/24  0348 09/07/24  0501 09/07/24  0358   NA  --   --  137  --   --  143  --  139  --  137   POTASSIUM  --   --  3.6  --   --  3.8  3.8  --  3.7  --  4.2   CHLORIDE  --   --  106  --   --  111*  --  108*  --  106   CARLA  --   --  7.3*  --   --  7.3*  --  7.4*  --  7.7*   CO2  --   --  24  --   --  25  --  25  --  22   BUN  --   --  8.4  --   --  8.6  --  8.8  --  5.6*   CR  --   --  0.62  --   --  0.69  --  0.76  --  0.64   * 128* 129* 128*   < > 87   < > 109*   < > 133*    < > = values in this interval not displayed.     CBC  Recent Labs   Lab 09/09/24  0554 09/09/24  0111 09/08/24  0936 09/08/24  0348   WBC 26.2* 29.0* 31.1* 35.2*   RBC 2.78* 2.32* 2.39* 2.64*   HGB 8.1* 6.8* 7.0* 7.7*   HCT 24.7* 21.3* 21.9* 24.1*   MCV 89 92 92 91   MCH 29.1 29.3 29.3 29.2   MCHC 32.8 31.9 32.0 32.0   RDW 14.1 13.6 13.7 13.8    244 210 217     INR  Recent Labs   Lab 09/06/24  1852 09/03/24  0830   INR 1.54* 1.15       Inpatient Diabetes Service will continue to follow, please don't hesitate to contact the team with any questions or concerns.     Jessica Sánchez PA-C  Inpatient Diabetes Service  [employer]  [phone]    Discussed plan with patient and , bedside RN, transplant KEYA     To contact Inpatient Diabetes Service:     7 AM - 5 PM: Page the IDS KEYA following the patient that day (see filed or incomplete progress notes/consult notes under Endocrinology)    OR if uncertain of provider assignment: page job code [phone]    5 PM - 7 AM: First call after hours is to primary service.    For urgent after-hours questions, page job code for on  call fellow: [phone]     I spent a total of 35 minutes on the date of the encounter doing prep/post-work, chart review, history and exam, documentation and further activities per the note including lab review, multidisciplinary communication, counseling the patient and/or coordinating care regarding acute hyper/hypoglycemic management, as well as discharge management and planning/communication.

## 2024-09-09 NOTE — PROGRESS NOTES
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 She is now s/p TPAIT, splenectomy, and G/J placement with repair of small incisional hernia on 9/6/24 with Dr. Carmichael.     s/p TPAIT 9/6/24: POD#2. Islet yield Islet equivalents/kilogram: 2937.   -Post-op US   1. Normal hepatic artery resistive indices. Elevated hepatic artery  velocities are likely artifactual due to its tortuous course.  2. Patent portal vein.  KELLY drain x 1 with serosang output.     Cardiorespiratory: No issues, stable on 2L oxygen via NC.     GI/Nutrition:   GERD: PPI daily  Pancreatic insufficiency: Relizorb with TF.   Moderate malnutrition in the context of chronic illness/disease: GJ tube placed, clamp G tube as tolerated. Increase tube feeds to 30cc/hour today and advance by 10 ml every 12 hours until reaching goal of 45cc/hour. NPO. Positive flatus. Continue stool softeners.     Fluid/Electrolytes: IVF: LR@100/hr; electrolyte replacements per protocol.     : Foleyto be removed today, check PVR    Endocrine:  Post-pancreatectomy diabetes/stress hyperglycemia: Endocrine consulted. Continue insulin gtt.     Infection:   Leukocytosis: WBC 35.2->26.2, likely 2/2 stress post operatively. Ertapenem for surgical ppx.     Prophylaxis: Anticoagulation: Heparin gtt 400 units/hr    Pain control: PTA managed with PO hydromorphone 4 mg Q3H.   Current regimen:    - Ropivacaine paravertebral block managed by pain   - Dilaudid pca, increase dose to 0.4 mg/hr basal and 0.4 bolus   - Neurontin   - Acetaminophen   - Atarax prn     Activity: PT/OT consulted    Disposition: 7A    KEYA/Fellow/Resident Provider: Christina Calvin PA-C [id number]    Faculty: Carlos Carmichael  "MD  __________________________________________________________________  Transplant History: Admitted 9/6/2024 for TPAIT  9/6/2024 (Islet), Postoperative day: 3     Interval History: History is obtained from the patient  Overnight events: Pain control improved. Positive flatus. No nausea/emesis.     ROS:   A 10-point review of systems was negative except as noted above.    Curent Meds:  Current Facility-Administered Medications   Medication Dose Route Frequency Provider Last Rate Last Admin    acetaminophen *SUGAR FREE* (TYLENOL) solution 975 mg  975 mg Per J Tube Q8H Carlos Carmichael MD   975 mg at 09/08/24 1537    sennosides (SENOKOT) syrup 5 mL  5 mL Per J Tube BID Carlos Carmichael MD   5 mL at 09/09/24 0745    And    docusate (COLACE) 50 MG/5ML liquid 50 mg  50 mg Per J Tube BID Carlos Carmichael MD   50 mg at 09/09/24 0745    ertapenem (INVanz) 1 g vial to attach to  mL bag  1 g Intravenous Q24H Christina Calvin PA-C   1 g at 09/08/24 1238    gabapentin (NEURONTIN) solution 300 mg  300 mg Oral or J tube BID Carlos Carmichael MD   300 mg at 09/09/24 0746    mvw complete formulation (PEDIATRIC) oral solution 1.5 mL  1.5 mL Oral or J tube Daily Carlos Carmichael MD   1.5 mL at 09/08/24 0842    pantoprazole (PROTONIX) 2 mg/mL suspension 40 mg  40 mg Oral or J tube Daily Carlos Carmichael MD   40 mg at 09/09/24 0746    polyethylene glycol (MIRALAX) Packet 17 g  17 g Per J Tube Daily Carlos Carmichael MD   17 g at 09/09/24 0745    sodium chloride (PF) 0.9% PF flush 3 mL  3 mL Intracatheter Q8H Carlos Carmichael MD   3 mL at 09/08/24 2127       Physical Exam:     Admit Weight: 71.8 kg (158 lb 4.6 oz)    Current Vitals:   /65 (BP Location: Left arm)   Pulse 89   Temp 99.3  F (37.4  C) (Oral)   Resp 20   Ht 1.676 m (5' 6\")   Wt 77.1 kg (169 lb 15.6 oz)   SpO2 97%   BMI " 27.43 kg/m           Vital sign ranges:    Temp:  [98.5  F (36.9  C)-99.7  F (37.6  C)] 99.3  F (37.4  C)  Pulse:  [] 89  Resp:  [10-20] 20  BP: ()/(55-93) 116/65  MAP:  [81 mmHg] 81 mmHg  Arterial Line BP: (102)/(68) 102/68  SpO2:  [90 %-100 %] 97 %  Patient Vitals for the past 24 hrs:   BP Temp Temp src Pulse Resp SpO2   09/09/24 0549 116/65 99.3  F (37.4  C) Oral 89 20 97 %   09/09/24 0356 110/76 99.2  F (37.3  C) Oral 92 -- 98 %   09/09/24 0300 103/66 -- -- 90 20 100 %   09/09/24 0156 106/83 98.5  F (36.9  C) Oral 86 16 99 %   09/09/24 0147 109/63 99  F (37.2  C) Oral 91 16 99 %   09/08/24 2235 96/59 98.9  F (37.2  C) Oral -- 16 --   09/08/24 1852 112/70 99  F (37.2  C) Oral -- 16 --   09/08/24 1426 106/62 -- -- -- -- --   09/08/24 1417 -- -- -- -- -- 100 %   09/08/24 1400 -- -- -- 97 -- 100 %   09/08/24 1300 (!) 112/93 -- -- 110 -- 96 %   09/08/24 1235 99/66 -- -- 100 -- 91 %   09/08/24 1200 103/65 99.7  F (37.6  C) Oral 107 10 94 %   09/08/24 1145 113/73 -- -- 102 -- 93 %   09/08/24 1140 100/67 -- -- 105 -- 91 %   09/08/24 1135 101/56 -- -- 109 -- 94 %   09/08/24 1130 105/69 -- -- 109 -- 93 %   09/08/24 1115 97/55 -- -- 106 -- 92 %   09/08/24 1045 110/76 -- -- 109 -- 93 %   09/08/24 1000 117/60 -- -- 102 -- 93 %   09/08/24 0900 -- -- -- -- -- 90 %     General Appearance: NAD  Skin: normal, no suspicious lesions or rashes  Heart: regular rate and rhythm  Lungs: NLB on 2L via NC  Abdomen: The abdomen is rounded, and  moderately tender. The wound is Healing well. Tube/Drain sites are: GJ in place. KELLY: serosanguinous, moderate   : martinez is not present.    Extremities: edema: present bilaterally. 2+    Data:   CMP  Recent Labs   Lab 09/09/24  0757 09/09/24  0646 09/09/24  0554 09/08/24  1338 09/08/24  1307 09/08/24  0828 09/08/24  0820 09/08/24  0354 09/08/24  0348 09/07/24  0501 09/07/24  0358 09/06/24  1740 09/06/24  1701   NA  --   --  137  --  143  --   --   --  139  --  137   < > 136   POTASSIUM   --   --  3.6  --  3.8  3.8  --   --   --  3.7  --  4.2   < > 4.3   CHLORIDE  --   --  106  --  111*  --   --   --  108*  --  106   < >  --    CO2  --   --  24  --  25  --   --   --  25  --  22   < >  --    * 128* 129*   < > 87   < >  --    < > 109*   < > 133*   < > 125*   BUN  --   --  8.4  --  8.6  --   --   --  8.8  --  5.6*   < >  --    CR  --   --  0.62  --  0.69  --   --   --  0.76  --  0.64   < >  --    GFRESTIMATED  --   --  >90  --  >90  --   --   --  >90  --  >90   < >  --    CARLA  --   --  7.3*  --  7.3*  --   --   --  7.4*  --  7.7*   < >  --    ICAW  --   --   --   --   --   --  4.4  --   --   --   --   --  4.1*   MAG  --   --  1.7  --  1.7  --   --   --  1.7  --  1.7   < >  --    PHOS  --   --  2.6  --  2.5  --   --   --  2.8  --  3.7   < >  --    AMYLASE  --   --   --   --   --   --   --   --   --   --  61  --   --    LIPASE  --   --   --   --   --   --   --   --   --   --  85*  --   --    ALBUMIN  --   --   --   --   --   --   --   --  2.6*  --  2.9*   < >  --    BILITOTAL  --   --   --   --   --   --   --   --  0.3  --  0.6   < >  --    ALKPHOS  --   --   --   --   --   --   --   --  55  --  48   < >  --    AST  --   --   --   --   --   --   --   --  67*  --  162*   < >  --    ALT  --   --   --   --   --   --   --   --  81*  --  125*   < >  --     < > = values in this interval not displayed.     CBC  Recent Labs   Lab 09/09/24  0554 09/09/24  0111 09/07/24  0358 09/06/24  2254   HGB 8.1* 6.8*   < >  --    WBC 26.2* 29.0*   < >  --     244   < >  --    A1C  --   --   --  6.7*    < > = values in this interval not displayed.     Coags  Recent Labs   Lab 09/09/24  0554 09/08/24  0348 09/06/24  2309 09/06/24  1852 09/03/24  0830   INR  --   --   --  1.54* 1.15   PTT 34 38   < > >240*  --     < > = values in this interval not displayed.      Urinalysis  Recent Labs   Lab Test 09/03/24  0912   COLOR Yellow   APPEARANCE Clear   URINEGLC Negative   URINEBILI Negative   URINEKETONE Negative   SG  1.026   UBLD Negative   URINEPH 5.5   PROTEIN 10*   NITRITE Negative   LEUKEST Negative   RBCU <1   WBCU 2

## 2024-09-09 NOTE — CONSULTS
Care Management Initial Consult    General Information  Assessment completed with: Patient, Spouse or significant other,  (Austen and Dinesh)  Type of CM/SW Visit: Initial Assessment    Primary Care Provider verified and updated as needed: Yes   Readmission within the last 30 days: current reason for admission unrelated to previous admission   Return Category: New Diagnosis  Reason for Consult: other (see comments) (Post TPAIT support)  Advance Care Planning: Advance Care Planning Reviewed: verified with patient          Communication Assessment  Patient's communication style: spoken language (English or Bilingual)    Hearing Difficulty or Deaf: no   Wear Glasses or Blind: no    Cognitive  Cognitive/Neuro/Behavioral: WDL  Level of Consciousness: alert  Arousal Level: opens eyes spontaneously  Orientation: oriented x 4  Mood/Behavior: calm  Best Language: 0 - No aphasia  Speech: logical, spontaneous, clear    Living Environment:   People in home: child(roel), adult, spouse   (Dinesh and Dinesh II)  Current living Arrangements: house      Able to return to prior arrangements: yes       Family/Social Support:  Care provided by: self  Provides care for: no one  Marital Status:   Support system: , Friend   (Dinesh)       Description of Support System: Supportive, Involved    Support Assessment: Adequate family and caregiver support    Current Resources:   Patient receiving home care services: No        Community Resources: None  Equipment currently used at home: none  Supplies currently used at home: None    Employment/Financial:  Employment Status: disabled     Employment/ Comments:  (Dinesh works and has taken [place] to care for Austen)  Financial Concerns: none   Referral to Financial Worker: No     AIT*LIAISON (AUSTEN) IS A (TPIAT) TRANSPLANT PATIENT  (DATE OF TRANSPLANT: 08/23/24)         PRIMARY HEALTH BENEFIT: BCBS OUT OF STATE    ID# [id number] [facility]# [id number] (EFFECTIVE 04/01/2024  )    SECONDARY HEALTH BENEFIT: MEDICARE    ID# [id number] (PART A EFFECTIVE 07/01/2023 , PART B EFFECTIVE 07/01/2023 )    PROCESSING INFO: MDCR SECONDARY ID# [id number] GRP# [id number] BIN#[id number]    PT WILL PAY $0 AT TIME OF SERVICE FOR IMMUNOS         PHARMACY BENEFIT: PAID/[employer] ([facility])    PROCESSING INFO: ID# [id number] GRP# [id number] PCN# [id number] BIN# [id number] (EFFECTIVE 04/01/2024).    DEDUCTIBLE $0 & MAX OUT-OF-POCKET $4,750 (MET)    COPAY STRUCTURE:    $ 20 FOR GENERIC    $ 80 FOR BRAND    $ 130 FOR NON-FORMULARY MEDICATIONS         TEST CLAIM SPECIALTY #28    ZENPEP [id number] (#450/30DS)--COST EXCEEDS MAXIMUM REJECTION (NEEDS PRIOR AUTH)    CREON [id number] (#450/30DS)-- COST EXCEEDS MAXIMUM REJECTION (NEEDS PRIOR AUTH)    VIOKACE [id number] (#450/30DS)-- COST EXCEEDS MAXIMUM REJECTION (NEEDS PRIOR AUTH)    PANTOPRAZOLE 40mg (#30/30DS)-- $0    COMPOUND PANTOPRAZOLE 2mg/ml (#600/30DS)-- $0    LANTUS SOLOSTAR 100units/ml (#15/30DS)--PRODUCT NOT COVERED    TRESIBA FLEXTOUCH 100units/ml (#15/30DS)-- $0    HUMALOG KWIKPEN 100U UNIT/ML (#15/30DS)-- $0    NOVOLOG FLEXPEN 100unitl/ml (#15/30DS)-- PRODUCT NOT COVERED    INSULIN LISPRO KWIKPEN 100unit/ml (#15/30DS)-- $0    ONETOUCH VERIO TEST STRIPS (#150/25DS)- $0    GLUCAGON EMERGENCY 1MG KIT (#1/1DS)-- $0    DEXCOM G7 SENSOR (#3/30DS)-PA Process is changing - $0    DEXCOM G7  JORDY (#1/30DS)-- $0    DEXCOM G6 TRANSMITTER (#1/30DS) -- $0    DEXCOM G6  JORDY (#1/30DS-- $0         TEST CLAIM DISCHARGE #27    ZENPEP [id number] (#450/30DS)-- COST EXCEEDS MAXIMUM REJECTION (NEEDS PRIOR AUTH)    CREON [id number] (#450/30DS)-- COST EXCEEDS MAXIMUM REJECTION (NEEDS PRIOR AUTH)    VIOKACE [id number] (#450/30DS)-- COST EXCEEDS MAXIMUM REJECTION (NEEDS PRIOR AUTH)    PANTOPRAZOLE 40mg (#30/30DS)-- $0    COMPOUND PANTOPRAZOLE 2mg/ml (#600/30DS)-- $0    LANTUS SOLOSTAR 100units/ml (#15/30DS-- PRODUCT NOT COVERED    TRESIBA FLEXTOUCH 100units/ml (#15/30DS)--  $0    HUMALOG KWIKPEN 100U UNIT/ML (#15/30DS)-- $0    NOVOLOG FLEXPEN 100unitl/ml (15/30DS)-- PRODUCT NOT COVERED    INSULIN LISPRO KWIKPEN 100unit/ml (#15/30DS)-- $0    ONETOUCH VERIO TEST STRIPS (#150/25DS)-- $0    GLUCAGON EMERGENCY 1MG KIT (#1/1DS)-- $0    DEXCOM G7 SENSOR (#3/30DS)-PA Process is changing - $0    DEXCOM G7  JORDY (#1/30DS)-- $0    DEXCOM G6 TRANSMITTER (#1/30DS) -- $0    DEXCOM G6  JORDY (#1/30DS-- $0    **CAN PATIENT FILL AT [facility] FOR MEDICATIONS LISTED? YES, HOWEVER PT HAS AN INSURANCE PLAN THAT PROCESSES THROUGH [facility] , AND PER OUR CONTRACT WITH THEM WE ARE ONLY ALLOWED TO SHIP TO THE PT IF THEY ARE PHYSICALLY UNABLE TO COME IN TO OUR PHARMACY TO . IF PT IS COMFORTABLE WITH US DOCUMENTING THIS IN THEIR RECORD, WE CAN SHIP OUT THEIR MEDS (FOR [place] RESIDENTS ONLY) IF NOT THEY WILL NEED TO UTILIZE [facility] ([phone]) OR  AT ANY [facility] SITE.    Does the patient's insurance plan have a 3 day qualifying hospital stay waiver?  No    Lifestyle & Psychosocial Needs:  Social Determinants of Health     Food Insecurity: Low Risk  (9/7/2024)    Food Insecurity     Within the past 12 months, did you worry that your food would run out before you got money to buy more?: No     Within the past 12 months, did the food you bought just not last and you didn t have money to get more?: No   Depression: Not at risk (9/4/2024)    PHQ-2     PHQ-2 Score: 0   Housing Stability: Low Risk  (9/7/2024)    Housing Stability     Do you have housing? : Yes     Are you worried about losing your housing?: No   Tobacco Use: Medium Risk (9/5/2024)    Patient History     Smoking Tobacco Use: Former     Smokeless Tobacco Use: Never     Passive Exposure: Not on file   Financial Resource Strain: Low Risk  (9/7/2024)    Financial Resource Strain     Within the past 12 months, have you or your family members you live with been unable to get utilities  (heat, electricity) when it was really needed?: No   Alcohol Use: Not At Risk (8/19/2024)    Received from [facility]    AUDIT-C     Frequency of Alcohol Consumption: Never     Average Number of Drinks: Patient does not drink     Frequency of Binge Drinking: Never   Transportation Needs: Low Risk  (9/7/2024)    Transportation Needs     Within the past 12 months, has lack of transportation kept you from medical appointments, getting your medicines, non-medical meetings or appointments, work, or from getting things that you need?: No   Physical Activity: Medium Risk (6/13/2022)    Received from [facility], [facility]    Physical Activity     Physical Activity: 10   Interpersonal Safety: Low Risk  (9/6/2024)    Interpersonal Safety     Do you feel physically and emotionally safe where you currently live?: Yes     Within the past 12 months, have you been hit, slapped, kicked or otherwise physically hurt by someone?: No     Within the past 12 months, have you been humiliated or emotionally abused in other ways by your partner or ex-partner?: No   Stress: No Stress Concern Present (7/23/2021)    Received from [facility]    [facility] [employer] of Occupational Health - Occupational Stress Questionnaire     Feeling of Stress : Not at all   Social Connections: Socially Isolated (7/23/2021)    Received from [facility]    Social Connection and Isolation Panel [NHANES]     Frequency of Communication with Friends and Family: Once a week     Frequency of Social Gatherings with Friends and Family: Once a week     Attends [employer] Services: Never     Active Member of Clubs or Organizations: No     Attends Club or Organization Meetings: Never     Marital Status:    Health Literacy: Not on file       Functional Status:  Prior to admission patient needed assistance:   Dependent ADLs::  Independent  Dependent IADLs:: Independent  Assesssment of Functional Status: At functional baseline    Mental Health Status:  Mental Health Status: No Current Concerns       Chemical Dependency Status:  Chemical Dependency Status: No Current Concerns             Values/Beliefs:  Spiritual, Cultural Beliefs, Presybeterian Practices, Values that affect care: no               Discussed  Partnership in Safe Discharge Planning  document with patient/family: No    Additional Information:  Ciera underwent a TPIAT on 09/06. MSW met with Ciera and , Dinesh at bedside to monitor psychosocial needs post tx. Ciera actively engaged in conversation and verbalized no psychosocial needs at this time. Ciera reports that Dinesh will be providing support throughout her recovery in [place] and that they plan to stay at the [facility] post tx. Dinesh notes he is staying there now and verbalized no concern with covering the cost of their stay. Ciera notes that her mental health feels as though it has improved since transplant. MSW provided education specific to post tx expectations and impacts on mental health. Family verbalized understanding and plan to reach out as needs arise.     Next Steps: SOT SW to follow for post tx psychosocial needs.    KOKO Rhoades, SW  Clinical       [facility]  Kidney, Pancreas, Auto-Islet   [address] [facility] [place] [id number]  [email or web address]  Office: [phone]  Fax: [phone]  Gender pronouns: she/her  Employed by [employer]

## 2024-09-09 NOTE — PLAN OF CARE
Occupational Therapy: Defer - Orders received. Chart reviewed and discussed with care team.?Occupational Therapy not indicated.?Per discussion with PT, patient is completing ADLs SBA in room and appropriate for single rehab discipline to follow. No IP OT needs identified. PT to continue to follow. Defer discharge recommendations to PT and medical team.?Will complete orders. Please re-order OT if mobility status changes or further needs arise.

## 2024-09-09 NOTE — PROGRESS NOTES
Care Management Follow Up    Length of Stay (days): 3    Expected Discharge Date: 09/13/2024     Concerns to be Addressed: discharge planning     Patient plan of care discussed at interdisciplinary rounds: Yes    Anticipated Discharge Disposition: Home Infusion ([facility])              Anticipated Discharge Services: Other (see comment)  Anticipated Discharge DME: None    Patient/family educated on Medicare website which has current facility and service quality ratings: no  Education Provided on the Discharge Plan: Yes  Patient/Family in Agreement with the Plan: yes    Referrals Placed by CM/SW: External Care Coordination, Home Infusion  Private pay costs discussed: Not applicable    Discussed  Partnership in Safe Discharge Planning  document with patient/family: No     Handoff Completed: No, handoff not indicated or clinically appropriate    Additional Information:  Pt transferred to  for on going medical management s/p TPAIT.      Met with pt and spouse.  Prior to hospitalization pt was receiving home infusion services through the [facility].  Patient had weekly RN visits through [facility] for port a cath site are and lab draws.  Pt and her spouse attempted home enteral in the past however was never successful as pt was unable to tolerate [place] and health issues resulting in frequent hospitalization.  Pt and spouse are willing/able to learn enteral feeds.  Pt spouse confirmed that [facility].  Pt brought her home glucometer, lancets and test strips but acknowledged that she has not utilized them in over a year.  Pt spouse agreed to bring supplies to the hospital when closer to discharge to ensure the glucometer is working and evaluate supply needs.      [facility] benefit check: Therapy: Enteral Nutrition (Relizorb)  Insurance: [facility] [place]   This patient has coverage for Enteral Nutrition (Relizorb) through their [facility] [place] plan, patient has a  deductible of $750.00 met $750.00, once pt meets the deductible they are covered at 80%. Patient has an out of pocket of $4750.00 met $4750.00. Once the out of pocket was met pt is covered at 100%.     Reviewed with Christina Noriega that pt was IVF's at home prior to TPAIT.  Team will review need when closer to discharge.  Updated [facility] Benefit check request sent inquiring about home IVF coverage.     Agreed to follow up with pt and spouse mid week.      Next Steps:  Ensure enteral education, diabetic education are completed prior to discharge.  Will need ATC appointment arranged. Coordinate with [facility] Liaison. Ensure final home infusion orders have been placed.     Yeni Campbell RN BSN, PHN, ACM-RN  September 9, 2024  7A Nurse Coordinator    Phone: [phone]  Available on [employer] 7A SOT RNCC  Weekend/Holiday 7A SOT RNCC    9/9/2024

## 2024-09-09 NOTE — PLAN OF CARE
"/79 (BP Location: Right arm)   Pulse 85   Temp 98.4  F (36.9  C) (Oral)   Resp 18   Ht 1.676 m (5' 6\")   Wt 81.1 kg (178 lb 12.8 oz)   SpO2 99%   BMI 28.86 kg/m      Shift: 7669-7001  Isolation Status: standard  VS: stable on room air, afebrile  Neuro: Aox4  Behaviors: calm, able to make needs known  BG: insulin gtt alg 2 all day (), now alg 1  Labs: Hgb 8.1; RN managed mag 1.7 phos 2.6 potassium 3.6 (recheck in am)  Respiratory: WDL  Cardiac: echo completed today  Pain/Nausea: abd pain managed with epidural x2, dilaudid PCA. Denies nausea   PRN: robaxin x1  Diet: NPO + ice chips   IV Access: L/R PIV; R chest port  Infusion(s): LR @ 100, hep gtt SR @80085U/hr, insulin gtt, IV abx, dilaudid PCA  Lines/Drains: R KELLY (275 ml serosang output, leaky dressing changed x2); G/J- J with continuous enteral feeds (increased to 30 ml/hr to be advanced to 40 @2000 if tolerating; relizorb change @2200); G now clamped since 0745 pt tolerating denies nausea  GI/: voiding PVR negative; no BM, pt is passing gas   Skin: WOC consulted for R breast wound, covered with xeroform/mepilex  Mobility: Ax1, walking in halls today  Events/Education: echo completed today, worked with PT today  Plan: continue POC and notify team of changes      "

## 2024-09-09 NOTE — PLAN OF CARE
Vitals: low grade 99.3 oral. 2 L NC.   Neuro : a x o x 4, drowsy.   Blood glucose: insulin drip alg 1. Bg within goals.   Pain/nausea: PCA dilaudid, epidural @ 7 ml x 2,back sites cdi.   Diet: NPO, ice. TF @ 20, GR 45. Do not increase until PCO put in (reported by nolberto LEWIS). Relizorb changed due @ 1000 (q12hrs).   Lines: PIV R saline locked. PIVL infusing heparin SR with carrier. Port chest infusing insulin, MIVF & PCA dilaudid (compatible).   : martinez  ML Removed @ 0630. Needs PVR x 2, pt educated to call.   GI: no bm, passing gas.   Drains: R KELLY  ml, serous . G tube to gravity OP 25 ml, green.   Skin: Admitted/transferred from:   Time of arrival on unit 0015  2 RN full  skin assessment completed by Alissa Aggarwal  Skin assessment finding: issues found R breast has oval shaped pus layer, cleaned and applied bacitracin, requested WOC consult in sticky note.     Interventions/actions: skin interventions other than that her OP dressing cdi, R KELLY, G tube and martinez is intact. Her port chest looks fine.     Will continue to monitor.  Mobility: up x 1-2 with a walker, was up in chair in ICU.   Labs: hemoglobin 6.8, 1 unit given. Tolerated well.    RN managed : K, mag, and phos all WDL. Recheck am labs.

## 2024-09-10 PROBLEM — K86.89 DIABETES MELLITUS SECONDARY TO PANCREATIC INSUFFICIENCY (H): Status: RESOLVED | Noted: 2023-01-31 | Resolved: 2024-09-10

## 2024-09-10 PROBLEM — Z90.410 DIABETES MELLITUS SECONDARY TO PANCREATECTOMY (H): Status: ACTIVE | Noted: 2024-09-10

## 2024-09-10 PROBLEM — Z90.410 HISTORY OF PANCREATECTOMY: Status: ACTIVE | Noted: 2024-09-10

## 2024-09-10 PROBLEM — E08.9 DIABETES MELLITUS SECONDARY TO PANCREATIC INSUFFICIENCY (H): Status: RESOLVED | Noted: 2023-01-31 | Resolved: 2024-09-10

## 2024-09-10 PROBLEM — K86.89 PANCREATIC INSUFFICIENCY: Status: ACTIVE | Noted: 2024-09-10

## 2024-09-10 PROBLEM — Z90.410 HISTORY OF PANCREATECTOMY: Chronic | Status: ACTIVE | Noted: 2024-09-10

## 2024-09-10 PROBLEM — I48.91 ATRIAL FIBRILLATION WITH RVR (H): Status: ACTIVE | Noted: 2024-09-08

## 2024-09-10 PROBLEM — G89.18 ACUTE POST-OPERATIVE PAIN: Status: ACTIVE | Noted: 2024-09-10

## 2024-09-10 PROBLEM — I48.92 ATRIAL FLUTTER (H): Status: ACTIVE | Noted: 2024-09-10

## 2024-09-10 PROBLEM — E13.9 DIABETES MELLITUS SECONDARY TO PANCREATECTOMY (H): Status: ACTIVE | Noted: 2024-09-10

## 2024-09-10 PROBLEM — E89.1 DIABETES MELLITUS SECONDARY TO PANCREATECTOMY (H): Status: ACTIVE | Noted: 2024-09-10

## 2024-09-10 LAB
ANION GAP SERPL CALCULATED.3IONS-SCNC: 6 MMOL/L (ref 7–15)
APTT PPP: 32 SECONDS (ref 22–38)
BASOPHILS # BLD AUTO: 0.1 10E3/UL (ref 0–0.2)
BASOPHILS NFR BLD AUTO: 0 %
BUN SERPL-MCNC: 9.6 MG/DL (ref 6–20)
CALCIUM SERPL-MCNC: 7.4 MG/DL (ref 8.8–10.4)
CHLORIDE SERPL-SCNC: 106 MMOL/L (ref 98–107)
CREAT SERPL-MCNC: 0.57 MG/DL (ref 0.51–0.95)
DEPRECATED CALCIDIOL+CALCIFEROL SERPL-MC: <20 UG/L (ref 20–75)
EGFRCR SERPLBLD CKD-EPI 2021: >90 ML/MIN/1.73M2
EOSINOPHIL # BLD AUTO: 0.7 10E3/UL (ref 0–0.7)
EOSINOPHIL NFR BLD AUTO: 3 %
ERYTHROCYTE [DISTWIDTH] IN BLOOD BY AUTOMATED COUNT: 14.1 % (ref 10–15)
GLUCOSE BLDC GLUCOMTR-MCNC: 101 MG/DL (ref 70–99)
GLUCOSE BLDC GLUCOMTR-MCNC: 102 MG/DL (ref 70–99)
GLUCOSE BLDC GLUCOMTR-MCNC: 104 MG/DL (ref 70–99)
GLUCOSE BLDC GLUCOMTR-MCNC: 105 MG/DL (ref 70–99)
GLUCOSE BLDC GLUCOMTR-MCNC: 106 MG/DL (ref 70–99)
GLUCOSE BLDC GLUCOMTR-MCNC: 106 MG/DL (ref 70–99)
GLUCOSE BLDC GLUCOMTR-MCNC: 107 MG/DL (ref 70–99)
GLUCOSE BLDC GLUCOMTR-MCNC: 108 MG/DL (ref 70–99)
GLUCOSE BLDC GLUCOMTR-MCNC: 109 MG/DL (ref 70–99)
GLUCOSE BLDC GLUCOMTR-MCNC: 113 MG/DL (ref 70–99)
GLUCOSE BLDC GLUCOMTR-MCNC: 117 MG/DL (ref 70–99)
GLUCOSE BLDC GLUCOMTR-MCNC: 120 MG/DL (ref 70–99)
GLUCOSE BLDC GLUCOMTR-MCNC: 122 MG/DL (ref 70–99)
GLUCOSE BLDC GLUCOMTR-MCNC: 128 MG/DL (ref 70–99)
GLUCOSE BLDC GLUCOMTR-MCNC: 129 MG/DL (ref 70–99)
GLUCOSE BLDC GLUCOMTR-MCNC: 133 MG/DL (ref 70–99)
GLUCOSE BLDC GLUCOMTR-MCNC: 134 MG/DL (ref 70–99)
GLUCOSE BLDC GLUCOMTR-MCNC: 138 MG/DL (ref 70–99)
GLUCOSE BLDC GLUCOMTR-MCNC: 139 MG/DL (ref 70–99)
GLUCOSE BLDC GLUCOMTR-MCNC: 150 MG/DL (ref 70–99)
GLUCOSE BLDC GLUCOMTR-MCNC: 152 MG/DL (ref 70–99)
GLUCOSE BLDC GLUCOMTR-MCNC: 160 MG/DL (ref 70–99)
GLUCOSE BLDC GLUCOMTR-MCNC: 169 MG/DL (ref 70–99)
GLUCOSE BLDC GLUCOMTR-MCNC: 81 MG/DL (ref 70–99)
GLUCOSE BLDC GLUCOMTR-MCNC: 83 MG/DL (ref 70–99)
GLUCOSE BLDC GLUCOMTR-MCNC: 88 MG/DL (ref 70–99)
GLUCOSE BLDC GLUCOMTR-MCNC: 90 MG/DL (ref 70–99)
GLUCOSE BLDC GLUCOMTR-MCNC: 95 MG/DL (ref 70–99)
GLUCOSE BLDC GLUCOMTR-MCNC: 96 MG/DL (ref 70–99)
GLUCOSE BLDC GLUCOMTR-MCNC: 97 MG/DL (ref 70–99)
GLUCOSE BLDC GLUCOMTR-MCNC: 98 MG/DL (ref 70–99)
GLUCOSE SERPL-MCNC: 127 MG/DL (ref 70–99)
HCO3 SERPL-SCNC: 26 MMOL/L (ref 22–29)
HCT VFR BLD AUTO: 26.9 % (ref 35–47)
HGB BLD-MCNC: 8.7 G/DL (ref 11.7–15.7)
IMM GRANULOCYTES # BLD: 0.3 10E3/UL
IMM GRANULOCYTES NFR BLD: 1 %
LYMPHOCYTES # BLD AUTO: 2.5 10E3/UL (ref 0.8–5.3)
LYMPHOCYTES NFR BLD AUTO: 10 %
MAGNESIUM SERPL-MCNC: 1.7 MG/DL (ref 1.7–2.3)
MCH RBC QN AUTO: 28.8 PG (ref 26.5–33)
MCHC RBC AUTO-ENTMCNC: 32.3 G/DL (ref 31.5–36.5)
MCV RBC AUTO: 89 FL (ref 78–100)
MONOCYTES # BLD AUTO: 2.9 10E3/UL (ref 0–1.3)
MONOCYTES NFR BLD AUTO: 12 %
NEUTROPHILS # BLD AUTO: 18.5 10E3/UL (ref 1.6–8.3)
NEUTROPHILS NFR BLD AUTO: 74 %
NRBC # BLD AUTO: 0 10E3/UL
NRBC BLD AUTO-RTO: 0 /100
PHOSPHATE SERPL-MCNC: 2.9 MG/DL (ref 2.5–4.5)
PLATELET # BLD AUTO: 394 10E3/UL (ref 150–450)
POTASSIUM SERPL-SCNC: 3.4 MMOL/L (ref 3.4–5.3)
RBC # BLD AUTO: 3.02 10E6/UL (ref 3.8–5.2)
SODIUM SERPL-SCNC: 138 MMOL/L (ref 135–145)
TSH SERPL DL<=0.005 MIU/L-ACNC: 1.7 UIU/ML (ref 0.3–4.2)
VITAMIN D2 SERPL-MCNC: <5 UG/L
VITAMIN D3 SERPL-MCNC: 15 UG/L
WBC # BLD AUTO: 24.8 10E3/UL (ref 4–11)

## 2024-09-10 PROCEDURE — 84100 ASSAY OF PHOSPHORUS: CPT

## 2024-09-10 PROCEDURE — 250N000012 HC RX MED GY IP 250 OP 636 PS 637: Performed by: SURGERY

## 2024-09-10 PROCEDURE — 250N000009 HC RX 250

## 2024-09-10 PROCEDURE — 258N000003 HC RX IP 258 OP 636: Performed by: SURGERY

## 2024-09-10 PROCEDURE — 83735 ASSAY OF MAGNESIUM: CPT

## 2024-09-10 PROCEDURE — 250N000011 HC RX IP 250 OP 636: Performed by: SURGERY

## 2024-09-10 PROCEDURE — 93010 ELECTROCARDIOGRAM REPORT: CPT | Performed by: INTERNAL MEDICINE

## 2024-09-10 PROCEDURE — 120N000011 HC R&B TRANSPLANT UMMC

## 2024-09-10 PROCEDURE — 250N000013 HC RX MED GY IP 250 OP 250 PS 637: Performed by: SURGERY

## 2024-09-10 PROCEDURE — 84443 ASSAY THYROID STIM HORMONE: CPT | Performed by: NURSE PRACTITIONER

## 2024-09-10 PROCEDURE — 250N000009 HC RX 250: Performed by: NURSE PRACTITIONER

## 2024-09-10 PROCEDURE — 258N000003 HC RX IP 258 OP 636

## 2024-09-10 PROCEDURE — 99231 SBSQ HOSP IP/OBS SF/LOW 25: CPT | Mod: 24 | Performed by: STUDENT IN AN ORGANIZED HEALTH CARE EDUCATION/TRAINING PROGRAM

## 2024-09-10 PROCEDURE — 271N000002 HC RX 271: Performed by: SURGERY

## 2024-09-10 PROCEDURE — 36415 COLL VENOUS BLD VENIPUNCTURE: CPT

## 2024-09-10 PROCEDURE — 85730 THROMBOPLASTIN TIME PARTIAL: CPT | Performed by: SURGERY

## 2024-09-10 PROCEDURE — 85025 COMPLETE CBC W/AUTO DIFF WBC: CPT | Performed by: SURGERY

## 2024-09-10 PROCEDURE — 80048 BASIC METABOLIC PNL TOTAL CA: CPT

## 2024-09-10 PROCEDURE — 99222 1ST HOSP IP/OBS MODERATE 55: CPT | Mod: 24 | Performed by: INTERNAL MEDICINE

## 2024-09-10 PROCEDURE — 250N000011 HC RX IP 250 OP 636: Performed by: NURSE PRACTITIONER

## 2024-09-10 PROCEDURE — 250N000013 HC RX MED GY IP 250 OP 250 PS 637: Performed by: NURSE PRACTITIONER

## 2024-09-10 PROCEDURE — 250N000011 HC RX IP 250 OP 636

## 2024-09-10 PROCEDURE — 93005 ELECTROCARDIOGRAM TRACING: CPT

## 2024-09-10 RX ORDER — DIAZEPAM 10 MG/2ML
1 INJECTION, SOLUTION INTRAMUSCULAR; INTRAVENOUS EVERY 6 HOURS PRN
Status: DISCONTINUED | OUTPATIENT
Start: 2024-09-10 | End: 2024-09-13

## 2024-09-10 RX ORDER — MAGNESIUM SULFATE HEPTAHYDRATE 40 MG/ML
2 INJECTION, SOLUTION INTRAVENOUS ONCE
Status: COMPLETED | OUTPATIENT
Start: 2024-09-10 | End: 2024-09-10

## 2024-09-10 RX ORDER — POTASSIUM CHLORIDE 29.8 MG/ML
20 INJECTION INTRAVENOUS
Status: COMPLETED | OUTPATIENT
Start: 2024-09-10 | End: 2024-09-10

## 2024-09-10 RX ORDER — POTASSIUM CHLORIDE 20MEQ/15ML
20 LIQUID (ML) ORAL ONCE
Status: COMPLETED | OUTPATIENT
Start: 2024-09-10 | End: 2024-09-10

## 2024-09-10 RX ORDER — HYDROMORPHONE HYDROCHLORIDE 1 MG/ML
2 SOLUTION ORAL EVERY 4 HOURS
Status: DISCONTINUED | OUTPATIENT
Start: 2024-09-10 | End: 2024-09-12

## 2024-09-10 RX ORDER — ACETAMINOPHEN 325 MG/1
975 TABLET ORAL ONCE
Status: COMPLETED | OUTPATIENT
Start: 2024-09-10 | End: 2024-09-10

## 2024-09-10 RX ORDER — METOPROLOL TARTRATE 1 MG/ML
5 INJECTION, SOLUTION INTRAVENOUS EVERY 5 MIN PRN
Status: DISCONTINUED | OUTPATIENT
Start: 2024-09-10 | End: 2024-09-16

## 2024-09-10 RX ORDER — METOPROLOL TARTRATE 1 MG/ML
5 INJECTION, SOLUTION INTRAVENOUS EVERY 5 MIN PRN
Status: DISCONTINUED | OUTPATIENT
Start: 2024-09-10 | End: 2024-09-10

## 2024-09-10 RX ORDER — METOPROLOL TARTRATE 1 MG/ML
INJECTION, SOLUTION INTRAVENOUS
Status: COMPLETED
Start: 2024-09-10 | End: 2024-09-10

## 2024-09-10 RX ADMIN — GABAPENTIN 300 MG: 250 SUSPENSION ORAL at 08:21

## 2024-09-10 RX ADMIN — MAGNESIUM SULFATE HEPTAHYDRATE 2 G: 2 INJECTION, SOLUTION INTRAVENOUS at 08:21

## 2024-09-10 RX ADMIN — HYDROMORPHONE HYDROCHLORIDE 2 MG: 5 SOLUTION ORAL at 12:58

## 2024-09-10 RX ADMIN — SODIUM CHLORIDE, POTASSIUM CHLORIDE, SODIUM LACTATE AND CALCIUM CHLORIDE: 600; 310; 30; 20 INJECTION, SOLUTION INTRAVENOUS at 14:11

## 2024-09-10 RX ADMIN — Medication 1.5 ML: at 08:21

## 2024-09-10 RX ADMIN — SODIUM CHLORIDE, POTASSIUM CHLORIDE, SODIUM LACTATE AND CALCIUM CHLORIDE: 600; 310; 30; 20 INJECTION, SOLUTION INTRAVENOUS at 04:14

## 2024-09-10 RX ADMIN — ACETAMINOPHEN 975 MG: 160 LIQUID ORAL at 16:09

## 2024-09-10 RX ADMIN — METHOCARBAMOL 750 MG: 750 TABLET ORAL at 03:20

## 2024-09-10 RX ADMIN — Medication: at 10:38

## 2024-09-10 RX ADMIN — POTASSIUM CHLORIDE 20 MEQ: 29.8 INJECTION, SOLUTION INTRAVENOUS at 10:04

## 2024-09-10 RX ADMIN — POTASSIUM CHLORIDE 20 MEQ: 1.5 SOLUTION ORAL at 08:21

## 2024-09-10 RX ADMIN — Medication: at 11:56

## 2024-09-10 RX ADMIN — GABAPENTIN 300 MG: 250 SUSPENSION ORAL at 19:51

## 2024-09-10 RX ADMIN — Medication 30 MG: at 08:22

## 2024-09-10 RX ADMIN — HYDROMORPHONE HYDROCHLORIDE 2 MG: 5 SOLUTION ORAL at 19:51

## 2024-09-10 RX ADMIN — POTASSIUM CHLORIDE 20 MEQ: 29.8 INJECTION, SOLUTION INTRAVENOUS at 08:55

## 2024-09-10 RX ADMIN — ACETAMINOPHEN 650 MG: 160 LIQUID ORAL at 03:13

## 2024-09-10 RX ADMIN — METHOCARBAMOL 750 MG: 750 TABLET ORAL at 20:41

## 2024-09-10 RX ADMIN — POTASSIUM CHLORIDE 20 MEQ: 29.8 INJECTION, SOLUTION INTRAVENOUS at 11:01

## 2024-09-10 RX ADMIN — Medication: at 21:57

## 2024-09-10 RX ADMIN — HYDROMORPHONE HYDROCHLORIDE 2 MG: 5 SOLUTION ORAL at 16:09

## 2024-09-10 RX ADMIN — METOPROLOL TARTRATE 5 MG: 5 INJECTION INTRAVENOUS at 04:07

## 2024-09-10 RX ADMIN — Medication: at 21:56

## 2024-09-10 RX ADMIN — Medication 30 MG: at 19:51

## 2024-09-10 RX ADMIN — ACETAMINOPHEN 975 MG: 160 LIQUID ORAL at 22:13

## 2024-09-10 RX ADMIN — Medication 40 MG: at 08:22

## 2024-09-10 RX ADMIN — ACETAMINOPHEN 975 MG: 160 LIQUID ORAL at 06:27

## 2024-09-10 RX ADMIN — SODIUM CHLORIDE 1 UNITS/HR: 9 INJECTION, SOLUTION INTRAVENOUS at 04:14

## 2024-09-10 RX ADMIN — Medication 30 MG: at 14:01

## 2024-09-10 RX ADMIN — METOPROLOL TARTRATE 5 MG: 5 INJECTION INTRAVENOUS at 03:36

## 2024-09-10 ASSESSMENT — ACTIVITIES OF DAILY LIVING (ADL)
ADLS_ACUITY_SCORE: 27
ADLS_ACUITY_SCORE: 27
ADLS_ACUITY_SCORE: 29
ADLS_ACUITY_SCORE: 27
ADLS_ACUITY_SCORE: 27
ADLS_ACUITY_SCORE: 29
ADLS_ACUITY_SCORE: 27
ADLS_ACUITY_SCORE: 27
ADLS_ACUITY_SCORE: 29
ADLS_ACUITY_SCORE: 27
ADLS_ACUITY_SCORE: 29
ADLS_ACUITY_SCORE: 27

## 2024-09-10 NOTE — PROGRESS NOTES
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 She is now s/p TPAIT, splenectomy, and G/J placement with repair of small incisional hernia on 9/6/24 with Dr. Carmichael.     s/p TPAIT 9/6/24: POD #4 . Islet yield Islet equivalents/kilogram: 2937.   Post-op US: patient vessels. KELLY drain x 1 with serosang output.   -Repeat liver US tomorrow.    Cardiorespiratory:  Atrial fib & flutter: Self-limited A-fib RVR in SICU on 9/8. Rate up to 150 today with EKG showing A-flutter. Metoprolol IV x2 slowed rate to 130s. Asymptomatic.  -Diltiazem 30mg susp x1 now  -Check TSH  -Consult Cardiology    GI/Nutrition:   GERD: PPI daily  Pancreatic insufficiency: Relizorb with TF.   Moderate malnutrition in the context of chronic illness/disease: GJ tube placed, clamp G tube as tolerated. Increase tube feeds to 45cc/hour today. NPO.   Bowel regimen: Senna & PEG    Fluid/Electrolytes: IVF: LR@100/hr; Replace K & Mag today due to A-flutter.    : No acute issues.     Endocrine:  Post-pancreatectomy diabetes/stress hyperglycemia: Endocrine consulted. Continue insulin gtt.     Infection:   Leukocytosis: WBC 26.2->24.8, likely 2/2 stress and splenectomy. Ertapenem for surgical ppx, stop today.     Prophylaxis: Anticoagulation: Heparin gtt 400 units/hr    Neuro:   Acute on chronic pain: PTA managed with PO hydromorphone 4 mg Q3H. Pain Management team consulted.  Current regimen:    - Ropivacaine paravertebral block managed by Anesthesia   - Dilaudid PCA, STOP basal and continue 0.4mg q10m bolus   - Dilaudid 2mg q4H via JT   - Neurontin   - Acetaminophen   - Atarax prn    - Robaxin PRN, STOP ineffective   - Valium IV 1mg q6H PRN muscle spasms HOLD FOR SEDATION    Activity: PT/OT  consulted    Disposition: 7A    KEYA/Fellow/Resident Provider: Nia Sierra NP  [id number]    Faculty: Carlos Carmichael MD  __________________________________________________________________  Transplant History: Admitted 9/6/2024 for TPAIT  9/6/2024 (Islet), Postoperative day: 4     Interval History: History is obtained from the patient  Overnight events: Pancreas pain resolved. Surgical pain remains significant. New A-flutter overnight. No chest pain or dyspnea.    ROS:   A 10-point review of systems was negative except as noted above.    Curent Meds:  Current Facility-Administered Medications   Medication Dose Route Frequency Provider Last Rate Last Admin    acetaminophen *SUGAR FREE* (TYLENOL) solution 975 mg  975 mg Per J Tube Q8H Nia Sierra APRN CNP   975 mg at 09/10/24 0627    diltiazem (CARDIZEM) solution 30 mg  30 mg Per J Tube Once Nia Sierra APRN CNP        sennosides (SENOKOT) syrup 5 mL  5 mL Per J Tube BID Carlos Carmichael MD   5 mL at 09/09/24 2013    And    docusate (COLACE) 50 MG/5ML liquid 50 mg  50 mg Per J Tube BID Carlos Carmichael MD   50 mg at 09/09/24 2104    ertapenem (INVanz) 1 g vial to attach to  mL bag  1 g Intravenous Q24H Christina Calvin PA-C   1 g at 09/09/24 1146    gabapentin (NEURONTIN) solution 300 mg  300 mg Oral or J tube BID Carlos Carmichael MD   300 mg at 09/09/24 2013    magnesium sulfate 2 g in 50 mL sterile water intermittent infusion  2 g Intravenous Once Nia Sierra APRN CNP        mvw complete formulation (PEDIATRIC) oral solution 1.5 mL  1.5 mL Oral or J tube Daily Carlos Carmichael MD   1.5 mL at 09/09/24 0906    pantoprazole (PROTONIX) 2 mg/mL suspension 40 mg  40 mg Oral or J tube Daily Carlos Carmichael MD   40 mg at 09/09/24 0746    polyethylene glycol (MIRALAX) Packet 17 g  17 g Per J Tube Daily Carlos Carmichael MD   17 g at  "09/09/24 0745    potassium chloride (KAYCIEL) solution 20 mEq  20 mEq Per J Tube Once Nia Sierra APRN CNP        potassium chloride 20 mEq in 50 mL intermittent infusion  20 mEq Intravenous Q1H Nia Sierra APRN CNP        sodium chloride (PF) 0.9% PF flush 3 mL  3 mL Intracatheter Q8H Carlos Carmichael MD   3 mL at 09/10/24 0627       Physical Exam:     Admit Weight: 71.8 kg (158 lb 4.6 oz)    Current Vitals:   /87 (BP Location: Right arm)   Pulse (!) 131   Temp 98.6  F (37  C) (Oral)   Resp 18   Ht 1.676 m (5' 6\")   Wt 81.1 kg (178 lb 12.8 oz)   SpO2 96%   BMI 28.86 kg/m      Vital sign ranges:    Temp:  [98.4  F (36.9  C)-99.3  F (37.4  C)] 98.6  F (37  C)  Pulse:  [] 131  Resp:  [16-18] 18  BP: (111-129)/(74-93) 120/87  SpO2:  [91 %-100 %] 96 %  Patient Vitals for the past 24 hrs:   BP Temp Temp src Pulse Resp SpO2 Weight   09/10/24 0606 120/87 98.6  F (37  C) Oral (!) 131 18 96 % --   09/10/24 0407 (!) 115/93 -- -- -- -- -- --   09/10/24 0336 (!) 129/90 -- -- (!) 155 -- -- --   09/10/24 0230 120/81 99.3  F (37.4  C) Oral (!) 136 16 95 % --   09/10/24 0204 115/78 -- -- -- 16 95 % --   09/10/24 0030 -- 98.7  F (37.1  C) -- -- -- -- --   09/09/24 2152 116/81 98.9  F (37.2  C) Oral 92 16 99 % --   09/09/24 1813 120/79 98.4  F (36.9  C) Oral 85 18 99 % --   09/09/24 1425 -- -- -- -- -- -- 81.1 kg (178 lb 12.8 oz)   09/09/24 1308 115/75 98.6  F (37  C) Oral 82 16 100 % --   09/09/24 1003 115/80 98.7  F (37.1  C) Oral 88 16 91 % --   09/09/24 0929 111/74 -- -- 97 -- 93 % --     General Appearance: NAD  Skin: normal, no suspicious lesions or rashes  Heart: regular rate and rhythm, no rub  Lungs: CTA  Abdomen: The abdomen is rounded, and  moderately tender. The wound is Healing well. Tube/Drain sites are: GJ in place. KELLY: serosanguinous, moderate. GJ tube site intact.  : martinez is not present.    Extremities: edema: present bilaterally. 1+  Neuro: A&Ox4    Data: "   [facility]  Recent Labs   Lab 09/10/24  0609 09/10/24  0547 09/09/24  0646 09/09/24  0554 09/08/24  0828 09/08/24  0820 09/08/24  0354 09/08/24  0348 09/07/24  0501 09/07/24  0358 09/06/24  1740 09/06/24  1701   NA  --  138  --  137   < >  --   --  139  --  137   < > 136   POTASSIUM  --  3.4  --  3.6   < >  --   --  3.7  --  4.2   < > 4.3   CHLORIDE  --  106  --  106   < >  --   --  108*  --  106   < >  --    CO2  --  26  --  24   < >  --   --  25  --  22   < >  --    GLC 97 127*   < > 129*   < >  --    < > 109*   < > 133*   < > 125*   BUN  --  9.6  --  8.4   < >  --   --  8.8  --  5.6*   < >  --    CR  --  0.57  --  0.62   < >  --   --  0.76  --  0.64   < >  --    GFRESTIMATED  --  >90  --  >90   < >  --   --  >90  --  >90   < >  --    CARLA  --  7.4*  --  7.3*   < >  --   --  7.4*  --  7.7*   < >  --    ICAW  --   --   --   --   --  4.4  --   --   --   --   --  4.1*   MAG  --  1.7  --  1.7   < >  --   --  1.7  --  1.7   < >  --    PHOS  --  2.9  --  2.6   < >  --   --  2.8  --  3.7   < >  --    AMYLASE  --   --   --   --   --   --   --   --   --  61  --   --    LIPASE  --   --   --   --   --   --   --   --   --  85*  --   --    ALBUMIN  --   --   --   --   --   --   --  2.6*  --  2.9*   < >  --    BILITOTAL  --   --   --   --   --   --   --  0.3  --  0.6   < >  --    ALKPHOS  --   --   --   --   --   --   --  55  --  48   < >  --    AST  --   --   --   --   --   --   --  67*  --  162*   < >  --    ALT  --   --   --   --   --   --   --  81*  --  125*   < >  --     < > = values in this interval not displayed.     CBC  Recent Labs   Lab 09/10/24  0547 09/09/24  0554 09/07/24  0358 09/06/24  2254   HGB 8.7* 8.1*   < >  --    WBC 24.8* 26.2*   < >  --     238   < >  --    A1C  --   --   --  6.7*    < > = values in this interval not displayed.     [facility]  Recent Labs   Lab 09/10/24  0547 09/09/24  0554 09/06/24  2309 09/06/24  1852 09/03/24  0830   INR  --   --   --  1.54* 1.15   PTT 32 34   < > >240*  --     < > =  values in this interval not displayed.

## 2024-09-10 NOTE — CONSULTS
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 She is now s/p TPAIT, splenectomy, and G/J placement with repair of small incisional hernia on 9/6/24. Cardiology was consulted because her HR went up to 150 today with EKG notable for A-flutter. Of note, patient first went into Afib in SICU on 09/08, which was self-limited. Patient has been asymptomatic and she denies any history of Afib or heart disease. Echo 09/06 with LVEF 60-65%%, normal global and regional LV function and normal RV function and chamber size.    Patient was given Metoprolol IV x 2 with rate down to 130s and diltiazem 30 mg susp.     Patient is currently in Afib with -130s, but she remains asymptomatic. Etiology of Afib is multifactorial: infection (elevated WBC now s/p ertapenem) vs malnutrition vs blood loss (Hbg was 6.8- > 8.7). CHADS-VASc score is 2 (1+ sex, 1+ vague vasc hx), so no anticoagulant required. Patient is hemodynamically stable and asymptomatic, so rate control with diltiazem as below.    Recommendations:   - Diltiazem 30 mg susp every 6 hr today then switch to ext release 120 mg (susp) daily  -  Goal HR to < 130 bpm  - Mg goal >2.0 and  K >4  - Continue monitoring Hbg and for potential infection    Plan of care discussed with Dr. Mahmood, who agrees with above plan.    Thank you for consulting the cardiovascular services at the [facility]  [facility]. Please do not hesitate to call us with any questions.     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 She is now s/p TPAIT, splenectomy, and G/J placement with repair of small incisional hernia on 24. Cardiology was consulted because her HR went to 150 today with EKG notable for A-flutter. Of note, patient first went into Afib in SICU on , which was self-limited. Patient has been asymptomatic    At the time of interview, the patient denies chest pain, SOB, palpitations, lightheadedness, or syncope. She also denies hx of Afib.    Review of Systems:    Complete review of systems was performed and negative except per HPI.             CARDIAC HISTORY AND IMAGING     12-Lead EC/10/2024  Notable for Afib-flutter    Transthoracic Echocardiogram(s):  TTE 2024  Interpretation Summary  Global and regional left ventricular function is normal with an EF of 60-65%.  Right ventricular function, chamber size, wall motion, and thickness are  normal.  Both atria appear normal.  Pulmonary artery systolic pressure cannot be assessed.  The inferior vena cava cannot be assessed.  No significant valvular abnormalities present.  No pericardial effusion is present.  There is no prior study for direct comparison.     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) 2023     Priority: Medium    Chronic recurrent pancreatitis (H) 2021     Priority: Medium     Social History:  Social History     Tobacco Use    Smoking status: Former     Current packs/day: 0.00     Average packs/day: 1 pack/day for 8.0 years (8.0 ttl pk-yrs)     Types: Cigarettes     Start date: 2012     Quit date: 2020     Years since quittin.6    Smokeless tobacco: Never    Tobacco comments:     Currently Vapes   Substance Use Topics    Alcohol use: Not Currently    Drug use: Never     Family History:  Family History   Problem Relation Age of Onset    Depression Mother     Cerebrovascular Disease Mother     Aneurysm Mother         brain    Osteoporosis Mother     Unknown/Adopted Father     Deep Vein Thrombosis Maternal Grandmother     Heart Failure Maternal Grandmother     Unknown/Adopted Paternal Grandmother     Unknown/Adopted Paternal Grandfather     Anesthesia Reaction No family hx of      Medications:  Current Facility-Administered Medications   Medication Dose Route Frequency Provider Last Rate Last Admin    acetaminophen *SUGAR FREE* (TYLENOL) solution 975 mg  975 mg Per J Tube Q8H Nia Sierra, PATTI CNP   975 mg at 09/10/24 0627    sennosides (SENOKOT) syrup 5 mL  5 mL Per J Tube BID Carlos Carmichael MD   5 mL at 24    And    docusate (COLACE) 50 MG/5ML liquid 50 mg  50 mg Per J Tube BID Carlos Carmichael MD   50 mg at 24    gabapentin (NEURONTIN) solution 300 mg  300 mg Oral or J tube BID Carlos Carmichael MD   300 mg at 09/10/24 0821    mvw complete formulation (PEDIATRIC) oral solution 1.5 mL  1.5 mL Oral or J tube Daily Carlos Carmichael MD   1.5 mL at 09/10/24 0821    pantoprazole  (PROTONIX) 2 mg/mL suspension 40 mg  40 mg Oral or J tube Daily Carlos Carmichael MD   40 mg at 09/10/24 0822    polyethylene glycol (MIRALAX) Packet 17 g  17 g Per J Tube Daily Carlos Carmichael MD   17 g at 09/09/24 0745    potassium chloride 20 mEq in 50 mL intermittent infusion  20 mEq Intravenous Q1H Nia Sierra APRN CNP   20 mEq at 09/10/24 1004    sodium chloride (PF) 0.9% PF flush 3 mL  3 mL Intracatheter Q8H Carlos Carmichael MD   3 mL at 09/10/24 0627     Current Facility-Administered Medications   Medication Dose Route Frequency Provider Last Rate Last Admin    dextrose 10% infusion   Intravenous Continuous PRN Carlos Carmichael MD        dextrose 10% infusion   Intravenous Continuous PRN Carlos Carmichael MD   Stopped at 09/08/24 1422    heparin infusion 25,000 units in 0.45% NaCl 250 mL ANTICOAGULANT  400 Units/hr Intravenous Continuous Christina Calvin PA-C 4 mL/hr at 09/10/24 0807 400 Units/hr at 09/10/24 0807    HYDROmorphone (DILAUDID) PCA 0.2 mg/mL OPIOID TOLERANT   Intravenous Continuous Lisa Rossi APRN CNP   Rate Verify at 09/10/24 0808    insulin 1 unit/1mL in saline (NovoLIN-Regular) infusion- SOT TPIAT algorithm syringe  0-24 Units/hr Intravenous Continuous Carlos Carmichael MD 3 mL/hr at 09/10/24 1003 3 Units/hr at 09/10/24 1003    lactated ringers infusion   Intravenous Continuous Lisa Rossi APRN  mL/hr at 09/10/24 0807 Rate Verify at 09/10/24 0807    NO anticoagulation unless approved by Anesthesia Provider   Does not apply Continuous PRN Carlos Carmichael MD        ROPivacaine 0.2% in sodium chloride 0.9% PERINEURAL infusion   Perineural Continuous Nerve Block Carlos Carmichael MD 7 mL/hr at 09/09/24 2035 Rate Verify at 09/10/24 0810    ROPivacaine 0.2% in sodium chloride 0.9% PERINEURAL infusion   Perineural  Continuous Nerve Block Carlos Carmichael MD 7 mL/hr at 09/09/24 2035 Rate Verify at 09/10/24 0809             PHYSICAL EXAM     Temp:  [98.4  F (36.9  C)-99.3  F (37.4  C)] 98.6  F (37  C)  Pulse:  [] 131  Resp:  [16-18] 18  BP: (115-129)/(75-93) 120/87  SpO2:  [95 %-100 %] 96 %    Intake/Output Summary (Last 24 hours) at 9/10/2024 1019  Last data filed at 9/10/2024 0900  Gross per 24 hour   Intake 3883.62 ml   Output 3150 ml   Net 733.62 ml     GEN: NAD.  CV: IRR- Afib, normal S1, S2. No murmurs. JVP flat. No edema. Symmetric 2+ radial pulses.  Pulm: Clear bilaterally with normal WOB.  Abd: Nondistended.            DIAGNOSTICS     All labs and imaging were reviewed, of note:    CMP  Recent Labs   Lab 09/10/24  1003 09/10/24  0905 09/10/24  0804 09/10/24  0609 09/10/24  0547 09/09/24  0646 09/09/24  0554 09/08/24  1338 09/08/24  1307 09/08/24  0354 09/08/24  0348 09/07/24  0501 09/07/24  0358 09/06/24  1940 09/06/24  1852   NA  --   --   --   --  138  --  137  --  143  --  139  --  137  --  137   POTASSIUM  --   --   --   --  3.4  --  3.6  --  3.8  3.8  --  3.7  --  4.2  --  4.7   CHLORIDE  --   --   --   --  106  --  106  --  111*  --  108*  --  106  --  107   CO2  --   --   --   --  26  --  24  --  25  --  25  --  22  --  20*   ANIONGAP  --   --   --   --  6*  --  7  --  7  --  6*  --  9  --  10   * 152* 128* 97 127*   < > 129*   < > 87   < > 109*   < > 133*   < > 148*   BUN  --   --   --   --  9.6  --  8.4  --  8.6  --  8.8  --  5.6*  --  7.4   CR  --   --   --   --  0.57  --  0.62  --  0.69  --  0.76  --  0.64  --  0.56   GFRESTIMATED  --   --   --   --  >90  --  >90  --  >90  --  >90  --  >90  --  >90   CARLA  --   --   --   --  7.4*  --  7.3*  --  7.3*  --  7.4*  --  7.7*  --  7.7*   MAG  --   --   --   --  1.7  --  1.7  --  1.7  --  1.7  --  1.7   < >  --    PHOS  --   --   --   --  2.9  --  2.6  --  2.5  --  2.8  --  3.7   < >  --    PROTTOTAL  --   --   --   --   --   --   --    "--   --   --  4.5*  --  4.7*  --  4.5*   ALBUMIN  --   --   --   --   --   --   --   --   --   --  2.6*  --  2.9*  --  2.9*   BILITOTAL  --   --   --   --   --   --   --   --   --   --  0.3  --  0.6  --  0.9   ALKPHOS  --   --   --   --   --   --   --   --   --   --  55  --  48  --  47   AST  --   --   --   --   --   --   --   --   --   --  67*  --  162*  --  96*   ALT  --   --   --   --   --   --   --   --   --   --  81*  --  125*  --  68*    < > = values in this interval not displayed.     CBC  Recent Labs   Lab 09/10/24  0547 09/09/24  0554 09/09/24  0111 09/08/24  0936   WBC 24.8* 26.2* 29.0* 31.1*   RBC 3.02* 2.78* 2.32* 2.39*   HGB 8.7* 8.1* 6.8* 7.0*   HCT 26.9* 24.7* 21.3* 21.9*   MCV 89 89 92 92   MCH 28.8 29.1 29.3 29.3   MCHC 32.3 32.8 31.9 32.0   RDW 14.1 14.1 13.6 13.7    238 244 210     INR  Recent Labs   Lab 09/06/24  1852   INR 1.54*     Arterial Blood Gas  Recent Labs   Lab 09/06/24  1701 09/06/24  1454 09/06/24  1358 09/06/24  1249   PH 7.37 7.36 7.37 7.42   PCO2 39 40 39 36   PO2 212* 141* 150* 158*   HCO3 22 23 23 24   O2PER 50.0 30.0 30.0 40.0     TroponinNo results found for: \"CTROPT\", \"TROPONINIS\", \"TROPIHSMAYO\"     This a shared consult with Teresa Marr, CNP            IMPRESSION   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 She is now s/p TPAIT, splenectomy, and G/J placement with repair of small incisional hernia on 9/6/24. Cardiology was consulted because her HR went up to 150 today with EKG notable for A-flutter. Of note, patient first went into Afib in SICU on 09/08, which was " self-limited. Patient has been asymptomatic and she denies any history of Afib or heart disease. Echo  with LVEF 60-65%%, normal global and regional LV function and normal RV function and chamber size.    Patient was given Metoprolol IV x 2 with rate down to 130s and diltiazem 30 mg susp.     Patient is currently in Afib with -130s, but she remains asymptomatic. Etiology of Afib is multifactorial: infection (elevated WBC now s/p ertapenem) vs malnutrition vs blood loss (Hbg was 6.8- > 8.7). CHADS-VASc score is 2 (1+ sex, 1+ vague vasc hx), so no anticoagulant required. Patient is hemodynamically stable and asymptomatic, so rate control with diltiazem as below.    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 She is now s/p TPAIT, splenectomy, and G/J placement with repair of small incisional hernia on 24. Cardiology was consulted because her HR went to 150 today with EKG notable for A-flutter. Of note, patient first went into Afib in SICU on , which was self-limited. Patient has been asymptomatic    At the time of interview, the patient denies chest pain, SOB, palpitations, lightheadedness, or syncope. She also denies hx of Afib.    Review of Systems:    Complete review of systems was performed and negative except per HPI.             CARDIAC HISTORY AND IMAGING     12-Lead EC/10/2024  Notable for Afib-flutter    Transthoracic Echocardiogram(s):  TTE 2024  Interpretation Summary  Global and regional left ventricular function is normal with an EF of 60-65%.  Right  ventricular function, chamber size, wall motion, and thickness are  normal.  Both atria appear normal.  Pulmonary artery systolic pressure cannot be assessed.  The inferior vena cava cannot be assessed.  No significant valvular abnormalities present.  No pericardial effusion is present.  There is no prior study for direct comparison.     Catheterization(s):   N/A    CMR and/or Additional Imaging  N/A            PAST MEDICAL HISTORY      Past Medical History:   Diagnosis Date    Chronic abdominal pain     Chronic pancreatitis (H)     CFTR gene mutation    History of pancreatectomy 2024    History of smoking     Nicotine dependence due to vaping non-tobacco product     Port-A-Cath in place     Post-pancreatectomy diabetes (H)     Superior mesenteric artery stenosis (H24)      Active Problems:  Patient Active Problem List    Diagnosis Date Noted    Diabetes mellitus, type 2 (H) 2024     Priority: Medium    Diabetes mellitus secondary to pancreatic insufficiency (H) 2023     Priority: Medium    Chronic recurrent pancreatitis (H) 2021     Priority: Medium     Social History:  Social History     Tobacco Use    Smoking status: Former     Current packs/day: 0.00     Average packs/day: 1 pack/day for 8.0 years (8.0 ttl pk-yrs)     Types: Cigarettes     Start date: 2012     Quit date: 2020     Years since quittin.6    Smokeless tobacco: Never    Tobacco comments:     Currently Vapes   Substance Use Topics    Alcohol use: Not Currently    Drug use: Never     Family History:  Family History   Problem Relation Age of Onset    Depression Mother     Cerebrovascular Disease Mother     Aneurysm Mother         brain    Osteoporosis Mother     Unknown/Adopted Father     Deep Vein Thrombosis Maternal Grandmother     Heart Failure Maternal Grandmother     Unknown/Adopted Paternal Grandmother     Unknown/Adopted Paternal Grandfather     Anesthesia Reaction No family hx of      Medications:  Current  Facility-Administered Medications   Medication Dose Route Frequency Provider Last Rate Last Admin    acetaminophen *SUGAR FREE* (TYLENOL) solution 975 mg  975 mg Per J Tube Q8H Nia Sierra APRN CNP   975 mg at 09/10/24 0627    sennosides (SENOKOT) syrup 5 mL  5 mL Per J Tube BID Carlos Carmichael MD   5 mL at 09/09/24 2013    And    docusate (COLACE) 50 MG/5ML liquid 50 mg  50 mg Per J Tube BID Carlos Carmichael MD   50 mg at 09/09/24 2104    gabapentin (NEURONTIN) solution 300 mg  300 mg Oral or J tube BID Carlos Carmichael MD   300 mg at 09/10/24 0821    mvw complete formulation (PEDIATRIC) oral solution 1.5 mL  1.5 mL Oral or J tube Daily Carlos Carmichael MD   1.5 mL at 09/10/24 0821    pantoprazole (PROTONIX) 2 mg/mL suspension 40 mg  40 mg Oral or J tube Daily Carlos Carmichael MD   40 mg at 09/10/24 0822    polyethylene glycol (MIRALAX) Packet 17 g  17 g Per J Tube Daily Carlos Carmichael MD   17 g at 09/09/24 0745    potassium chloride 20 mEq in 50 mL intermittent infusion  20 mEq Intravenous Q1H Nia Sierra APRN CNP   20 mEq at 09/10/24 1004    sodium chloride (PF) 0.9% PF flush 3 mL  3 mL Intracatheter Q8H Carlos Carmichael MD   3 mL at 09/10/24 0627     Current Facility-Administered Medications   Medication Dose Route Frequency Provider Last Rate Last Admin    dextrose 10% infusion   Intravenous Continuous PRN Carlos Carmichael MD        dextrose 10% infusion   Intravenous Continuous PRN Carlos Carmichael MD   Stopped at 09/08/24 1422    heparin infusion 25,000 units in 0.45% NaCl 250 mL ANTICOAGULANT  400 Units/hr Intravenous Continuous Christina Calvin PA-C 4 mL/hr at 09/10/24 0807 400 Units/hr at 09/10/24 0807    HYDROmorphone (DILAUDID) PCA 0.2 mg/mL OPIOID TOLERANT   Intravenous Continuous Lisa Rossi, APRN CNP   Rate  Verify at 09/10/24 0808    insulin 1 unit/1mL in saline (NovoLIN-Regular) infusion- SOT TPIAT algorithm syringe  0-24 Units/hr Intravenous Continuous Carlos Carmichael MD 3 mL/hr at 09/10/24 1003 3 Units/hr at 09/10/24 1003    lactated ringers infusion   Intravenous Continuous Lisa Rossi APRN  mL/hr at 09/10/24 0807 Rate Verify at 09/10/24 0807    NO anticoagulation unless approved by Anesthesia Provider   Does not apply Continuous PRN Carlos Carmichael MD        ROPivacaine 0.2% in sodium chloride 0.9% PERINEURAL infusion   Perineural Continuous Nerve Block Carlos Carmichael MD 7 mL/hr at 09/09/24 2035 Rate Verify at 09/10/24 0810    ROPivacaine 0.2% in sodium chloride 0.9% PERINEURAL infusion   Perineural Continuous Nerve Block Carlos Carmichael MD 7 mL/hr at 09/09/24 2035 Rate Verify at 09/10/24 0809             PHYSICAL EXAM     Temp:  [98.4  F (36.9  C)-99.3  F (37.4  C)] 98.6  F (37  C)  Pulse:  [] 131  Resp:  [16-18] 18  BP: (115-129)/(75-93) 120/87  SpO2:  [95 %-100 %] 96 %    Intake/Output Summary (Last 24 hours) at 9/10/2024 1019  Last data filed at 9/10/2024 0900  Gross per 24 hour   Intake 3883.62 ml   Output 3150 ml   Net 733.62 ml     GEN: NAD.  CV: IRR- Afib, normal S1, S2. No murmurs. JVP flat. No edema. Symmetric 2+ radial pulses.  Pulm: Clear bilaterally with normal WOB.  Abd: Nondistended.    I saw and evaluated patient with Nurse Practitioner. I examined patient with Nurse Practitioner. I discussed the results with patient and Nurse Practitioner. I discussed our plan with patient and Nurse Practitioner. I agree with Nurse Practitioner's note and I edited the Nurse Practitioner's note to make it a more comprehensive document.    I spent directly 35 min with patient and NP during the consult.    Pritesh Mahmood MD, PhD  Professor of Medicine  Division of Cardiology

## 2024-09-10 NOTE — PLAN OF CARE
"/84   Pulse 116   Temp 97.6  F (36.4  C)   Resp 16   Ht 1.676 m (5' 6\")   Wt 81.1 kg (178 lb 12.8 oz)   SpO2 99%   BMI 28.86 kg/m        Shift: 0916-4796  Isolation Status: none  VS:  on RA, afebrile  Neuro: Aox4  Behaviors: calm, cooperative  BG: Q1hrs, insulin gtt on Algorithm #3  Labs/Imaging: K+ and Mag replaced this AM  Respiratory: WDL  Cardiac: Afib/Flutter with rates 110s-150s (non sustained), started Q6hrs diltiazem this am, PRN IV metop for HR >150s  Pain/Nausea: dilaudid PCA, oral tylenol/robaxin/dilaudid  Diet: NPO except ice chips  LDA: KELLY drain large serosang output, port and PIVx1  Infusion(s): LR @100cc/hr, Heparin SR @400u/hr, epidurals x2  GI/: TF @ goal 45, tubing system and relizorb to be changed tonight at 2000,; ultiple liquid BM's this shift, voiding CYU; Gtube vented PRN  Skin: midline incision CDI, breast dressing changed  Mobility: x1 assist        Goal Outcome Evaluation:      Plan of Care Reviewed With: patient    Overall Patient Progress: no changeOverall Patient Progress: no change    Outcome Evaluation: pain control, blood sugar control, HR tachy      "

## 2024-09-10 NOTE — PROGRESS NOTES
Home Infusion    Ciera is expected to discharge within a few days and will be going home on continuous enteral feeds with with Relizorb cassette s/p TPAIT.   Patient was open to a home infusion company in [place] and has done enteral tube feeds for about a week through a NG tube using a Kangaroo Devin pump.   Ciera is from [place] but will be staying locally at [facility] room 1025 after discharge.  Benefits have been checked and pt will have coverage through Medicare and BCBS policy subject to having a length of need greater than 90 days. Relizorb is covered under BCBS plan.     Met with patient and spouse Dinesh at bedside and informed them about [facility] services and plan for discharge.   Explained about home enteral pump and backpack for mobility while on continuous feeds.   Informed them that formula and supplies will be delivered to the hospital on day of discharge and I or another I nurse (depending on the day) will assist with hook up of home enteral feed pump and provide some additional teaching and information.  Explained about plan for appt in clinic day after discharge and first home nurse visit the day following clinic appt.  Talked about supplies and supply deliveries as well as 24/7 availability of I staff while on enteral therapy.       Patient verbalized understanding of all information given.   She is willing and able to learn and manage home enteral therapy along with Dinesh (spouse) who will be her primary CG.      Will continue to follow and update pt with more details once discharge is confirmed.    Ayesha Vail RN, BSN, B.S., [facility]  [facility], [address] [address]  [email or web address]

## 2024-09-10 NOTE — PLAN OF CARE
"Goal Outcome Evaluation:  /87 (BP Location: Right arm)   Pulse (!) 131   Temp 98.6  F (37  C) (Oral)   Resp 18   Ht 1.676 m (5' 6\")   Wt 81.1 kg (178 lb 12.8 oz)   SpO2 96%   BMI 28.86 kg/m      Shift: [id number]    VS: tachy 90s-150s  Neuro: Aox4  Cardio: Went into aflutter, IV metoprolol given x2, still tachy in 130s  Respiratory: WDL on RA  GI: small BM x1  : Voiding adequately  Skin: midline incision stapled HARLEEN KELLY x1 leaking at site  Diet: NPO, TF @40ml/hr, increase to 45ml/hr @ 0800  BG: insulin gtt (), Alg 2  LDA: PIV x2, R Port a cath, R KELLY  Infusions: Dilaudid PCA, epidural x2, heparin @400units/hr  Mobility: Ax1  Pain/Nausea: Severe abd pain  PRN medications: tylenol x1, Robaxin x1        "

## 2024-09-10 NOTE — PROGRESS NOTES
"Pain Service Progress Note  [facility]  Date: 09/10/2024       Patient Name: Ciera Perkins  MRN: [id number]  Age: 39 year old  Sex: female      Assessment:  Ciera Perkins is a 39 year old female with PMH of cystic fibrosis of pancreas, pancreatic insufficiency, malnutrition, SMA stenosis s/p angioplasty, MASLD, and chronic pancreatitis who was admitted on 9/6/2024 with cystic fibrosis of the pancreas now s/p Total pancreatectomy with autologous islet transplant, cholecystectomy,  G/J placement, and appendectomy on 9/6/24.    Procedure: Total pancreatectomy with autologous islet transplant, cholecystectomy,  G/J placement, and appendectomy on 9/6/24.    Date of Surgery: 9/6/24    Date of Catheter Placement: 9/6/24    Plan/Recommendations:  1. Regional Anesthesia/Analgesia  -Continuous Catheter Type/Site: bilateral paravertebral (PV) T6-7  Infusate: Ropivacaine 0.2%  Programmed Intermittent Bolus (PIB) at 7 mL Q60 min via each catheter, total infusion rate of 14 mL/hr    Plan to maintain catheter, max of 7 days    2. Anticoagulation  -Please contact Inpatient Pain Service before ordering or making any anticoagulation changes    AC: Heparin gtt     3. Multimodal Analgesia  - Per primary team    Pain Service will continue to follow.    Discussed with attending anesthesiologist    Joel \"Logan\" MD [place]  Anesthesia resident, CA-1/PGY-2      Overnight Events: NAEO. Pain overall similar to yesterday but was acutely worse this morning after being repositioned for a skin check.    Tubes/Drains: Yes  Bilateral PV Catheters   GJ Tube    Subjective: Doing similar to yesterday but currently in lots of pain due to recent repositioning with nursing  Nausea: No  Vomiting: No  Pruritus: No  Symptoms of LAST: No    Pain Location:  Abdomen    Pain Intensity:    Pain at Rest: 8/10   Comfort Goal: 6/10   Baseline Pain: 2/10   Satisfied with your level of pain control: Yes    Diet: NPO for " "Medical/Clinical Reasons Except for: Ice Chips  Adult Formula Drip Feeding: Continuous Other; Impact Peptide 1.5; Jejunostomy; Goal Rate: 45; mL/hr; Increase to 30 ml/hr now, then increase by 10 ml/hr q 12 hours to goal rate of 45 ml/hr.  Use Relizorb with TF (change 1 cartridge q 12 hours) - s...    Relevant Labs:  Recent Labs   Lab Test 09/07/24  0358 09/06/24  2309 09/06/24  1852   INR  --   --  1.54*     --  168   PTT 35   < > >240*   BUN 5.6*  --  7.4    < > = values in this interval not displayed.       Physical Exam:  Vitals: /87 (BP Location: Right arm)   Pulse (!) 131   Temp 98.6  F (37  C) (Oral)   Resp 18   Ht 1.676 m (5' 6\")   Wt 81.1 kg (178 lb 12.8 oz)   SpO2 96%   BMI 28.86 kg/m      Physical Exam:   Orientation:  Alert, oriented, and in no acute distress: Yes  Sedation: No    Motor Examination:  5/5 Strength in lower extremities: Yes    Sensory Level:   Decrease in sensation: No    Catheter Site:   Catheter entry site is clean/dry/intact: Deferred today as she was in lots of pain from recent skin check with nursing.        Relevant Medications:  Current Pain Medications:  Medications related to Pain Management (From now, onward)      Start     Dose/Rate Route Frequency Ordered Stop    09/09/24 0000  bisacodyl (DULCOLAX) suppository 10 mg         10 mg Rectal DAILY PRN 09/06/24 2158 09/08/24 0000  magnesium hydroxide (MILK OF MAGNESIA) suspension 30 mL         30 mL Per J Tube DAILY PRN 09/06/24 2158 09/07/24 0800  gabapentin (NEURONTIN) solution 300 mg         300 mg Oral or J tube 2 TIMES DAILY 09/06/24 2158 09/07/24 0800  polyethylene glycol (MIRALAX) Packet 17 g         17 g Per J Tube DAILY 09/06/24 2158 09/07/24 0800  sennosides (SENOKOT) syrup 5 mL        Placed in \"And\" Linked Group    5 mL Per J Tube 2 TIMES DAILY 09/06/24 2254 09/07/24 0800  docusate (COLACE) 50 MG/5ML liquid 50 mg        Placed in \"And\" Linked Group    50 mg Per J Tube 2 TIMES " "DAILY 09/06/24 2254 09/06/24 2330  HYDROmorphone (DILAUDID) PCA 0.2 mg/mL OPIOID TOLERANT          Intravenous CONTINUOUS 09/06/24 2311      09/06/24 2300  acetaminophen *SUGAR FREE* (TYLENOL) solution 975 mg         975 mg Per J Tube EVERY 8 HOURS 09/06/24 2158 09/09/24 2259 09/06/24 2158  acetaminophen *SUGAR FREE* (TYLENOL) solution 650 mg         650 mg Per J Tube EVERY 4 HOURS PRN 09/06/24 2158      09/06/24 2158  methocarbamol (ROBAXIN) tablet 750 mg         750 mg Oral EVERY 6 HOURS PRN 09/06/24 2158      09/06/24 2158  hydrOXYzine HCl (ATARAX) tablet 25 mg         25 mg Oral EVERY 6 HOURS PRN 09/06/24 2158      09/06/24 2158  lidocaine 1 % 0.1-1 mL         0.1-1 mL Other EVERY 1 HOUR PRN 09/06/24 2158      09/06/24 2158  lidocaine (LMX4) cream          Topical EVERY 1 HOUR PRN 09/06/24 2158      09/06/24 0830  ROPivacaine 0.2% in sodium chloride 0.9% PERINEURAL infusion          Perineural Continuous Nerve Block 09/06/24 0813      09/06/24 0830  ROPivacaine 0.2% in sodium chloride 0.9% PERINEURAL infusion          Perineural Continuous Nerve Block 09/06/24 0813              Primary Service Contacted with Recommendations? No      Please see A&P for additional details of medical decision making.      Acute Inpatient Pain Service [facility]  Hours of pain coverage 24/7   Page via [employer]- Please Page the Pain Team Via [facility]: \"PAIN MANAGEMENT ACUTE INPATIENT/ [facility]\"  "

## 2024-09-10 NOTE — DISCHARGE SUMMARY
[facility]    Discharge Summary  Transplant Surgery    Date of Admission:  9/6/2024  Date of Discharge:  9/17/2024  Discharging Provider: Carlos Carmichael MD / Saniya De Luna NP     Attestation: I saw and examined the patient with the transplant team. I independently reviewed all pertinent laboratory and imaging information and made independent management decisions including immunosuppression adjustment as indicted. I agree with the findings and plan as documented in this note.  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 S/p total pancreatectomy with islet autotransplant (TPAIT), splenectomy, and G/J tube placement with repair of small incisional hernia on 9/6/24.    Hospital Course   s/p TPAIT, splenectomy, GJ tube placement 9/6/24:  Islet yield Islet equivalents/kilogram: 2937. Pathology benign. Post-op US with patient vessels. KELLY drain removed prior to discharge.   - Follow up with Dr. Carmichael in clinic on 9/19/24 at noon.     Cardiorespiratory:  Atrial fib & flutter: Self-limited A-fib RVR in SICU on 9/8/24. EKG on 9/10/24 showed A-flutter. Asymptomatic. TSH normal. Controlled with diltiazem. Now in NSR.    - Continue diltiazem SR 60 mg BID.   - Follow up with Cardiology as able.      GI/Nutrition:   GERD: Continue  PPI.  Pancreatic insufficiency: Relizorb with tube feeds. Will need pancreatic enzymes started when diet advanced.  Moderate malnutrition in the context of chronic illness/disease: GJ tube placed, clamp G tube as tolerated. Continue TF via J at goal and sugar free clears.     Hematology:  Leukocytosis: Secondary to stress and splenectomy. Continue to monitor outpatient.   Anemia of chronic disease and acute blood loss: Hgb ~10, stable.  Thrombocytosis: Plt 1400 post-splenectomy. Allergy to ASA. Monitor.     Endocrine:  Post-pancreatectomy diabetes/stress hyperglycemia: Endocrinology consulted. Diabetic education completed. Plan on discharge:    - Glargine 46 units daily at 09:00   - Novolog correction scale q4h   - Follow up with Endocrinology as scheduled     Neuro:   Acute on chronic pain: Prior to admission managed with oral hydromorphone 4 mg Q3H. Will discharge on:    - Dilaudid 6 mg q3H via JT   - Neurontin 300 mg BID   - Acetaminophen 650 mg q4H PRN (sugar free)   - Robaxin 750 mg TID     Skin:   Wound, under right breast: Unclear etiology. Started as a large blister. WOC nurse consulted.   - Continue wound care q3days and PRN.  Wound, under GT hub: Sutures cut to relieve pressure. WOC nurse consulted.   - Continue wound care daily and PRN.        Significant Results and Procedures   9/6/24  PREOPERATIVE DIAGNOSES: Recurrent acute on chronic pancreatitis secondary to hereditary pancreatitis (CFTR mutant)  POSTOPERATIVE DIAGNOSES: Same  PROCEDURE: total pancreatectomy, islet cell autotransplant, splenectomy, and gastrojejunostomy tube placement, adhesiolysis > 90 minutes, and primary repair of small incisional hernia   SURGEON: Carlos Carmichael MD    Pending Results   These results will be followed up by Transplant team  Unresulted Labs Ordered in the Past 30 Days of this Admission       Date and Time Order Name Status Description    9/6/2024  6:44 PM Blood Culture Yeast Preliminary     9/6/2024  6:44 PM  Blood Culture Yeast Preliminary     9/6/2024 12:00 PM Prepare red blood cells (unit) Preliminary     9/6/2024 12:00 PM Prepare red blood cells (unit) Preliminary             Code Status   Full    Primary Care Physician   Hayden Houston    Physical Exam   Temp: 98.6  F (37  C) Temp src: Oral BP: 104/63 Pulse: 91   Resp: 16 SpO2: 99 % O2 Device: None (Room air)    Vitals:    09/15/24 0801 09/15/24 2349 09/17/24 0019   Weight: 74.1 kg (163 lb 4.8 oz) 72.3 kg (159 lb 8 oz) 71.6 kg (157 lb 12.8 oz)     Vital Signs with Ranges  Temp:  [98.6  F (37  C)-98.9  F (37.2  C)] 98.6  F (37  C)  Pulse:  [81-91] 91  Resp:  [16-18] 16  BP: ()/(63-71) 104/63  SpO2:  [95 %-99 %] 99 %  I/O last 3 completed shifts:  In: 1732 [P.O.:200; NG/GT:452]  Out: 1195 [Drains:1195]    Constitutional: resting comfortably in bed  Eyes: EOMI  Respiratory: unlabored on RA  Cardiovascular: perfused  GI: abdomen soft, non-distended. Midline incision closed with staples and open to air. G tube clamped, J tube with continuous TF. KELLY with serous output.   Genitourinary: no Sousa present  Skin: warm, dry  Musculoskeletal: no edema noted  Neurologic: A&Ox4  Neuropsychiatric: calm, pleasant    Time Spent on this Encounter   Saniya WHITE NP, personally saw the patient today and spent greater than 30 minutes discharging this patient.    Discharge Disposition   Discharged to local apartment ([facility])  Condition at discharge: Stable    Consultations This Hospital Stay   ENDOCRINOLOGY IP CONSULT  PHYSICAL THERAPY ADULT IP CONSULT  OCCUPATIONAL THERAPY ADULT IP CONSULT  CNS DIABETES IP CONSULT  CARE MANAGEMENT / SOCIAL WORK IP CONSULT  NUTRITION SERVICES ADULT IP CONSULT  PHARMACY IP CONSULT  PHARMACY IP CONSULT  ENDOCRINE DIABETES ADULT IP CONSULT  PHARMACY LIAISON FOR MEDICATION COVERAGE CONSULT  WOUND OSTOMY CONTINENCE NURSE  IP CONSULT  PHARMACY LIAISON FOR MEDICATION COVERAGE CONSULT  CARE MANAGEMENT / SOCIAL WORK IP CONSULT  CARDIOLOGY  "GENERAL ADULT IP CONSULT  NURSING TO CONSULT FOR VASCULAR ACCESS CARE IP CONSULT  WOUND OSTOMY CONTINENCE NURSE  IP CONSULT    Discharge Orders       Home Infusion Referral      Reason for your hospital stay    You were admitted after total pancreatectomy and auto islet transplant, splenectomy, and G/J tube placement 9/6/24. You're post-op course was complicated by Atrial fibrillation/A flutter with rapid ventricular rate and acute on chronic pain. You are discharging home in stable condition with close follow up.     Activity    Your activity upon discharge:   -Don't lift anything heavier than 10 pounds for 8 weeks (or longer if your surgeon has discussed this with you).  -Walk regularly.    -OK to drive only after no longer taking narcotics AND you feel comfortable working the breaks/clutch suddenly if needed.  -Wear abdominal binder for comfort if you desire.    You have been instructed to avoid NSAIDs (medications such as ibuprofen, \"Motrin\", naproxen, diclofenac) due to concern for possible gastrointestinal inflammation/ulceration. Take other medications as prescribed.    Keep narcotics out of reach of children. When finished with them, please destroy/discard any remaining pills responsibly. Often, your [facility] government office, local police station or pharmacy will have a drug deposit box.     Monitor and record    Monitor and recored blood glucose every 4 hours.     When to contact your care team    WHEN TO CONTACT YOUR  COORDINATOR:  Transplant Coordinator [phone]  Notify your coordinator if you have any new onset or dramatic increase in pain, difficulty obtaining or taking your medications, increased redness or drainage from your incision, fever greater than 100.5F, or decreased urine output.     If it is outside of office hours, please call the hospital switchboard at [phone] and ask to have the  pancreas transplant surgery fellow paged for urgent medical questions, or present to the emergency " department.     Wound care and dressings    Instructions to care for your wound at home:     Midline incision: Gently wash with soap and water, but do not soak/scrub. Leave open to air. Staples will be removed in clinic per your Transplant Surgeon.     GJ-tube wound: Daily and as needed when soiled  Gently cleanse with wound cleanser and 4x4 gauze   Place 4x4 split gauze around tube  Always keep tube secured with no tension using tube securement device (906238)     Under right breast wound: Every 3 days and as needed when soiled  Spray above wound with wound cleanser and allow it to run over wound- gently pat dry  Apply no sting barrier wipe to intact periwound skin  Cut piece of xeroform ([id number]) and lay over all open areas  Apply 4x4 mepilex over area- if not sticking ok to use sacral mepilex     Tubes and drains    You are going home with the following tubes or drains: G/J tube.  Tube cares per hospital or home care instructions.     IV access    You are going home with the following vascular access device: Port-a-Cath, de-accessed prior to discharge.     Discharge Instructions    IP Diabetes Management Team Discharge Instructions     Blood glucose (BG) monitoring:   Check your blood glucose every 4 hours.   Blood glucose (BG) goal:       Glucose Control Regimen:  Insulin glargine (Semglee) 46 units once daily at 9:00 AM. Take at the same time each day.     Insulin lispro (Humalog): take correction every 4 hours if your BG is greater than 120.     Correction Scale   Do Not give Correction Insulin if  or less.  -140 = 1 unit.  -160 = 2 units.  -180 = 3 units.  -200 = 4 units.  -220 = 5 units.  -240 = 6 units.  -260 = 7 units.  -280 = 8 units.  -300 = 9 units.  BG greater than 300 = 10 units.      Hypoglycemia Management while not taking food/liquids by mouth:  If blood glucose is 51-79 mg/dL    1 small tube of glucose gel.    Can place 4 ounces or  120 ml apple juice down J tube.    Re-check blood glucose in 15 minutes.    Repeat these steps every 15 minutes until blood glucose is above 80 mg/dL.     If your blood glucose is less than or equal to 50 mg/dL:    2 small tubes of glucose gel.    Can place 8 ounces or 240 ml apple juice down J tube    Re-check blood glucose in 15 minutes.    Repeat these steps every 15 minutes until blood glucose is above 80 mg/dL.     If unable to take anything by mouth, disoriented, BG severely low, may use Baqsimi (glucagon).        Endocrinology Outpatient follow up: Outpatient endocrinology appointment has been Scheduled for 10/4/2024: with Doreen Goodwin at 9:00 and with Dr Lupillo Hudson. Please call the clinic at [phone]  if you have non-urgent questions regarding your blood sugars or insulin. Call the clinic if your BG is running greater than 180 consistently or less than 70 consistently.      If you have urgent questions or concerns regarding your blood sugars or insulin, you may contact [phone] (the main hospital ). Ask to speak with the endocrinologist on call.     Adult [facility] Follow-up and recommended labs and tests    [facility] for labs and assess.  Labs to be drawn tomorrow and every Monday: CBC and CMP     Follow up with your surgeon, Dr. Carlos Carmichael on Thursday 9/19/24 at 12:00 pm.   Follow up with Endocrinology scheduled 10/4/2024: with Doreen Goodwin at 9:00 and with Dr Lupillo Hudson   Follow up with diabetic educator.   Follow up with dietician for review of carb counting in 1-2 weeks time.  Follow up with Cardiology once home in [place] as able.      Appointments on [place] and/or [facility] (with [facility] or [facility] provider or service). Call [phone] if you haven't heard regarding these appointments within 7 days of discharge.     Diet    Follow this diet upon discharge:       Adult Formula Drip Feeding: Continuous; Impact Peptide 1.5;  via Jejunostomy; Goal Rate: 45; mL/hr with Relizorb (change 1 cartridge every 12 hours)      Sugar Free Clear Liquid Diet    Free water order:  Free water route: Duodenum/Jejunum   Free water volume: 800 mL free water daily (Oral vs flushes)    Patient will require tube feeds for > 90 days.     Discharge Medications    Current Discharge Medication List        START taking these medications    Details   acetaminophen *SUGAR FREE* (TYLENOL) 32 mg/mL solution Place 20.313 mLs (650 mg) into J tube every 6 hours as needed for pain.  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 To accompany glucose monitor brands per insurance coverage. 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  Qty: 60 capsule, Refills: 2    Associated Diagnoses:  Atrial fibrillation with RVR (H)      gabapentin (NEURONTIN) 250 MG/5ML solution Take 6 mLs (300 mg) by mouth or J tube 2 times daily.  Qty: 470 mL, Refills: 0    Associated Diagnoses: Acute post-operative pain      Glucagon (BAQSIMI) 3 MG/DOSE nasal powder Spray 1 spray (3 mg) in nostril as needed for low blood sugar. in the event of unconscious hypoglycemia or hypoglycemic seizure. May repeat dose if no response after 15 minutes.  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  Qty: 15 mL, Refills: 2    Comments: If Lantus is not covered by insurance, may substitute Basaglar or Semglee or other insulin glargine product per insurance preference at same dose and frequency.    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  Qty: 60 tablet, Refills: 0    Associated Diagnoses: Acute post-operative pain      mvw complete formulation (PEDIATRIC) oral solution Take 1.5 mLs by mouth or J tube daily.  Qty: 30 mL, Refills: 1    Comments: NDC: CITRUS [address]-12  Associated Diagnoses: Moderate malnutrition (H24)      pantoprazole (PROTONIX) 2 mg/mL SUSP suspension Take 20 mLs (40 mg) by mouth or J tube daily.  Qty: 600 mL, Refills: 0    Associated Diagnoses: Gastroesophageal reflux disease, unspecified whether esophagitis present      sennosides (SENOKOT) 8.8  MG/5ML syrup Place 5-10 mLs into J tube 2 times daily as needed for constipation.  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  Qty: 152 mL, Refills: 0    Associated Diagnoses: Acute post-operative pain           CONTINUE these medications which have NOT CHANGED    Details   naloxone (NARCAN) 4 MG/0.1ML nasal spray Spray 4 mg into one nostril alternating nostrils as needed for opioid reversal. every 2-3 minutes until assistance arrives           STOP taking these medications       Continuous Glucose  (DEXCOM G7 ) JORDY Comments:   Reason for Stopping:             Allergies   Allergies   Allergen Reactions    Aspirin Anaphylaxis and Hives     HIVES (UTICARIA)    Has tolerated toradol injections during 11/8/23 admission    Bee Venom Anaphylaxis    Adhesive Tape Blisters     Data   Most Recent 3 CBC's:  Recent Labs   Lab Test 09/17/24  0631 09/16/24  0607 09/15/24  0615   WBC 26.2* 23.7* 25.6*   HGB 10.0* 9.5* 9.5*   MCV 89 91 90   PLT 1,446* 1,347* 1,154*      Most Recent 3 BMP's:  Recent Labs   Lab Test 09/17/24  0749 09/17/24  0631 09/17/24  0348 09/16/24  0814 09/16/24  0607 09/15/24  0757 09/15/24  0615   NA  --  136  --   --  137  --  133*   POTASSIUM  --  4.7  --   --  4.7  --  4.4   CHLORIDE  --  103  --   --  105  --  101   CO2  --  25  --   --  25  --  25   BUN  --  15.9  --   --  14.9  --  13.6   CR  --  0.53  --   --  0.53  --  0.52   ANIONGAP  --  8  --   --  7  --  7   CARLA  --  7.8*  --   --  7.7*  --  7.6*   * 134* 130*   < > 154*   < > 178*    < > = values in this interval not displayed.      Most Recent 2 LFT's:  Recent Labs   Lab Test 09/11/24  0535 09/08/24  0348   AST 19 67*   ALT 25 81*   ALKPHOS 93 55   BILITOTAL 0.2 0.3     Most Recent INR's and Anticoagulation Dosing History:  Anticoagulation Dose History          Latest Ref Rng & Units 9/3/2024 9/6/2024 9/12/2024   Recent Dosing and Labs   INR 0.85 - 1.15 1.15  1.54  1.09       Details                 Most Recent 3 Troponin's:No lab results found.  Most Recent Cholesterol Panel:  Recent Labs   Lab Test 09/03/24  0830   CHOL 148   LDL 76   HDL 43*   TRIG 143     Most Recent 6 Bacteria Isolates From Any Culture (See EPIC Reports for Culture Details):No lab results found.  Most Recent TSH, T4 and A1c Labs:  Recent Labs   Lab Test 09/10/24  0547 09/06/24  2254   TSH 1.70  --    A1C  --  6.7*     Results for orders placed or performed during the hospital encounter of 09/06/24   POC US Guidance Needle Placement    Impression    Bilateral Paravertebral Catheters   US Abdomen Limited w Abdomen Doppler Limited    Narrative    EXAMINATION: US ABDOMEN LIMITED W ABDOMEN DOPPLER LIMITED, 9/6/2024  7:47 PM     COMPARISON: None.    HISTORY: s.p TPIAT - please assess hepatic vascular structure    TECHNIQUE:  Gray-scale, color Doppler and spectral flow analysis.    FINDINGS:   There is no ascites.    Liver:   The liver parenchyma is echogenic. Liver measures 16.6 cm. No  evidence of a focal hepatic mass.     Kidneys:   Right kidney:  The right kidney demonstrates normal echotexture with  no evidence of a shadowing stone, focal mass or hydronephrosis.   10.7  cm in long axis dimension.    LIVER DOPPLER:  Splenic vein: Surgically absent.  Extrahepatic portal vein:  Patent continuous antegrade flow, 45  cm/sec.  Right portal vein flow is antegrade, measuring 20 cm/sec.  Left portal vein flow is antegrade, measuring 18 cm/sec.    Inferior vena cava: patent with flow toward the heart throughout..    Right, mid, left hepatic veins: Patent with flow towards the  inferior  vena cava.    The hepatic artery appears tortuous.  Extrahepatic hepatic artery: Low resistance waveform with flow towards  the liver. 366 cm/sec maximum velocity with resistive index 0.64.  Right hepatic artery: 107 cm/sec with resistive index 0.59.  Left hepatic artery: 55 cm/sec with resistive index 0.64.      Impression    Impression:   1. Normal hepatic artery resistive indices. Elevated hepatic artery  velocities are likely artifactual due to its tortuous course.  2. Patent portal vein.    I have personally reviewed the examination and initial interpretation  and I agree with the findings.    AYANA COYLE DO         SYSTEM ID:  [id number]   US Abdominal Doppler Complete    Narrative    EXAMINATION: US ABDOMEN OR PELVIS DOPPLER COMPLETE 9/11/2024 9:22 AM     COMPARISON: Ultrasound 9/6/2024, CT 1/27/2024    HISTORY: Doppler of liver to ensure patient portal vein after islet  transplant    TECHNIQUE: Grey-scale, color Doppler and spectral flow analysis.    Findings:  Extrahepatic portal vein flow is antegrade at 31 cm/s.  Right portal vein flow is antegrade, measuring 20 cm/s.  Left portal vein flow is antegrade, measuring 17 cm/s.    Flow in the hepatic artery is towards the liver and:  205 cm/s peak systolic  0.63 resistive index.     The splenic vein is not visualized.  The left, middle, and right  hepatic veins are patent with flow towards the IVC. The IVC is patent  with flow towards the heart.   The visualized aorta is not dilated.      Impression    Impression:   Patent hepatic vasculature with antegrade flow.    I have personally reviewed the examination and initial interpretation  and I agree with the findings.    VIC MEZA DO         SYSTEM ID:  [id number]   Echo Complete     Value    LVEF  60-65%    Narrative    [id number]  [id number]  [id number]  [id number]^LAMBERT^CHACHA^JAMAICA     [facility],[place]  Echocardiography Laboratory  [address] [place] [id number]      Name: AUSTEN ARELLANO  MRN: [id number]  : 1985  Study Date: 2024 08:48 AM  Age: 39 yrs  Gender: Female  Patient [facility]: [facility]  Reason For Study: Atrial Fibrillation  Ordering Physician: CHACHA VERDIN  Performed By: Ian Granado ESPERANZA     BSA: 1.9 m2  Height: 66 in  Weight: 169 lb  HR: 88  BP: 116/65 mmHg  ______________________________________________________________________________  Procedure  Complete Portable Echo Adult. Contrast Optison. Optison (NDC #6823-7876-38)  given intravenously. Patient was given 6 ml mixture of 3 ml Optison and 6 ml  saline. 3 ml wasted.  ______________________________________________________________________________  Interpretation Summary  Global and regional left ventricular function is normal with an EF of 60-65%.  Right ventricular function, chamber size, wall motion, and thickness are  normal.  Both atria appear normal.  Pulmonary artery systolic pressure cannot be assessed.  The inferior vena cava cannot be assessed.  No significant valvular abnormalities present.  No pericardial effusion is present.  There is no prior study for direct comparison.  ______________________________________________________________________________  Left Ventricle  Global and regional left ventricular function is normal with an EF of 60-65%.  Left ventricular wall thickness is normal. Left ventricular size is normal.  Left ventricular diastolic function is normal. No regional wall motion  abnormalities are seen.     Right Ventricle  Right ventricular function, chamber size, wall motion, and thickness are  normal.     Atria  Both atria appear normal.     Mitral Valve  The mitral valve is normal.     Aortic Valve  Aortic valve is normal in structure and function.     Tricuspid Valve  The tricuspid valve is normal. Trace tricuspid insufficiency is present.  Pulmonary artery systolic pressure cannot be assessed.     Pulmonic Valve  The pulmonic valve is normal.      Vessels  The thoracic aorta is normal. The pulmonary artery and bifurcation cannot be  assessed. The inferior vena cava cannot be assessed.     Pericardium  No pericardial effusion is present.     Miscellaneous  No significant valvular abnormalities present.     Compared to Previous Study  There is no prior study for direct comparison.  ______________________________________________________________________________  MMode/2D Measurements & Calculations  IVSd: 0.89 cm  LVIDd: 4.9 cm  LVIDs: 3.0 cm  LVPWd: 0.92 cm  FS: 39.4 %  LV mass(C)d: 153.5 grams  LV mass(C)dI: 82.4 grams/m2  asc Aorta Diam: 3.0 cm  LVOT diam: 2.2 cm  LVOT area: 3.9 cm2  Asc Ao diam index BSA (cm/m2): 1.6  Asc Ao diam index Ht(cm/m): 1.8     LA Volume Index (BP): 33.5 ml/m2  RWT: 0.37     Doppler Measurements & Calculations  MV E max luis miguel: 88.1 cm/sec  MV A max luis miguel: 79.6 cm/sec  MV E/A: 1.1  MV dec slope: 447.1 cm/sec2  MV dec time: 0.20 sec  PA acc time: 0.10 sec  E/E' av.0  Lateral E/e': 7.2  Medial E/e': 6.8     ______________________________________________________________________________  Report approved by: Jason Amaya 2024 09:28 AM

## 2024-09-10 NOTE — PROGRESS NOTES
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 She is now s/p TPAIT, splenectomy, and G/J placement with repair of small incisional hernia on 9/6/24 with Dr. Carmichael.  Inpatient Diabetes Service consulted for management of diabetes.          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 Remains NPO. Continue TPIAT insulin drip to keep BG  today to monitor insulin needs with TF now at goal rate, plan to transition off IV insulin tomorrow (24 hrs after TF at goal rate).     Discharge Planning: (tentative)    Medications: TBD    Test Claims: put TC in on 9/8/2024   Education: will need education, knows how to check bg    Outpatient Follow-up:  recommend [facility] - here from [place].  Scheduled 10/4/2024: with Doreen Goodwin at 9:00 and with Dr Lupillo Hudson     Please notify Inpatient Diabetes Service if changes are planned to steroids, nutrition, TPN/TF and anticipated procedures requiring prolonged NPO status.         Interval History/Review of Systems :   - The last 24 hours progress and nursing notes reviewed.  - Feeling tired  "today. Pain well managed. Tolerating continuous TF without n/v.   - Cardiology was consulted today for tachycardia and EKG notable for a-flutter.     Planned Procedures/Surgeries: none    Inpatient Glucose Control:       Recent Labs   Lab 09/10/24  0609 09/10/24  0547 09/10/24  0418 09/10/24  0311 09/10/24  0235 09/10/24  0202   GLC 97 127* 117* 106* 108* 120*             Medications Impacting Glycemia:   Steroids: none  D5W containing solutions/medications: LR with dextrose at 100cc, now stopped  Other medications impacting glucose: TF         Nutrition:   Orders Placed This Encounter      NPO for Medical/Clinical Reasons Except for: Ice Chips  Supplements:  none  TF: 9/7/2024: Impact Peptide 1.5 @ 10 ml/hr and advance by 10 ml q 24 hrs (and/or ONLY advance rate upon MD approval after daily evaluation) to goal Impact Peptide 1.5 @ goal of  45ml/hr  (1080ml/day) provides:  151 g CHO --> reached goal at 8AM 9/10  TPN: none        Diabetes History: see full consult note for complete diabetes history   Diabetes Type and Duration:   Prediabetes( as noted by Dr Marin) and there was a time they thought she had DM secondary to pancreatitis but this resolved per her   PTA Medication Regimen: none recently  Historical Diabetes Medications:      Basaglar 10 units  Tradjenta 5 mg daily  Metformin 1000 mg po bid  Glimiperide     Glucose monitoring device and frequency: not recently but has checked her BG in the past--> 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  at bedside.   Lungs:  breathing comfortably on room air. "   Mental Status:  alert and oriented x3  Psych:   Cooperative, appropriate affect           Data:     Lab Results   Component Value Date    A1C 6.7 (H) 09/06/2024    A1C 7.0 (H) 09/03/2024    A1C 6.2 (H) 05/08/2024       ROUTINE IP LABS (Last four results)  BMP  Recent Labs   Lab 09/10/24  0609 09/10/24  0547 09/10/24  0418 09/10/24  0311 09/09/24  0646 09/09/24  0554 09/08/24  1338 09/08/24  1307 09/08/24  0354 09/08/24  0348   NA  --  138  --   --   --  137  --  143  --  139   POTASSIUM  --  3.4  --   --   --  3.6  --  3.8  3.8  --  3.7   CHLORIDE  --  106  --   --   --  106  --  111*  --  108*   CARLA  --  7.4*  --   --   --  7.3*  --  7.3*  --  7.4*   CO2  --  26  --   --   --  24  --  25  --  25   BUN  --  9.6  --   --   --  8.4  --  8.6  --  8.8   CR  --  0.57  --   --   --  0.62  --  0.69  --  0.76   GLC 97 127* 117* 106*   < > 129*   < > 87   < > 109*    < > = values in this interval not displayed.     CBC  Recent Labs   Lab 09/10/24  0547 09/09/24  0554 09/09/24  0111 09/08/24  0936   WBC 24.8* 26.2* 29.0* 31.1*   RBC 3.02* 2.78* 2.32* 2.39*   HGB 8.7* 8.1* 6.8* 7.0*   HCT 26.9* 24.7* 21.3* 21.9*   MCV 89 89 92 92   MCH 28.8 29.1 29.3 29.3   MCHC 32.3 32.8 31.9 32.0   RDW 14.1 14.1 13.6 13.7    238 244 210     Inpatient Diabetes Service will continue to follow, please don't hesitate to contact the team with any questions or concerns.     Beverley Phillips PA-C  Inpatient Diabetes Service  Pager: [phone]  Available on [employer]wing    To contact Inpatient Diabetes Service:     7 AM - 5 PM: Page the IDS KEYA following the patient that day (see filed or incomplete progress notes/consult notes under Endocrinology)    OR if uncertain of provider assignment: page job code [phone]    5 PM - 7 AM: First call after hours is to primary service.    For urgent after-hours questions, page job code for on call fellow: [phone]     I spent a total of 30 minutes on the date of the encounter doing prep/post-work, chart review, history  and exam, documentation and further activities per the note including lab review, multidisciplinary communication, counseling the patient and/or coordinating care regarding acute hyper/hypoglycemic management, as well as discharge management and planning/communication.

## 2024-09-11 ENCOUNTER — APPOINTMENT (OUTPATIENT)
Dept: ULTRASOUND IMAGING | Facility: CLINIC | Age: 39
End: 2024-09-11
Attending: NURSE PRACTITIONER
Payer: COMMERCIAL

## 2024-09-11 ENCOUNTER — PRE VISIT (OUTPATIENT)
Dept: SURGERY | Facility: CLINIC | Age: 39
End: 2024-09-11

## 2024-09-11 ENCOUNTER — APPOINTMENT (OUTPATIENT)
Dept: PHYSICAL THERAPY | Facility: CLINIC | Age: 39
End: 2024-09-11
Attending: SURGERY
Payer: COMMERCIAL

## 2024-09-11 LAB
ALBUMIN SERPL BCG-MCNC: 2.2 G/DL (ref 3.5–5.2)
ALP SERPL-CCNC: 93 U/L (ref 40–150)
ALT SERPL W P-5'-P-CCNC: 25 U/L (ref 0–50)
AMYLASE SERPL-CCNC: 4 U/L (ref 28–100)
ANION GAP SERPL CALCULATED.3IONS-SCNC: 6 MMOL/L (ref 7–15)
APTT PPP: 30 SECONDS (ref 22–38)
AST SERPL W P-5'-P-CCNC: 19 U/L (ref 0–45)
ATRIAL RATE - MUSE: 312 BPM
BASOPHILS # BLD AUTO: 0.1 10E3/UL (ref 0–0.2)
BASOPHILS NFR BLD AUTO: 0 %
BILIRUB DIRECT SERPL-MCNC: <0.2 MG/DL (ref 0–0.3)
BILIRUB SERPL-MCNC: 0.2 MG/DL
BUN SERPL-MCNC: 10.8 MG/DL (ref 6–20)
CALCIUM SERPL-MCNC: 7.3 MG/DL (ref 8.8–10.4)
CHLORIDE SERPL-SCNC: 106 MMOL/L (ref 98–107)
CREAT SERPL-MCNC: 0.55 MG/DL (ref 0.51–0.95)
DIASTOLIC BLOOD PRESSURE - MUSE: NORMAL MMHG
EGFRCR SERPLBLD CKD-EPI 2021: >90 ML/MIN/1.73M2
EOSINOPHIL # BLD AUTO: 0.6 10E3/UL (ref 0–0.7)
EOSINOPHIL NFR BLD AUTO: 3 %
ERYTHROCYTE [DISTWIDTH] IN BLOOD BY AUTOMATED COUNT: 13.9 % (ref 10–15)
GLUCOSE BLDC GLUCOMTR-MCNC: 100 MG/DL (ref 70–99)
GLUCOSE BLDC GLUCOMTR-MCNC: 102 MG/DL (ref 70–99)
GLUCOSE BLDC GLUCOMTR-MCNC: 104 MG/DL (ref 70–99)
GLUCOSE BLDC GLUCOMTR-MCNC: 108 MG/DL (ref 70–99)
GLUCOSE BLDC GLUCOMTR-MCNC: 111 MG/DL (ref 70–99)
GLUCOSE BLDC GLUCOMTR-MCNC: 121 MG/DL (ref 70–99)
GLUCOSE BLDC GLUCOMTR-MCNC: 125 MG/DL (ref 70–99)
GLUCOSE BLDC GLUCOMTR-MCNC: 127 MG/DL (ref 70–99)
GLUCOSE BLDC GLUCOMTR-MCNC: 128 MG/DL (ref 70–99)
GLUCOSE BLDC GLUCOMTR-MCNC: 145 MG/DL (ref 70–99)
GLUCOSE BLDC GLUCOMTR-MCNC: 84 MG/DL (ref 70–99)
GLUCOSE SERPL-MCNC: 110 MG/DL (ref 70–99)
HCO3 SERPL-SCNC: 25 MMOL/L (ref 22–29)
HCT VFR BLD AUTO: 27.7 % (ref 35–47)
HGB BLD-MCNC: 8.9 G/DL (ref 11.7–15.7)
IMM GRANULOCYTES # BLD: 0.4 10E3/UL
IMM GRANULOCYTES NFR BLD: 2 %
INTERPRETATION ECG - MUSE: NORMAL
LIPASE SERPL-CCNC: 6 U/L (ref 13–60)
LYMPHOCYTES # BLD AUTO: 2.4 10E3/UL (ref 0.8–5.3)
LYMPHOCYTES NFR BLD AUTO: 10 %
MAGNESIUM SERPL-MCNC: 1.8 MG/DL (ref 1.7–2.3)
MCH RBC QN AUTO: 29.2 PG (ref 26.5–33)
MCHC RBC AUTO-ENTMCNC: 32.1 G/DL (ref 31.5–36.5)
MCV RBC AUTO: 91 FL (ref 78–100)
MONOCYTES # BLD AUTO: 3.6 10E3/UL (ref 0–1.3)
MONOCYTES NFR BLD AUTO: 16 %
NEUTROPHILS # BLD AUTO: 16.1 10E3/UL (ref 1.6–8.3)
NEUTROPHILS NFR BLD AUTO: 69 %
NRBC # BLD AUTO: 0.1 10E3/UL
NRBC BLD AUTO-RTO: 0 /100
P AXIS - MUSE: 61 DEGREES
PHOSPHATE SERPL-MCNC: 2.7 MG/DL (ref 2.5–4.5)
PLATELET # BLD AUTO: 492 10E3/UL (ref 150–450)
POTASSIUM SERPL-SCNC: 4 MMOL/L (ref 3.4–5.3)
PR INTERVAL - MUSE: NORMAL MS
PROT SERPL-MCNC: 4.4 G/DL (ref 6.4–8.3)
QRS DURATION - MUSE: 92 MS
QT - MUSE: 262 MS
QTC - MUSE: 406 MS
R AXIS - MUSE: 142 DEGREES
RBC # BLD AUTO: 3.05 10E6/UL (ref 3.8–5.2)
SODIUM SERPL-SCNC: 137 MMOL/L (ref 135–145)
SYSTOLIC BLOOD PRESSURE - MUSE: NORMAL MMHG
T AXIS - MUSE: -26 DEGREES
VENTRICULAR RATE- MUSE: 145 BPM
WBC # BLD AUTO: 23.3 10E3/UL (ref 4–11)

## 2024-09-11 PROCEDURE — 97116 GAIT TRAINING THERAPY: CPT | Mod: GP

## 2024-09-11 PROCEDURE — 83690 ASSAY OF LIPASE: CPT | Performed by: SURGERY

## 2024-09-11 PROCEDURE — 250N000011 HC RX IP 250 OP 636: Performed by: NURSE PRACTITIONER

## 2024-09-11 PROCEDURE — 120N000011 HC R&B TRANSPLANT UMMC

## 2024-09-11 PROCEDURE — 85730 THROMBOPLASTIN TIME PARTIAL: CPT | Performed by: SURGERY

## 2024-09-11 PROCEDURE — 250N000013 HC RX MED GY IP 250 OP 250 PS 637: Performed by: SURGERY

## 2024-09-11 PROCEDURE — 83735 ASSAY OF MAGNESIUM: CPT | Performed by: SURGERY

## 2024-09-11 PROCEDURE — 999N000248 HC STATISTIC IV INSERT WITH US BY RN

## 2024-09-11 PROCEDURE — 250N000009 HC RX 250: Performed by: NURSE PRACTITIONER

## 2024-09-11 PROCEDURE — 250N000012 HC RX MED GY IP 250 OP 636 PS 637: Performed by: STUDENT IN AN ORGANIZED HEALTH CARE EDUCATION/TRAINING PROGRAM

## 2024-09-11 PROCEDURE — 85025 COMPLETE CBC W/AUTO DIFF WBC: CPT | Performed by: SURGERY

## 2024-09-11 PROCEDURE — 36591 DRAW BLOOD OFF VENOUS DEVICE: CPT | Performed by: SURGERY

## 2024-09-11 PROCEDURE — 97530 THERAPEUTIC ACTIVITIES: CPT | Mod: GP

## 2024-09-11 PROCEDURE — 99232 SBSQ HOSP IP/OBS MODERATE 35: CPT | Mod: 24 | Performed by: STUDENT IN AN ORGANIZED HEALTH CARE EDUCATION/TRAINING PROGRAM

## 2024-09-11 PROCEDURE — 82248 BILIRUBIN DIRECT: CPT | Performed by: SURGERY

## 2024-09-11 PROCEDURE — 82150 ASSAY OF AMYLASE: CPT | Performed by: SURGERY

## 2024-09-11 PROCEDURE — 84100 ASSAY OF PHOSPHORUS: CPT | Performed by: SURGERY

## 2024-09-11 PROCEDURE — 258N000003 HC RX IP 258 OP 636: Performed by: STUDENT IN AN ORGANIZED HEALTH CARE EDUCATION/TRAINING PROGRAM

## 2024-09-11 PROCEDURE — 250N000013 HC RX MED GY IP 250 OP 250 PS 637: Performed by: NURSE PRACTITIONER

## 2024-09-11 PROCEDURE — 93975 VASCULAR STUDY: CPT | Mod: 26 | Performed by: RADIOLOGY

## 2024-09-11 PROCEDURE — 93975 VASCULAR STUDY: CPT

## 2024-09-11 PROCEDURE — 250N000011 HC RX IP 250 OP 636: Performed by: SURGERY

## 2024-09-11 RX ORDER — BISACODYL 10 MG
10 SUPPOSITORY, RECTAL RECTAL ONCE
Status: COMPLETED | OUTPATIENT
Start: 2024-09-11 | End: 2024-09-11

## 2024-09-11 RX ORDER — PEDIATRIC MULTIVIT 61/D3/VIT K 1500-800
1.5 CAPSULE ORAL DAILY
Status: DISCONTINUED | OUTPATIENT
Start: 2024-09-12 | End: 2024-09-17 | Stop reason: HOSPADM

## 2024-09-11 RX ADMIN — INSULIN ASPART 2 UNITS: 100 INJECTION, SOLUTION INTRAVENOUS; SUBCUTANEOUS at 15:48

## 2024-09-11 RX ADMIN — Medication 1.5 ML: at 08:12

## 2024-09-11 RX ADMIN — GABAPENTIN 300 MG: 250 SUSPENSION ORAL at 19:46

## 2024-09-11 RX ADMIN — DOCUSATE SODIUM 50 MG: 50 LIQUID ORAL at 19:46

## 2024-09-11 RX ADMIN — SODIUM CHLORIDE 1.5 UNITS/HR: 9 INJECTION, SOLUTION INTRAVENOUS at 08:08

## 2024-09-11 RX ADMIN — HYDROMORPHONE HYDROCHLORIDE 2 MG: 5 SOLUTION ORAL at 04:17

## 2024-09-11 RX ADMIN — Medication 40 MG: at 08:12

## 2024-09-11 RX ADMIN — HYDROMORPHONE HYDROCHLORIDE 2 MG: 5 SOLUTION ORAL at 08:12

## 2024-09-11 RX ADMIN — DIAZEPAM 1 MG: 5 INJECTION INTRAMUSCULAR; INTRAVENOUS at 05:05

## 2024-09-11 RX ADMIN — Medication 30 MG: at 02:19

## 2024-09-11 RX ADMIN — ACETAMINOPHEN 975 MG: 160 LIQUID ORAL at 06:12

## 2024-09-11 RX ADMIN — Medication 30 MG: at 14:19

## 2024-09-11 RX ADMIN — METHOCARBAMOL 750 MG: 750 TABLET ORAL at 04:27

## 2024-09-11 RX ADMIN — ACETAMINOPHEN 975 MG: 160 LIQUID ORAL at 22:40

## 2024-09-11 RX ADMIN — HYDROMORPHONE HYDROCHLORIDE 2 MG: 5 SOLUTION ORAL at 19:46

## 2024-09-11 RX ADMIN — HYDROMORPHONE HYDROCHLORIDE 2 MG: 5 SOLUTION ORAL at 12:05

## 2024-09-11 RX ADMIN — HYDROMORPHONE HYDROCHLORIDE 2 MG: 5 SOLUTION ORAL at 23:55

## 2024-09-11 RX ADMIN — Medication 30 MG: at 08:12

## 2024-09-11 RX ADMIN — HYDROMORPHONE HYDROCHLORIDE 2 MG: 5 SOLUTION ORAL at 00:15

## 2024-09-11 RX ADMIN — GABAPENTIN 300 MG: 250 SUSPENSION ORAL at 08:12

## 2024-09-11 RX ADMIN — Medication: at 12:00

## 2024-09-11 RX ADMIN — Medication 30 MG: at 19:46

## 2024-09-11 RX ADMIN — HYDROMORPHONE HYDROCHLORIDE 2 MG: 5 SOLUTION ORAL at 15:12

## 2024-09-11 RX ADMIN — INSULIN GLARGINE 36 UNITS: 100 INJECTION, SOLUTION SUBCUTANEOUS at 08:04

## 2024-09-11 RX ADMIN — ACETAMINOPHEN 975 MG: 160 LIQUID ORAL at 14:22

## 2024-09-11 RX ADMIN — INSULIN ASPART 2 UNITS: 100 INJECTION, SOLUTION INTRAVENOUS; SUBCUTANEOUS at 23:56

## 2024-09-11 RX ADMIN — ONDANSETRON 4 MG: 2 INJECTION INTRAMUSCULAR; INTRAVENOUS at 19:21

## 2024-09-11 ASSESSMENT — ACTIVITIES OF DAILY LIVING (ADL)
ADLS_ACUITY_SCORE: 27
ADLS_ACUITY_SCORE: 29
ADLS_ACUITY_SCORE: 27
ADLS_ACUITY_SCORE: 29
ADLS_ACUITY_SCORE: 27
ADLS_ACUITY_SCORE: 29
ADLS_ACUITY_SCORE: 27
ADLS_ACUITY_SCORE: 27

## 2024-09-11 NOTE — DISCHARGE INSTRUCTIONS
Tube feeding regimen:  Impact Peptide 1.5 @ 45 ml/hr via J-tube  Free water flushes of ____ q 1 hour via feed and flush system  *Use/change relizorb cartridge (for enzyme replacement) every 12 hours    Vitamin/Mineral Recommendations:  MVW complete (multivitamin) 1.5 ml daily through feeding tube daily - take for ~4 weeks.  Then transition to general multivitamin with minerals (Certavite or similar) + Vitamin D3 25 mcg (1000 international unit(s)) daily.    Recommended checking vitamin A, D, E, K and B12 every 1-2 years following surgery due to risk for deficiencies      GJ-tube wound(s): Daily and as needed when soiled  Gently cleanse with wound cleanser and 4x4 gauze   Place 4x4 split gauze around tube  Always keep tube secured with no tension using tube securement device (blue butterfly)     Under right breast wound(s): Every 3 days and as needed when soiled  Spray above wound with wound cleanser and allow it to run over wound- gently pat dry  Apply no sting barrier wipe to intact periwound skin  Cut piece of xeroform and lay over all open areas  Apply 4x4 mepilex over area- if not sticking ok to use sacral mepilex     Once healed OK to apply aquaphor daily and as needed      IP Diabetes Management Team Discharge Instructions    Blood glucose (BG) monitoring:   Check your blood glucose every 4 hours.   Blood glucose (BG) goal:       Glucose Control Regimen:  Insulin glargine (Semglee) 46 units once daily at 9:00 AM. Take at the same time each day.     Insulin lispro (Humalog): take correction every 4 hours if your BG is greater than 120.     Correction Scale   Do Not give Correction Insulin if  or less.   -140 = 1 unit.  -160 = 2 units.  -180 = 3 units.  -200 = 4 units.  -220 = 5 units.  -240 = 6 units.  -260 = 7 units.  -280 = 8 units.  -300 = 9 units.  BG greater than 300 = 10 units.      Hypoglycemia Management while not taking food/liquids by  mouth:  If blood glucose is 51-79 mg/dL  1 small tube of glucose gel.  Can place 4 ounces or 120 ml apple juice down J tube.  Re-check blood glucose in 15 minutes.  Repeat these steps every 15 minutes until blood glucose is above 80 mg/dL.     If your blood glucose is less than or equal to 50 mg/dL:  2 small tubes of glucose gel.  Can place 8 ounces or 240 ml apple juice down J tube  Re-check blood glucose in 15 minutes.  Repeat these steps every 15 minutes until blood glucose is above 80 mg/dL.     If unable to take anything by mouth, disoriented, BG severely low, may use Baqsimi (glucagon).       Endocrinology Outpatient follow up: Outpatient endocrinology appointment has been Scheduled for 10/4/2024: with Doreen Goodwin at 9:00 and with Dr Lupillo Hudson. Please call the clinic at [phone]  if you have non-urgent questions regarding your blood sugars or insulin. Call the clinic if your BG is running greater than 180 consistently or less than 70 consistently.      If you have urgent questions or concerns regarding your blood sugars or insulin, you may contact [phone] (the main hospital ). Ask to speak with the endocrinologist on call.     Supplies Needed at Discharge: please use the DIAB non-branded discharge supply order set ([id number])   One Touch Verio Glucose Meter   One Touch Verio test strips   One Touch Delica Lancets   Dexcom G 7 sensors   Sharps Container   Alcohol Wipes   B-D 4 mm brenda pen needles   Semglee (glargine) insulin pens   Insulin Lispro Kwikpens   Baqsimi Glucagon Nasal Powder

## 2024-09-11 NOTE — PLAN OF CARE
"/62 (BP Location: Left arm)   Pulse 86   Temp 98.7  F (37.1  C) (Pulmonary Artery)   Resp 16   Ht 1.676 m (5' 6\")   Wt 81.1 kg (178 lb 12.8 oz)   SpO2 97%   BMI 28.86 kg/m         Shift: 5944-2266  Isolation Status: none  VS:  on RA, afebrile  Neuro: Aox4  Behaviors: calm, cooperative  BG: q4hrs; rec'd 36u Lantus this am, off inguslin gtt since 10am  Labs/Imaging: RN managed electrolytes protocol  Respiratory: WDL  Cardiac: HR 80s-100s reverted back to NSR, continues Q6hrs diltiazem, PRN IV metop for HR >150s  Pain/Nausea: dilaudid PCA, oral tylenol/dilaudid, better pain control today compared to yesterday  Diet: CL diet SF  LDA: KELLY drain large serosang output, port and PIVx1  Infusion(s): epidurals x2, dilaudid PCA  GI/: TF @ goal 45, LBM yesterday,voiding CYU; Gtube vented PRN; pt requesting to have suppository tonight before bed  Skin: midline incision and breast wound CDI  Mobility: x1 assist        Goal Outcome Evaluation:      Plan of Care Reviewed With: patient    Overall Patient Progress: improvingOverall Patient Progress: improving    Outcome Evaluation: better pain control, diet advancing, will be off insulin gtt      "

## 2024-09-11 NOTE — PROGRESS NOTES
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 She is now s/p TPAIT, splenectomy, and G/J placement with repair of small incisional hernia on 9/6/24 with Dr. Carmichael.     s/p TPAIT 9/6/24: POD #5 . Islet yield Islet equivalents/kilogram: 2937.   Post-op US: patient vessels. KELLY drain x 1 with serosang output.   -Repeat liver US patent vasculature.    Cardiorespiratory:  Atrial fib & flutter: Self-limited A-fib RVR in SICU on 9/8. Rate up to 150 9/10 with EKG showing A-flutter. Metoprolol IV x2 slowed rate to 130s. Asymptomatic. Now in NSR, rates in the 90s  -Diltiazem 30mg q6hr  -Check TSH (WNL)  -Consult Cardiology    GI/Nutrition:   GERD: PPI daily  Pancreatic insufficiency: Relizorb with TF.   Moderate malnutrition in the context of chronic illness/disease: GJ tube placed, clamp G tube as tolerated. Tube feeds at goal. Start sugar free clears.   Bowel regimen: Senna & PEG    Fluid/Electrolytes: IVF: discontinue today; Replaced K & Mag today due to A-flutter.    : No acute issues.     Endocrine:  Post-pancreatectomy diabetes/stress hyperglycemia: Endocrine consulted. Continue insulin gtt, hoping to transition to subcutaneous insulin today.     Infection:   Leukocytosis: WBC 23.3 (24.8), likely 2/2 stress and splenectomy. Ertapenem for surgical ppx, stopped 9/9    Prophylaxis: Anticoagulation: Heparin gtt 400 units/hr. Will stop later today if hepatic US is patent    Neuro:   Acute on chronic pain: PTA managed with PO hydromorphone 4 mg Q3H. Pain Management team consulted.  Current regimen:    - Ropivacaine paravertebral block managed by Anesthesia   - Dilaudid PCA, STOP basal and continue 0.4mg q10m bolus   - Dilaudid 2mg q4H via JT   - Neurontin   -  Acetaminophen   - Atarax prn    - Robaxin PRN, STOP ineffective   - Valium IV 1mg q6H PRN muscle spasms HOLD FOR SEDATION. Received one dose overnight and thought it was helpful.    Activity: PT/OT consulted    Disposition: 7A    KEYA/Fellow/Resident Provider: Lesly Renae PA-C [id number]    Faculty: Carlos Carmichael MD  __________________________________________________________________  Transplant History: Admitted 9/6/2024 for TPAIT  9/6/2024 (Islet), Postoperative day: 5     Interval History: History is obtained from the patient  Overnight events: Feeling overall better today. Is noticing some reflux with G tube clamped.    ROS:   A 10-point review of systems was negative except as noted above.    Curent Meds:  Current Facility-Administered Medications   Medication Dose Route Frequency Provider Last Rate Last Admin    acetaminophen *SUGAR FREE* (TYLENOL) solution 975 mg  975 mg Per J Tube Q8H Nia Sierra APRN CNP   975 mg at 09/11/24 0612    diltiazem (CARDIZEM) solution 30 mg  30 mg Per J Tube Q6H [facility] Nia Sierra APRN CNP   30 mg at 09/11/24 0812    sennosides (SENOKOT) syrup 5 mL  5 mL Per J Tube BID Carlos Carmichael MD   5 mL at 09/09/24 2013    And    docusate (COLACE) 50 MG/5ML liquid 50 mg  50 mg Per J Tube BID Carlos Carmichael MD   50 mg at 09/09/24 2104    gabapentin (NEURONTIN) solution 300 mg  300 mg Oral or J tube BID Carlos Carmichael MD   300 mg at 09/11/24 0812    HYDROmorphone (STANDARD CONC) (DILAUDID) oral solution 2 mg  2 mg Per J Tube Q4H Nia Sierra APRN CNP   2 mg at 09/11/24 0812    insulin aspart (NovoLOG) injection (RAPID ACTING)  1-10 Units Subcutaneous Q4H Beverley Phillips PA-C        insulin glargine (LANTUS PEN) injection 36 Units  36 Units Subcutaneous Q24H Beverley Phillips PA-C   36 Units at 09/11/24 0804    [START ON 9/12/2024] mvw complete formulation (PEDIATRIC) oral solution 1.5 mL  1.5 mL Oral or J tube  "Daily Carlos Carmichael MD        pantoprazole (PROTONIX) 2 mg/mL suspension 40 mg  40 mg Oral or J tube Daily Carlos Carmichael MD   40 mg at 09/11/24 0812    polyethylene glycol (MIRALAX) Packet 17 g  17 g Per J Tube Daily Carlos Carmichael MD   17 g at 09/09/24 0745    sodium chloride (PF) 0.9% PF flush 3 mL  3 mL Intracatheter Q8H Carlos Carmichael MD   3 mL at 09/10/24 0627       Physical Exam:     Admit Weight: 71.8 kg (158 lb 4.6 oz)    Current Vitals:   /62 (BP Location: Left arm)   Pulse 86   Temp 98.7  F (37.1  C) (Pulmonary Artery)   Resp 16   Ht 1.676 m (5' 6\")   Wt 81.1 kg (178 lb 12.8 oz)   SpO2 97%   BMI 28.86 kg/m      Vital sign ranges:    Temp:  [97.6  F (36.4  C)-99  F (37.2  C)] 98.7  F (37.1  C)  Pulse:  [] 86  Resp:  [16] 16  BP: ()/(55-84) 107/62  SpO2:  [97 %-100 %] 97 %  Patient Vitals for the past 24 hrs:   BP Temp Temp src Pulse Resp SpO2   09/11/24 0953 107/62 98.7  F (37.1  C) Pulm Art 86 16 97 %   09/11/24 0539 101/55 99  F (37.2  C) Oral 99 16 97 %   09/11/24 0130 115/75 98.6  F (37  C) Oral 87 16 99 %   09/11/24 0100 -- -- -- 87 -- --   09/10/24 2300 -- -- -- 114 -- --   09/10/24 2148 101/77 98.3  F (36.8  C) Oral (!) 133 16 100 %   09/10/24 1830 98/76 98.7  F (37.1  C) Oral 104 16 97 %   09/10/24 1700 -- -- -- 116 -- --   09/10/24 1637 -- -- -- (!) 157 -- --   09/10/24 1534 -- -- -- (!) 138 -- --   09/10/24 1312 118/84 97.6  F (36.4  C) -- (!) 155 16 99 %     General Appearance: NAD  Skin: normal, no suspicious lesions or rashes  Heart: Perfused, regular rate  Lungs: Nonlabored resps on RA  Abdomen: The abdomen is soft, and moderately tender. The wound is Healing well. Tube/Drain sites are: GJ in place. KELLY: serosanguinous, moderate. GJ tube site intact.  : martinez is not present.    Extremities: edema: present bilaterally. 1+  Neuro: A&Ox4, somnolent but arousable    Data:   CMP  Recent Labs "   Lab 09/11/24  0857 09/11/24  0759 09/11/24  0554 09/11/24  0535 09/10/24  0609 09/10/24  0547 09/08/24  0828 09/08/24  0820 09/08/24  0354 09/08/24  0348 09/07/24  0501 09/07/24  0358 09/06/24  1740 09/06/24  1701   NA  --   --   --  137  --  138   < >  --   --  139  --  137   < > 136   POTASSIUM  --   --   --  4.0  --  3.4   < >  --   --  3.7  --  4.2   < > 4.3   CHLORIDE  --   --   --  106  --  106   < >  --   --  108*  --  106   < >  --    CO2  --   --   --  25  --  26   < >  --   --  25  --  22   < >  --    * 100*   < > 110*   < > 127*   < >  --    < > 109*   < > 133*   < > 125*   BUN  --   --   --  10.8  --  9.6   < >  --   --  8.8  --  5.6*   < >  --    CR  --   --   --  0.55  --  0.57   < >  --   --  0.76  --  0.64   < >  --    GFRESTIMATED  --   --   --  >90  --  >90   < >  --   --  >90  --  >90   < >  --    CARLA  --   --   --  7.3*  --  7.4*   < >  --   --  7.4*  --  7.7*   < >  --    ICAW  --   --   --   --   --   --   --  4.4  --   --   --   --   --  4.1*   MAG  --   --   --  1.8  --  1.7   < >  --   --  1.7  --  1.7   < >  --    PHOS  --   --   --  2.7  --  2.9   < >  --   --  2.8  --  3.7   < >  --    AMYLASE  --   --   --  4*  --   --   --   --   --   --   --  61  --   --    LIPASE  --   --   --  6*  --   --   --   --   --   --   --  85*  --   --    ALBUMIN  --   --   --  2.2*  --   --   --   --   --  2.6*  --  2.9*   < >  --    BILITOTAL  --   --   --  0.2  --   --   --   --   --  0.3  --  0.6   < >  --    ALKPHOS  --   --   --  93  --   --   --   --   --  55  --  48   < >  --    AST  --   --   --  19  --   --   --   --   --  67*  --  162*   < >  --    ALT  --   --   --  25  --   --   --   --   --  81*  --  125*   < >  --     < > = values in this interval not displayed.     CBC  Recent Labs   Lab 09/11/24  0535 09/10/24  0547 09/07/24  0358 09/06/24  8288   HGB 8.9* 8.7*   < >  --    WBC 23.3* 24.8*   < >  --    * 394   < >  --    A1C  --   --   --  6.7*    < > = values in this interval  not displayed.     Raji  Recent Labs   Lab 09/11/24  0535 09/10/24  0547 09/06/24  2309 09/06/24  1852   INR  --   --   --  1.54*   PTT 30 32   < > >240*    < > = values in this interval not displayed.

## 2024-09-11 NOTE — PROGRESS NOTES
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 She is now s/p TPAIT, splenectomy, and G/J placement with repair of small incisional hernia on 9/6/24 with Dr. Carmichael.  Inpatient Diabetes Service consulted for management of diabetes.          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/2024  Autosomal recessive hereditary pancreatitis with associated CFTR gene mutatio  Enteral Feed induced hyperglycemia  Stress induced hyperglycemia     Plan/Recommendations:    - BG goal  mg/dL  - Start Lantus 36 units q24 hrs at 0900 (to cover continuous TF)  - Discontinue TPIAT insulin drip 2 hrs after Lantus is given, ~1100  - Novolog Meal Coverage: none, sugar free clear liquids diet   - Start Novolog Correction Scale: 1:25 >120 q4hr   - BG monitoring: Every 1-2 hours on insulin drip --> q4hr     - Hypoglycemia protocol    - Carb counting protocol      Discussion: Continuous TF has been at goal rate (45 mL/hr) x 24 hrs. IV insulin needs trending ~1.5 unit/hr. Transition off IV insulin today to once daily Lantus and start q4hr correction scale. Patient also started clear liquid sugar-free diet. Spoke with primary team, patient will discharge on continuous TF and sugar-free clear liquid diet. Tentative discharge Friday 9/13 or Monday 9/16.      Discharge Planning: (tentative)    Medications: TBD. Likely Semglee once daily for continuous TF plus Lispro correction scale q4hrs. Has meter + supplies already. Needs Baqsimi. Would like Dexcom G7 prescribed at discharge.  Test Claims: completed 9/9 - Semglee, Lispro, Dexcom G7, Baqsimi.   Education: consulted 9/6, updated  9/11 about tentative discharge soon. knows how to use meter. Has previously used insulin too a while ago.   Outpatient Follow-up:  recommend [facility] - here from [place]. Scheduled 10/4/2024: with Doreen Goodwin at 9:00 and with Dr Lupillo Hudson     Please notify Inpatient Diabetes Service if changes are planned to steroids, nutrition, TPN/TF and anticipated procedures requiring prolonged NPO status.         Interval History/Review of Systems :   - The last 24 hours progress and nursing notes reviewed.  - Feeling ok today. Pain controlled. Up walking the hallways. Tolerating TF without nausea or emesis.   - Patient would like Dexcom G7 prescbied at discharge, her and  feel they will be able to set with up with phone jenniffer system (they are tech savvy). Plans to establish with endocrinologist back in [place], has appointments set up here after discharge.     Planned Procedures/Surgeries: none    Inpatient Glucose Control:       Recent Labs   Lab 09/11/24  0554 09/11/24  0535 09/11/24  0416 09/11/24  0223 09/11/24  0132 09/11/24  0019   * 110* 102* 84 104* 125*             Medications Impacting Glycemia:   Steroids: none  D5W containing solutions/medications: LR with dextrose at 100cc, now stopped  Other medications impacting glucose: TF         Nutrition:   Orders Placed This Encounter      NPO for Medical/Clinical Reasons Except for: Ice Chips  Supplements:  none  TF: 9/7/2024: Impact Peptide 1.5 @ 10 ml/hr and advance by 10 ml q 24 hrs (and/or ONLY advance rate upon MD approval after daily evaluation) to goal Impact Peptide 1.5 @ goal of  45ml/hr  (1080ml/day) provides:  151 g CHO --> reached goal at 8AM 9/10  TPN: none        Diabetes History: see full consult note for complete diabetes history   Diabetes Type and Duration:   Prediabetes( as noted by Dr Marin) and there was a time they thought she had DM secondary to pancreatitis but this resolved per her   PTA Medication Regimen:  "none recently  Historical Diabetes Medications:      Basaglar 10 units  Tradjenta 5 mg daily  Metformin 1000 mg po bid  Glimiperide     Glucose monitoring device and frequency: not recently but has checked her BG in the past--> 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  at bedside. TF on at 45 mL/hr.   Lungs:  breathing comfortably on room air.   Mental Status:  alert and oriented x3  Psych:   Cooperative, appropriate affect           Data:     Lab Results   Component Value Date    A1C 6.7 (H) 09/06/2024    A1C 7.0 (H) 09/03/2024    A1C 6.2 (H) 05/08/2024       ROUTINE IP LABS (Last four results)  BMP  Recent Labs   Lab 09/11/24  0554 09/11/24  0535 09/11/24  0416 09/11/24  0223 09/10/24  0609 09/10/24  0547 09/09/24  0646 09/09/24  0554 09/08/24  1338 09/08/24  1307   NA  --  137  --   --   --  138  --  137  --  143   POTASSIUM  --  4.0  --   --   --  3.4  --  3.6  --  3.8  3.8   CHLORIDE  --  106  --   --   --  106  --  106  --  111*   CARLA  --  7.3*  --   --   --  7.4*  --  7.3*  --  7.3*   CO2  --  25  --   --   --  26  --  24  --  25   BUN  --  10.8  --   --   --  9.6  --  8.4  --  8.6   CR  --  0.55  --   --   --  0.57  --  0.62  --  0.69   * 110* 102* 84   < > 127*   < > 129*   < > 87    < > = values in this interval not displayed.     CBC  Recent Labs   Lab 09/11/24  0535 09/10/24  0547 09/09/24  0554 09/09/24  0111   WBC 23.3* 24.8* 26.2* 29.0*   RBC 3.05* 3.02* 2.78* 2.32*   HGB 8.9* 8.7* 8.1* 6.8*   HCT 27.7* 26.9* 24.7* 21.3*   MCV 91 89 89 92   MCH 29.2 28.8 29.1 29.3   MCHC 32.1 32.3 32.8 31.9   RDW 13.9 14.1 14.1 " 13.6   * 394 238 614     Inpatient Diabetes Service will continue to follow, please don't hesitate to contact the team with any questions or concerns.     Beverley Phillips PA-C  Inpatient Diabetes Service  Pager: [phone]  Available on [employer]    To contact Inpatient Diabetes Service:     7 AM - 5 PM: Page the IDS KEYA following the patient that day (see filed or incomplete progress notes/consult notes under Endocrinology)    OR if uncertain of provider assignment: page job code [phone]    5 PM - 7 AM: First call after hours is to primary service.    For urgent after-hours questions, page job code for on call fellow: [phone]     I spent a total of 35 minutes on the date of the encounter doing prep/post-work, chart review, history and exam, documentation and further activities per the note including lab review, multidisciplinary communication, counseling the patient and/or coordinating care regarding acute hyper/hypoglycemic management, as well as discharge management and planning/communication.

## 2024-09-11 NOTE — PROGRESS NOTES
"/55 (BP Location: Left arm)   Pulse 99   Temp 99  F (37.2  C) (Oral)   Resp 16   Ht 1.676 m (5' 6\")   Wt 81.1 kg (178 lb 12.8 oz)   SpO2 97%   BMI 28.86 kg/m      Shift: 2140-7610     VS: Tachy 90s-160s  Neuro: Aox4  Cardio: Reverted back to NSR   Respiratory: WDL on RA  GI: LBM 9/10  : Voiding adequately  Skin: midline incision stapled HARLEEN KELLY x1 leaking at site  Diet: NPO, TF @45ml/hr  BG: insulin gtt ()), Alg 3  LDA: PIV x2, R Port a cath, R KELLY, G to gravity, J to TF  Infusions: Dilaudid PCA, epidural x2, heparin @400units/hr  Mobility: Ax1  Pain/Nausea: Severe abd pain  PRN medications: Robaxin and Valium  "

## 2024-09-11 NOTE — PROGRESS NOTES
"CLINICAL NUTRITION SERVICES - REASSESSMENT NOTE     Nutrition Prescription    RECOMMENDATIONS FOR MDs/PROVIDERS TO ORDER:  Adjust diet order to read \"sugar free\" to avoid high/erratic BG while on clear liquids (RD provided list of sugar free CL to patient, instructed only choosing from that list).     Consider alternative to sugar free tylenol d/t high osmolality (>6000 mOsm) and high sorbitol content.  If no alternative, would dilute with as much water as tolerated for administration.     Recommend Zenpep 25,000 - 3 with meals, 1-2 with snacks once diet advances to full liquids (or greater).     If appropriate to provide more free water via FT to maintain hydration, recommend 40 ml q 1 hour flushes via J-tube.  Total free water received daily from TF + flushes would = 1793 ml (~25 ml/kg, >1 ml/kcal)    Consider checking C.Diff d/t frequent watery stools since yesterday.  If negative, would add Nutrisource Fiber 1 pkt BID.     Malnutrition Status:    Moderate malnutrition in the context of acute illness    Interventions by Registered Dietitian (RD):  Modified MVW multivitamin to enter stop date (4 weeks total supplementation).  Once this is complete, recommend standard MVI with minerals + 1000 international unit(s)/25 mcg vitamin D3 daily.  Education provided on sugar free clear liquids (handout provided)  Education on pancreatic enzymes/recognizing malabsorption  Education on risk for micronutrient deficiencies  Handout provided: Diet Guidelines after Total Pancreatectomy    Reviewed patient's status with bedside RN.  Discussed diet/loose stools with team.        EVALUATION OF THE PROGRESS TOWARD GOALS   Diet: Clear Liquid/Mod CHO (advanced today)    Nutrition Support: Impact Peptide 1.5 @ 45 ml/hr     Intake: TF started 9/7.  Goal rate met 9/10.  Pt has received 418 ml TF daily on average over the last 5 days, providing 627 kcal and 39 grams protein daily (39% kcal needs, 47% pro needs).     Pt was receiving IVF " (3L/week) prior to admission.  Hopeful she will not require this after discharge.  She used Zenpep prior to admission but notes it didn't seem very effective for her.  She wants to try Zenpep again as she feels her response will be better s/p total pancreatectomy.      NEW FINDINGS   Weight: Wt currently up ~10 kg from admission.  Continue current DW of 64 kg.     Labs: Vit D <20 (9/3), BG  in the last 24 hours    Meds: Lantus 36 units, Novolog correction scale, acetaminophen suspension TID, Miralax, MVW complete 1.5 ml daily, Protonix, Insulin gtt.    GI: Pt with multiple loose watery stools per RN (~4-5x/day yesterday).     MALNUTRITION  % Intake: </= 50% for >/= 5 days (severe)  % Weight Loss: None noted  Subcutaneous Fat Loss: None observed  Muscle Loss: None observed  Fluid Accumulation/Edema: Mild  Malnutrition Diagnosis: Moderate malnutrition in the context of acute illness    Previous Goals   1.  Tolerate adv of TF to goal infusion without sx of refeeding within the next 5 days. - MET  2.  Once TF at goal, total ave EN intakes provide minimum of 25 kcal/kg/day and 1.3 g/kg/day (per 63.6 kg) - MET    Previous Nutrition Diagnosis  Altered GI function   Evaluation: No change    CURRENT NUTRITION DIAGNOSIS  Inadequate protein-energy intake related to slow TF advancement post-op as evidenced by patient receiving on average 39% kcal needs, 47% pro needs from TF over the last 5 days.       INTERVENTIONS  Implementation  Collaboration with other providers - discussed plan for diet change with team, loose stools - checking C.Diff and fiber  Nutrition education for recommended modifications - Discussed clear liquid, sugar free diet options on room service.  Discussed need for pancreatic enzymes long term and signs/symptoms of steatorrhea/malabsorption.  Reviewed an appropriate dose to start. Discussed risk for deficiencies (A, D, E, K, B12) s/p total pancreatectomy.  Encouraged getting these checked q 1-2 years  for monitoring. Provided handout: Diet Guidelines after Total Pancreatectomy.     Goals  Total avg nutritional intake to meet a minimum of 25 kcal/kg and 1.3 g PRO/kg daily (per dosing wt 64 kg).    Monitoring/Evaluation  Progress toward goals will be monitored and evaluated per protocol.    Saniya Flood, MS, RD, LD, CCTD, [facility]  7A + 7B (beds [phone])  Available on [employer]oc[employer] [7A or 7B Clinical Dietitian]   Weekend/Holiday: Vocera [Weekend Clinical Dietitian]

## 2024-09-11 NOTE — PROGRESS NOTES
"Pain Service Progress Note  [facility]  Date: 09/11/2024       Patient Name: Ciera Perkins  MRN: [id number]  Age: 39 year old  Sex: female      Assessment:  Ciera Perkins is a 39 year old female with PMH of cystic fibrosis of pancreas, pancreatic insufficiency, malnutrition, SMA stenosis s/p angioplasty, MASLD, and chronic pancreatitis who was admitted on 9/6/2024 with cystic fibrosis of the pancreas now s/p Total pancreatectomy with autologous islet transplant, cholecystectomy,  G/J placement, and appendectomy on 9/6/24.    Procedure: Total pancreatectomy with autologous islet transplant, cholecystectomy,  G/J placement, and appendectomy on 9/6/24.    Date of Surgery: 9/6/24    Date of Catheter Placement: 9/6/24    Plan/Recommendations:  1. Regional Anesthesia/Analgesia  -Continuous Catheter Type/Site: bilateral paravertebral (PV) T6-7  Infusate: Ropivacaine 0.2%  Programmed Intermittent Bolus (PIB) at 7 mL Q60 min via each catheter, total infusion rate of 14 mL/hr    Plan to maintain catheter, max of 7 days    2. Anticoagulation  -Please contact Inpatient Pain Service before ordering or making any anticoagulation changes    AC: Heparin gtt     3. Multimodal Analgesia  - Per primary team    Pain Service will continue to follow.    Discussed with attending anesthesiologist    Joel \"Logan\" MD [place]  Anesthesia resident, CA-1/PGY-2      Overnight Events: NAEO. Pain better managed today, especially compared to yesterday morning after she was being turned for which she had lots of pain.     Tubes/Drains: Yes  - Bilateral PV Catheters   - GJ Tube  - RUQ suction drain    Subjective: Doing better than yesterday. Pain 6-7/10 at rest and gets worse with movement.  Nausea: No  Vomiting: No  Pruritus: No  Symptoms of LAST: No    Pain Location:  Abdomen    Pain Intensity:    Pain at Rest: 6-7/10   Comfort Goal: 6/10   Baseline Pain: 2/10   Satisfied with your level of pain control: " "Yes    Diet: Adult Formula Drip Feeding: Continuous Other; Impact Peptide 1.5; Jejunostomy; Goal Rate: 45; mL/hr; Increase to 30 ml/hr now, then increase by 10 ml/hr q 12 hours to goal rate of 45 ml/hr.  Use Relizorb with TF (change 1 cartridge q 12 hours) - s...  Combination Diet Clear Liquid; Moderate Consistent Carb (60 g CHO per Meal) Diet    Relevant Labs:  Recent Labs   Lab Test 09/07/24  0358 09/06/24  2309 09/06/24  1852   INR  --   --  1.54*     --  168   PTT 35   < > >240*   BUN 5.6*  --  7.4    < > = values in this interval not displayed.       Physical Exam:  Vitals: /55 (BP Location: Left arm)   Pulse 99   Temp 99  F (37.2  C) (Oral)   Resp 16   Ht 1.676 m (5' 6\")   Wt 81.1 kg (178 lb 12.8 oz)   SpO2 97%   BMI 28.86 kg/m      Physical Exam:   Orientation:  Alert, oriented, and in no acute distress: Yes  Sedation: No    Motor Examination:  5/5 Strength in lower extremities: Yes    Sensory Level:   Decrease in sensation: No    Catheter Site:   Catheter entry site is clean/dry/intact: Yes        Relevant Medications:  Current Pain Medications:  Medications related to Pain Management (From now, onward)      Start     Dose/Rate Route Frequency Ordered Stop    09/09/24 0000  bisacodyl (DULCOLAX) suppository 10 mg         10 mg Rectal DAILY PRN 09/06/24 2158 09/08/24 0000  magnesium hydroxide (MILK OF MAGNESIA) suspension 30 mL         30 mL Per J Tube DAILY PRN 09/06/24 2158 09/07/24 0800  gabapentin (NEURONTIN) solution 300 mg         300 mg Oral or J tube 2 TIMES DAILY 09/06/24 2158 09/07/24 0800  polyethylene glycol (MIRALAX) Packet 17 g         17 g Per J Tube DAILY 09/06/24 2158 09/07/24 0800  sennosides (SENOKOT) syrup 5 mL        Placed in \"And\" Linked Group    5 mL Per J Tube 2 TIMES DAILY 09/06/24 2254 09/07/24 0800  docusate (COLACE) 50 MG/5ML liquid 50 mg        Placed in \"And\" Linked Group    50 mg Per J Tube 2 TIMES DAILY 09/06/24 225      09/06/24 " "2330  HYDROmorphone (DILAUDID) PCA 0.2 mg/mL OPIOID TOLERANT          Intravenous CONTINUOUS 09/06/24 2311      09/06/24 2300  acetaminophen *SUGAR FREE* (TYLENOL) solution 975 mg         975 mg Per J Tube EVERY 8 HOURS 09/06/24 2158 09/09/24 2259 09/06/24 2158  acetaminophen *SUGAR FREE* (TYLENOL) solution 650 mg         650 mg Per J Tube EVERY 4 HOURS PRN 09/06/24 2158 09/06/24 2158  methocarbamol (ROBAXIN) tablet 750 mg         750 mg Oral EVERY 6 HOURS PRN 09/06/24 2158 09/06/24 2158  hydrOXYzine HCl (ATARAX) tablet 25 mg         25 mg Oral EVERY 6 HOURS PRN 09/06/24 2158 09/06/24 2158  lidocaine 1 % 0.1-1 mL         0.1-1 mL Other EVERY 1 HOUR PRN 09/06/24 2158      09/06/24 2158  lidocaine (LMX4) cream          Topical EVERY 1 HOUR PRN 09/06/24 2158 09/06/24 0830  ROPivacaine 0.2% in sodium chloride 0.9% PERINEURAL infusion          Perineural Continuous Nerve Block 09/06/24 0813      09/06/24 0830  ROPivacaine 0.2% in sodium chloride 0.9% PERINEURAL infusion          Perineural Continuous Nerve Block 09/06/24 0813              Primary Service Contacted with Recommendations? No      Please see A&P for additional details of medical decision making.      Acute Inpatient Pain Service [facility]  Hours of pain coverage 24/7   Page via [employer]- Please Page the Pain Team Via [employer]: \"PAIN MANAGEMENT ACUTE INPATIENT/ [facility]\"  "

## 2024-09-11 NOTE — PROGRESS NOTES
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 She is now s/p TPAIT, splenectomy, and G/J placement with repair of small incisional hernia on 9/6/24. Cardiology was consulted because her HR went up to 150 09/10/24 with EKG notable for A-flutter. Patient was given Metoprolol IV x 2 with rate down to 130s and diltiazem 30 mg susp.     Of note, patient first went into Afib in SICU on 09/08, which was self-limited. Patient has been asymptomatic and she denies any history of Afib or heart disease. Echo 09/06 with LVEF 60-65%%, normal global and regional LV function and normal RV function and chamber size.          Patient  reverted back to sinus rhythm today. Etiology of Afib is multifactorial: infection (elevated WBC now s/p ertapenem) vs malnutrition vs blood loss (Hbg was 6.8- > 8.7). CHADS-VASc score is 2 (1+ sex, 1+ vague vasc hx), so no anticoagulant required. Patient has been hemodynamically stable and asymptomatic.    Recommendations:   - Ok to stop diltiazem since she reverted back to normal sinus  - Will reconsider rate control if she goes back to Afib  - If she goes into Aflutter, will consider cardioversion    Plan of care discussed with Dr. Santoyo, who agrees with above plan.    Thank you for consulting the cardiovascular services at the [facility]. Please do not hesitate to call us with any questions.      Vanessa Neff, MS4  Medical Student             INTERVAL HISTORY   Patient reverted back to sinus rhythm with HR <100. She reports feeling fine, no chest pain, SOB, palpitation, N/V.             CARDIAC HISTORY AND IMAGING   12-Lead EC/10/2024  Notable for Afib-flutter     Transthoracic Echocardiogram(s):  TTE 2024  Interpretation Summary  Global and regional left ventricular function is normal with an EF of 60-65%.  Right ventricular function, chamber size, wall motion, and thickness are  normal.  Both atria appear normal.  Pulmonary artery systolic pressure cannot be assessed.  The inferior vena cava cannot be assessed.  No significant valvular abnormalities present.  No pericardial effusion is present.  There is no prior study for direct comparison.      Catheterization(s):   N/A     CMR and/or Additional Imaging  N/A            PHYSICAL EXAM     Temp:  [97.6  F (36.4  C)-99  F (37.2  C)] 98.7  F (37.1  C)  Pulse:  [] 86  Resp:  [16] 16  BP: ()/(55-84) 107/62  SpO2:  [97 %-100 %] 97 %    Intake/Output Summary (Last 24 hours) at 2024 1223  Last data filed at 2024 1100  Gross per 24 hour   Intake 3874.97 ml   Output 4955 ml   Net -1080.03 ml     Physical Exam  Gen: WDWN, NAD.  CV: RRR, normal S1, S2. No murmur. JVP flat. Mild bilateral lower extremity edema. Symmetric 2+ radial pulse.  Pulm: Clear bilaterally with normal WOB.  Abd: Nondistended.            DIAGNOSTICS     All labs and imaging were reviewed, of note:    CMP  Recent Labs   Lab 24  0956 24  0857 24  0759 24  0554 24  0535 09/10/24  0609 09/10/24  0547 24  0646 24  0554 24  1338 24  1307 24  0354 24  0348 24  0501 24  0358 24  1940 24  1852   NA  --   --   --   --  137  --  138  --  137  --  143  --  139  --  137  --  137   POTASSIUM  --   --   --   --  4.0  --  3.4  --  3.6  --  3.8  3.8  --  3.7  --  4.2  --  4.7   CHLORIDE   --   --   --   --  106  --  106  --  106  --  111*  --  108*  --  106  --  107   CO2  --   --   --   --  25  --  26  --  24  --  25  --  25  --  22  --  20*   ANIONGAP  --   --   --   --  6*  --  6*  --  7  --  7  --  6*  --  9  --  10   * 108* 100* 121* 110*   < > 127*   < > 129*   < > 87   < > 109*   < > 133*   < > 148*   BUN  --   --   --   --  10.8  --  9.6  --  8.4  --  8.6  --  8.8  --  5.6*  --  7.4   CR  --   --   --   --  0.55  --  0.57  --  0.62  --  0.69  --  0.76  --  0.64  --  0.56   GFRESTIMATED  --   --   --   --  >90  --  >90  --  >90  --  >90  --  >90  --  >90  --  >90   CARLA  --   --   --   --  7.3*  --  7.4*  --  7.3*  --  7.3*  --  7.4*  --  7.7*  --  7.7*   MAG  --   --   --   --  1.8  --  1.7  --  1.7  --  1.7  --  1.7  --  1.7   < >  --    PHOS  --   --   --   --  2.7  --  2.9  --  2.6  --  2.5  --  2.8  --  3.7   < >  --    PROTTOTAL  --   --   --   --  4.4*  --   --   --   --   --   --   --  4.5*  --  4.7*  --  4.5*   ALBUMIN  --   --   --   --  2.2*  --   --   --   --   --   --   --  2.6*  --  2.9*  --  2.9*   BILITOTAL  --   --   --   --  0.2  --   --   --   --   --   --   --  0.3  --  0.6  --  0.9   ALKPHOS  --   --   --   --  93  --   --   --   --   --   --   --  55  --  48  --  47   AST  --   --   --   --  19  --   --   --   --   --   --   --  67*  --  162*  --  96*   ALT  --   --   --   --  25  --   --   --   --   --   --   --  81*  --  125*  --  68*    < > = values in this interval not displayed.     CBC  Recent Labs   Lab 09/11/24  0535 09/10/24  0547 09/09/24  0554 09/09/24  0111   WBC 23.3* 24.8* 26.2* 29.0*   RBC 3.05* 3.02* 2.78* 2.32*   HGB 8.9* 8.7* 8.1* 6.8*   HCT 27.7* 26.9* 24.7* 21.3*   MCV 91 89 89 92   MCH 29.2 28.8 29.1 29.3   MCHC 32.1 32.3 32.8 31.9   RDW 13.9 14.1 14.1 13.6   * 394 238 244     INR  Recent Labs   Lab 09/06/24  1852   INR 1.54*     Arterial Blood Gas  Recent Labs   Lab 09/06/24  1701 09/06/24  1454 09/06/24  1358 09/06/24  1249   PH 7.37  "7.36 7.37 7.42   PCO2 39 40 39 36   PO2 212* 141* 150* 158*   HCO3 22 23 23 24   O2PER 50.0 30.0 30.0 40.0     TroponinNo results found for: \"CTROPT\", \"TROPONINIS\", \"TROPIHSMAYO\"   "

## 2024-09-12 LAB
ANION GAP SERPL CALCULATED.3IONS-SCNC: 9 MMOL/L (ref 7–15)
APTT PPP: 32 SECONDS (ref 22–38)
BUN SERPL-MCNC: 14.2 MG/DL (ref 6–20)
CALCIUM SERPL-MCNC: 7.3 MG/DL (ref 8.8–10.4)
CHLORIDE SERPL-SCNC: 104 MMOL/L (ref 98–107)
CREAT SERPL-MCNC: 0.55 MG/DL (ref 0.51–0.95)
EGFRCR SERPLBLD CKD-EPI 2021: >90 ML/MIN/1.73M2
ERYTHROCYTE [DISTWIDTH] IN BLOOD BY AUTOMATED COUNT: 13.9 % (ref 10–15)
GLUCOSE BLDC GLUCOMTR-MCNC: 112 MG/DL (ref 70–99)
GLUCOSE BLDC GLUCOMTR-MCNC: 127 MG/DL (ref 70–99)
GLUCOSE BLDC GLUCOMTR-MCNC: 139 MG/DL (ref 70–99)
GLUCOSE BLDC GLUCOMTR-MCNC: 147 MG/DL (ref 70–99)
GLUCOSE BLDC GLUCOMTR-MCNC: 198 MG/DL (ref 70–99)
GLUCOSE BLDC GLUCOMTR-MCNC: 97 MG/DL (ref 70–99)
GLUCOSE SERPL-MCNC: 141 MG/DL (ref 70–99)
HCO3 SERPL-SCNC: 23 MMOL/L (ref 22–29)
HCT VFR BLD AUTO: 26.5 % (ref 35–47)
HGB BLD-MCNC: 8.4 G/DL (ref 11.7–15.7)
INR PPP: 1.09 (ref 0.85–1.15)
MAGNESIUM SERPL-MCNC: 2.5 MG/DL (ref 1.7–2.3)
MCH RBC QN AUTO: 29.6 PG (ref 26.5–33)
MCHC RBC AUTO-ENTMCNC: 31.7 G/DL (ref 31.5–36.5)
MCV RBC AUTO: 93 FL (ref 78–100)
PHOSPHATE SERPL-MCNC: 3.7 MG/DL (ref 2.5–4.5)
PLATELET # BLD AUTO: 605 10E3/UL (ref 150–450)
POTASSIUM SERPL-SCNC: 4.1 MMOL/L (ref 3.4–5.3)
POTASSIUM SERPL-SCNC: 4.1 MMOL/L (ref 3.4–5.3)
RBC # BLD AUTO: 2.84 10E6/UL (ref 3.8–5.2)
SODIUM SERPL-SCNC: 136 MMOL/L (ref 135–145)
WBC # BLD AUTO: 23.7 10E3/UL (ref 4–11)

## 2024-09-12 PROCEDURE — 36591 DRAW BLOOD OFF VENOUS DEVICE: CPT | Performed by: PHYSICIAN ASSISTANT

## 2024-09-12 PROCEDURE — 250N000009 HC RX 250: Performed by: NURSE PRACTITIONER

## 2024-09-12 PROCEDURE — 36592 COLLECT BLOOD FROM PICC: CPT | Performed by: SURGERY

## 2024-09-12 PROCEDURE — 250N000011 HC RX IP 250 OP 636: Performed by: SURGERY

## 2024-09-12 PROCEDURE — 250N000013 HC RX MED GY IP 250 OP 250 PS 637: Performed by: PHYSICIAN ASSISTANT

## 2024-09-12 PROCEDURE — 84132 ASSAY OF SERUM POTASSIUM: CPT | Performed by: SURGERY

## 2024-09-12 PROCEDURE — 83735 ASSAY OF MAGNESIUM: CPT | Performed by: SURGERY

## 2024-09-12 PROCEDURE — 85610 PROTHROMBIN TIME: CPT

## 2024-09-12 PROCEDURE — 250N000013 HC RX MED GY IP 250 OP 250 PS 637: Performed by: NURSE PRACTITIONER

## 2024-09-12 PROCEDURE — 250N000013 HC RX MED GY IP 250 OP 250 PS 637: Performed by: SURGERY

## 2024-09-12 PROCEDURE — 250N000011 HC RX IP 250 OP 636: Performed by: NURSE PRACTITIONER

## 2024-09-12 PROCEDURE — 271N000002 HC RX 271: Performed by: SURGERY

## 2024-09-12 PROCEDURE — 120N000011 HC R&B TRANSPLANT UMMC

## 2024-09-12 PROCEDURE — 85730 THROMBOPLASTIN TIME PARTIAL: CPT | Performed by: SURGERY

## 2024-09-12 PROCEDURE — 80048 BASIC METABOLIC PNL TOTAL CA: CPT | Performed by: PHYSICIAN ASSISTANT

## 2024-09-12 PROCEDURE — 84100 ASSAY OF PHOSPHORUS: CPT | Performed by: SURGERY

## 2024-09-12 PROCEDURE — 99232 SBSQ HOSP IP/OBS MODERATE 35: CPT | Mod: 24 | Performed by: PHYSICIAN ASSISTANT

## 2024-09-12 PROCEDURE — 85018 HEMOGLOBIN: CPT | Performed by: PHYSICIAN ASSISTANT

## 2024-09-12 PROCEDURE — 82310 ASSAY OF CALCIUM: CPT | Performed by: PHYSICIAN ASSISTANT

## 2024-09-12 RX ORDER — ACYCLOVIR 400 MG/1
1 TABLET ORAL ONCE
Qty: 1 EACH | Refills: 0 | Status: SHIPPED | OUTPATIENT
Start: 2024-09-12 | End: 2024-09-12

## 2024-09-12 RX ORDER — BLOOD PRESSURE TEST KIT
KIT MISCELLANEOUS
Qty: 200 EACH | Refills: 1 | Status: SHIPPED | OUTPATIENT
Start: 2024-09-12

## 2024-09-12 RX ORDER — CONTAINER,EMPTY
EACH MISCELLANEOUS
Qty: 1 EACH | Refills: 1 | Status: SHIPPED | OUTPATIENT
Start: 2024-09-12

## 2024-09-12 RX ORDER — HYDROMORPHONE HYDROCHLORIDE 1 MG/ML
2 SOLUTION ORAL
Status: DISCONTINUED | OUTPATIENT
Start: 2024-09-12 | End: 2024-09-17 | Stop reason: HOSPADM

## 2024-09-12 RX ORDER — BLOOD-GLUCOSE METER
EACH MISCELLANEOUS
Qty: 1 KIT | Refills: 0 | Status: SHIPPED | OUTPATIENT
Start: 2024-09-12

## 2024-09-12 RX ORDER — HYDROMORPHONE HYDROCHLORIDE 1 MG/ML
4 SOLUTION ORAL
Status: DISCONTINUED | OUTPATIENT
Start: 2024-09-12 | End: 2024-09-13

## 2024-09-12 RX ORDER — HYDROMORPHONE HYDROCHLORIDE 1 MG/ML
4-6 SOLUTION ORAL EVERY 4 HOURS
Status: DISCONTINUED | OUTPATIENT
Start: 2024-09-12 | End: 2024-09-12

## 2024-09-12 RX ORDER — HYDROMORPHONE HYDROCHLORIDE 1 MG/ML
0.5 INJECTION, SOLUTION INTRAMUSCULAR; INTRAVENOUS; SUBCUTANEOUS
Status: DISCONTINUED | OUTPATIENT
Start: 2024-09-12 | End: 2024-09-16

## 2024-09-12 RX ORDER — HYDROMORPHONE HYDROCHLORIDE 1 MG/ML
4-6 SOLUTION ORAL
Status: DISCONTINUED | OUTPATIENT
Start: 2024-09-12 | End: 2024-09-12

## 2024-09-12 RX ORDER — ACYCLOVIR 400 MG/1
1 TABLET ORAL
Qty: 9 EACH | Refills: 5 | Status: SHIPPED | OUTPATIENT
Start: 2024-09-12

## 2024-09-12 RX ADMIN — INSULIN ASPART 1 UNITS: 100 INJECTION, SOLUTION INTRAVENOUS; SUBCUTANEOUS at 04:14

## 2024-09-12 RX ADMIN — DIAZEPAM 1 MG: 5 INJECTION INTRAMUSCULAR; INTRAVENOUS at 10:59

## 2024-09-12 RX ADMIN — Medication 30 MG: at 20:10

## 2024-09-12 RX ADMIN — DOCUSATE SODIUM 50 MG: 50 LIQUID ORAL at 20:10

## 2024-09-12 RX ADMIN — Medication: at 08:15

## 2024-09-12 RX ADMIN — HYDROMORPHONE HYDROCHLORIDE 2 MG: 5 SOLUTION ORAL at 16:09

## 2024-09-12 RX ADMIN — Medication 1.5 ML: at 08:46

## 2024-09-12 RX ADMIN — HYDROMORPHONE HYDROCHLORIDE 4 MG: 5 SOLUTION ORAL at 14:39

## 2024-09-12 RX ADMIN — GABAPENTIN 300 MG: 250 SUSPENSION ORAL at 20:10

## 2024-09-12 RX ADMIN — Medication: at 03:51

## 2024-09-12 RX ADMIN — HYDROMORPHONE HYDROCHLORIDE 4 MG: 5 SOLUTION ORAL at 17:38

## 2024-09-12 RX ADMIN — GABAPENTIN 300 MG: 250 SUSPENSION ORAL at 08:46

## 2024-09-12 RX ADMIN — ACETAMINOPHEN 975 MG: 160 LIQUID ORAL at 22:59

## 2024-09-12 RX ADMIN — HYDROMORPHONE HYDROCHLORIDE 4 MG: 5 SOLUTION ORAL at 11:59

## 2024-09-12 RX ADMIN — DIAZEPAM 1 MG: 5 INJECTION INTRAMUSCULAR; INTRAVENOUS at 02:12

## 2024-09-12 RX ADMIN — ACETAMINOPHEN 975 MG: 160 LIQUID ORAL at 14:40

## 2024-09-12 RX ADMIN — HYDROMORPHONE HYDROCHLORIDE 2 MG: 5 SOLUTION ORAL at 08:46

## 2024-09-12 RX ADMIN — Medication 30 MG: at 08:46

## 2024-09-12 RX ADMIN — ACETAMINOPHEN 975 MG: 160 LIQUID ORAL at 05:41

## 2024-09-12 RX ADMIN — Medication 30 MG: at 14:40

## 2024-09-12 RX ADMIN — DIAZEPAM 1 MG: 5 INJECTION INTRAMUSCULAR; INTRAVENOUS at 18:50

## 2024-09-12 RX ADMIN — Medication 30 MG: at 01:57

## 2024-09-12 RX ADMIN — Medication 40 MG: at 08:46

## 2024-09-12 RX ADMIN — HYDROMORPHONE HYDROCHLORIDE 4 MG: 5 SOLUTION ORAL at 21:42

## 2024-09-12 RX ADMIN — HYDROMORPHONE HYDROCHLORIDE 2 MG: 5 SOLUTION ORAL at 04:08

## 2024-09-12 RX ADMIN — HYDROMORPHONE HYDROCHLORIDE 2 MG: 5 SOLUTION ORAL at 20:10

## 2024-09-12 ASSESSMENT — ACTIVITIES OF DAILY LIVING (ADL)
ADLS_ACUITY_SCORE: 29

## 2024-09-12 NOTE — CONSULTS
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 She is now s/p TPAIT, splenectomy, and G/J placement with repair of small incisional hernia on 9/6/24 with Dr. Carmichael.     Diabetes Educator consulted for s/p TPIAT. A1c was 6.7% on 9/6/24. Ciera has a Relion glucose meter and has used insulin in the past as well.      Inpatient Diabetes Service is following for glycemic management. Please see their notes for plan. TF at goal (45 mL/hr) and transitioned from IV insulin infusion to subcutaneous insulin.    Met with Ciera and spouse (Dinesh) at bedside for education. Ciera will be managing diabetes cares at home with support from spouse. Ciera was feeling tired, but agreeable to reviewing. She stated that she has done BG monitoring and insulin in past, she used to work as a CNA and understood hypoglycemia recognition and treatment, understood the current insulin plan.    Diet: Adult Formula Drip Feeding: Continuous Other; Impact Peptide 1.5; Jejunostomy; Goal Rate: 45; mL/hr; Increase to 30 ml/hr now, then increase by 10 ml/hr q 12 hours to goal rate of 45 ml/hr.  Use Relizorb with TF (change 1 cartridge q 12 hours) - s...  Combination Diet Clear Liquid       Barriers to diabetes management: None    Diabetes Education Provided:  Discussed reason for requiring blood glucose monitoring and insulin s/p TPIAT. They stated they understood type 3c diabetes, importance of BG control, and DKA. Provided \"Understanding Diabetes\" handout.   Reviewed insulin glargine and lispro action, dose, onset, peak, and duration. Discussed proper insulin pen storage, preparation, and injection technique. Discussed pen needle disposal in sharps container. Discussed site selection and rotation. They declined needing demonstration as " "they had done injections before. Provided \"4 mm pen needle - tips for good injection practice\" handout. Discussed frequency and dosing of insulin administration.  Frequency of testing discussed. Reviewed demo One Touch Verio meter briefly--Ciera familiar with using home glucose meters.     Discussed signs and symptoms of hypoglycemia. Reviewed treating blood glucose below 70 mg/dL with 15 grams of carbohydrates. Discussed examples of 15 grams of CHO. Reviewed treating blood glucose less than or equal to 50 mg/dL with 30 grams of CHO (if able to take PO) or Baqsimi (if unable to take PO). Spouse practiced with demo Baqsimi. Discussed rechecking BG 15 minutes after treatment and retreating if necessary. Advised to provide the clinic Dietitian with a list of typical foods patient eats to help with learning carb counting.   Reviewed calling provider for consistently elevated BGs and/or any lows.   Discussed following up with Endocrinology after discharge (IDS will submit an appointment request on day of discharge.   Ciera was tired, but agreed that she wanted to start using the Dexcom G7 at discharge. She napped while I reviewed with spouse. Reviewed Dexcom G7 CGM system with patient. Discussed setting up Dexcom G7 jenniffer (phone compatible). Discussed 10-day use, site selection, applying sensor, pairing sensor with jenniffer, warm up period. Discussed reading BG values, viewing trends, setting alarms. Discussed when to check a fingerstick BG and when to remove sensor (CT, MRI, direct Xray beam). Discussed Dexcom Share and Follow jenniffer for spouse to download.     TENTATIVE Diabetes Plan Reviewed with Patient: Patient instructed to refer to discharge papers for final plan.  Blood glucose (BG) monitoring:   Check your blood glucose every 4 hours.   Blood glucose (BG) goal:      Glucose Control Regimen:  Insulin glargine (Semglee) 40 units once daily at 9:00 AM. Take at the same time each day.     Insulin lispro (Humalog): " take correction every 4 hours if your BG is greater than 120.     Correction Scale   Do Not give Correction Insulin if  or less.   -140 = 1 unit.  -160 = 2 units.  -180 = 3 units.  -200 = 4 units.  -220 = 5 units.  -240 = 6 units.  -260 = 7 units.  -280 = 8 units.  -300 = 9 units.  BG greater than 300 = 10 units.     Hypoglycemia Management while not taking food/liquids by mouth:  If blood glucose is 51-79 mg/dL  1 small tube of glucose gel.  Can place 4 ounces or 120 ml apple juice down J tube.  Re-check blood glucose in 15 minutes.  Repeat these steps every 15 minutes until blood glucose is above 80 mg/dL.     If your blood glucose is less than or equal to 50 mg/dL:  2 small tubes of glucose gel.  Can place 8 ounces or 240 ml apple juice down J tube  Re-check blood glucose in 15 minutes.  Repeat these steps every 15 minutes until blood glucose is above 80 mg/dL.     If unable to take anything by mouth, disoriented, BG severely low, may use Baqsimi (glucagon).    Outpatient Follow-Up:   Follow-up with [facility] 1-2 weeks after discharge. You can call the [facility] at [phone] if you have non-urgent questions regarding your blood sugars or insulin.     If you have urgent questions or concerns regarding your blood sugars or insulin, you may contact [phone] (the main hospital ). Ask to speak with the Endocrinologist on call.    Supplies Needed at Discharge: please use the DIAB non-branded discharge supply order set ([id number])   One Touch Verio Glucose Meter   One Touch Verio test strips   One Touch Delica Lancets   Dexcom G 7 sensors   Sharps Container   Alcohol Wipes   B-D 4 mm brenda pen needles   Semglee (glargine) insulin pens   Insulin Lispro Kwikpens   Baqsimi Glucagon Nasal Powder     Diabetes education completed. Ciera and spouse were very knowledgeable about many diabetes concepts and skills, and  were engaged with education and asked appropriate questions. Both and stated that plan is manageable. Spouse feels comfortable with review of Dexcom G7 and willing to help Ciera with starting it at discharge. All questions answered. Notified RN, charge RN, RNCC, and IDS.     PATTI Frazier, [facility]  Diabetes Educator  Pager: [phone]  Office: [phone]  Securely message with [employer]

## 2024-09-12 NOTE — PROGRESS NOTES
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 She is now s/p TPAIT, splenectomy, and G/J placement with repair of small incisional hernia on 9/6/24 with Dr. Carmichael.     s/p TPAIT 9/6/24: POD #6 . Islet yield Islet equivalents/kilogram: 2937.   Post-op US: patient vessels. KELLY drain x 1 with serosang output.   -Repeat liver US patent vasculature.    Cardiorespiratory:  Atrial fib & flutter: Self-limited A-fib RVR in SICU on 9/8. Rate up to 150 9/10 with EKG showing A-flutter. Metoprolol IV x2 slowed rate to 130s. Asymptomatic. Now in NSR, rates in the 90s  -Diltiazem 30mg q6hr  -Check TSH (WNL)  -Consult Cardiology    GI/Nutrition:   GERD: PPI daily  Pancreatic insufficiency: Relizorb with TF.   Moderate malnutrition in the context of chronic illness/disease: GJ tube placed, clamp G tube as tolerated. Tube feeds at goal. Start sugar free clears.   Bowel regimen: Senna & PEG    Fluid/Electrolytes: IVF: discontinued; Replaced K & Mag 9/10 due to A-flutter.    : No acute issues.     Endocrine:  Post-pancreatectomy diabetes/stress hyperglycemia: Endocrine consulted. Transitioned to subcutaneous insulin, adjusting as tolerated.     Infection:   Leukocytosis: WBC 23.3 (24.8), likely 2/2 stress and splenectomy. Ertapenem for surgical ppx, stopped 9/9    Prophylaxis: Anticoagulation: Heparin gtt now stopped since hepatic US is patent    Neuro:   Acute on chronic pain: PTA managed with PO hydromorphone 4 mg Q3H. Pain Management team consulted.  Current regimen:    - Ropivacaine paravertebral block managed by Anesthesia   - Dilaudid PCA, 0.4mg q10m bolus- will STOP 9/12   - Dilaudid 2mg q4H via JT- will increase to 4-6 mg every 3 hours   - Neurontin   - Acetaminophen   -  Atarax prn    - Robaxin PRN, STOP ineffective   - Valium IV 1mg q6H PRN muscle spasms HOLD FOR SEDATION. Received one dose overnight and thought it was helpful.    Activity: PT/OT consulted    Disposition: 7A, planning for discharge in 1-3 days    KEYA/Fellow/Resident Provider: Lesly Renae PA-C [id number]    Faculty: Carlos Carmichael MD  __________________________________________________________________  Transplant History: Admitted 9/6/2024 for TPAIT  9/6/2024 (Islet), Postoperative day: 6     Interval History: History is obtained from the patient  Overnight events: Feeling overall better today. Noting some L sided cramping pain, doesn't think it's related to her bowels    ROS:   A 10-point review of systems was negative except as noted above.    Curent Meds:  Current Facility-Administered Medications   Medication Dose Route Frequency Provider Last Rate Last Admin    acetaminophen *SUGAR FREE* (TYLENOL) solution 975 mg  975 mg Per J Tube Q8H Nia Sierra APRN CNP   975 mg at 09/12/24 0541    diltiazem (CARDIZEM) solution 30 mg  30 mg Per J Tube Q6H [facility] Nia Sierra APRN CNP   30 mg at 09/12/24 0846    sennosides (SENOKOT) syrup 5 mL  5 mL Per J Tube BID Carlos Carmichael MD   5 mL at 09/09/24 2013    And    docusate (COLACE) 50 MG/5ML liquid 50 mg  50 mg Per J Tube BID Carlos Carmichael MD   50 mg at 09/11/24 1946    gabapentin (NEURONTIN) solution 300 mg  300 mg Oral or J tube BID Carlos Carmichael MD   300 mg at 09/12/24 0846    HYDROmorphone (STANDARD CONC) (DILAUDID) oral solution 4-6 mg  4-6 mg Per J Tube Q4H Lesly Renae PA-C        insulin aspart (NovoLOG) injection (RAPID ACTING)  1-10 Units Subcutaneous Q4H Jessica Sánchez PA-C   4 Units at 09/12/24 0845    insulin glargine (LANTUS PEN) injection 40 Units  40 Units Subcutaneous Q24H Jessica Sánchez PA-C   40 Units at 09/12/24 0845    mvw complete formulation (PEDIATRIC) oral  "solution 1.5 mL  1.5 mL Oral or J tube Daily Carlos Carmichael MD   1.5 mL at 09/12/24 0846    pantoprazole (PROTONIX) 2 mg/mL suspension 40 mg  40 mg Oral or J tube Daily Carlos Carmichael MD   40 mg at 09/12/24 0846    polyethylene glycol (MIRALAX) Packet 17 g  17 g Per J Tube Daily Carlos Carmichael MD   17 g at 09/09/24 0745    sodium chloride (PF) 0.9% PF flush 3 mL  3 mL Intracatheter Q8H Carlos Carmichael MD   3 mL at 09/10/24 0627       Physical Exam:     Admit Weight: 71.8 kg (158 lb 4.6 oz)    Current Vitals:   BP 94/62   Pulse 84   Temp 98.4  F (36.9  C)   Resp 17   Ht 1.676 m (5' 6\")   Wt 81.1 kg (178 lb 12.8 oz)   SpO2 96%   BMI 28.86 kg/m      Vital sign ranges:    Temp:  [98.2  F (36.8  C)-98.8  F (37.1  C)] 98.4  F (36.9  C)  Pulse:  [84-99] 84  Resp:  [16-18] 17  BP: ()/(62-77) 94/62  SpO2:  [96 %-100 %] 96 %  Patient Vitals for the past 24 hrs:   BP Temp Temp src Pulse Resp SpO2 Weight   09/12/24 0945 94/62 98.4  F (36.9  C) -- 84 -- 96 % --   09/12/24 0612 101/65 98.8  F (37.1  C) Oral 97 17 -- 81.1 kg (178 lb 12.8 oz)   09/12/24 0200 103/62 98.3  F (36.8  C) Oral 86 18 100 % --   09/12/24 0000 -- -- -- 91 -- -- --   09/11/24 2159 110/66 98.2  F (36.8  C) Oral 99 16 100 % --   09/11/24 1946 -- -- -- 84 -- -- --   09/11/24 1803 116/77 98.4  F (36.9  C) Oral 91 16 99 % --     General Appearance: NAD  Skin: normal, no suspicious lesions or rashes  Heart: Perfused, regular rate  Lungs: Nonlabored resps on RA  Abdomen: The abdomen is soft, and moderately tender. The wound is Healing well. Tube/Drain sites are: GJ in place. KELLY: serosanguinous, moderate. GJ tube site intact.  : martinez is not present.    Extremities: edema: present bilaterally. 1+  Neuro: A&Ox4    Data:   CMP  Recent Labs   Lab 09/12/24  0844 09/12/24  0541 09/11/24  0554 09/11/24  0535 09/08/24  0828 09/08/24  0820 09/08/24  0354 09/08/24  0348 09/07/24  0501 " 09/07/24  0358 09/06/24  1740 09/06/24  1701   NA  --  136  --  137   < >  --   --  139  --  137   < > 136   POTASSIUM  --  4.1  4.1  --  4.0   < >  --   --  3.7  --  4.2   < > 4.3   CHLORIDE  --  104  --  106   < >  --   --  108*  --  106   < >  --    CO2  --  23  --  25   < >  --   --  25  --  22   < >  --    * 141*   < > 110*   < >  --    < > 109*   < > 133*   < > 125*   BUN  --  14.2  --  10.8   < >  --   --  8.8  --  5.6*   < >  --    CR  --  0.55  --  0.55   < >  --   --  0.76  --  0.64   < >  --    GFRESTIMATED  --  >90  --  >90   < >  --   --  >90  --  >90   < >  --    CARLA  --  7.3*  --  7.3*   < >  --   --  7.4*  --  7.7*   < >  --    ICAW  --   --   --   --   --  4.4  --   --   --   --   --  4.1*   MAG  --  2.5*  --  1.8   < >  --   --  1.7  --  1.7   < >  --    PHOS  --  3.7  --  2.7   < >  --   --  2.8  --  3.7   < >  --    AMYLASE  --   --   --  4*  --   --   --   --   --  61  --   --    LIPASE  --   --   --  6*  --   --   --   --   --  85*  --   --    ALBUMIN  --   --   --  2.2*  --   --   --  2.6*  --  2.9*   < >  --    BILITOTAL  --   --   --  0.2  --   --   --  0.3  --  0.6   < >  --    ALKPHOS  --   --   --  93  --   --   --  55  --  48   < >  --    AST  --   --   --  19  --   --   --  67*  --  162*   < >  --    ALT  --   --   --  25  --   --   --  81*  --  125*   < >  --     < > = values in this interval not displayed.     CBC  Recent Labs   Lab 09/11/24  0535 09/10/24  0547 09/07/24  0358 09/06/24  2254   HGB 8.9* 8.7*   < >  --    WBC 23.3* 24.8*   < >  --    * 394   < >  --    A1C  --   --   --  6.7*    < > = values in this interval not displayed.     Coags  Recent Labs   Lab 09/12/24  0541 09/11/24  0535 09/06/24  2309 09/06/24  1852   INR  --   --   --  1.54*   PTT 32 30   < > >240*    < > = values in this interval not displayed.

## 2024-09-12 NOTE — PROGRESS NOTES
"Pain Service Progress Note  [facility]  Date: 09/12/2024       Patient Name: Ciera Perkins  MRN: [id number]  Age: 39 year old  Sex: female      Assessment:  Ciera Perkins is a 39 year old female with PMH of cystic fibrosis of pancreas, pancreatic insufficiency, malnutrition, SMA stenosis s/p angioplasty, MASLD, and chronic pancreatitis who was admitted on 9/6/2024 with cystic fibrosis of the pancreas now s/p Total pancreatectomy with autologous islet transplant, cholecystectomy,  G/J placement, and appendectomy on 9/6/24.    Procedure: Total pancreatectomy with autologous islet transplant, cholecystectomy,  G/J placement, and appendectomy on 9/6/24.    Date of Surgery: 9/6/24    Date of Catheter Placement: 9/6/24    Plan/Recommendations:  1. Regional Anesthesia/Analgesia  -Continuous Catheter Type/Site: bilateral paravertebral (PV) T6-7  Infusate: Ropivacaine 0.2%  Programmed Intermittent Bolus (PIB) at 7 mL Q60 min via each catheter, total infusion rate of 14 mL/hr    Plan to maintain catheter, max of 7 days    2. Anticoagulation  -Please contact Inpatient Pain Service before ordering or making any anticoagulation changes    AC: Heparin gtt     3. Multimodal Analgesia  - Per primary team    Pain Service will continue to follow.    Discussed with attending anesthesiologist    Joel \"Logan\" MD Mary Lou  Anesthesia resident, CA-1/PGY-2      Overnight Events: NAEO. Pain continues to be better day by day. She didn't sleep well last night so she is tired.     Tubes/Drains: Yes  - GJ Tube  - RUQ suction drain    Subjective: Doing better than yesterday. Pain 6-7/10 at rest and gets worse with movement.  Nausea: No  Vomiting: No  Pruritus: No  Symptoms of LAST: No    Pain Location:  Abdomen    Pain Intensity:    Pain at Rest: 6-7/10   Comfort Goal: 6/10   Baseline Pain: 2/10   Satisfied with your level of pain control: Yes    Diet: Adult Formula Drip Feeding: Continuous Other; " "Impact Peptide 1.5; Jejunostomy; Goal Rate: 45; mL/hr; Increase to 30 ml/hr now, then increase by 10 ml/hr q 12 hours to goal rate of 45 ml/hr.  Use Relizorb with TF (change 1 cartridge q 12 hours) - s...  Combination Diet Clear Liquid    Relevant Labs:  Recent Labs   Lab Test 09/07/24  0358 09/06/24  2309 09/06/24  1852   INR  --   --  1.54*     --  168   PTT 35   < > >240*   BUN 5.6*  --  7.4    < > = values in this interval not displayed.       Physical Exam:  Vitals: BP 94/62   Pulse 84   Temp 98.4  F (36.9  C)   Resp 17   Ht 1.676 m (5' 6\")   Wt 81.1 kg (178 lb 12.8 oz)   SpO2 96%   BMI 28.86 kg/m      Physical Exam:   Orientation:  Alert, oriented, and in no acute distress: Yes  Sedation: No    Motor Examination:  5/5 Strength in lower extremities: Yes    Sensory Level:   Decrease in sensation: No    Catheter Site:   Catheter entry site is clean/dry/intact: Yes        Relevant Medications:  Current Pain Medications:  Medications related to Pain Management (From now, onward)      Start     Dose/Rate Route Frequency Ordered Stop    09/09/24 0000  bisacodyl (DULCOLAX) suppository 10 mg         10 mg Rectal DAILY PRN 09/06/24 2158 09/08/24 0000  magnesium hydroxide (MILK OF MAGNESIA) suspension 30 mL         30 mL Per J Tube DAILY PRN 09/06/24 2158 09/07/24 0800  gabapentin (NEURONTIN) solution 300 mg         300 mg Oral or J tube 2 TIMES DAILY 09/06/24 2158 09/07/24 0800  polyethylene glycol (MIRALAX) Packet 17 g         17 g Per J Tube DAILY 09/06/24 2158 09/07/24 0800  sennosides (SENOKOT) syrup 5 mL        Placed in \"And\" Linked Group    5 mL Per J Tube 2 TIMES DAILY 09/06/24 2254 09/07/24 0800  docusate (COLACE) 50 MG/5ML liquid 50 mg        Placed in \"And\" Linked Group    50 mg Per J Tube 2 TIMES DAILY 09/06/24 2254 09/06/24 2330  HYDROmorphone (DILAUDID) PCA 0.2 mg/mL OPIOID TOLERANT          Intravenous CONTINUOUS 09/06/24 2311      09/06/24 2300  acetaminophen " "*SUGAR FREE* (TYLENOL) solution 975 mg         975 mg Per J Tube EVERY 8 HOURS 09/06/24 2158 09/09/24 2259 09/06/24 2158  acetaminophen *SUGAR FREE* (TYLENOL) solution 650 mg         650 mg Per J Tube EVERY 4 HOURS PRN 09/06/24 2158      09/06/24 2158  methocarbamol (ROBAXIN) tablet 750 mg         750 mg Oral EVERY 6 HOURS PRN 09/06/24 2158      09/06/24 2158  hydrOXYzine HCl (ATARAX) tablet 25 mg         25 mg Oral EVERY 6 HOURS PRN 09/06/24 2158      09/06/24 2158  lidocaine 1 % 0.1-1 mL         0.1-1 mL Other EVERY 1 HOUR PRN 09/06/24 2158      09/06/24 2158  lidocaine (LMX4) cream          Topical EVERY 1 HOUR PRN 09/06/24 2158      09/06/24 0830  ROPivacaine 0.2% in sodium chloride 0.9% PERINEURAL infusion          Perineural Continuous Nerve Block 09/06/24 0813      09/06/24 0830  ROPivacaine 0.2% in sodium chloride 0.9% PERINEURAL infusion          Perineural Continuous Nerve Block 09/06/24 0813              Primary Service Contacted with Recommendations? No      Please see A&P for additional details of medical decision making.      Acute Inpatient Pain Service [facility]  Hours of pain coverage 24/7   Page via Amcom- Please Page the Pain Team Via Amcom: \"PAIN MANAGEMENT ACUTE INPATIENT/ [facility]\"  "

## 2024-09-12 NOTE — PLAN OF CARE
"/84 (BP Location: Right arm)   Pulse 80   Temp 98.4  F (36.9  C) (Oral)   Resp 16   Ht 1.676 m (5' 6\")   Wt 81.1 kg (178 lb 12.8 oz)   SpO2 96%   BMI 28.86 kg/m        Shift: 5524-0951  Isolation Status: none  VS:  on RA, afebrile  Neuro: Aox4  Behaviors: calm, cooperative  BG: q4hrs; lantus dosing increased to 40u this am  Labs/Imaging: RN managed electrolytes protocol  Respiratory: WDL  Cardiac: HR 80s-90s reverted back to NSR, continues Q6hrs diltiazem, PRN IV metop for HR >150s  Pain/Nausea: dilaudid PCA discontinued, taking oral tylenol/dilaudid, required PRN dilaudid x1 this shift, rec'd valium x2  Diet: CL diet SF  LDA: KELLY drain large serosang output, port and PIVx1  Infusion(s): epidurals x2   GI/: TF @ goal 45, LBM today, voiding CYU; Gtube vented PRN  Skin: midline incision and breast wound CDI  Mobility: x1 assist          Goal Outcome Evaluation:      Plan of Care Reviewed With: patient    Overall Patient Progress: improvingOverall Patient Progress: improving    Outcome Evaluation: off insulin, on SF CL diet, ambulating in halls      "

## 2024-09-12 NOTE — PROGRESS NOTES
Care Management Follow Up    Length of Stay (days): 6    Expected Discharge Date: 09/14/2024     Concerns to be Addressed: discharge planning     Patient plan of care discussed at interdisciplinary rounds: Yes    Anticipated Discharge Disposition: Home Infusion ([facility])      Anticipated Discharge Services: Other (see comment)  Anticipated Discharge DME: None    Patient/family educated on Medicare website which has current facility and service quality ratings: no  Education Provided on the Discharge Plan: Yes  Patient/Family in Agreement with the Plan: yes    Referrals Placed by CM/SW: External Care Coordination, Home Infusion  Private pay costs discussed: Not applicable    Discussed  Partnership in Safe Discharge Planning  document with patient/family: No     Handoff Completed: No, handoff not indicated or clinically appropriate    Additional Information:  Pt status reviewed/discussed during care team rounds.  Team anticipate possible discharge over the weekend.  Pt will discharge on continuous enteral feeds, ATC appointment will need to be confirmed prior to discharge.     Met with pt and spouse.  Pt spouse brought in Relion Glucometer, lancets and test strips.  Reviewed anticipated plan for discharge.   Plan for diabetic education tomorrow.  Pt and spouse note no concerns or questions.    Messaged June [facility] [place] plan for additional enteral education tomorrow.    Messaged Lucretia Estrada Diabetic Educator with pt home glucometer information.  Per Lucretia ideally pt would benefit from a new glucometer.  Confirmed with [facility] Liaison one touch glucometer covered $0 copay.      Next Steps:  Ensure enteral education, diabetic education are completed prior to discharge. Will need ATC appointment arranged. Coordinate with [facility] Liaison. Ensure final home infusion orders have been placed.     Local Address  [facility]  [address] [place] [address]   [id number] [place] [address]  Infusion  [facility]  Phone # [phone]  Fax # [phone]   Enteral formula, pump, bags and relizorb cartridges.    Yeni Campbell RN BSN, PHN, ACM-RN  September 12, 2024  7A Nurse Coordinator    Phone: [phone]  Available on [employer] 7A SOT RNCC  Weekend/Holiday 7A SOT RNCC    9/12/2024

## 2024-09-12 NOTE — PROGRESS NOTES
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 She is now s/p TPAIT, splenectomy, and G/J placement with repair of small incisional hernia on 9/6/24 with Dr. Carmichael.  Inpatient Diabetes Service consulted for management of diabetes.          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 Colleague Spoke with primary team, patient will discharge on continuous TF and sugar-free clear liquid diet. Tentative discharge now is likely Monday 9/16. BG yesterday . Yesterday had a BG at 8 az=765,  and this am UQ=173 not sure why, since this time Increased lantus today.  Same correction.>120.     Discharge Planning: (tentative)    Medications: TBD. Likely Semglee once daily for continuous TF plus Lispro correction scale q4hrs. Has meter + supplies already. Needs Baqsimi. Would like Dexcom G7 prescribed at discharge.Upon review this dexcom 7 sensor is already ordered for discharge today  Test Claims: completed 9/9 - Semglee, Lispro, Dexcom G7, Baqsimi.   Education:Teaching completed on 9/12/2024. knows how to use meter. Has previously used insulin too a  while ago. Also a CNA.  Outpatient Follow-up:  recommend [facility] - here from [place]. Scheduled 10/4/2024: with Doreen Goodwin at 9:00 and with Dr Lupillo Hudson     Please notify Inpatient Diabetes Service if changes are planned to steroids, nutrition, TPN/TF and anticipated procedures requiring prolonged NPO status.         Interval History/Review of Systems :   - The last 24 hours progress and nursing notes reviewed  No nausea or emesis. Loose stool.  Some increased pain so no going home today.  Likely Monday.    - Patient would like Dexcom G7 prescribed at discharge, her and  feel they will be able to set with up with phone jenniffer system (they are tech savvy). Sent RX for Dexcom 7 down to discharge pharmacy today    Plans to establish with endocrinologist back in [place], has appointments set up here after discharge.   Creat=0.52    Planned Procedures/Surgeries: none    Inpatient Glucose Control:       Recent Labs   Lab 09/12/24  0414 09/11/24  2354 09/11/24  1958 09/11/24  1546 09/11/24  1222 09/11/24  0956   * 147* 111* 145* 128* 127*             Medications Impacting Glycemia:   Steroids: none  D5W containing solutions/medications: LR with dextrose at 100cc, now stopped  Other medications impacting glucose: TF         Nutrition:   Orders Placed This Encounter      Combination Diet Clear Liquid  Supplements:  none  TF: 9/7/2024: Impact Peptide 1.5 @ 10 ml/hr and advance by 10 ml q 24 hrs (and/or ONLY advance rate upon MD approval after daily evaluation) to goal Impact Peptide 1.5 @ goal of  45ml/hr  (1080ml/day) provides:  151 g CHO --> reached goal at 8AM 9/10  TPN: none        Diabetes History: see full consult note for complete diabetes history   Diabetes Type and Duration:   Prediabetes( as noted by Dr Marin) and there was a time they thought she had DM secondary to pancreatitis but this resolved per her   PTA Medication Regimen: none recently  Historical Diabetes  "Medications:      Basaglar 10 units  Tradjenta 5 mg daily  Metformin 1000 mg po bid  Glimiperide     Glucose monitoring device and frequency: not recently but has checked her BG in the past--> 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  at bedside. TF on at 45 mL/hr.   Lungs:  breathing comfortably on room air.  Abdomen: not distended   Mental Status:  alert and oriented x3  Psych:   Cooperative, appropriate affect           Data:     Lab Results   Component Value Date    A1C 6.7 (H) 09/06/2024    A1C 7.0 (H) 09/03/2024    A1C 6.2 (H) 05/08/2024       ROUTINE IP LABS (Last four results)  BMP  Recent Labs   Lab 09/12/24  0541 09/12/24  0414 09/11/24  2354 09/11/24  1958 09/11/24  1546 09/11/24  0554 09/11/24  0535 09/10/24  0609 09/10/24  0547 09/09/24  0646 09/09/24  0554 09/08/24  1338 09/08/24  1307   NA  --   --   --   --   --   --  137  --  138  --  137  --  143   POTASSIUM 4.1  --   --   --   --   --  4.0  --  3.4  --  3.6  --  3.8  3.8   CHLORIDE  --   --   --   --   --   --  106  --  106  --  106  --  111*   ACRLA  --   --   --   --   --   --  7.3*  --  7.4*  --  7.3*  --  7.3*   CO2  --   --   --   --   --   --  25  --  26  --  24  --  25   BUN  --   --   --   --   --   --  10.8  --  9.6  --  8.4  --  8.6   CR  --   --   --   --   --   --  0.55  --  0.57  --  0.62  --  0.69   GLC  --  127* 147* 111* 145*   < > 110*   < > 127*   < > 129*   < > 87    < > = values in this interval not displayed.     CBC  Recent Labs   Lab 09/11/24  0535 09/10/24  0547 09/09/24  0554 " 09/09/24  0111   WBC 23.3* 24.8* 26.2* 29.0*   RBC 3.05* 3.02* 2.78* 2.32*   HGB 8.9* 8.7* 8.1* 6.8*   HCT 27.7* 26.9* 24.7* 21.3*   MCV 91 89 89 92   MCH 29.2 28.8 29.1 29.3   MCHC 32.1 32.3 32.8 31.9   RDW 13.9 14.1 14.1 13.6   * [phone]     Inpatient Diabetes Service will continue to follow, please don't hesitate to contact the team with any questions or concerns.     Jessica Sánchez PA-C  Inpatient Diabetes Service  [phone]  Available by [employer]  Date of Service: 9/13/2024    Discussed plan with patient and  today, bedside RN in room and primary team via this note  To contact Inpatient Diabetes Service:     7 AM - 5 PM: Page the IDS KEYA following the patient that day (see filed or incomplete progress notes/consult notes under Endocrinology)    OR if uncertain of provider assignment: page job code [phone]    5 PM - 7 AM: First call after hours is to primary service.    For urgent after-hours questions, page job code for on call fellow: [phone]     I spent a total of 35 minutes on the date of the encounter doing prep/post-work, chart review, history and exam, documentation and further activities per the note including lab review, multidisciplinary communication, counseling the patient and/or coordinating care regarding acute hyper/hypoglycemic management, as well as discharge management and planning/communication.

## 2024-09-12 NOTE — PLAN OF CARE
"Vitals: /65 (BP Location: Left arm, Patient Position: Semi-Oliveira's, Cuff Size: Adult Regular)   Pulse 97   Temp 98.8  F (37.1  C) (Oral)   Resp 17   Ht 1.676 m (5' 6\")   Wt 81.1 kg (178 lb 12.8 oz)   SpO2 100%   BMI 28.86 kg/m    Neuros: Alert and oriented.   IV:  Port and PIV infusing.  R KELLY, G to gravity, J to TF   Labs/Electrolytes:  See labs and results fr updated values.   Resp/trach: RA, denies SOB  Cardica: Tele in place and Q6 oral diltiazem.   BG: Q4 w/sliding scale.   Diet: Sugar free clears as pt tolerates.     Bowel status: BS active passing and x1 loose BM during the shift.   : Voiding adequate urine during the night.   Skin: Midline incision, CDI, KELLY dressing changed x1 during the night. Breast dressing CDI.   Pain:  Pt reported  pain, while changing KELLY drain dressing, PRN Valium given x1 with effective relief. Along with other scheduled pain medications.   Activity:  Ax1,W/IV pole.   Social: Spouse at bedside during the evening and supportive  Updates this shift: Pt is alert and oriented is able to make needs known. Is able to shift weight in bed.       Intake/Output Summary (Last 24 hours) at 9/12/2024 0651  Last data filed at 9/12/2024 0609  Gross per 24 hour   Intake 4326.27 ml   Output 4165 ml   Net 161.27 ml       Goal Outcome Evaluation: Improving       Plan of Care Reviewed With: patient    Overall Patient Progress: improving  "

## 2024-09-13 LAB
ANION GAP SERPL CALCULATED.3IONS-SCNC: 7 MMOL/L (ref 7–15)
APTT PPP: 32 SECONDS (ref 22–38)
BUN SERPL-MCNC: 14.7 MG/DL (ref 6–20)
CALCIUM SERPL-MCNC: 7.2 MG/DL (ref 8.8–10.4)
CHLORIDE SERPL-SCNC: 105 MMOL/L (ref 98–107)
CREAT SERPL-MCNC: 0.52 MG/DL (ref 0.51–0.95)
EGFRCR SERPLBLD CKD-EPI 2021: >90 ML/MIN/1.73M2
ERYTHROCYTE [DISTWIDTH] IN BLOOD BY AUTOMATED COUNT: 14.2 % (ref 10–15)
GLUCOSE BLDC GLUCOMTR-MCNC: 102 MG/DL (ref 70–99)
GLUCOSE BLDC GLUCOMTR-MCNC: 113 MG/DL (ref 70–99)
GLUCOSE BLDC GLUCOMTR-MCNC: 120 MG/DL (ref 70–99)
GLUCOSE BLDC GLUCOMTR-MCNC: 122 MG/DL (ref 70–99)
GLUCOSE BLDC GLUCOMTR-MCNC: 124 MG/DL (ref 70–99)
GLUCOSE BLDC GLUCOMTR-MCNC: 156 MG/DL (ref 70–99)
GLUCOSE BLDC GLUCOMTR-MCNC: 171 MG/DL (ref 70–99)
GLUCOSE SERPL-MCNC: 123 MG/DL (ref 70–99)
HCO3 SERPL-SCNC: 25 MMOL/L (ref 22–29)
HCT VFR BLD AUTO: 27.3 % (ref 35–47)
HGB BLD-MCNC: 8.6 G/DL (ref 11.7–15.7)
MAGNESIUM SERPL-MCNC: 2 MG/DL (ref 1.7–2.3)
MCH RBC QN AUTO: 28.9 PG (ref 26.5–33)
MCHC RBC AUTO-ENTMCNC: 31.5 G/DL (ref 31.5–36.5)
MCV RBC AUTO: 92 FL (ref 78–100)
PATH REPORT.COMMENTS IMP SPEC: NORMAL
PATH REPORT.COMMENTS IMP SPEC: NORMAL
PATH REPORT.FINAL DX SPEC: NORMAL
PATH REPORT.GROSS SPEC: NORMAL
PATH REPORT.MICROSCOPIC SPEC OTHER STN: NORMAL
PATH REPORT.RELEVANT HX SPEC: NORMAL
PHOSPHATE SERPL-MCNC: 3.8 MG/DL (ref 2.5–4.5)
PHOTO IMAGE: NORMAL
PLATELET # BLD AUTO: 703 10E3/UL (ref 150–450)
POTASSIUM SERPL-SCNC: 4.2 MMOL/L (ref 3.4–5.3)
RBC # BLD AUTO: 2.98 10E6/UL (ref 3.8–5.2)
SODIUM SERPL-SCNC: 137 MMOL/L (ref 135–145)
WBC # BLD AUTO: 26.9 10E3/UL (ref 4–11)

## 2024-09-13 PROCEDURE — 250N000013 HC RX MED GY IP 250 OP 250 PS 637: Performed by: STUDENT IN AN ORGANIZED HEALTH CARE EDUCATION/TRAINING PROGRAM

## 2024-09-13 PROCEDURE — 99231 SBSQ HOSP IP/OBS SF/LOW 25: CPT | Mod: GC | Performed by: ANESTHESIOLOGY

## 2024-09-13 PROCEDURE — 36591 DRAW BLOOD OFF VENOUS DEVICE: CPT | Performed by: PHYSICIAN ASSISTANT

## 2024-09-13 PROCEDURE — 85730 THROMBOPLASTIN TIME PARTIAL: CPT | Performed by: SURGERY

## 2024-09-13 PROCEDURE — 85027 COMPLETE CBC AUTOMATED: CPT | Performed by: PHYSICIAN ASSISTANT

## 2024-09-13 PROCEDURE — 120N000011 HC R&B TRANSPLANT UMMC

## 2024-09-13 PROCEDURE — 250N000011 HC RX IP 250 OP 636: Performed by: PHYSICIAN ASSISTANT

## 2024-09-13 PROCEDURE — 80048 BASIC METABOLIC PNL TOTAL CA: CPT | Performed by: PHYSICIAN ASSISTANT

## 2024-09-13 PROCEDURE — 250N000013 HC RX MED GY IP 250 OP 250 PS 637

## 2024-09-13 PROCEDURE — 83735 ASSAY OF MAGNESIUM: CPT | Performed by: SURGERY

## 2024-09-13 PROCEDURE — 250N000011 HC RX IP 250 OP 636: Performed by: NURSE PRACTITIONER

## 2024-09-13 PROCEDURE — P9045 ALBUMIN (HUMAN), 5%, 250 ML: HCPCS | Performed by: PHYSICIAN ASSISTANT

## 2024-09-13 PROCEDURE — 250N000013 HC RX MED GY IP 250 OP 250 PS 637: Performed by: PHYSICIAN ASSISTANT

## 2024-09-13 PROCEDURE — 250N000009 HC RX 250: Performed by: NURSE PRACTITIONER

## 2024-09-13 PROCEDURE — 84100 ASSAY OF PHOSPHORUS: CPT | Performed by: SURGERY

## 2024-09-13 PROCEDURE — 250N000013 HC RX MED GY IP 250 OP 250 PS 637: Performed by: SURGERY

## 2024-09-13 RX ORDER — METHOCARBAMOL 750 MG/1
750 TABLET, FILM COATED ORAL 3 TIMES DAILY
Status: DISCONTINUED | OUTPATIENT
Start: 2024-09-13 | End: 2024-09-17 | Stop reason: HOSPADM

## 2024-09-13 RX ORDER — DIAZEPAM 2 MG
2 TABLET ORAL EVERY 6 HOURS PRN
Status: DISCONTINUED | OUTPATIENT
Start: 2024-09-13 | End: 2024-09-14

## 2024-09-13 RX ORDER — HYDROMORPHONE HYDROCHLORIDE 1 MG/ML
4-6 SOLUTION ORAL
Status: DISCONTINUED | OUTPATIENT
Start: 2024-09-13 | End: 2024-09-15

## 2024-09-13 RX ADMIN — HYDROMORPHONE HYDROCHLORIDE 6 MG: 5 SOLUTION ORAL at 17:22

## 2024-09-13 RX ADMIN — Medication 30 MG: at 01:28

## 2024-09-13 RX ADMIN — SIMETHICONE 80 MG: 80 TABLET, CHEWABLE ORAL at 09:44

## 2024-09-13 RX ADMIN — ALBUMIN HUMAN 50 G: 0.05 INJECTION, SOLUTION INTRAVENOUS at 12:02

## 2024-09-13 RX ADMIN — HYDROMORPHONE HYDROCHLORIDE 2 MG: 5 SOLUTION ORAL at 05:23

## 2024-09-13 RX ADMIN — METHOCARBAMOL 750 MG: 750 TABLET ORAL at 20:33

## 2024-09-13 RX ADMIN — ACETAMINOPHEN 650 MG: 160 LIQUID ORAL at 12:01

## 2024-09-13 RX ADMIN — HYDROMORPHONE HYDROCHLORIDE 0.5 MG: 1 INJECTION, SOLUTION INTRAMUSCULAR; INTRAVENOUS; SUBCUTANEOUS at 21:25

## 2024-09-13 RX ADMIN — HYDROMORPHONE HYDROCHLORIDE 6 MG: 5 SOLUTION ORAL at 23:20

## 2024-09-13 RX ADMIN — HYDROMORPHONE HYDROCHLORIDE 0.5 MG: 1 INJECTION, SOLUTION INTRAMUSCULAR; INTRAVENOUS; SUBCUTANEOUS at 10:24

## 2024-09-13 RX ADMIN — HYDROMORPHONE HYDROCHLORIDE 4 MG: 5 SOLUTION ORAL at 08:28

## 2024-09-13 RX ADMIN — GABAPENTIN 300 MG: 250 SUSPENSION ORAL at 20:33

## 2024-09-13 RX ADMIN — GABAPENTIN 300 MG: 250 SUSPENSION ORAL at 08:28

## 2024-09-13 RX ADMIN — HYDROMORPHONE HYDROCHLORIDE 2 MG: 5 SOLUTION ORAL at 09:44

## 2024-09-13 RX ADMIN — Medication 40 MG: at 08:27

## 2024-09-13 RX ADMIN — HYDROMORPHONE HYDROCHLORIDE 4 MG: 5 SOLUTION ORAL at 14:52

## 2024-09-13 RX ADMIN — Medication 30 MG: at 20:36

## 2024-09-13 RX ADMIN — METHOCARBAMOL 750 MG: 750 TABLET ORAL at 14:52

## 2024-09-13 RX ADMIN — HYDROMORPHONE HYDROCHLORIDE 4 MG: 5 SOLUTION ORAL at 00:33

## 2024-09-13 RX ADMIN — Medication 1.5 ML: at 08:28

## 2024-09-13 RX ADMIN — HYDROMORPHONE HYDROCHLORIDE 4 MG: 5 SOLUTION ORAL at 03:02

## 2024-09-13 RX ADMIN — HYDROMORPHONE HYDROCHLORIDE 0.5 MG: 1 INJECTION, SOLUTION INTRAMUSCULAR; INTRAVENOUS; SUBCUTANEOUS at 15:39

## 2024-09-13 RX ADMIN — Medication 30 MG: at 08:27

## 2024-09-13 RX ADMIN — HYDROMORPHONE HYDROCHLORIDE 4 MG: 5 SOLUTION ORAL at 06:23

## 2024-09-13 RX ADMIN — HYDROMORPHONE HYDROCHLORIDE 4 MG: 5 SOLUTION ORAL at 12:01

## 2024-09-13 RX ADMIN — ACETAMINOPHEN 650 MG: 160 LIQUID ORAL at 18:35

## 2024-09-13 RX ADMIN — HYDROMORPHONE HYDROCHLORIDE 0.5 MG: 1 INJECTION, SOLUTION INTRAMUSCULAR; INTRAVENOUS; SUBCUTANEOUS at 02:24

## 2024-09-13 RX ADMIN — METHOCARBAMOL 750 MG: 750 TABLET ORAL at 04:36

## 2024-09-13 RX ADMIN — HYDROMORPHONE HYDROCHLORIDE 6 MG: 5 SOLUTION ORAL at 20:32

## 2024-09-13 RX ADMIN — DIAZEPAM 1 MG: 5 INJECTION INTRAMUSCULAR; INTRAVENOUS at 01:29

## 2024-09-13 RX ADMIN — DIAZEPAM 2 MG: 2 TABLET ORAL at 20:33

## 2024-09-13 RX ADMIN — HYDROMORPHONE HYDROCHLORIDE 0.5 MG: 1 INJECTION, SOLUTION INTRAMUSCULAR; INTRAVENOUS; SUBCUTANEOUS at 13:01

## 2024-09-13 RX ADMIN — Medication 30 MG: at 14:52

## 2024-09-13 RX ADMIN — HYDROMORPHONE HYDROCHLORIDE 0.5 MG: 1 INJECTION, SOLUTION INTRAMUSCULAR; INTRAVENOUS; SUBCUTANEOUS at 23:34

## 2024-09-13 RX ADMIN — METHOCARBAMOL 750 MG: 750 TABLET ORAL at 08:27

## 2024-09-13 RX ADMIN — HYDROMORPHONE HYDROCHLORIDE 0.5 MG: 1 INJECTION, SOLUTION INTRAMUSCULAR; INTRAVENOUS; SUBCUTANEOUS at 18:35

## 2024-09-13 ASSESSMENT — ACTIVITIES OF DAILY LIVING (ADL)
ADLS_ACUITY_SCORE: 29
ADLS_ACUITY_SCORE: 26
ADLS_ACUITY_SCORE: 29
ADLS_ACUITY_SCORE: 26
ADLS_ACUITY_SCORE: 29

## 2024-09-13 NOTE — PLAN OF CARE
"Goal Outcome Evaluation:      Plan of Care Reviewed With: patient    Overall Patient Progress: improvingOverall Patient Progress: improving    Outcome Evaluation: Q4 BG checks, clear liquid diet, No nausea, PCA discontinued last night and epidural dicontinued this morning. Managing pain with scheduled PO meds and PRN meds    /72 (BP Location: Left arm)   Pulse 84   Temp 98.4  F (36.9  C) (Oral)   Resp 16   Ht 1.676 m (5' 6\")   Wt 77.6 kg (171 lb 1.6 oz)   SpO2 96%   BMI 27.62 kg/m      Shift: 9849-3204  Isolation Status: None  VS: Stable on room air, afebrile  Neuro: Aox4  Behaviors: calm, cooperative   BG: Q4 (156,171,102)  Labs: Calcium=7.2  Respiratory: WDL  Cardiac: WDL - on tele  Pain/Nausea: moderate to severe pain 7-10/10. Denies nausea  PRN: IV dilaudid x3, PO dilaudid x1, simethicone x1, tylenol x2  Diet: Clears/sugar free  IV Access: Right PIV, Left portacath  Infusion(s): Albumin 12.5g/250mL 5%bags x4  Lines/Drains: g/J peg, g to gravity, J to enteral feedings, Right KELLY to bulb suction   GI/: BM 9/13, voiding spontaneously without difficulty   Skin:  Wound under right breast - see wound care orders   Mobility: SBA   Events/Education: TF education provided to patient. Educated patient on dangers of low blood pressure and fall risk   Plan: Continue POC with focus on pain managment    "

## 2024-09-13 NOTE — PROGRESS NOTES
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 She is now s/p TPAIT, splenectomy, and G/J placement with repair of small incisional hernia on 9/6/24 with Dr. Carmichael.     s/p TPAIT 9/6/24: POD #7 . Islet yield Islet equivalents/kilogram: 2937.   Post-op US: patient vessels. KELLY drain x 1 with serosang output.   -Repeat liver US patent vasculature.    Cardiorespiratory:  Atrial fib & flutter: Self-limited A-fib RVR in SICU on 9/8. Rate up to 150 9/10 with EKG showing A-flutter. Metoprolol IV x2 slowed rate to 130s. Asymptomatic. Now in NSR, rates in the 90s  -Diltiazem 30mg q6hr  -Check TSH (WNL)  -Consult Cardiology    GI/Nutrition:   GERD: PPI daily  Pancreatic insufficiency: Relizorb with TF.   Moderate malnutrition in the context of chronic illness/disease: GJ tube placed, clamp G tube as tolerated. Tube feeds at goal. Sugar free clears.   Bowel regimen: Senna & PEG    Fluid/Electrolytes: IVF: discontinued; Replaced K & Mag 9/10 due to A-flutter.    : No acute issues.     Endocrine:  Post-pancreatectomy diabetes/stress hyperglycemia: Endocrine consulted. Transitioned to subcutaneous insulin, adjusting as tolerated.     Infection:   Leukocytosis: WBC 26.9 (23.7), likely 2/2 stress and splenectomy. Ertapenem for surgical ppx, stopped 9/9    Prophylaxis: Anticoagulation: Heparin gtt now stopped since hepatic US is patent    Neuro:   Acute on chronic pain: PTA managed with PO hydromorphone 4 mg Q3H. Pain Management team consulted.  Current regimen:    - Ropivacaine paravertebral block managed by Anesthesia   - Dilaudid PCA, STOPPED 9/12   - Dilaudid 2mg q4H via JT- increased to 4-6 mg every 3 hours with stopping PCA   - Neurontin   - Acetaminophen   - Atarax prn    -  Robaxin, not very effective per patient   - Valium IV 1mg q6H PRN muscle spasms. Patient states it is helpful, will switch to tablet to plan for discharge.    Activity: PT/OT consulted    Disposition: 7A, planning for discharge Monday/Tuesday of next week    KEYA/Fellow/Resident Provider: Lesly Renae PA-C [id number]    Faculty: Carlos Carmichael MD  __________________________________________________________________  Transplant History: Admitted 9/6/2024 for TPAIT  9/6/2024 (Islet), Postoperative day: 7     Interval History: History is obtained from the patient  Overnight events: Feeling worse today. Noting some L sided cramping/spasming pain, doesn't think it's related to her bowels. Does state that dilaudid helps.    ROS:   A 10-point review of systems was negative except as noted above.    Curent Meds:  Current Facility-Administered Medications   Medication Dose Route Frequency Provider Last Rate Last Admin    diltiazem (CARDIZEM) solution 30 mg  30 mg Per J Tube Q6H Nia Wise, PATTI CNP   30 mg at 09/13/24 0827    sennosides (SENOKOT) syrup 5 mL  5 mL Per J Tube BID Carlos Carmichael MD   5 mL at 09/09/24 2013    And    docusate (COLACE) 50 MG/5ML liquid 50 mg  50 mg Per J Tube BID Carlos Carmichael MD   50 mg at 09/12/24 2010    gabapentin (NEURONTIN) solution 300 mg  300 mg Oral or J tube BID Carlos Carmichael MD   300 mg at 09/13/24 0828    HYDROmorphone (STANDARD CONC) (DILAUDID) oral solution 4 mg  4 mg Per J Tube Q3H Lesly Renae PA-C   4 mg at 09/13/24 0828    insulin aspart (NovoLOG) injection (RAPID ACTING)  1-10 Units Subcutaneous Q4H Jessica Sánchez PA-C   2 Units at 09/13/24 0838    [START ON 9/14/2024] insulin glargine (LANTUS PEN) injection 42 Units  42 Units Subcutaneous Q24H Jessica Sánchez PA-C        methocarbamol (ROBAXIN) tablet 750 mg  750 mg Oral TID Jus Clements MD   750 mg at 09/13/24 0827    [facility] complete  "formulation (PEDIATRIC) oral solution 1.5 mL  1.5 mL Oral or J tube Daily Carlos Carmichael MD   1.5 mL at 09/13/24 0828    pantoprazole (PROTONIX) 2 mg/mL suspension 40 mg  40 mg Oral or J tube Daily Carlos Carmichael MD   40 mg at 09/13/24 0827    polyethylene glycol (MIRALAX) Packet 17 g  17 g Per J Tube Daily Carlos Carmichael MD   17 g at 09/09/24 0745    sodium chloride (PF) 0.9% PF flush 3 mL  3 mL Intracatheter Q8H Carlos Carmichael MD   3 mL at 09/12/24 2300       Physical Exam:     Admit Weight: 71.8 kg (158 lb 4.6 oz)    Current Vitals:   /72 (BP Location: Left arm)   Pulse 84   Temp 98.4  F (36.9  C) (Oral)   Resp 16   Ht 1.676 m (5' 6\")   Wt 77.6 kg (171 lb 1.6 oz)   SpO2 96%   BMI 27.62 kg/m      Vital sign ranges:    Temp:  [98.2  F (36.8  C)-98.5  F (36.9  C)] 98.4  F (36.9  C)  Pulse:  [80-84] 84  Resp:  [16] 16  BP: (103-116)/(59-84) 109/72  SpO2:  [96 %-98 %] 96 %  Patient Vitals for the past 24 hrs:   BP Temp Temp src Pulse Resp SpO2 Weight   09/13/24 1005 109/72 98.4  F (36.9  C) Oral 84 16 96 % --   09/13/24 0639 113/68 98.2  F (36.8  C) Oral 80 16 98 % 77.6 kg (171 lb 1.6 oz)   09/13/24 0135 103/62 98.5  F (36.9  C) Oral -- 16 96 % --   09/12/24 2128 116/59 98.4  F (36.9  C) Oral 83 16 96 % --   09/12/24 1756 115/84 98.4  F (36.9  C) Oral 80 16 96 % --   09/12/24 1410 116/73 98.4  F (36.9  C) -- 82 16 96 % --     General Appearance: NAD  Skin: normal, no suspicious lesions or rashes  Heart: Perfused, regular rate  Lungs: Nonlabored resps on RA  Abdomen: The abdomen is soft, and moderately tender. The wound is Healing well. Tube/Drain sites are: GJ in place. KELLY: serosanguinous, moderate. GJ tube site intact.  : martinez is not present.    Extremities: edema: present bilaterally. 1+  Neuro: A&Ox4    Data:   CMP  Recent Labs   Lab 09/13/24  0802 09/13/24  0419 09/13/24  0417 09/12/24  0844 09/12/24  0541 09/11/24  0554 " 09/11/24  0535 09/08/24  0828 09/08/24  0820 09/08/24  0354 09/08/24  0348 09/07/24  0501 09/07/24  0358 09/06/24  1740 09/06/24  1701   NA  --   --  137  --  136  --  137   < >  --   --  139  --  137   < > 136   POTASSIUM  --   --  4.2  --  4.1  4.1  --  4.0   < >  --   --  3.7  --  4.2   < > 4.3   CHLORIDE  --   --  105  --  104  --  106   < >  --   --  108*  --  106   < >  --    CO2  --   --  25  --  23  --  25   < >  --   --  25  --  22   < >  --    * 124* 123*   < > 141*   < > 110*   < >  --    < > 109*   < > 133*   < > 125*   BUN  --   --  14.7  --  14.2  --  10.8   < >  --   --  8.8  --  5.6*   < >  --    CR  --   --  0.52  --  0.55  --  0.55   < >  --   --  0.76  --  0.64   < >  --    GFRESTIMATED  --   --  >90  --  >90  --  >90   < >  --   --  >90  --  >90   < >  --    CARLA  --   --  7.2*  --  7.3*  --  7.3*   < >  --   --  7.4*  --  7.7*   < >  --    ICAW  --   --   --   --   --   --   --   --  4.4  --   --   --   --   --  4.1*   MAG  --   --  2.0  --  2.5*  --  1.8   < >  --   --  1.7  --  1.7   < >  --    PHOS  --   --  3.8  --  3.7  --  2.7   < >  --   --  2.8  --  3.7   < >  --    AMYLASE  --   --   --   --   --   --  4*  --   --   --   --   --  61  --   --    LIPASE  --   --   --   --   --   --  6*  --   --   --   --   --  85*  --   --    ALBUMIN  --   --   --   --   --   --  2.2*  --   --   --  2.6*  --  2.9*   < >  --    BILITOTAL  --   --   --   --   --   --  0.2  --   --   --  0.3  --  0.6   < >  --    ALKPHOS  --   --   --   --   --   --  93  --   --   --  55  --  48   < >  --    AST  --   --   --   --   --   --  19  --   --   --  67*  --  162*   < >  --    ALT  --   --   --   --   --   --  25  --   --   --  81*  --  125*   < >  --     < > = values in this interval not displayed.     CBC  Recent Labs   Lab 09/13/24  0417 09/12/24  0853 09/07/24  0358 09/06/24  7224   HGB 8.6* 8.4*   < >  --    WBC 26.9* 23.7*   < >  --    * 605*   < >  --    A1C  --   --   --  6.7*    < > = values  in this interval not displayed.     Coags  Recent Labs   Lab 09/13/24  0417 09/12/24  0541 09/06/24  2309 09/06/24  1852   INR  --  1.09  --  1.54*   PTT 32 32   < > >240*    < > = values in this interval not displayed.

## 2024-09-13 NOTE — PLAN OF CARE
"Shift: 8703-0003  /68 (BP Location: Left arm)   Pulse 80   Temp 98.2  F (36.8  C) (Oral)   Resp 16   Ht 1.676 m (5' 6\")   Wt 77.6 kg (171 lb 1.6 oz)   SpO2 98%   BMI 27.62 kg/m       VS- stable on RA On tele.   BG- Q4 112, 120  Labs- pending AM collection   Pain/Nausea/PRN'S- PRN Dilaudid and scheduled Dilaudid for pain (See MAR) , Valium X1. Denies nausea   Diet- clear liquids (sugar free)   LDA- Right chest Port A. Cath, PIV SL  PEG- J- tube with tube feeds continuous @45 ml/hr   G- to Gravity-   Gtt/IVF- none   GI/- voiding, BM X2    Skin- abdominal incision stapled, Foam dressing under right breast covering wound  Activity- Assist x1    Plan of Care Reviewed With: patient    Overall Patient Progress: improving    Outcome Evaluation: on Q4 BG checks, clear liquid diet, pain being manged with PRN's and schduled meds.      "

## 2024-09-13 NOTE — PROGRESS NOTES
"Pain Service Progress Note  [facility]  Date: 09/13/2024       Patient Name: Ciera Perkins  MRN: [id number]  Age: 39 year old  Sex: female      Assessment:  Ciera Perkins is a 39 year old female with PMH of cystic fibrosis of pancreas, pancreatic insufficiency, malnutrition, SMA stenosis s/p angioplasty, MASLD, and chronic pancreatitis who was admitted on 9/6/2024 with cystic fibrosis of the pancreas now s/p Total pancreatectomy with autologous islet transplant, cholecystectomy,  G/J placement, and appendectomy on 9/6/24.    Procedure: Total pancreatectomy with autologous islet transplant, cholecystectomy,  G/J placement, and appendectomy on 9/6/24.    Date of Surgery: 9/6/24    Date of Catheter Placement: 9/6/24-9/13/24    Plan/Recommendations:  1. Regional Anesthesia/Analgesia  - Pulled bilateral paravertebral catheters without issue. Tips intact bilaterally.       2. Anticoagulation  -Please contact Inpatient Pain Service before ordering or making any anticoagulation changes  - Heparin discontinued 9/11 and not restarted since then.   - INR 1.09 from yesterday's labs     3. Multimodal Analgesia  - Per primary team    Pain Service will sign off.    Discussed with attending anesthesiologist    Joel \"Logan\" MD [place]  Anesthesia resident, CA-1/PGY-2      Overnight Events: NAEO. Pain related to surgery is improved but endorses an new pain to the L abdomen that is sharp in nature.    Tubes/Drains: Yes  - GJ Tube  - RUQ suction drain    Subjective: Surgical site pain is improved but she has a new pain in her L abdomen. She describes it as sharp and intermittent. She is looking forward to leaving the hospital. Anticipates she will leave in 1-2 days.  Nausea: No  Vomiting: No  Pruritus: No  Symptoms of LAST: No    Pain Location:  Abdomen    Diet: Adult Formula Drip Feeding: Continuous Other; Impact Peptide 1.5; Jejunostomy; Goal Rate: 45; mL/hr; Increase to 30 ml/hr now, then " "increase by 10 ml/hr q 12 hours to goal rate of 45 ml/hr.  Use Relizorb with TF (change 1 cartridge q 12 hours) - s...  Combination Diet Clear Liquid    Relevant Labs:  Recent Labs   Lab Test 09/07/24  0358 09/06/24 2309 09/06/24  1852   INR  --   --  1.54*     --  168   PTT 35   < > >240*   BUN 5.6*  --  7.4    < > = values in this interval not displayed.       Physical Exam:  Vitals: /68 (BP Location: Left arm)   Pulse 80   Temp 98.2  F (36.8  C) (Oral)   Resp 16   Ht 1.676 m (5' 6\")   Wt 77.6 kg (171 lb 1.6 oz)   SpO2 98%   BMI 27.62 kg/m      Physical Exam:   Orientation:  Alert, oriented, and in no acute distress: Yes  Sedation: No    Motor Examination:  5/5 Strength in lower extremities: Yes    Sensory Level:   Decrease in sensation: No    Catheter Site:   Catheter entry site is clean/dry/intact: Yes        Relevant Medications:  Current Pain Medications:  Medications related to Pain Management (From now, onward)      Start     Dose/Rate Route Frequency Ordered Stop    09/09/24 0000  bisacodyl (DULCOLAX) suppository 10 mg         10 mg Rectal DAILY PRN 09/06/24 2158 09/08/24 0000  magnesium hydroxide (MILK OF MAGNESIA) suspension 30 mL         30 mL Per J Tube DAILY PRN 09/06/24 2158 09/07/24 0800  gabapentin (NEURONTIN) solution 300 mg         300 mg Oral or J tube 2 TIMES DAILY 09/06/24 2158 09/07/24 0800  polyethylene glycol (MIRALAX) Packet 17 g         17 g Per J Tube DAILY 09/06/24 2158 09/07/24 0800  sennosides (SENOKOT) syrup 5 mL        Placed in \"And\" Linked Group    5 mL Per J Tube 2 TIMES DAILY 09/06/24 2254 09/07/24 0800  docusate (COLACE) 50 MG/5ML liquid 50 mg        Placed in \"And\" Linked Group    50 mg Per J Tube 2 TIMES DAILY 09/06/24 2254 09/06/24 2330  HYDROmorphone (DILAUDID) PCA 0.2 mg/mL OPIOID TOLERANT          Intravenous CONTINUOUS 09/06/24 2311      09/06/24 2300  acetaminophen *SUGAR FREE* (TYLENOL) solution 975 mg         975 mg " "Per J Tube EVERY 8 HOURS 09/06/24 2158 09/09/24 2259 09/06/24 2158  acetaminophen *SUGAR FREE* (TYLENOL) solution 650 mg         650 mg Per J Tube EVERY 4 HOURS PRN 09/06/24 2158      09/06/24 2158  methocarbamol (ROBAXIN) tablet 750 mg         750 mg Oral EVERY 6 HOURS PRN 09/06/24 2158      09/06/24 2158  hydrOXYzine HCl (ATARAX) tablet 25 mg         25 mg Oral EVERY 6 HOURS PRN 09/06/24 2158      09/06/24 2158  lidocaine 1 % 0.1-1 mL         0.1-1 mL Other EVERY 1 HOUR PRN 09/06/24 2158      09/06/24 2158  lidocaine (LMX4) cream          Topical EVERY 1 HOUR PRN 09/06/24 2158      09/06/24 0830  ROPivacaine 0.2% in sodium chloride 0.9% PERINEURAL infusion          Perineural Continuous Nerve Block 09/06/24 0813      09/06/24 0830  ROPivacaine 0.2% in sodium chloride 0.9% PERINEURAL infusion          Perineural Continuous Nerve Block 09/06/24 0813              Primary Service Contacted with Recommendations? No      Please see A&P for additional details of medical decision making.      Acute Inpatient Pain Service [facility]  Hours of pain coverage 24/7   Page via Amcom- Please Page the Pain Team Via Amcom: \"PAIN MANAGEMENT ACUTE INPATIENT/ [facility]\"  "

## 2024-09-14 LAB
ANION GAP SERPL CALCULATED.3IONS-SCNC: 6 MMOL/L (ref 7–15)
BUN SERPL-MCNC: 13.2 MG/DL (ref 6–20)
CALCIUM SERPL-MCNC: 7.7 MG/DL (ref 8.8–10.4)
CHLORIDE SERPL-SCNC: 105 MMOL/L (ref 98–107)
CREAT SERPL-MCNC: 0.5 MG/DL (ref 0.51–0.95)
EGFRCR SERPLBLD CKD-EPI 2021: >90 ML/MIN/1.73M2
ERYTHROCYTE [DISTWIDTH] IN BLOOD BY AUTOMATED COUNT: 14 % (ref 10–15)
GLUCOSE BLDC GLUCOMTR-MCNC: 114 MG/DL (ref 70–99)
GLUCOSE BLDC GLUCOMTR-MCNC: 126 MG/DL (ref 70–99)
GLUCOSE BLDC GLUCOMTR-MCNC: 130 MG/DL (ref 70–99)
GLUCOSE BLDC GLUCOMTR-MCNC: 145 MG/DL (ref 70–99)
GLUCOSE BLDC GLUCOMTR-MCNC: 156 MG/DL (ref 70–99)
GLUCOSE BLDC GLUCOMTR-MCNC: 65 MG/DL (ref 70–99)
GLUCOSE BLDC GLUCOMTR-MCNC: 66 MG/DL (ref 70–99)
GLUCOSE BLDC GLUCOMTR-MCNC: 76 MG/DL (ref 70–99)
GLUCOSE BLDC GLUCOMTR-MCNC: 80 MG/DL (ref 70–99)
GLUCOSE BLDC GLUCOMTR-MCNC: 85 MG/DL (ref 70–99)
GLUCOSE SERPL-MCNC: 136 MG/DL (ref 70–99)
HCO3 SERPL-SCNC: 26 MMOL/L (ref 22–29)
HCT VFR BLD AUTO: 27.3 % (ref 35–47)
HGB BLD-MCNC: 8.7 G/DL (ref 11.7–15.7)
MAGNESIUM SERPL-MCNC: 2 MG/DL (ref 1.7–2.3)
MCH RBC QN AUTO: 29 PG (ref 26.5–33)
MCHC RBC AUTO-ENTMCNC: 31.9 G/DL (ref 31.5–36.5)
MCV RBC AUTO: 91 FL (ref 78–100)
PHOSPHATE SERPL-MCNC: 3.6 MG/DL (ref 2.5–4.5)
PLATELET # BLD AUTO: 903 10E3/UL (ref 150–450)
POTASSIUM SERPL-SCNC: 4 MMOL/L (ref 3.4–5.3)
RBC # BLD AUTO: 3 10E6/UL (ref 3.8–5.2)
SODIUM SERPL-SCNC: 137 MMOL/L (ref 135–145)
TRIGL FLD-MCNC: 56 MG/DL
TRIGLYCERIDE BODY FLUID SOURCE: NORMAL
WBC # BLD AUTO: 26.3 10E3/UL (ref 4–11)

## 2024-09-14 PROCEDURE — 250N000013 HC RX MED GY IP 250 OP 250 PS 637: Performed by: SURGERY

## 2024-09-14 PROCEDURE — 84100 ASSAY OF PHOSPHORUS: CPT | Performed by: SURGERY

## 2024-09-14 PROCEDURE — 250N000013 HC RX MED GY IP 250 OP 250 PS 637: Performed by: PHYSICIAN ASSISTANT

## 2024-09-14 PROCEDURE — 250N000011 HC RX IP 250 OP 636: Performed by: PHYSICIAN ASSISTANT

## 2024-09-14 PROCEDURE — 250N000013 HC RX MED GY IP 250 OP 250 PS 637: Performed by: STUDENT IN AN ORGANIZED HEALTH CARE EDUCATION/TRAINING PROGRAM

## 2024-09-14 PROCEDURE — 36591 DRAW BLOOD OFF VENOUS DEVICE: CPT | Performed by: PHYSICIAN ASSISTANT

## 2024-09-14 PROCEDURE — 85027 COMPLETE CBC AUTOMATED: CPT | Performed by: PHYSICIAN ASSISTANT

## 2024-09-14 PROCEDURE — 120N000011 HC R&B TRANSPLANT UMMC

## 2024-09-14 PROCEDURE — 80048 BASIC METABOLIC PNL TOTAL CA: CPT | Performed by: PHYSICIAN ASSISTANT

## 2024-09-14 PROCEDURE — 84478 ASSAY OF TRIGLYCERIDES: CPT | Performed by: NURSE PRACTITIONER

## 2024-09-14 PROCEDURE — 250N000009 HC RX 250: Performed by: NURSE PRACTITIONER

## 2024-09-14 PROCEDURE — 83735 ASSAY OF MAGNESIUM: CPT | Performed by: SURGERY

## 2024-09-14 RX ADMIN — Medication 30 MG: at 13:55

## 2024-09-14 RX ADMIN — METHOCARBAMOL 750 MG: 750 TABLET ORAL at 20:36

## 2024-09-14 RX ADMIN — HYDROMORPHONE HYDROCHLORIDE 0.5 MG: 1 INJECTION, SOLUTION INTRAMUSCULAR; INTRAVENOUS; SUBCUTANEOUS at 11:46

## 2024-09-14 RX ADMIN — HYDROMORPHONE HYDROCHLORIDE 0.5 MG: 1 INJECTION, SOLUTION INTRAMUSCULAR; INTRAVENOUS; SUBCUTANEOUS at 10:26

## 2024-09-14 RX ADMIN — HYDROMORPHONE HYDROCHLORIDE 5 MG: 5 SOLUTION ORAL at 21:19

## 2024-09-14 RX ADMIN — GABAPENTIN 300 MG: 250 SUSPENSION ORAL at 08:17

## 2024-09-14 RX ADMIN — HYDROMORPHONE HYDROCHLORIDE 6 MG: 5 SOLUTION ORAL at 06:51

## 2024-09-14 RX ADMIN — HYDROMORPHONE HYDROCHLORIDE 6 MG: 5 SOLUTION ORAL at 08:17

## 2024-09-14 RX ADMIN — HYDROMORPHONE HYDROCHLORIDE 5 MG: 5 SOLUTION ORAL at 14:04

## 2024-09-14 RX ADMIN — HYDROMORPHONE HYDROCHLORIDE 0.5 MG: 1 INJECTION, SOLUTION INTRAMUSCULAR; INTRAVENOUS; SUBCUTANEOUS at 15:50

## 2024-09-14 RX ADMIN — Medication 30 MG: at 08:17

## 2024-09-14 RX ADMIN — HYDROMORPHONE HYDROCHLORIDE 0.5 MG: 1 INJECTION, SOLUTION INTRAMUSCULAR; INTRAVENOUS; SUBCUTANEOUS at 18:18

## 2024-09-14 RX ADMIN — METHOCARBAMOL 750 MG: 750 TABLET ORAL at 08:17

## 2024-09-14 RX ADMIN — HYDROMORPHONE HYDROCHLORIDE 2 MG: 5 SOLUTION ORAL at 10:26

## 2024-09-14 RX ADMIN — HYDROMORPHONE HYDROCHLORIDE 4 MG: 5 SOLUTION ORAL at 02:03

## 2024-09-14 RX ADMIN — HYDROMORPHONE HYDROCHLORIDE 2 MG: 5 SOLUTION ORAL at 04:52

## 2024-09-14 RX ADMIN — Medication 40 MG: at 08:17

## 2024-09-14 RX ADMIN — HYDROMORPHONE HYDROCHLORIDE 0.5 MG: 1 INJECTION, SOLUTION INTRAMUSCULAR; INTRAVENOUS; SUBCUTANEOUS at 20:15

## 2024-09-14 RX ADMIN — HYDROMORPHONE HYDROCHLORIDE 5 MG: 5 SOLUTION ORAL at 19:46

## 2024-09-14 RX ADMIN — HYDROMORPHONE HYDROCHLORIDE 6 MG: 5 SOLUTION ORAL at 11:44

## 2024-09-14 RX ADMIN — HYDROMORPHONE HYDROCHLORIDE 2 MG: 5 SOLUTION ORAL at 17:33

## 2024-09-14 RX ADMIN — METHOCARBAMOL 750 MG: 750 TABLET ORAL at 13:55

## 2024-09-14 RX ADMIN — HYDROMORPHONE HYDROCHLORIDE 2 MG: 5 SOLUTION ORAL at 14:08

## 2024-09-14 RX ADMIN — ACETAMINOPHEN 650 MG: 160 LIQUID ORAL at 10:32

## 2024-09-14 RX ADMIN — Medication 30 MG: at 20:36

## 2024-09-14 RX ADMIN — ACETAMINOPHEN 650 MG: 160 LIQUID ORAL at 17:39

## 2024-09-14 RX ADMIN — HYDROMORPHONE HYDROCHLORIDE 0.5 MG: 1 INJECTION, SOLUTION INTRAMUSCULAR; INTRAVENOUS; SUBCUTANEOUS at 02:57

## 2024-09-14 RX ADMIN — HYDROMORPHONE HYDROCHLORIDE 0.5 MG: 1 INJECTION, SOLUTION INTRAMUSCULAR; INTRAVENOUS; SUBCUTANEOUS at 05:26

## 2024-09-14 RX ADMIN — HYDROMORPHONE HYDROCHLORIDE 0.5 MG: 1 INJECTION, SOLUTION INTRAMUSCULAR; INTRAVENOUS; SUBCUTANEOUS at 13:55

## 2024-09-14 RX ADMIN — HYDROMORPHONE HYDROCHLORIDE 0.5 MG: 1 INJECTION, SOLUTION INTRAMUSCULAR; INTRAVENOUS; SUBCUTANEOUS at 21:37

## 2024-09-14 RX ADMIN — Medication 1.5 ML: at 08:17

## 2024-09-14 RX ADMIN — GABAPENTIN 300 MG: 250 SUSPENSION ORAL at 21:37

## 2024-09-14 RX ADMIN — Medication 30 MG: at 02:57

## 2024-09-14 RX ADMIN — HYDROMORPHONE HYDROCHLORIDE 0.5 MG: 1 INJECTION, SOLUTION INTRAMUSCULAR; INTRAVENOUS; SUBCUTANEOUS at 23:04

## 2024-09-14 ASSESSMENT — ACTIVITIES OF DAILY LIVING (ADL)
ADLS_ACUITY_SCORE: 26
ADLS_ACUITY_SCORE: 30
ADLS_ACUITY_SCORE: 26
ADLS_ACUITY_SCORE: 26
ADLS_ACUITY_SCORE: 30
ADLS_ACUITY_SCORE: 26
ADLS_ACUITY_SCORE: 30
ADLS_ACUITY_SCORE: 26
ADLS_ACUITY_SCORE: 30
ADLS_ACUITY_SCORE: 26
ADLS_ACUITY_SCORE: 26
ADLS_ACUITY_SCORE: 30
ADLS_ACUITY_SCORE: 30

## 2024-09-14 NOTE — PROGRESS NOTES
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 She is now s/p TPAIT, splenectomy, and G/J placement with repair of small incisional hernia on 9/6/24 with Dr. Carmichael.     Updates:  Doing well, will increase valium dose  Send drain triglycerides    _______________________________________________    s/p TPAIT 9/6/24: POD #8 . Islet yield Islet equivalents/kilogram: 2937.   Post-op US: patient vessels. KELLY drain x 1 with serosang output.   -Repeat liver US patent vasculature.    Cardiorespiratory:  Atrial fib & flutter: Self-limited A-fib RVR in SICU on 9/8. Rate up to 150 9/10 with EKG showing A-flutter. Metoprolol IV x2 slowed rate to 130s. Asymptomatic. Now in NSR, rates in the 90s  -Diltiazem 30mg q6hr  -Check TSH (WNL)  -Consult Cardiology    GI/Nutrition:   GERD: PPI daily  Pancreatic insufficiency: Relizorb with TF.   Moderate malnutrition in the context of chronic illness/disease: GJ tube placed, clamp G tube as tolerated. Tube feeds at goal. Sugar free clears.   Bowel regimen: Senna & PEG    Fluid/Electrolytes: IVF: discontinued; Replaced K & Mag 9/10 due to A-flutter.    : No acute issues.     Endocrine:  Post-pancreatectomy diabetes/stress hyperglycemia: Endocrine consulted. Transitioned to subcutaneous insulin, adjusting as tolerated.     Infection:   Leukocytosis: WBC 26.9 (23.7), likely 2/2 stress and splenectomy. Ertapenem for surgical ppx, stopped 9/9    Prophylaxis: Anticoagulation: Heparin gtt now stopped since hepatic US is patent    Neuro:   Acute on chronic pain: PTA managed with PO hydromorphone 4 mg Q3H. Pain Management team consulted.  Current regimen:    - Ropivacaine paravertebral block managed by Anesthesia   - Dilaudid PCA, STOPPED 9/12   -  Dilaudid 2mg q4H via JT- increased to 4-6 mg every 3 hours with stopping PCA   - Neurontin   - Acetaminophen   - Atarax prn    - Robaxin, not very effective per patient   - Valium IV 1mg q6H PRN muscle spasms. Patient states it is helpful, will switch to tablet to plan for discharge.    Activity: PT/OT consulted    Disposition: 7A, planning for discharge Monday/Tuesday of next week    KEYA/Fellow/Resident Provider: Edi Clements MD  Transplant Surgery Fellow      Faculty: Carlos Carmichael MD  __________________________________________________________________  Transplant History: Admitted 9/6/2024 for TPAIT  9/6/2024 (Islet), Postoperative day: 8     Interval History: History is obtained from the patient  Overnight events: NAEO    ROS:   A 10-point review of systems was negative except as noted above.    Curent Meds:  Current Facility-Administered Medications   Medication Dose Route Frequency Provider Last Rate Last Admin    diltiazem (CARDIZEM) solution 30 mg  30 mg Per J Tube Q6H Nia Wise, PATTI CNP   30 mg at 09/14/24 0817    sennosides (SENOKOT) syrup 5 mL  5 mL Per J Tube BID Carlos Carmichael MD   5 mL at 09/09/24 2013    And    docusate (COLACE) 50 MG/5ML liquid 50 mg  50 mg Per J Tube BID Carlos Carmichael MD   50 mg at 09/12/24 2010    gabapentin (NEURONTIN) solution 300 mg  300 mg Oral or J tube BID Carlos Carmichael MD   300 mg at 09/14/24 0817    HYDROmorphone (STANDARD CONC) (DILAUDID) oral solution 4-6 mg  4-6 mg Per J Tube Q3H Lesly Renae PA-C   6 mg at 09/14/24 0817    insulin aspart (NovoLOG) injection (RAPID ACTING)  1-10 Units Subcutaneous Q4H Jessica Sánchez PA-C   2 Units at 09/14/24 0835    insulin glargine (LANTUS PEN) injection 42 Units  42 Units Subcutaneous Q24H Jessica Sánchez PA-C   42 Units at 09/14/24 0833    methocarbamol (ROBAXIN) tablet 750 mg  750 mg Oral TID Jus Clements MD   750 mg at 09/14/24 0817    w  "complete formulation (PEDIATRIC) oral solution 1.5 mL  1.5 mL Oral or J tube Daily Carlos Carmichael MD   1.5 mL at 09/14/24 0817    pantoprazole (PROTONIX) 2 mg/mL suspension 40 mg  40 mg Oral or J tube Daily Carlos Carmichael MD   40 mg at 09/14/24 0817    polyethylene glycol (MIRALAX) Packet 17 g  17 g Per J Tube Daily Carlos Carmichael MD   17 g at 09/09/24 0745    sodium chloride (PF) 0.9% PF flush 3 mL  3 mL Intracatheter Q8H Carlos Carmichael MD   3 mL at 09/14/24 0655       Physical Exam:     Admit Weight: 71.8 kg (158 lb 4.6 oz)    Current Vitals:   /60 (BP Location: Left arm)   Pulse 75   Temp 98.2  F (36.8  C) (Oral)   Resp 16   Ht 1.676 m (5' 6\")   Wt 77.6 kg (171 lb 1.6 oz)   SpO2 96%   BMI 27.62 kg/m      Vital sign ranges:    Temp:  [98  F (36.7  C)-98.4  F (36.9  C)] 98.2  F (36.8  C)  Pulse:  [75-89] 75  Resp:  [16] 16  BP: ()/(52-72) 106/60  SpO2:  [95 %-98 %] 96 %  Patient Vitals for the past 24 hrs:   BP Temp Temp src Pulse Resp SpO2   09/14/24 0536 106/60 98.2  F (36.8  C) Oral 75 16 96 %   09/14/24 0201 105/71 98.1  F (36.7  C) Oral 89 16 98 %   09/13/24 2230 109/61 98  F (36.7  C) Oral 85 16 95 %   09/13/24 2036 105/61 -- -- 84 -- --   09/13/24 1833 103/64 98.2  F (36.8  C) Oral 81 16 96 %   09/13/24 1432 (!) 87/52 98.2  F (36.8  C) Oral -- 16 --   09/13/24 1005 109/72 98.4  F (36.9  C) Oral 84 16 96 %     General Appearance: NAD  Skin: normal, no suspicious lesions or rashes  Heart: Perfused, regular rate  Lungs: Nonlabored resps on RA  Abdomen: The abdomen is soft, and moderately tender. The wound is Healing well. Tube/Drain sites are: GJ in place. KELLY: serosanguinous, moderate. GJ tube site intact.  : martinez is not present.    Extremities: edema: present bilaterally. 1+  Neuro: A&Ox4    Data:   CMP  Recent Labs   Lab 09/14/24  0832 09/14/24  0628 09/13/24  0419 09/13/24  0417 09/11/24  0554 09/11/24  0535 " 09/08/24 0828 09/08/24  0820 09/08/24  0354 09/08/24  0348   NA  --  137  --  137   < > 137   < >  --   --  139   POTASSIUM  --  4.0  --  4.2   < > 4.0   < >  --   --  3.7   CHLORIDE  --  105  --  105   < > 106   < >  --   --  108*   CO2  --  26  --  25   < > 25   < >  --   --  25   * 136*   < > 123*   < > 110*   < >  --    < > 109*   BUN  --  13.2  --  14.7   < > 10.8   < >  --   --  8.8   CR  --  0.50*  --  0.52   < > 0.55   < >  --   --  0.76   GFRESTIMATED  --  >90  --  >90   < > >90   < >  --   --  >90   CARLA  --  7.7*  --  7.2*   < > 7.3*   < >  --   --  7.4*   ICAW  --   --   --   --   --   --   --  4.4  --   --    MAG  --  2.0  --  2.0   < > 1.8   < >  --   --  1.7   PHOS  --  3.6  --  3.8   < > 2.7   < >  --   --  2.8   AMYLASE  --   --   --   --   --  4*  --   --   --   --    LIPASE  --   --   --   --   --  6*  --   --   --   --    ALBUMIN  --   --   --   --   --  2.2*  --   --   --  2.6*   BILITOTAL  --   --   --   --   --  0.2  --   --   --  0.3   ALKPHOS  --   --   --   --   --  93  --   --   --  55   AST  --   --   --   --   --  19  --   --   --  67*   ALT  --   --   --   --   --  25  --   --   --  81*    < > = values in this interval not displayed.     CBC  Recent Labs   Lab 09/14/24 0628 09/13/24  1930   HGB 8.7* 8.6*   WBC 26.3* 26.9*   * 703*     Coags  Recent Labs   Lab 09/13/24  0417 09/12/24  0541   INR  --  1.09   PTT 32 32

## 2024-09-14 NOTE — PLAN OF CARE
"Goal Outcome Evaluation:    Plan of Care Reviewed With: patient    Overall Patient Progress: improving    Outcome Evaluation: moderate/severe abdominal pain managed w/ PRNs. Q4H BG checks stable. G tube clamped all night w/out nausea.    VS: /60 (BP Location: Left arm)   Pulse 75   Temp 98.2  F (36.8  C) (Oral)   Resp 16   Ht 1.676 m (5' 6\")   Wt 77.6 kg (171 lb 1.6 oz)   SpO2 96%   BMI 27.62 kg/m      Cares: 1900 - 0730     Neuro: AOX4   VS: softer pressures 100/60s, all other VSS on RA  GI/: voiding adequately, LBM 9/13  Skin: midline abdominal incision stapled HARLEEN  Diet: clears (sugar free) w/ carb count   Labs: awaiting AM lab results   BG: Q4H (113, 122, 145)  LDA:  Right PIV SL, Right internal jugular. Right KELLY - 800+ serous OP   Mobility: SBA   Pain/Nausea: moderate/severe abdominal pain, denies nausea   PRN medications: PO valium x1 (see MAR), IV dilaudid x3 (see MAR), PO dilaudid x1 (see MAR)  Plan of Care: continue with current POC and update MD with any changes.     "

## 2024-09-14 NOTE — PLAN OF CARE
"Goal Outcome Evaluation:      Plan of Care Reviewed With: patient    Overall Patient Progress: improvingOverall Patient Progress: improving    Outcome Evaluation: Pain better controlled today with consistent use of PRN medications. Gtube clamped without nausea.    /68 (BP Location: Right arm)   Pulse 76   Temp 97.7  F (36.5  C) (Oral)   Resp 18   Ht 1.676 m (5' 6\")   Wt 77.6 kg (171 lb 1.6 oz)   SpO2 100%   BMI 27.62 kg/m      Shift: [id number]  Isolation Status: None  VS: Stable on room air, afebrile  Neuro: Aox4  Behaviors: calm, cooperative   BG: Q4 (156,130,126,66)  Labs: Calcium=7.7  Respiratory: WDL  Cardiac: WDL   Pain/Nausea: moderate to severe pain 6-10/10. Denies nausea  PRN: IV dilaudid x3, PO dilaudid x1, simethicone x1, tylenol x2  Diet: Clears/sugar free  IV Access: Right PIV, Right portacath  Infusion(s): none  Lines/Drains: g/J peg, g clamped, J to enteral feedings, Right KELLY to bulb suction   GI/: BM 9/14, voiding spontaneously without difficulty   Skin:  Wound under right breast - see wound care orders. Bandaged changed today  Events: low blood sugar of 66. Administered 120mL apple juice via Jtube. Recheck=65. Administered 2nd dose of apple juice 120mL.=76. 3rd dose apple juice =80, 85  Mobility: SBA   Plan: Continue POC with focus on pain managment    "

## 2024-09-14 NOTE — PROGRESS NOTES
Brief Endocrine Progress Note:     Patient labs, chart, and imaging reviewed.   No changes in insulin today. The lantus dose was changed yesterday.                                   Discussed with oncall attending.     Augusto Washington MD  Endocrine Fellow  [phone]

## 2024-09-15 LAB
ANION GAP SERPL CALCULATED.3IONS-SCNC: 7 MMOL/L (ref 7–15)
BUN SERPL-MCNC: 13.6 MG/DL (ref 6–20)
CALCIUM SERPL-MCNC: 7.6 MG/DL (ref 8.8–10.4)
CHLORIDE SERPL-SCNC: 101 MMOL/L (ref 98–107)
CREAT SERPL-MCNC: 0.52 MG/DL (ref 0.51–0.95)
EGFRCR SERPLBLD CKD-EPI 2021: >90 ML/MIN/1.73M2
ERYTHROCYTE [DISTWIDTH] IN BLOOD BY AUTOMATED COUNT: 14 % (ref 10–15)
GLUCOSE BLDC GLUCOMTR-MCNC: 144 MG/DL (ref 70–99)
GLUCOSE BLDC GLUCOMTR-MCNC: 155 MG/DL (ref 70–99)
GLUCOSE BLDC GLUCOMTR-MCNC: 219 MG/DL (ref 70–99)
GLUCOSE BLDC GLUCOMTR-MCNC: 69 MG/DL (ref 70–99)
GLUCOSE BLDC GLUCOMTR-MCNC: 79 MG/DL (ref 70–99)
GLUCOSE BLDC GLUCOMTR-MCNC: 90 MG/DL (ref 70–99)
GLUCOSE BLDC GLUCOMTR-MCNC: 91 MG/DL (ref 70–99)
GLUCOSE BLDC GLUCOMTR-MCNC: 93 MG/DL (ref 70–99)
GLUCOSE BLDC GLUCOMTR-MCNC: 98 MG/DL (ref 70–99)
GLUCOSE SERPL-MCNC: 178 MG/DL (ref 70–99)
HCO3 SERPL-SCNC: 25 MMOL/L (ref 22–29)
HCT VFR BLD AUTO: 29.5 % (ref 35–47)
HGB BLD-MCNC: 9.5 G/DL (ref 11.7–15.7)
MAGNESIUM SERPL-MCNC: 1.9 MG/DL (ref 1.7–2.3)
MCH RBC QN AUTO: 28.9 PG (ref 26.5–33)
MCHC RBC AUTO-ENTMCNC: 32.2 G/DL (ref 31.5–36.5)
MCV RBC AUTO: 90 FL (ref 78–100)
PHOSPHATE SERPL-MCNC: 4.3 MG/DL (ref 2.5–4.5)
PLATELET # BLD AUTO: 1154 10E3/UL (ref 150–450)
POTASSIUM SERPL-SCNC: 4.4 MMOL/L (ref 3.4–5.3)
RBC # BLD AUTO: 3.29 10E6/UL (ref 3.8–5.2)
SODIUM SERPL-SCNC: 133 MMOL/L (ref 135–145)
WBC # BLD AUTO: 25.6 10E3/UL (ref 4–11)

## 2024-09-15 PROCEDURE — 99232 SBSQ HOSP IP/OBS MODERATE 35: CPT | Mod: 24 | Performed by: NURSE PRACTITIONER

## 2024-09-15 PROCEDURE — 120N000011 HC R&B TRANSPLANT UMMC

## 2024-09-15 PROCEDURE — 84100 ASSAY OF PHOSPHORUS: CPT | Performed by: SURGERY

## 2024-09-15 PROCEDURE — 250N000013 HC RX MED GY IP 250 OP 250 PS 637: Performed by: SURGERY

## 2024-09-15 PROCEDURE — 85027 COMPLETE CBC AUTOMATED: CPT | Performed by: PHYSICIAN ASSISTANT

## 2024-09-15 PROCEDURE — 250N000013 HC RX MED GY IP 250 OP 250 PS 637: Performed by: PHYSICIAN ASSISTANT

## 2024-09-15 PROCEDURE — 250N000013 HC RX MED GY IP 250 OP 250 PS 637: Performed by: STUDENT IN AN ORGANIZED HEALTH CARE EDUCATION/TRAINING PROGRAM

## 2024-09-15 PROCEDURE — 250N000013 HC RX MED GY IP 250 OP 250 PS 637: Performed by: NURSE PRACTITIONER

## 2024-09-15 PROCEDURE — 250N000009 HC RX 250: Performed by: NURSE PRACTITIONER

## 2024-09-15 PROCEDURE — 80048 BASIC METABOLIC PNL TOTAL CA: CPT | Performed by: PHYSICIAN ASSISTANT

## 2024-09-15 PROCEDURE — 36591 DRAW BLOOD OFF VENOUS DEVICE: CPT | Performed by: PHYSICIAN ASSISTANT

## 2024-09-15 PROCEDURE — 83735 ASSAY OF MAGNESIUM: CPT | Performed by: SURGERY

## 2024-09-15 PROCEDURE — 250N000011 HC RX IP 250 OP 636: Performed by: PHYSICIAN ASSISTANT

## 2024-09-15 RX ORDER — HYDROMORPHONE HYDROCHLORIDE 1 MG/ML
6 SOLUTION ORAL
Status: DISCONTINUED | OUTPATIENT
Start: 2024-09-15 | End: 2024-09-17 | Stop reason: HOSPADM

## 2024-09-15 RX ORDER — DILTIAZEM HYDROCHLORIDE 60 MG/1
60 CAPSULE, EXTENDED RELEASE ORAL 2 TIMES DAILY
Status: DISCONTINUED | OUTPATIENT
Start: 2024-09-16 | End: 2024-09-17 | Stop reason: HOSPADM

## 2024-09-15 RX ADMIN — HYDROMORPHONE HYDROCHLORIDE 0.5 MG: 1 INJECTION, SOLUTION INTRAMUSCULAR; INTRAVENOUS; SUBCUTANEOUS at 08:52

## 2024-09-15 RX ADMIN — HYDROMORPHONE HYDROCHLORIDE 0.5 MG: 1 INJECTION, SOLUTION INTRAMUSCULAR; INTRAVENOUS; SUBCUTANEOUS at 17:57

## 2024-09-15 RX ADMIN — HYDROMORPHONE HYDROCHLORIDE 6 MG: 5 SOLUTION ORAL at 12:01

## 2024-09-15 RX ADMIN — Medication 30 MG: at 19:40

## 2024-09-15 RX ADMIN — HYDROMORPHONE HYDROCHLORIDE 0.5 MG: 1 INJECTION, SOLUTION INTRAMUSCULAR; INTRAVENOUS; SUBCUTANEOUS at 14:20

## 2024-09-15 RX ADMIN — Medication 1.5 ML: at 08:55

## 2024-09-15 RX ADMIN — GABAPENTIN 300 MG: 250 SUSPENSION ORAL at 19:40

## 2024-09-15 RX ADMIN — METHOCARBAMOL 750 MG: 750 TABLET ORAL at 14:20

## 2024-09-15 RX ADMIN — Medication 30 MG: at 08:55

## 2024-09-15 RX ADMIN — HYDROMORPHONE HYDROCHLORIDE 5 MG: 5 SOLUTION ORAL at 06:18

## 2024-09-15 RX ADMIN — Medication 30 MG: at 03:21

## 2024-09-15 RX ADMIN — HYDROMORPHONE HYDROCHLORIDE 0.5 MG: 1 INJECTION, SOLUTION INTRAMUSCULAR; INTRAVENOUS; SUBCUTANEOUS at 19:40

## 2024-09-15 RX ADMIN — HYDROMORPHONE HYDROCHLORIDE 2 MG: 5 SOLUTION ORAL at 16:56

## 2024-09-15 RX ADMIN — HYDROMORPHONE HYDROCHLORIDE 5 MG: 5 SOLUTION ORAL at 00:00

## 2024-09-15 RX ADMIN — HYDROMORPHONE HYDROCHLORIDE 6 MG: 5 SOLUTION ORAL at 09:21

## 2024-09-15 RX ADMIN — HYDROMORPHONE HYDROCHLORIDE 0.5 MG: 1 INJECTION, SOLUTION INTRAMUSCULAR; INTRAVENOUS; SUBCUTANEOUS at 06:52

## 2024-09-15 RX ADMIN — HYDROMORPHONE HYDROCHLORIDE 6 MG: 5 SOLUTION ORAL at 15:32

## 2024-09-15 RX ADMIN — METHOCARBAMOL 750 MG: 750 TABLET ORAL at 08:50

## 2024-09-15 RX ADMIN — HYDROMORPHONE HYDROCHLORIDE 6 MG: 5 SOLUTION ORAL at 21:03

## 2024-09-15 RX ADMIN — HYDROMORPHONE HYDROCHLORIDE 0.5 MG: 1 INJECTION, SOLUTION INTRAMUSCULAR; INTRAVENOUS; SUBCUTANEOUS at 10:36

## 2024-09-15 RX ADMIN — HYDROMORPHONE HYDROCHLORIDE 0.5 MG: 1 INJECTION, SOLUTION INTRAMUSCULAR; INTRAVENOUS; SUBCUTANEOUS at 12:59

## 2024-09-15 RX ADMIN — ACETAMINOPHEN 650 MG: 160 LIQUID ORAL at 12:02

## 2024-09-15 RX ADMIN — HYDROMORPHONE HYDROCHLORIDE 0.5 MG: 1 INJECTION, SOLUTION INTRAMUSCULAR; INTRAVENOUS; SUBCUTANEOUS at 23:53

## 2024-09-15 RX ADMIN — HYDROMORPHONE HYDROCHLORIDE 2 MG: 5 SOLUTION ORAL at 01:00

## 2024-09-15 RX ADMIN — HYDROMORPHONE HYDROCHLORIDE 0.5 MG: 1 INJECTION, SOLUTION INTRAMUSCULAR; INTRAVENOUS; SUBCUTANEOUS at 04:01

## 2024-09-15 RX ADMIN — Medication 40 MG: at 08:54

## 2024-09-15 RX ADMIN — GABAPENTIN 300 MG: 250 SUSPENSION ORAL at 08:55

## 2024-09-15 RX ADMIN — HYDROMORPHONE HYDROCHLORIDE 5 MG: 5 SOLUTION ORAL at 03:21

## 2024-09-15 RX ADMIN — Medication 30 MG: at 15:33

## 2024-09-15 RX ADMIN — METHOCARBAMOL 750 MG: 750 TABLET ORAL at 19:40

## 2024-09-15 RX ADMIN — HYDROMORPHONE HYDROCHLORIDE 2 MG: 5 SOLUTION ORAL at 05:29

## 2024-09-15 ASSESSMENT — ACTIVITIES OF DAILY LIVING (ADL)
ADLS_ACUITY_SCORE: 29
ADLS_ACUITY_SCORE: 29
ADLS_ACUITY_SCORE: 30
ADLS_ACUITY_SCORE: 29
ADLS_ACUITY_SCORE: 29
ADLS_ACUITY_SCORE: 30
ADLS_ACUITY_SCORE: 29
ADLS_ACUITY_SCORE: 30
ADLS_ACUITY_SCORE: 30
ADLS_ACUITY_SCORE: 29
ADLS_ACUITY_SCORE: 30
ADLS_ACUITY_SCORE: 30
ADLS_ACUITY_SCORE: 29
ADLS_ACUITY_SCORE: 30
ADLS_ACUITY_SCORE: 30

## 2024-09-15 NOTE — PLAN OF CARE
"Goal Outcome Evaluation:      Plan of Care Reviewed With: patient    Overall Patient Progress: improvingOverall Patient Progress: improving    Vitals: /76 (BP Location: Right arm)   Pulse 75   Temp 98.7  F (37.1  C) (Oral)   Resp 18   Ht 1.676 m (5' 6\")   Wt 74.1 kg (163 lb 4.8 oz)   SpO2 97%   BMI 26.36 kg/m      Endocrine: Glucose 155, Lantus 35U, Novolog correction.  Labs: Stable labs, elevated platelets, WBC.  Pain: Moderate pain 9/10 at G/J tube site.  PRN's: Frequent IV Dilaudid and is on scheduled elixir Dilaudid.  Diet: Sugar free clear liquids, Impact Peptide 1.5 at 45cc/hr, 30cc water flush Q4 hrs.  LDA: R SL memo-cath, R PIV saline locked.  GI: Loose BM's, laxatives held.  : Voids spontaneously.  Skin: Abdominal incision, KELLY 300cc.  Neuro: Alert and oriented.  Mobility: Up with minimal assist.  Education: Continue with diabetes and tube feed education.  Plan: Manage pain, discharge locally in 1-2 days.             "

## 2024-09-15 NOTE — PLAN OF CARE
Goal Outcome Evaluation:      Plan of Care Reviewed With: patient, spouse               VS: RA, afebrile  Neuro: Aox4  BG: Q4 hrs  Respiratory: WDL  Cardiac: WDL   Pain/Nausea: moderate to severe pain 6-9/10. PRN meds given. Denies nausea  Diet: Clears sugar free  IV Access: Right PIV, Right port  Lines/Drains: g/J peg, g clamped, J to enteral feedings, Right KELLY to bulb suction   GI/:No new changes   Skin:  Wound under right breast -WOC orders  Mobility: UAL  Plan: Continue POC with focus on pain managment

## 2024-09-15 NOTE — PROVIDER NOTIFICATION
Time of notification: 1:29 AM  Provider notified: Dr. Cricket Lackey  Patient status:FYI: Pt BG 69. Followed hypoglycemia protocol now post 2 BG's in 90's. PT asymptomatic entire time   Temp:  [97.7  F (36.5  C)-98.6  F (37  C)] 98.6  F (37  C)  Pulse:  [75-89] 84  Resp:  [16-20] 18  BP: (105-111)/(60-82) 111/63  SpO2:  [96 %-100 %] 97 %  Orders received: Page acknowledged

## 2024-09-15 NOTE — PLAN OF CARE
Shift: [id number]        Team: Surg transplant autoislet   Neuro: A&Ox4, denies numbness and tingling  Resp: >95% on room air   Cardiac: No tele, afebrile, palpable pulses, vital signs stable   GI/: Voids spontaneously, bowel sounds audible, no bowel movement this shift     Skin: Midline incision open to air closed with staples, R under breast wound   Pain: 7-10/10 abdominal pain  PRN: See MAR for timing and amounts of PO dilaudid and IV dilaudid   Activity: Stand by assist   Labs: Mag, K, and Phos   Endo: Q4 BG episode of hypoglycemia 69 corrected with protocol   LDA's: R Port-a-cath saline locked, R PIV saline locked, R KELLY drain, PEGJ G clamped, J 45 mL/hr tube feeds with Q1 40 mL free water flush relizorb to be changed @ 1400  Diet: Clear sugar free liquids       Plan: Continue plan of care and notify team with changes.

## 2024-09-15 NOTE — PROGRESS NOTES
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 She is now s/p TPAIT, splenectomy, and G/J placement with repair of small incisional hernia on 9/6/24 with Dr. Carmichael.     _______________________________________________    s/p TPAIT 9/6/24: POD #9 . Islet yield Islet equivalents/kilogram: 2937.   Post-op US: patient vessels. KELLY drain x 1 with serous output.     Cardiorespiratory:  Atrial fib & flutter: Self-limited A-fib RVR in SICU on 9/8. Rate up to 150 9/10 with EKG showing A-flutter. Metoprolol IV x2 slowed rate to 130s. Asymptomatic. TSH normal. Now in NSR.  -Diltiazem 30mg q6hr, switch to 60mg SR BID ORAL tomorrow    GI/Nutrition:   GERD: PPI daily  Pancreatic insufficiency: Relizorb with TF.   Moderate malnutrition in the context of chronic illness/disease: GJ tube placed, clamp G tube as tolerated. Tube feeds at goal. Sugar free clears.   Bowel regimen: Senna & PEG    Fluid/Electrolytes: No acute issues.     : No acute issues.     Endocrine:  Post-pancreatectomy diabetes/stress hyperglycemia: Endocrine consulted. Transitioned to subcutaneous insulin, adjusting as tolerated.     Infection:   Leukocytosis: WBC 25.6, likely 2/2 stress and splenectomy. Ertapenem for surgical ppx, stopped 9/9    Prophylaxis: Anticoagulation: Heparin gtt now stopped since hepatic US is patent    Neuro:   Acute on chronic pain: PTA managed with PO hydromorphone 4 mg Q3H. Better control today.  Current regimen:    - Dilaudid 6mg q3H via JT   - Dilaudid IV 0.5mg q1H PRN   - Neurontin   - Acetaminophen   - Atarax prn    - Robaxin, not very effective per patient   - Valium 3mg q6H PRN muscle spasms    Activity: PT/OT consulted    Disposition: 7A, planning for discharge Monday/Tuesday of  next week    KEYA/Fellow/Resident Provider: Nia Sierra NP     Faculty: Carlos Carmichael MD  __________________________________________________________________  Transplant History: Admitted 9/6/2024 for TPAIT  9/6/2024 (Islet), Postoperative day: 9     Interval History: History is obtained from the patient  Overnight events: Better pain control today    ROS:   A 10-point review of systems was negative except as noted above.    Curent Meds:  Current Facility-Administered Medications   Medication Dose Route Frequency Provider Last Rate Last Admin    diltiazem (CARDIZEM) solution 30 mg  30 mg Per J Tube Q6H [facility] Nia Sierra APRN CNP   30 mg at 09/15/24 0855    sennosides (SENOKOT) syrup 5 mL  5 mL Per J Tube BID Calros Carmichael MD   5 mL at 09/09/24 2013    And    docusate (COLACE) 50 MG/5ML liquid 50 mg  50 mg Per J Tube BID Carlos Carmichael MD   50 mg at 09/12/24 2010    gabapentin (NEURONTIN) solution 300 mg  300 mg Oral or J tube BID Carlos Carmichael MD   300 mg at 09/15/24 0855    HYDROmorphone (STANDARD CONC) (DILAUDID) oral solution 6 mg  6 mg Per J Tube Q3H Nia Sierra APRN CNP   6 mg at 09/15/24 0921    insulin aspart (NovoLOG) injection (RAPID ACTING)  1-10 Units Subcutaneous Q4H Jessica Sánchez PA-C   2 Units at 09/15/24 0856    insulin glargine (LANTUS PEN) injection 35 Units  35 Units Subcutaneous Q24H Cheri Ward APRN CNP   35 Units at 09/15/24 0925    methocarbamol (ROBAXIN) tablet 750 mg  750 mg Oral TID Jus Clements MD   750 mg at 09/15/24 0850    mvw complete formulation (PEDIATRIC) oral solution 1.5 mL  1.5 mL Oral or J tube Daily Carlos Carmichael MD   1.5 mL at 09/15/24 0855    pantoprazole (PROTONIX) 2 mg/mL suspension 40 mg  40 mg Oral or J tube Daily Carlos Carmichael MD   40 mg at 09/15/24 0854    polyethylene glycol (MIRALAX) Packet 17 g  17 g Per J Tube Daily Amol  "Carlos Smiley MD   17 g at 09/09/24 0745    sodium chloride (PF) 0.9% PF flush 3 mL  3 mL Intracatheter Q8H Amol, Carlos Smiley MD   3 mL at 09/14/24 1407       Physical Exam:     Admit Weight: 71.8 kg (158 lb 4.6 oz)    Current Vitals:   /76 (BP Location: Right arm)   Pulse 75   Temp 98.7  F (37.1  C) (Oral)   Resp 18   Ht 1.676 m (5' 6\")   Wt 74.1 kg (163 lb 4.8 oz)   SpO2 97%   BMI 26.36 kg/m      Vital sign ranges:    Temp:  [97.7  F (36.5  C)-99  F (37.2  C)] 98.7  F (37.1  C)  Pulse:  [75-87] 75  Resp:  [16-18] 18  BP: (105-128)/(61-82) 128/76  Cuff Mean (mmHg):  [82] 82  SpO2:  [95 %-100 %] 97 %  Patient Vitals for the past 24 hrs:   BP Temp Temp src Pulse Resp SpO2 Weight   09/15/24 0950 128/76 98.7  F (37.1  C) Oral 75 18 97 % --   09/15/24 0801 -- -- -- -- -- -- 74.1 kg (163 lb 4.8 oz)   09/15/24 0623 105/70 98.4  F (36.9  C) Oral -- 18 96 % --   09/15/24 0320 117/68 -- -- -- -- 95 % --   09/15/24 0144 112/71 99  F (37.2  C) Oral 87 16 97 % --   09/14/24 2150 111/63 98.6  F (37  C) Oral 84 18 97 % --   09/14/24 2023 111/61 98.3  F (36.8  C) Oral -- 18 97 % --   09/14/24 1721 106/68 97.7  F (36.5  C) Oral 76 18 100 % --   09/14/24 1449 105/82 98  F (36.7  C) Oral 78 18 98 % --     General Appearance: NAD  Skin: normal, no suspicious lesions or rashes  Heart: Perfused, regular rate  Lungs: Nonlabored resps on RA  Abdomen: The abdomen is soft, and moderately tender. The wound is Healing well. Tube/Drain sites are: GJ in place. KELLY: serous. GJ tube site intact.  : martinez is not present.    Extremities: edema: present bilaterally. 1+  Neuro: A&Ox4    Data:   CMP  Recent Labs   Lab 09/15/24  0757 09/15/24  0615 09/14/24  0832 09/14/24  0628 09/11/24  0554 09/11/24  0535   NA  --  133*  --  137   < > 137   POTASSIUM  --  4.4  --  4.0   < > 4.0   CHLORIDE  --  101  --  105   < > 106   CO2  --  25  --  26   < > 25   * 178*   < > 136*   < > 110*   BUN  --  13.6  --  13.2   < " > 10.8   CR  --  0.52  --  0.50*   < > 0.55   GFRESTIMATED  --  >90  --  >90   < > >90   CARLA  --  7.6*  --  7.7*   < > 7.3*   MAG  --  1.9  --  2.0   < > 1.8   PHOS  --  4.3  --  3.6   < > 2.7   AMYLASE  --   --   --   --   --  4*   LIPASE  --   --   --   --   --  6*   ALBUMIN  --   --   --   --   --  2.2*   BILITOTAL  --   --   --   --   --  0.2   ALKPHOS  --   --   --   --   --  93   AST  --   --   --   --   --  19   ALT  --   --   --   --   --  25    < > = values in this interval not displayed.     CBC  Recent Labs   Lab 09/15/24  0615 09/14/24  0628   HGB 9.5* 8.7*   WBC 25.6* 26.3*   PLT 1,154* 903*     Coags  Recent Labs   Lab 09/13/24  0417 09/12/24  0541   INR  --  1.09   PTT 32 32

## 2024-09-15 NOTE — PLAN OF CARE
"Goal Outcome Evaluation:      Plan of Care Reviewed With: patient    Overall Patient Progress: improvingOverall Patient Progress: improving    Outcome Evaluation: Pain better controlled today with consistent use of PRN medications. Gtube clamped without nausea.    /71 (BP Location: Right arm)   Pulse 77   Temp 98.4  F (36.9  C) (Oral)   Resp 18   Ht 1.676 m (5' 6\")   Wt 74.1 kg (163 lb 4.8 oz)   SpO2 98%   BMI 26.36 kg/m      Shift: [id number]  Isolation Status: None  VS: Stable on room air, afebrile  Neuro: Aox4  Behaviors: calm, cooperative   BG: Q4   Respiratory: WDL  Cardiac: WDL   Pain/Nausea: moderate to severe pain 6-9/10. Denies nausea  PRN: IV dilaudid x2, PO tylenol x1  Diet: Clears/sugar free  IV Access: Right PIV, Right portacath  Infusion(s): none  Lines/Drains: g/J peg, g clamped, J to enteral feedings, Right KELLY to bulb suction   GI/: BM 9/14, voiding spontaneously without difficulty   Skin:  Wound under right breast - see wound care orders.   Mobility: UAL  Plan: Continue POC with focus on pain managment    "

## 2024-09-15 NOTE — PROGRESS NOTES
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 She is now s/p TPAIT, splenectomy, and G/J placement with repair of small incisional hernia on 9/6/24 with Dr. Carmichael.  Inpatient Diabetes Service consulted for management of diabetes.          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      - 178 - this AM and more labile with hypoglycemia requiring treatment at least x2    When pain is well controlled, insulin needs reduce.   Friday was having + pain, thus increased insulin needs and once this resolves, the stress impalct from the pain also wanes.      Will reduce for safety by nearly 20%    10:30 AM  Busy during rounds with dressing changes. Will return later or call to review questions/concerns. Well appearing and family in room      Carry over:   Colleague Spoke with primary team, patient will discharge on continuous TF and sugar-free clear liquid diet. Tentative discharge now is likely Monday 9/16. Education done - see note from MIKEY Cowan.       Discharge Planning: (tentative)    Medications: TBD. Likely Semglee once daily for continuous TF plus Lispro  correction scale q4hrs. Has meter + supplies already. Needs Baqsimi. Would like Dexcom G7 prescribed at discharge.Upon review this dexcom 7 sensor is already ordered for discharge today  Test Claims: completed 9/9 - Semglee, Lispro, Dexcom G7, Baqsimi.   Education:Teaching completed on 9/12/2024. knows how to use meter. Has previously used insulin too a while ago. Also a CNA.  Outpatient Follow-up:  recommend [facility] - here from [place]. Plans to establish with endocrinologist back in [place], has appointments set up here after discharge.Scheduled 10/4/2024: with Doreen Goodwin at 9:00 and with Dr Lupillo Hudson     Please notify Inpatient Diabetes Service if changes are planned to steroids, nutrition, TPN/TF and anticipated procedures requiring prolonged NPO status.         Interval History/Review of Systems :   - The last 24 hours progress and nursing notes reviewed     - asymptomatic hypoglycemia x2 in evening and overnoc.    - is progressing and discharge possible on Monday     Creat: 0.52/GFR > 90  Na: 133  K 4.4  WBC 25.6 - at/near where it has been past 6 days   Hgb 9.5  Plts: 1,154 (was 394 on 9/10 and rising each day) - message to team       Inpatient Glucose Control: labile        Patient with x2 episodes of low BG requiring apple juice to correct.  Lows to mid-60's then again at 0000 to 69 - both times requiring correction.  In past 24 hours range: 65 - 178 this AM (lab draw).  Given the low's will decrease basal for safety seeing the propensity is more toward low than high with the high reading this AM an outlier to the overall trends.             Planned Procedures/Surgeries: none    Recent Labs   Lab 09/15/24  0400 09/15/24  0124 09/15/24  0106 09/15/24  0038 09/15/24  0008 09/14/24 2034   * 98 90 79 69* 114*             Medications Impacting Glycemia:   Steroids: none  D5W containing solutions/medications: LR with dextrose at 100cc, now stopped  Other medications impacting  "glucose: TF         Nutrition:   Orders Placed This Encounter      Combination Diet Clear Liquid  Supplements:  none  TF: 9/7/2024: Impact Peptide 1.5 @ 10 ml/hr and advance by 10 ml q 24 hrs (and/or ONLY advance rate upon MD approval after daily evaluation) to goal Impact Peptide 1.5 @ goal of  45ml/hr  (1080ml/day) provides:  151 g CHO --> reached goal at 8AM 9/10  TPN: none        Diabetes History: see full consult note for complete diabetes history   Diabetes Type and Duration:   Prediabetes( as noted by Dr Marin) and there was a time they thought she had DM secondary to pancreatitis but this resolved per her   PTA Medication Regimen: none recently  Historical Diabetes Medications:      Basaglar 10 units  Tradjenta 5 mg daily  Metformin 1000 mg po bid  Glimiperide     Glucose monitoring device and frequency: not recently but has checked her BG in the past--> 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  at bedside. TF on at 45 mL/hr.   Lungs:  breathing comfortably on room air.  Abdomen: not distended   Mental Status:  alert and oriented x3  Psych:   Cooperative, appropriate affect         Data:     Lab Results   Component Value Date    A1C 6.7 (H) 09/06/2024    A1C 7.0 (H) 09/03/2024    A1C 6.2 (H) 05/08/2024     ROUTINE IP LABS (Last four results)  BMP  Recent Labs   Lab 09/15/24  0400 09/15/24  0124 09/15/24  0106 09/15/24  0038 09/14/24  0832 09/14/24  0628 09/13/24  0419 09/13/24  0417 09/12/24  0844 09/12/24  0541 09/11/24  0554 09/11/24  0535   NA  --   --   --   --   --  137  --  137  --  136  --  137   POTASSIUM  --   --   --   --   --  4.0  " --  4.2  --  4.1  4.1  --  4.0   CHLORIDE  --   --   --   --   --  105  --  105  --  104  --  106   CARLA  --   --   --   --   --  7.7*  --  7.2*  --  7.3*  --  7.3*   CO2  --   --   --   --   --  26  --  25  --  23  --  25   BUN  --   --   --   --   --  13.2  --  14.7  --  14.2  --  10.8   CR  --   --   --   --   --  0.50*  --  0.52  --  0.55  --  0.55   * 98 90 79   < > 136*   < > 123*   < > 141*   < > 110*    < > = values in this interval not displayed.     CBC  Recent Labs   Lab 09/14/24  0628 09/13/24  0417 09/12/24  0853 09/11/24  0535   WBC 26.3* 26.9* 23.7* 23.3*   RBC 3.00* 2.98* 2.84* 3.05*   HGB 8.7* 8.6* 8.4* 8.9*   HCT 27.3* 27.3* 26.5* 27.7*   MCV 91 92 93 91   MCH 29.0 28.9 29.6 29.2   MCHC 31.9 31.5 31.7 32.1   RDW 14.0 14.2 13.9 13.9   * 703* 605* 492*     Inpatient Diabetes Service will continue to follow, please don't hesitate to contact the team with any questions or concerns.     Cheri Ward, PATTI CNP, [facility] [place]   Inpatient Diabetes Service  Available on [facility] - when on duty.     To contact Inpatient Diabetes Service:     7 AM - 5 PM: Page the IDS KEYA following the patient that day (see filed or incomplete progress notes/consult notes under Endocrinology)    OR if uncertain of provider assignment: page job code [phone]    5 PM - 7 AM: First call after hours is to primary service.    For urgent after-hours questions, page job code for on call fellow: [phone]     I spent a total of 35 minutes on the date of the encounter doing prep/post-work, chart review, history and exam, documentation and further activities per the note including lab review, multidisciplinary communication, counseling the patient and/or coordinating care regarding acute hyper/hypoglycemic management, as well as discharge management and planning/communication.  See note for details.      Please notify inpatient diabetes service if changes are planned to steroids, nutrition, or if procedures are planned requiring  prolonged NPO status.Diabetes Management Team job code: [phone]

## 2024-09-16 ENCOUNTER — APPOINTMENT (OUTPATIENT)
Dept: PHYSICAL THERAPY | Facility: CLINIC | Age: 39
End: 2024-09-16
Attending: SURGERY
Payer: COMMERCIAL

## 2024-09-16 PROBLEM — T14.8XXA OPEN WOUND: Status: ACTIVE | Noted: 2024-09-16

## 2024-09-16 LAB
ANION GAP SERPL CALCULATED.3IONS-SCNC: 7 MMOL/L (ref 7–15)
BUN SERPL-MCNC: 14.9 MG/DL (ref 6–20)
CALCIUM SERPL-MCNC: 7.7 MG/DL (ref 8.8–10.4)
CHLORIDE SERPL-SCNC: 105 MMOL/L (ref 98–107)
CREAT SERPL-MCNC: 0.53 MG/DL (ref 0.51–0.95)
EGFRCR SERPLBLD CKD-EPI 2021: >90 ML/MIN/1.73M2
ERYTHROCYTE [DISTWIDTH] IN BLOOD BY AUTOMATED COUNT: 14 % (ref 10–15)
GLUCOSE BLDC GLUCOMTR-MCNC: 115 MG/DL (ref 70–99)
GLUCOSE BLDC GLUCOMTR-MCNC: 130 MG/DL (ref 70–99)
GLUCOSE BLDC GLUCOMTR-MCNC: 132 MG/DL (ref 70–99)
GLUCOSE BLDC GLUCOMTR-MCNC: 136 MG/DL (ref 70–99)
GLUCOSE BLDC GLUCOMTR-MCNC: 151 MG/DL (ref 70–99)
GLUCOSE BLDC GLUCOMTR-MCNC: 160 MG/DL (ref 70–99)
GLUCOSE BLDC GLUCOMTR-MCNC: 190 MG/DL (ref 70–99)
GLUCOSE BLDC GLUCOMTR-MCNC: 226 MG/DL (ref 70–99)
GLUCOSE SERPL-MCNC: 154 MG/DL (ref 70–99)
HCO3 SERPL-SCNC: 25 MMOL/L (ref 22–29)
HCT VFR BLD AUTO: 30.4 % (ref 35–47)
HGB BLD-MCNC: 9.5 G/DL (ref 11.7–15.7)
MCH RBC QN AUTO: 28.3 PG (ref 26.5–33)
MCHC RBC AUTO-ENTMCNC: 31.3 G/DL (ref 31.5–36.5)
MCV RBC AUTO: 91 FL (ref 78–100)
PLATELET # BLD AUTO: 1347 10E3/UL (ref 150–450)
POTASSIUM SERPL-SCNC: 4.7 MMOL/L (ref 3.4–5.3)
RBC # BLD AUTO: 3.36 10E6/UL (ref 3.8–5.2)
SODIUM SERPL-SCNC: 137 MMOL/L (ref 135–145)
WBC # BLD AUTO: 23.7 10E3/UL (ref 4–11)

## 2024-09-16 PROCEDURE — 99232 SBSQ HOSP IP/OBS MODERATE 35: CPT | Mod: 24 | Performed by: NURSE PRACTITIONER

## 2024-09-16 PROCEDURE — 250N000013 HC RX MED GY IP 250 OP 250 PS 637: Performed by: STUDENT IN AN ORGANIZED HEALTH CARE EDUCATION/TRAINING PROGRAM

## 2024-09-16 PROCEDURE — 250N000013 HC RX MED GY IP 250 OP 250 PS 637: Performed by: SURGERY

## 2024-09-16 PROCEDURE — 80048 BASIC METABOLIC PNL TOTAL CA: CPT | Performed by: PHYSICIAN ASSISTANT

## 2024-09-16 PROCEDURE — 250N000013 HC RX MED GY IP 250 OP 250 PS 637: Performed by: PHYSICIAN ASSISTANT

## 2024-09-16 PROCEDURE — 250N000009 HC RX 250: Performed by: NURSE PRACTITIONER

## 2024-09-16 PROCEDURE — 85027 COMPLETE CBC AUTOMATED: CPT | Performed by: PHYSICIAN ASSISTANT

## 2024-09-16 PROCEDURE — 250N000011 HC RX IP 250 OP 636: Performed by: PHYSICIAN ASSISTANT

## 2024-09-16 PROCEDURE — 120N000011 HC R&B TRANSPLANT UMMC

## 2024-09-16 PROCEDURE — 250N000011 HC RX IP 250 OP 636: Performed by: NURSE PRACTITIONER

## 2024-09-16 PROCEDURE — 97530 THERAPEUTIC ACTIVITIES: CPT | Mod: GP

## 2024-09-16 PROCEDURE — 36591 DRAW BLOOD OFF VENOUS DEVICE: CPT | Performed by: PHYSICIAN ASSISTANT

## 2024-09-16 PROCEDURE — G0463 HOSPITAL OUTPT CLINIC VISIT: HCPCS

## 2024-09-16 PROCEDURE — 250N000013 HC RX MED GY IP 250 OP 250 PS 637: Performed by: NURSE PRACTITIONER

## 2024-09-16 RX ORDER — HYDROMORPHONE HYDROCHLORIDE 1 MG/ML
0.5 INJECTION, SOLUTION INTRAMUSCULAR; INTRAVENOUS; SUBCUTANEOUS EVERY 4 HOURS PRN
Status: DISCONTINUED | OUTPATIENT
Start: 2024-09-16 | End: 2024-09-17

## 2024-09-16 RX ORDER — HYDROMORPHONE HYDROCHLORIDE 1 MG/ML
0.5 INJECTION, SOLUTION INTRAMUSCULAR; INTRAVENOUS; SUBCUTANEOUS
Status: DISCONTINUED | OUTPATIENT
Start: 2024-09-16 | End: 2024-09-16

## 2024-09-16 RX ADMIN — HYDROMORPHONE HYDROCHLORIDE 6 MG: 5 SOLUTION ORAL at 21:41

## 2024-09-16 RX ADMIN — HYDROMORPHONE HYDROCHLORIDE 6 MG: 5 SOLUTION ORAL at 00:44

## 2024-09-16 RX ADMIN — HYDROMORPHONE HYDROCHLORIDE 6 MG: 5 SOLUTION ORAL at 09:23

## 2024-09-16 RX ADMIN — GABAPENTIN 300 MG: 250 SUSPENSION ORAL at 08:06

## 2024-09-16 RX ADMIN — Medication 1.5 ML: at 08:07

## 2024-09-16 RX ADMIN — HYDROMORPHONE HYDROCHLORIDE 6 MG: 5 SOLUTION ORAL at 06:42

## 2024-09-16 RX ADMIN — DILTIAZEM HYDROCHLORIDE 60 MG: 60 CAPSULE, EXTENDED RELEASE ORAL at 20:37

## 2024-09-16 RX ADMIN — DILTIAZEM HYDROCHLORIDE 60 MG: 60 CAPSULE, EXTENDED RELEASE ORAL at 08:05

## 2024-09-16 RX ADMIN — HYDROMORPHONE HYDROCHLORIDE 6 MG: 5 SOLUTION ORAL at 15:41

## 2024-09-16 RX ADMIN — HYDROMORPHONE HYDROCHLORIDE 2 MG: 5 SOLUTION ORAL at 14:20

## 2024-09-16 RX ADMIN — METHOCARBAMOL 750 MG: 750 TABLET ORAL at 20:38

## 2024-09-16 RX ADMIN — HYDROMORPHONE HYDROCHLORIDE 0.5 MG: 1 INJECTION, SOLUTION INTRAMUSCULAR; INTRAVENOUS; SUBCUTANEOUS at 02:42

## 2024-09-16 RX ADMIN — Medication 40 MG: at 08:06

## 2024-09-16 RX ADMIN — HYDROMORPHONE HYDROCHLORIDE 2 MG: 5 SOLUTION ORAL at 08:04

## 2024-09-16 RX ADMIN — METHOCARBAMOL 750 MG: 750 TABLET ORAL at 08:04

## 2024-09-16 RX ADMIN — HYDROMORPHONE HYDROCHLORIDE 0.5 MG: 1 INJECTION, SOLUTION INTRAMUSCULAR; INTRAVENOUS; SUBCUTANEOUS at 05:58

## 2024-09-16 RX ADMIN — HYDROMORPHONE HYDROCHLORIDE 2 MG: 5 SOLUTION ORAL at 10:57

## 2024-09-16 RX ADMIN — METHOCARBAMOL 750 MG: 750 TABLET ORAL at 14:15

## 2024-09-16 RX ADMIN — GABAPENTIN 300 MG: 250 SUSPENSION ORAL at 20:37

## 2024-09-16 RX ADMIN — HYDROMORPHONE HYDROCHLORIDE 6 MG: 5 SOLUTION ORAL at 12:17

## 2024-09-16 RX ADMIN — HYDROMORPHONE HYDROCHLORIDE 6 MG: 5 SOLUTION ORAL at 03:35

## 2024-09-16 RX ADMIN — HYDROMORPHONE HYDROCHLORIDE 0.5 MG: 1 INJECTION, SOLUTION INTRAMUSCULAR; INTRAVENOUS; SUBCUTANEOUS at 20:38

## 2024-09-16 RX ADMIN — HYDROMORPHONE HYDROCHLORIDE 6 MG: 5 SOLUTION ORAL at 18:34

## 2024-09-16 RX ADMIN — Medication 30 MG: at 02:44

## 2024-09-16 ASSESSMENT — ACTIVITIES OF DAILY LIVING (ADL)
ADLS_ACUITY_SCORE: 29

## 2024-09-16 NOTE — PROGRESS NOTES
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 She is now s/p TPAIT, splenectomy, and G/J placement with repair of small incisional hernia on 9/6/24 with Dr. Carmichael.     _______________________________________________    s/p TPAIT 9/6/24: POD #10 . Islet yield Islet equivalents/kilogram: 2937. Post-op US: patient vessels. KELLY drain x 1 with serous output.     Cardiorespiratory:  Atrial fib & flutter: Self-limited A-fib RVR in SICU on 9/8. Rate up to 150 9/10 with EKG showing A-flutter. Asymptomatic. TSH normal. Controlled with diltiazem. Now in NSR.  -Diltiazem 60mg SR BID ORAL starting today    GI/Nutrition:   GERD: PPI daily  Pancreatic insufficiency: Relizorb with TF.   Moderate malnutrition in the context of chronic illness/disease: GJ tube placed, clamp G tube as tolerated. Tube feeds at goal. Sugar free clears.   Bowel regimen: Senna & PEG    Hematology:  Anemia of chronic disease and acute blood loss: Hgb 9.5, stable.  Thrombocytosis: Plt 1347 post-splenectomy. Monitor.    Fluid/Electrolytes: No acute issues.     : No acute issues.     Endocrine:  Post-pancreatectomy diabetes/stress hyperglycemia: Endocrine consulted. Managed with subcutaneous insulin, adjusting as tolerated.     Infection:   Leukocytosis: WBC 23.7 trending down, likely 2/2 stress and splenectomy. Ertapenem for surgical ppx, stopped 9/9.    Neuro:   Acute on chronic pain: PTA managed with PO hydromorphone 4 mg Q3H. Better control today.  Current regimen:    - Dilaudid 6mg q3H via JT   - Dilaudid 2mg q3H PRN JT, use 1st   - Dilaudid IV 0.5mg, reduce to q4H PRN   - Neurontin   - Acetaminophen   - Atarax prn    - Robaxin, not very effective per patient   - Valium 3mg q6H PRN muscle  spasms    Skin:   Wound, under right breast: Unclear etiology. Started as a large blister. WOC consulted.  Wound, under GT hub: Sutures cut to relieve pressure. WOC consulted.    Activity: PT/OT consulted    Disposition: 7A, discharge when off of IV dilaudid    KEYA/Fellow/Resident Provider: Nia Sierra NP     Faculty: Carlos Carmichael MD  __________________________________________________________________  Transplant History: Admitted 9/6/2024 for TPAIT  9/6/2024 (Islet), Postoperative day: 10     Interval History: History is obtained from the patient  Overnight events: Pt declining doses of PRN dilaudid via JT last night and requesting IV. Discussed that we cannot discharge until off of IV. Pt will try to avoid today. Denies nausea. Drinking well. GT clamped.    ROS:   A 10-point review of systems was negative except as noted above.    Curent Meds:  Current Facility-Administered Medications   Medication Dose Route Frequency Provider Last Rate Last Admin    diltiazem ER (CARDIZEM SR) 12 hr capsule 60 mg  60 mg Oral BID Nia Sierra APRN CNP        sennosides (SENOKOT) syrup 5 mL  5 mL Per J Tube BID Carlos Carmichael MD   5 mL at 09/09/24 2013    And    docusate (COLACE) 50 MG/5ML liquid 50 mg  50 mg Per J Tube BID Carlos Carmichael MD   50 mg at 09/12/24 2010    gabapentin (NEURONTIN) solution 300 mg  300 mg Oral or J tube BID Carlos Carmichael MD   300 mg at 09/15/24 1940    HYDROmorphone (STANDARD CONC) (DILAUDID) oral solution 6 mg  6 mg Per J Tube Q3H Nia Sierra APRN CNP   6 mg at 09/16/24 0335    insulin aspart (NovoLOG) injection (RAPID ACTING)  1-10 Units Subcutaneous Q4H Jessica Sánchez PA-C   2 Units at 09/16/24 0246    insulin glargine (LANTUS PEN) injection 35 Units  35 Units Subcutaneous Q24H Cheri Ward, PATTI CNP   35 Units at 09/15/24 0925    methocarbamol (ROBAXIN) tablet 750 mg  750 mg Oral TID Jus Clements MD   750 mg  "at 09/15/24 1940    mvw complete formulation (PEDIATRIC) oral solution 1.5 mL  1.5 mL Oral or J tube Daily Carlos Carmichael MD   1.5 mL at 09/15/24 0855    pantoprazole (PROTONIX) 2 mg/mL suspension 40 mg  40 mg Oral or J tube Daily Carlos Carmichael MD   40 mg at 09/15/24 0854    polyethylene glycol (MIRALAX) Packet 17 g  17 g Per J Tube Daily Carlos Carmichael MD   17 g at 09/09/24 0745    sodium chloride (PF) 0.9% PF flush 3 mL  3 mL Intracatheter Q8H Carlos Carmichael MD   3 mL at 09/16/24 0558       Physical Exam:     Admit Weight: 71.8 kg (158 lb 4.6 oz)    Current Vitals:   /60 (BP Location: Right arm, Patient Position: Semi-Oliveira's, Cuff Size: Adult Regular)   Pulse 82   Temp 98.4  F (36.9  C) (Oral)   Resp 16   Ht 1.676 m (5' 6\")   Wt 72.3 kg (159 lb 8 oz)   SpO2 96%   BMI 25.74 kg/m      Vital sign ranges:    Temp:  [98.4  F (36.9  C)-98.8  F (37.1  C)] 98.4  F (36.9  C)  Pulse:  [75-87] 82  Resp:  [15-18] 16  BP: ()/(60-76) 100/60  SpO2:  [95 %-98 %] 96 %  Patient Vitals for the past 24 hrs:   BP Temp Temp src Pulse Resp SpO2 Weight   09/16/24 0551 100/60 98.4  F (36.9  C) Oral 82 16 96 % --   09/16/24 0242 101/62 98.7  F (37.1  C) Oral -- 15 97 % --   09/16/24 0031 -- -- -- -- -- 95 % --   09/15/24 2349 -- -- -- -- -- -- 72.3 kg (159 lb 8 oz)   09/15/24 2109 107/65 98.8  F (37.1  C) Oral -- 15 98 % --   09/15/24 1725 93/60 98.6  F (37  C) Oral 87 18 98 % --   09/15/24 1346 117/71 98.4  F (36.9  C) Oral 77 18 98 % --   09/15/24 0950 128/76 98.7  F (37.1  C) Oral 75 18 97 % --   09/15/24 0801 -- -- -- -- -- -- 74.1 kg (163 lb 4.8 oz)     General Appearance: NAD  Skin: normal, no suspicious lesions or rashes  Heart: Perfused, regular rate  Lungs: Nonlabored resps on RA  Abdomen: The abdomen is soft, and moderately tender. The wound is Healing well. Tube/Drain sites are: GJ in place. KELLY: serous. GJ tube site with erythema " under hub lower aspect.  : martinez is not present.    Extremities: edema: present bilaterally. trace  Neuro: A&Ox4    Data:   CMP  Recent Labs   Lab 09/16/24  0245 09/15/24  2356 09/15/24  0757 09/15/24  0615 09/14/24  0832 09/14/24  0628 09/11/24  0554 09/11/24  0535   NA  --   --   --  133*  --  137   < > 137   POTASSIUM  --   --   --  4.4  --  4.0   < > 4.0   CHLORIDE  --   --   --  101  --  105   < > 106   CO2  --   --   --  25  --  26   < > 25   * 160*   < > 178*   < > 136*   < > 110*   BUN  --   --   --  13.6  --  13.2   < > 10.8   CR  --   --   --  0.52  --  0.50*   < > 0.55   GFRESTIMATED  --   --   --  >90  --  >90   < > >90   CARLA  --   --   --  7.6*  --  7.7*   < > 7.3*   MAG  --   --   --  1.9  --  2.0   < > 1.8   PHOS  --   --   --  4.3  --  3.6   < > 2.7   AMYLASE  --   --   --   --   --   --   --  4*   LIPASE  --   --   --   --   --   --   --  6*   ALBUMIN  --   --   --   --   --   --   --  2.2*   BILITOTAL  --   --   --   --   --   --   --  0.2   ALKPHOS  --   --   --   --   --   --   --  93   AST  --   --   --   --   --   --   --  19   ALT  --   --   --   --   --   --   --  25    < > = values in this interval not displayed.     CBC  Recent Labs   Lab 09/16/24  0607 09/15/24  0615   HGB 9.5* 9.5*   WBC 23.7* 25.6*   PLT 1,347* 1,154*     Coags  Recent Labs   Lab 09/13/24  0417 09/12/24  0541   INR  --  1.09   PTT 32 32

## 2024-09-16 NOTE — PLAN OF CARE
"Goal Outcome Evaluation:      Plan of Care Reviewed With: patient    Overall Patient Progress: improvingOverall Patient Progress: improving    Outcome Evaluation: Patient attempting to reduce IV narcotics.      Vitals: /65 (BP Location: Right arm)   Pulse 85   Temp 98.9  F (37.2  C) (Oral)   Resp 16   Ht 1.676 m (5' 6\")   Wt 72.3 kg (159 lb 8 oz)   SpO2 96%   BMI 25.74 kg/m      Endocrine: Glucose 190,255, Lantus dose increased. Endocrine contacted with hyperglycemia, no changes at this time.  Labs: Stable labs.  Pain: Improving abdominal discomfort.  PRN's: Alternating scheduled and PRN Dilaudid elixir.  Diet: Clear liquid diet, taking in water and sugar free candy. Tube feeds at 45cc/hr, via JT, GT clamped.   LDA: R memo-cath, R PIV saline locked.  GI: Loose BM's, laxatives held.  : Voids spontaneously, not saving.  Skin: Abdominal incision with staples, R breast blister wound. WOC consulted to evaluate possible ulcer under G/J tube.  Neuro: Alert and oriented.  Mobility: Up ad ihsan.   Education: Patient administered her own insulin this morning.   Plan: Decrease IV narcotics, discontinue locally in 1-2 days.      "

## 2024-09-16 NOTE — PROGRESS NOTES
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 She is now s/p TPAIT, splenectomy, and G/J placement with repair of small incisional hernia on 9/6/24 with Dr. Carmichael.  Inpatient Diabetes Service consulted for management of diabetes.             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 . In review of blood sugars over the last few days. Noted to be stable on Lantus 40 units. Was increased  Increased BG with stress and pain. BG this morning 190, reported she was in pain. IV dilaudid was given during interview. Patient currently on sugar free clears and was to advance to full liquid diet (with sugar) Discussed with transplant team, BG is elevated and currently working better  control of  BG and would defer starting full liquid diet once patient is outpatient and BGs are more stable.         Carry over:   Colleague Spoke with primary team, patient will discharge on continuous TF and sugar-free clear liquid diet.  Education done - see note from MIKEY Cowan.       Discharge Planning: (tentative)    Medications: TBD. Likely Semglee once daily for continuous TF  plus Lispro correction scale q4hrs. Has meter + supplies already. Needs Baqsimi. Would like Dexcom G7 prescribed at discharge.Upon review this dexcom 7 sensor is already ordered for discharge today  Test Claims: completed 9/9 - Semglee, Lispro, Dexcom G7, Baqsimi.   Education:Teaching completed on 9/12/2024. knows how to use meter. Has previously used insulin too a while ago. Also a CNA.  Outpatient Follow-up:  recommend [facility] - here from [place]. Plans to establish with endocrinologist back in [place], has appointments set up here after discharge.Scheduled 10/4/2024: with Doreen Goodwin at 9:00 and with Dr Lupillo Hudson     Please notify Inpatient Diabetes Service if changes are planned to steroids, nutrition, TPN/TF and anticipated procedures requiring prolonged NPO status.         Interval History/Review of Systems :   The last 24 hours progress and nursing notes reviewed.  Reports continued pain.   Decrease IV narcotics,discharging locally in 1-2 days.    Planned Procedures/Surgeries: none    Inpatient Glucose Control:       Recent Labs   Lab 09/16/24  0607 09/16/24  0245 09/15/24  2356 09/15/24  2001 09/15/24  1655 09/15/24  1206   * 151* 160* 93 91 219*             Medications Impacting Glycemia:   Steroids: none  D5W containing solutions/medications: LR with dextrose at 100cc, now stopped  Other medications impacting glucose: TF         Nutrition:   Orders Placed This Encounter      Combination Diet Clear Liquid    Supplements:  none  TF: 9/7/2024: Impact Peptide 1.5 @ 10 ml/hr and advance by 10 ml q 24 hrs (and/or ONLY advance rate upon MD approval after daily evaluation) to goal Impact Peptide 1.5 @ goal of  45ml/hr  (1080ml/day) provides:  151 g CHO --> reached goal at 8AM 9/10  TPN: none        Diabetes History: see full consult note for complete diabetes history   Diabetes Type and Duration:   Prediabetes( as noted by Dr Marin) and there was a time they thought she had DM  "secondary to pancreatitis but this resolved per her   PTA Medication Regimen: none recently  Historical Diabetes Medications:      Basaglar 10 units  Tradjenta 5 mg daily  Metformin 1000 mg po bid  Glimiperide     Glucose monitoring device and frequency: not recently but has checked her BG in the past--> 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  Lungs:  unremarkable, no new cough, no SOB  ABD:  rounded  Skin:  warm and dry  MSK:   moving all extremities  Mental Status:  Alert and oriented x3  Psych:   Cooperative, good eye contact, full/appropriate affect           Data:     Lab Results   Component Value Date    A1C 6.7 (H) 09/06/2024    A1C 7.0 (H) 09/03/2024    A1C 6.2 (H) 05/08/2024       ROUTINE IP LABS (Last four results)  BMP  Recent Labs   Lab 09/16/24  0607 09/16/24  0245 09/15/24  2356 09/15/24  2001 09/15/24  0757 09/15/24  0615 09/14/24  0832 09/14/24  0628 09/13/24  0419 09/13/24  0417     --   --   --   --  133*  --  137  --  137   POTASSIUM 4.7  --   --   --   --  4.4  --  4.0  --  4.2   CHLORIDE 105  --   --   --   --  101  --  105  --  105   CARLA 7.7*  --   --   --   --  7.6*  --  7.7*  --  7.2*   CO2 25  --   --   --   --  25  --  26  --  25   BUN 14.9  --   --   --   --  13.6  --  13.2  --  14.7   CR 0.53  --   --   --   --  0.52  --  0.50*  --  0.52   * 151* 160* 93   < > 178*   < > 136*   < > 123*    < > = values in this interval not displayed.     CBC  Recent Labs   Lab 09/16/24  0607 09/15/24  0615 09/14/24  0628 09/13/24  0417   WBC 23.7* 25.6* 26.3* 26.9*   RBC " 3.36* 3.29* 3.00* 2.98*   HGB 9.5* 9.5* 8.7* 8.6*   HCT 30.4* 29.5* 27.3* 27.3*   MCV 91 90 91 92   MCH 28.3 28.9 29.0 28.9   MCHC 31.3* 32.2 31.9 31.5   RDW 14.0 14.0 14.0 14.2   PLT 1,347* 1,154* 903* 703*     INR  Recent Labs   Lab 09/12/24  0541   INR 1.09       Inpatient Diabetes Service will continue to follow, please don't hesitate to contact the team with any questions or concerns.     PATTI [place] CNP    Plan discussed with patient, bedside RN, and primary team via this note.    To contact Inpatient Diabetes Service:     7 AM - 5 PM: Page the [facility] KEYA following the patient that day (see filed or incomplete progress notes/consult notes under Endocrinology)    OR if uncertain of provider assignment: page job code [phone]    5 PM - 7 AM: First call after hours is to primary service.    For urgent after-hours questions, page job code for on call fellow: [phone]     I spent a total of 45 minutes on the date of the encounter doing prep/post-work, chart review, history and exam, documentation and further activities per the note including lab review, multidisciplinary communication, counseling the patient and/or coordinating care regarding acute hyper/hypoglycemic management, as well as discharge management and planning/communication.

## 2024-09-16 NOTE — PLAN OF CARE
Goal Outcome Evaluation:  Plan of Care Reviewed With: patient  Overall Patient Progress: no changeOverall Patient Progress: no change    Vitals: continuous pulsac on room air.  Neuro : a x o x 4.   Blood glucose: q4: sliding scale insulin: 93. 160. 151.  Pain/nausea: c/o 8-10 pain @ PEG site. Pain managed with scheduled suspension dilaudid q3hrs and prn IV dilaudid. Team wants to avoid IV dilaudid so use as last resort. Requested team to wean off IV dilaudid orders.   Diet: clears: TF @ GR 45. Continuous, lines new changed. Relizorb changed @ 1900. Due change next is 0700.   Lines: R PIV SL. Port a cath CDI. SL.   : x 1.   GI: x 1, loose stool. Hold laxatives.   Drains: G is clamped. KELLY  ml  CDI.   Skin: under breast dressing cdi. Abdomen incision staples mee. Skin under PEG site is breaking down and very tender, requested WOC consult and team to remove lower sutures to relieve her skin. Did applied dry gauze to pad the skin but only temporarily.   Mobility: a x 1 x walker x calls appropriately.   Education : started a new medication label.

## 2024-09-16 NOTE — CONSULTS
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 She is now s/p TPAIT, splenectomy, and G/J placement with repair of small incisional hernia on 9/6/24 with Dr. Carmichael.      s/p TPAIT 9/6/24:. Islet yield Islet equivalents/kilogram: 2937.   -Post-op US   1. Normal hepatic artery resistive indices. Elevated hepatic artery  velocities are likely artifactual due to its tortuous course.  2. Patent portal vein.  KELLY drain x 1 with serosang output.    Assessment:      Areas visualized during today's visit:  under right breast and around g-tube.     Tube type and location:  G-tube to LUQ     9/16: below gj tube      9/16: above gj tube    Last photo 9/16/24  History: Pt had GJ tube placed 9/6 by Dr. Clements with transplant and it was sutured at 12 and 6 O'clock until 9/16 when it was noted that pt had more pressure/pain to area. Sutures removed 9/16 AM per transplant notes and pt. All areas that have erythema are blanchable and superficial scabs have formed over previous sutured areas. Does not appear that any lasting pressure injury was made by gj tube bumper. Of note- at 3 O'clock there is approx 0.4 cm gap between tube and skin.   Current Tube Securement: none- added blue butterfly tube securement device  22 Fr tube with 1 lumens   Leakage around tube: none  Description of leakage/drainage: none  Insertion site assessment: Intact and 0.4 cm gap between the tube and skin at 3 O'clock only  Peritubular skin assessment: Blanchable , Erythema, and Superficial scab- scabs are where sutures had been- they were removed  9/16 in AM  Injury extends 1.5 cm from insertion site      Palpation of the wound bed: normal      Wound Drainage: moderate     Description of drainage: yellow and tan  ?? Temperature? normal   Pain: mild and during dressing change, tender  Pain interventions prior to dressing change: slow and gentle cares   STATUS: initial assessment  Supplies ordered: gathered, supplies stored on unit, and discussed with RN      Wound location: Under right breast     9/9    Last photo: 9/16/24  Wound due to: Unknown Etiology- likely MARSI vs allergic dermatitis per pt  Wound history/plan of care: Per pt she is very sensitive to adhesives and suspects she had a lead or bandage on during surgery that caused blister/skin irritation. Blister has since popped and area is very painful  Wound base: 20 % Dermis, 80 %  new epithelial tissue     Palpation of the wound bed: normal      Drainage: moderate     Description of drainage: serosanguinous     Measurements (length x width x depth, in cm): 1.7  x 3.5  x  0.1 cm      Periwound skin: Intact      Color: normal and consistent with surrounding tissue      Temperature: normal   Odor: none  Pain: moderate, severe, and during dressing change, sharp, tender, and burning  Pain interventions prior to dressing change: soaking and slow and gentle cares   Treatment goal: Heal , Infection control/prevention, Maintain (prevention of deterioration), Pain control, and Protection  STATUS: improving  Supplies ordered: supplies stored on unit and discussed with RN       Treatment Plan:     GJ-tube wound(s): Daily and PRN when soiled  Gently cleanse with wound cleanser and 4x4 gauze   Place 4x4 split gauze around tube  Always keep tube secured with no tension using tube securement device (713090)    Under right breast wound(s): Every 3 days and PRN when soiled  Spray above wound with wound cleanser and allow it to run over wound- gently pat dry  Apply no sting barrier wipe to intact periwound skin  Cut  piece of xeroform (716091) and lay over all open areas  Apply 4x4 mepilex over area- if not sticking ok to use sacral mepilex     Orders: Reviewed and Written    RECOMMEND PRIMARY TEAM ORDER: None, at this time  Education provided: plan of care and wound progress  Discussed plan of care with: Patient, Family, and Nurse  [facility] nurse follow-up plan: weekly  Notify WO if wound(s) deteriorate.  Nursing to notify the Provider(s) and re-consult the WO Nurse if new skin concern.    DATA:     Current support surface: Standard  Standard gel mattress (Isoflex)  Containment of urine/stool: Incontinent pad in bed  BMI: Body mass index is 25.74 kg/m .   Active diet order: Orders Placed This Encounter      Combination Diet Clear Liquid     Output: I/O last 3 completed shifts:  In: 1405 [I.V.:10; NG/GT:360]  Out: 750 [Drains:750]     Labs:   Recent Labs   Lab 09/16/24  0607 09/12/24  0853 09/12/24  0541 09/11/24  0535   ALBUMIN  --   --   --  2.2*   HGB 9.5*   < >  --  8.9*   INR  --   --  1.09  --    WBC 23.7*   < >  --  23.3*    < > = values in this interval not displayed.     Pressure injury risk assessment:   Sensory Perception: 3-->slightly limited  Moisture: 3-->occasionally moist  Activity: 3-->walks occasionally  Mobility: 3-->slightly limited  Nutrition: 3-->adequate  Friction and Shear: 3-->no apparent problem  Ignacio Score: 18    Christina Damon RN CWOCN  Pager no longer is use, please contact through Newsbound group: [facility] Nurse [place]  Dept. Office Number: *[phone]

## 2024-09-17 VITALS
BODY MASS INDEX: 25.36 KG/M2 | HEIGHT: 66 IN | RESPIRATION RATE: 16 BRPM | TEMPERATURE: 98.6 F | DIASTOLIC BLOOD PRESSURE: 63 MMHG | WEIGHT: 157.8 LBS | OXYGEN SATURATION: 99 % | SYSTOLIC BLOOD PRESSURE: 104 MMHG | HEART RATE: 91 BPM

## 2024-09-17 PROBLEM — K21.9 GERD (GASTROESOPHAGEAL REFLUX DISEASE): Status: ACTIVE | Noted: 2024-09-17

## 2024-09-17 PROBLEM — D64.9 ANEMIA: Status: ACTIVE | Noted: 2024-09-17

## 2024-09-17 PROBLEM — E44.0 MODERATE MALNUTRITION (H): Status: ACTIVE | Noted: 2024-09-17

## 2024-09-17 PROBLEM — D75.839 THROMBOCYTOSIS: Status: ACTIVE | Noted: 2024-09-17

## 2024-09-17 PROBLEM — D72.829 LEUKOCYTOSIS: Status: ACTIVE | Noted: 2024-09-17

## 2024-09-17 LAB
ANION GAP SERPL CALCULATED.3IONS-SCNC: 8 MMOL/L (ref 7–15)
BUN SERPL-MCNC: 15.9 MG/DL (ref 6–20)
CALCIUM SERPL-MCNC: 7.8 MG/DL (ref 8.8–10.4)
CHLORIDE SERPL-SCNC: 103 MMOL/L (ref 98–107)
CREAT SERPL-MCNC: 0.53 MG/DL (ref 0.51–0.95)
EGFRCR SERPLBLD CKD-EPI 2021: >90 ML/MIN/1.73M2
ERYTHROCYTE [DISTWIDTH] IN BLOOD BY AUTOMATED COUNT: 13.8 % (ref 10–15)
GLUCOSE BLDC GLUCOMTR-MCNC: 130 MG/DL (ref 70–99)
GLUCOSE BLDC GLUCOMTR-MCNC: 160 MG/DL (ref 70–99)
GLUCOSE BLDC GLUCOMTR-MCNC: 162 MG/DL (ref 70–99)
GLUCOSE BLDC GLUCOMTR-MCNC: 222 MG/DL (ref 70–99)
GLUCOSE SERPL-MCNC: 134 MG/DL (ref 70–99)
HCO3 SERPL-SCNC: 25 MMOL/L (ref 22–29)
HCT VFR BLD AUTO: 31.2 % (ref 35–47)
HGB BLD-MCNC: 10 G/DL (ref 11.7–15.7)
MCH RBC QN AUTO: 28.5 PG (ref 26.5–33)
MCHC RBC AUTO-ENTMCNC: 32.1 G/DL (ref 31.5–36.5)
MCV RBC AUTO: 89 FL (ref 78–100)
PLATELET # BLD AUTO: 1446 10E3/UL (ref 150–450)
POTASSIUM SERPL-SCNC: 4.7 MMOL/L (ref 3.4–5.3)
RBC # BLD AUTO: 3.51 10E6/UL (ref 3.8–5.2)
SODIUM SERPL-SCNC: 136 MMOL/L (ref 135–145)
WBC # BLD AUTO: 26.2 10E3/UL (ref 4–11)

## 2024-09-17 PROCEDURE — 80048 BASIC METABOLIC PNL TOTAL CA: CPT | Performed by: PHYSICIAN ASSISTANT

## 2024-09-17 PROCEDURE — 99232 SBSQ HOSP IP/OBS MODERATE 35: CPT | Mod: 24 | Performed by: NURSE PRACTITIONER

## 2024-09-17 PROCEDURE — 250N000013 HC RX MED GY IP 250 OP 250 PS 637: Performed by: NURSE PRACTITIONER

## 2024-09-17 PROCEDURE — 250N000013 HC RX MED GY IP 250 OP 250 PS 637: Performed by: STUDENT IN AN ORGANIZED HEALTH CARE EDUCATION/TRAINING PROGRAM

## 2024-09-17 PROCEDURE — 250N000013 HC RX MED GY IP 250 OP 250 PS 637: Performed by: SURGERY

## 2024-09-17 PROCEDURE — 99024 POSTOP FOLLOW-UP VISIT: CPT | Mod: FS | Performed by: NURSE PRACTITIONER

## 2024-09-17 PROCEDURE — 36591 DRAW BLOOD OFF VENOUS DEVICE: CPT | Performed by: PHYSICIAN ASSISTANT

## 2024-09-17 PROCEDURE — 85027 COMPLETE CBC AUTOMATED: CPT | Performed by: PHYSICIAN ASSISTANT

## 2024-09-17 RX ORDER — LIDOCAINE HYDROCHLORIDE 10 MG/ML
10 INJECTION, SOLUTION EPIDURAL; INFILTRATION; INTRACAUDAL; PERINEURAL ONCE
Status: DISCONTINUED | OUTPATIENT
Start: 2024-09-17 | End: 2024-09-17 | Stop reason: HOSPADM

## 2024-09-17 RX ORDER — HEPARIN SODIUM (PORCINE) LOCK FLUSH IV SOLN 100 UNIT/ML 100 UNIT/ML
5 SOLUTION INTRAVENOUS EVERY 8 HOURS
Status: DISCONTINUED | OUTPATIENT
Start: 2024-09-17 | End: 2024-09-17 | Stop reason: HOSPADM

## 2024-09-17 RX ORDER — GABAPENTIN 250 MG/5ML
300 SOLUTION ORAL 2 TIMES DAILY
Qty: 470 ML | Refills: 0 | Status: SHIPPED | OUTPATIENT
Start: 2024-09-17 | End: 2024-10-01

## 2024-09-17 RX ORDER — HEPARIN SODIUM (PORCINE) LOCK FLUSH IV SOLN 100 UNIT/ML 100 UNIT/ML
5-10 SOLUTION INTRAVENOUS
Status: DISCONTINUED | OUTPATIENT
Start: 2024-09-17 | End: 2024-09-17 | Stop reason: HOSPADM

## 2024-09-17 RX ORDER — HEPARIN SODIUM,PORCINE 10 UNIT/ML
5-10 VIAL (ML) INTRAVENOUS
Status: DISCONTINUED | OUTPATIENT
Start: 2024-09-17 | End: 2024-09-17 | Stop reason: HOSPADM

## 2024-09-17 RX ORDER — HYDROMORPHONE HYDROCHLORIDE 1 MG/ML
6 SOLUTION ORAL
Qty: 152 ML | Refills: 0 | Status: SHIPPED | OUTPATIENT
Start: 2024-09-17 | End: 2024-09-19

## 2024-09-17 RX ORDER — DILTIAZEM HYDROCHLORIDE 60 MG/1
60 CAPSULE, EXTENDED RELEASE ORAL 2 TIMES DAILY
Qty: 60 CAPSULE | Refills: 2 | Status: SHIPPED | OUTPATIENT
Start: 2024-09-17

## 2024-09-17 RX ORDER — METHOCARBAMOL 750 MG/1
750 TABLET, FILM COATED ORAL 3 TIMES DAILY
Qty: 60 TABLET | Refills: 0 | Status: ON HOLD | OUTPATIENT
Start: 2024-09-17 | End: 2024-10-07

## 2024-09-17 RX ORDER — HEPARIN SODIUM,PORCINE 10 UNIT/ML
5-10 VIAL (ML) INTRAVENOUS EVERY 24 HOURS
Status: DISCONTINUED | OUTPATIENT
Start: 2024-09-17 | End: 2024-09-17 | Stop reason: HOSPADM

## 2024-09-17 RX ORDER — PEDIATRIC MULTIVIT 61/D3/VIT K 1500-800
1.5 CAPSULE ORAL DAILY
Qty: 30 ML | Refills: 1 | Status: ON HOLD | OUTPATIENT
Start: 2024-09-17 | End: 2024-10-07

## 2024-09-17 RX ORDER — LIDOCAINE HYDROCHLORIDE 5 MG/ML
10 INJECTION, SOLUTION INFILTRATION; INTRAVENOUS ONCE
Status: DISCONTINUED | OUTPATIENT
Start: 2024-09-17 | End: 2024-09-17

## 2024-09-17 RX ADMIN — HYDROMORPHONE HYDROCHLORIDE 6 MG: 5 SOLUTION ORAL at 00:38

## 2024-09-17 RX ADMIN — DILTIAZEM HYDROCHLORIDE 60 MG: 60 CAPSULE, EXTENDED RELEASE ORAL at 08:57

## 2024-09-17 RX ADMIN — Medication 1.5 ML: at 08:58

## 2024-09-17 RX ADMIN — Medication 40 MG: at 08:58

## 2024-09-17 RX ADMIN — HYDROMORPHONE HYDROCHLORIDE 6 MG: 5 SOLUTION ORAL at 11:28

## 2024-09-17 RX ADMIN — HYDROMORPHONE HYDROCHLORIDE 6 MG: 5 SOLUTION ORAL at 06:34

## 2024-09-17 RX ADMIN — GABAPENTIN 300 MG: 250 SUSPENSION ORAL at 08:58

## 2024-09-17 RX ADMIN — HYDROMORPHONE HYDROCHLORIDE 6 MG: 5 SOLUTION ORAL at 08:58

## 2024-09-17 RX ADMIN — METHOCARBAMOL 750 MG: 750 TABLET ORAL at 08:58

## 2024-09-17 RX ADMIN — HYDROMORPHONE HYDROCHLORIDE 6 MG: 5 SOLUTION ORAL at 03:40

## 2024-09-17 ASSESSMENT — ACTIVITIES OF DAILY LIVING (ADL)
ADLS_ACUITY_SCORE: 29

## 2024-09-17 NOTE — PLAN OF CARE
"Goal Outcome Evaluation:      Plan of Care Reviewed With: patient    Overall Patient Progress: improvingOverall Patient Progress: improving    Outcome Evaluation: AVVS on RA, pt pain well controlled with scheduled Dilaudid, 0.5 mg IV Dilaudid x1    /71 (BP Location: Right arm)   Pulse 88   Temp 98.8  F (37.1  C) (Oral)   Resp 16   Ht 1.676 m (5' 6\")   Wt 71.6 kg (157 lb 12.8 oz)   SpO2 97%   BMI 25.47 kg/m      Shift: 4972-2245  Isolation Status: N/A  VS: stable on RA, afebrile  Neuro: A&O x4  Behaviors: receptive to cares  BG: Q$: 115, 162, 130  Labs/Imaging: Platelets 1,347; pending AM labs  Respiratory: WDL  Cardiac: WDL  Pain/Nausea: Denied nausea. C/O moderate-severe pain at G/J tube site  PRN: Dilaudid 0.5 mg IV x1 for breakthrough pain  Diet: sugar free clears; continuous TF  LDA: R PIV, R chest port-a-cath; R KELLY drain with ~ 200 mL serous output overnight; G/J tube with G clamped and J running TF  Infusion(s): TF @ 45 mL/hr with Q1 40 mL water flushes  GI/: No BM on shift, LBM 9/16. Voiding spontaneously, AUO, not saving  Skin: midline incision stapled and HARLEEN, no drainage; blister under R breast covered with mepilex per orders, scab/pressure injury under G/J tube button  Mobility: UAL/SBA  Plan: Notify MD of any significant changes, continue plan of care. Hand off to be given to oncoming RN.   "

## 2024-09-17 NOTE — PROGRESS NOTES
Home Infusion  Ciera is discharging today and going home on continuous Enteral therapy (Impact Peptide 1.5 with rate at 45ml/hr).   She requires a hook up of home enteral therapy prior to discharge.    Met with Ciera and Spouse at bedside once supplies arrived.  Provided hook up of Enteral therapy and programmed the infinity pump at 45ml/hr.   Instructed patient to get in 800ml free water daily.  Reminded them about plan for SNV, supplies and ongoing supply deliveries, storage of formula, 24/7 availability of [facility] staff and how to contact as needed while on home enteral therapy.     Patient verbalized understanding of information given.   Pt will be seen at [facility] Thursday 09/19/2024 [facility].  They feel comfortable discharging and managing the enteral therapy at home once teaching was completed.    Ciera's home enteral therapy is running and she is ready for discharge from [facility] perspective.       Ayesha Vail RN, BSN, B.S., [facility]  [facility], [address] [address]  [email or web address]

## 2024-09-17 NOTE — PLAN OF CARE
DISCHARGE:  Patient with orders to discharge to home.     Education Provided:   Med Card updated  LDAs port de-accessed     Discharge instructions, medications & follow ups reviewed with patient. Port de-accessed. A/ox4. VSS. Midline incision HARLEEN/CDI. PEG site/breast site wound care reviewed with patient. All equipment delivered. Patient left with spouse via wheelchair.         Goal Outcome Evaluation:      Plan of Care Reviewed With: patient    Overall Patient Progress: improvingOverall Patient Progress: improving    Outcome Evaluation: will dc today

## 2024-09-17 NOTE — PLAN OF CARE
Physical Therapy Discharge Summary    Reason for therapy discharge:    Discharged to home.    Progress towards therapy goal(s). See goals on Care Plan in [facility] electronic health record for goal details.  Goals partially met.  Barriers to achieving goals:   discharge from facility.    Therapy recommendation(s):    Continue home exercise program.

## 2024-09-17 NOTE — PROGRESS NOTES
Care Management Discharge Note    Discharge Date: 09/17/2024       Discharge Disposition: Home Infusion ([facility])    Discharge Services: Other (see comment)    Discharge DME: None    Discharge Transportation: family or friend will provide    Private pay costs discussed: Not applicable    Does the patient's insurance plan have a 3 day qualifying hospital stay waiver?  No    PAS Confirmation Code:    Patient/family educated on Medicare website which has current facility and service quality ratings: no    Education Provided on the Discharge Plan: Yes  Persons Notified of Discharge Plans: Provider, patient, family  Patient/Family in Agreement with the Plan: yes    Handoff Referral Completed: Yes, non-[facility] PCP: External handoff communication completed    Additional Information:  RNCC met with patient at bedside and confirmed that patient had no further questions about discharge.  RNCC informed [facility] liaison that patient is ready for discharge.  Liaison states she will see patient between 12pm and 1pm today to hook up for enteral feeds.  Patient states that patients , Dinesh will be transporting her to [facility] where they will be staying.  CHW set up ATC appointments for patient.      RNCC will send CTS Handoff to transplant coordinator.        Local Address  [facility]  [address] [place] [address]   [id number]     Home Infusion  [facility]  Phone # [phone]  Fax # [phone]   Enteral formula, pump, bags and relizorb cartridges.    Rosibel Marcum RN    Nurse Coordinator     Covering for: Shari ARON    Phone: *[phone]    Social Work and Care Management Department    SEARCHABLE in [facility] - search CARE COORDINATOR    [place] & [place] ([phone]) Saturday & Sunday; ([phone])  Recognized Holidays    Units: 5A, 5B & 5C  Pager: [phone]    Units: 6B, 6C & 6D    Pager: [phone]    Units: 7A, 7B, 7C & 7D    Pager: [phone]    Units: 6A & ICU   Pager:  [phone]    Units: 5 Ortho, 5MS & WB ED Pager: [phone]    Units: 6MS, 8A & 10 ICU  Pager [phone]

## 2024-09-17 NOTE — PROGRESS NOTES
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 She is now s/p TPAIT, splenectomy, and G/J placement with repair of small incisional hernia on 9/6/24 with Dr. Carmichael.  Inpatient Diabetes Service consulted for management of diabetes.          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 . BGS out of range yesterday and overnight. WIll increase Lantus 15% from 40 units to 46 units. Patient's BGS continue to be impacted on pain/stress and escalate into the 200's during that time frame.      Carry over:   Colleague Spoke with primary team, patient will discharge on continuous TF and sugar-free clear liquid diet.  Education done - see note from MIKEY Cowan.       Discharge Planning:  Medications: Semglee once daily for continuous TF plus Lispro correction scale q4hrs. Has meter + supplies already. Needs Baqsimi. Would like Dexcom G7 prescribed at discharge.Upon review this dexcom 7 sensor is already ordered for discharge.   Test Claims: completed 9/9 - Semglee, Lispro, Dexcom G7, Baqsimi.   Education:Teaching completed on 9/12/2024. knows how to use meter.  Has previously used insulin too a while ago. Also a CNA.  Outpatient Follow-up:  recommend [facility] - here from [place]. Plans to establish with endocrinologist back in [place], has appointments set up here after discharge.Scheduled 10/4/2024: with Doreen Goodwin at 9:00 and with Dr Lupillo Hudson      Diabetes Management Team Discharge Instructions    Blood glucose (BG) monitoring:   Check your blood glucose every 4 hours.   Blood glucose (BG) goal:       Glucose Control Regimen:  Insulin glargine (Semglee) 46 units once daily at 9:00 AM. Take at the same time each day.     Insulin lispro (Humalog): take correction every 4 hours if your BG is greater than 120.     Correction Scale   Do Not give Correction Insulin if  or less.   -140 = 1 unit.  -160 = 2 units.  -180 = 3 units.  -200 = 4 units.  -220 = 5 units.  -240 = 6 units.  -260 = 7 units.  -280 = 8 units.  -300 = 9 units.  BG greater than 300 = 10 units.      Hypoglycemia Management while not taking food/liquids by mouth:  If blood glucose is 51-79 mg/dL  1 small tube of glucose gel.  Can place 4 ounces or 120 ml apple juice down J tube.  Re-check blood glucose in 15 minutes.  Repeat these steps every 15 minutes until blood glucose is above 80 mg/dL.     If your blood glucose is less than or equal to 50 mg/dL:  2 small tubes of glucose gel.  Can place 8 ounces or 240 ml apple juice down J tube  Re-check blood glucose in 15 minutes.  Repeat these steps every 15 minutes until blood glucose is above 80 mg/dL.     If unable to take anything by mouth, disoriented, BG severely low, may use Baqsimi (glucagon).       Endocrinology Outpatient follow up: Outpatient endocrinology appointment has been Scheduled for 10/4/2024: with Doreen Goodwin at 9:00 and with Dr Lupillo Hudson. Please call the clinic at [phone]  if you have non-urgent questions regarding your blood sugars or insulin. Call  the clinic if your BG is running greater than 180 consistently or less than 70 consistently.      If you have urgent questions or concerns regarding your blood sugars or insulin, you may contact [phone] (the main hospital ). Ask to speak with the endocrinologist on call.     Supplies Needed at Discharge: please use the DIAB non-branded discharge supply order set ([id number])   One Touch Verio Glucose Meter   One Touch Verio test strips   One Touch Delica Lancets   Dexcom G 7 sensors   Sharps Container   Alcohol Wipes   B-D 4 mm brenda pen needles   Semglee (glargine) insulin pens   Insulin Lispro Kwikpens   Baqsimi Glucagon Nasal Powder      Please notify Inpatient Diabetes Service if changes are planned to steroids, nutrition, TPN/TF and anticipated procedures requiring prolonged NPO status.         Interval History/Review of Systems :   The last 24 hours progress and nursing notes reviewed.   Denied nausea.  Continues with Abdominal pain     Planned Procedures/Surgeries: none    Inpatient Glucose Control:       Recent Labs   Lab 09/17/24  0348 09/17/24  0036 09/16/24  2154 09/16/24 2029 09/16/24  1741 09/16/24  1637   * 162* 132* 115* 130* 136*             Medications Impacting Glycemia:   Steroids: none  D5W containing solutions/medications: LR with dextrose at 100cc, now stopped  Other medications impacting glucose: TF         Nutrition:   Orders Placed This Encounter      Combination Diet Clear Liquid    Supplements:  none  TF: 9/7/2024: Impact Peptide 1.5 @ 10 ml/hr and advance by 10 ml q 24 hrs (and/or ONLY advance rate upon MD approval after daily evaluation) to goal Impact Peptide 1.5 @ goal of  45ml/hr  (1080ml/day) provides:  151 g CHO --> reached goal at 8AM 9/10  TPN: none        Diabetes History: see full consult note for complete diabetes history   Diabetes Type and Duration:   Prediabetes( as noted by Dr Marin) and there was a time they thought she had DM secondary to pancreatitis  "but this resolved per her   PTA Medication Regimen: none recently  Historical Diabetes Medications:      Basaglar 10 units  Tradjenta 5 mg daily  Metformin 1000 mg po bid  Glimiperide     Glucose monitoring device and frequency: not recently but has checked her BG in the past--> 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  Lungs:  unremarkable, no new cough, no SOB  ABD:  rounded  Skin:  warm and dry  MSK:   moving all extremities  Mental Status:  Alert and oriented x3  Psych:   Cooperative, good eye contact, full/appropriate affect           Data:     Lab Results   Component Value Date    A1C 6.7 (H) 09/06/2024    A1C 7.0 (H) 09/03/2024    A1C 6.2 (H) 05/08/2024       ROUTINE IP LABS (Last four results)  BMP  Recent Labs   Lab 09/17/24  0348 09/17/24  0036 09/16/24 2154 09/16/24 2029 09/16/24  0814 09/16/24  0607 09/15/24  0757 09/15/24  0615 09/14/24  0832 09/14/24  0628 09/13/24  0419 09/13/24  0417   NA  --   --   --   --   --  137  --  133*  --  137  --  137   POTASSIUM  --   --   --   --   --  4.7  --  4.4  --  4.0  --  4.2   CHLORIDE  --   --   --   --   --  105  --  101  --  105  --  105   CARLA  --   --   --   --   --  7.7*  --  7.6*  --  7.7*  --  7.2*   CO2  --   --   --   --   --  25  --  25  --  26  --  25   BUN  --   --   --   --   --  14.9  --  13.6  --  13.2  --  14.7   CR  --   --   --   --   --  0.53  --  0.52  --  0.50*  --  0.52   * 162* 132* 115*   < > 154*   < > 178*   < > 136*   < > 123*    < > = values in this interval not displayed.     CBC  Recent Labs   Lab 09/16/24  0607 09/15/24  0615 09/14/24  0628 09/13/24  0417   WBC " 23.7* 25.6* 26.3* 26.9*   RBC 3.36* 3.29* 3.00* 2.98*   HGB 9.5* 9.5* 8.7* 8.6*   HCT 30.4* 29.5* 27.3* 27.3*   MCV 91 90 91 92   MCH 28.3 28.9 29.0 28.9   MCHC 31.3* 32.2 31.9 31.5   RDW 14.0 14.0 14.0 14.2   PLT 1,347* 1,154* 903* 703*     INR  Recent Labs   Lab 09/12/24  0541   INR 1.09       Inpatient Diabetes Service will continue to follow, please don't hesitate to contact the team with any questions or concerns.     PATTI Milligan CNP    Plan discussed with patient, bedside RN, and primary team via this note.    To contact Inpatient Diabetes Service:     7 AM - 5 PM: Page the IDS KEYA following the patient that day (see filed or incomplete progress notes/consult notes under Endocrinology)    OR if uncertain of provider assignment: page job code [phone]    5 PM - 7 AM: First call after hours is to primary service.    For urgent after-hours questions, page job code for on call fellow: [phone]     I spent a total of 45 minutes on the date of the encounter doing prep/post-work, chart review, history and exam, documentation and further activities per the note including lab review, multidisciplinary communication, counseling the patient and/or coordinating care regarding acute hyper/hypoglycemic management, as well as discharge management and planning/communication.

## 2024-09-17 NOTE — PLAN OF CARE
"Goal Outcome Evaluation:      Plan of Care Reviewed With: patient    Overall Patient Progress: improvingOverall Patient Progress: improving    Outcome Evaluation: Patient doing well to reduce IV narcartics.    BP 96/64 (BP Location: Left arm)   Pulse 81   Temp 98.9  F (37.2  C) (Oral)   Resp 18   Ht 1.676 m (5' 6\")   Wt 72.3 kg (159 lb 8 oz)   SpO2 98%   BMI 25.74 kg/m      Shift: 3821-7384  Isolation Status: none  VS: softer pressures on room air, afebrile  Neuro: Aox4  Behaviors: calm and cooperative   BG: q4 hr (136)  Labs: AM labs reviewed: platelet count 1,347  Respiratory: WDL  Cardiac: WDL  Pain/Nausea: 7/10 abdominal pain, scheduled dilaudid elixir   PRN: none given  Diet: clears, continuous TF @ 45 mL/hr  IV Access: R port a cath, R PIV both SL  Infusion(s): None  Lines/Drains: GJ Tube  GI/: No BM this shift, voiding, not saving  Skin: Abdominal incision with staples, R. Breast wound, possible ulcer under G/J tube  Mobility: up ad ihsan  Events/Education: n/a  Plan: Continue plan of care and notify team of any changes     "

## 2024-09-17 NOTE — PROGRESS NOTES
CARE MANAGEMENT FOLLOW UP    Additional Information:   09/17:CHW delegated by RNCC has schedule ATC appt for this Pt for tomorrow Wednesday 09/18/2024 and Thursday 09/19/2024;    Ministerio Wyatt   Community Health Worker, 7A&7B [facility].  [address], [place]   P [phone]   Fax: [phone]  Email: [email or web address]

## 2024-09-17 NOTE — PROGRESS NOTES
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 She is now s/p TPAIT, splenectomy, and G/J placement with repair of small incisional hernia on 9/6/24 with Dr. Carmichael.     _______________________________________________    s/p TPAIT 9/6/24: POD #11 . Islet yield Islet equivalents/kilogram: 2937. Post-op US: patient vessels. KELLY drain x 1 with serous output.    - Plan to remove KELLY today and stitch site.     Cardiorespiratory:  Atrial fib & flutter: Self-limited A-fib RVR in SICU on 9/8. Rate up to 150 9/10 with EKG showing A-flutter. Asymptomatic. TSH normal. Controlled with diltiazem. Now in NSR.  -Diltiazem 60mg SR BID    GI/Nutrition:   GERD: PPI daily  Pancreatic insufficiency: Relizorb with TF.   Moderate malnutrition in the context of chronic illness/disease: GJ tube placed, clamp G tube as tolerated. Tube feeds at goal via J tube; anticipate > 90 days (length of need). Sugar free clears.   Bowel regimen: Senna & PEG - not using with loose stools    Hematology:  Anemia of chronic disease and acute blood loss: Hgb ~10, stable.  Thrombocytosis: Plt ~1400 post-splenectomy. Monitor.    Fluid/Electrolytes: No acute issues.     : No acute issues.     Endocrine:  Post-pancreatectomy diabetes/stress hyperglycemia: Endocrine consulted. Managed with subcutaneous insulin, adjusting as tolerated.     Infection:   Leukocytosis: WBC 26 (from 23), likely 2/2 stress and splenectomy. Ertapenem for surgical ppx, stopped 9/9.    Neuro:   Acute on chronic pain: PTA managed with PO hydromorphone 4 mg Q3H. Better control today.  Current regimen:    - Dilaudid 6mg q3H via JT   - Dilaudid 2mg q3H PRN JT, use 1st (not using) - discontinue   - Dilaudid IV 0.5 mg q4H PRN (not using) -  discontinue   - Neurontin   - Acetaminophen   - Robaxin, not very effective per patient   - Valium 3mg q6H PRN muscle spasms (not using) - discontinue    Skin:   Wound, under right breast: Unclear etiology. Started as a large blister. WOC consulted.  Wound, under GT hub: Sutures cut to relieve pressure. WOC consulted.    Activity: PT/OT consulted, recommending home with assist    Disposition: 7A, discharge today    KEYA/Fellow/Resident Provider: Saniya De Luna NP [id number]    Faculty: Carlos Carmichael MD  __________________________________________________________________  Transplant History: Admitted 9/6/2024 for TPAIT  9/6/2024 (Islet), Postoperative day: 11     Interval History: History is obtained from the patient  Overnight events: No acute events overnight. Pain well controlled. Denies nausea. Tolerating TF. KELLY with 1.2L out.     ROS:   A 10-point review of systems was negative except as noted above.    Curent Meds:  Current Facility-Administered Medications   Medication Dose Route Frequency Provider Last Rate Last Admin    diltiazem ER (CARDIZEM SR) 12 hr capsule 60 mg  60 mg Oral BID Nia Sierra APRN CNP   60 mg at 09/17/24 0857    gabapentin (NEURONTIN) solution 300 mg  300 mg Oral or J tube BID Carlos Carmichael MD   300 mg at 09/17/24 0858    heparin lock flush 10 unit/mL injection 5-10 mL  5-10 mL Intracatheter Q24H Saniya De Luna NP        heparin lock flush 100 unit/mL injection 5 mL  5 mL Intracatheter Q8H Saniya De Luna NP        heparin lock flush 100 unit/mL injection 5-10 mL  5-10 mL Intracatheter Q28 Days Saniya De Luna NP        HYDROmorphone (STANDARD CONC) (DILAUDID) oral solution 6 mg  6 mg Per J Tube Q3H Nia Sierra APRN CNP   6 mg at 09/17/24 0858    insulin aspart (NovoLOG) injection (RAPID ACTING)  1-10 Units Subcutaneous Q4H Jessica Sánchez PA-C   2 Units at 09/17/24 0858    insulin glargine (LANTUS PEN) injection 46 Units  46 Units  "Subcutaneous Q24H Robyn Lehman APRN CNP   46 Units at 09/17/24 0858    lidocaine (PF) (XYLOCAINE) 1 % injection 10 mL  10 mL Intradermal Once Carlos Carmichael MD        methocarbamol (ROBAXIN) tablet 750 mg  750 mg Oral TID Jus Clements MD   750 mg at 09/17/24 0858    mvw complete formulation (PEDIATRIC) oral solution 1.5 mL  1.5 mL Oral or J tube Daily Carlos Carmichael MD   1.5 mL at 09/17/24 0858    pantoprazole (PROTONIX) 2 mg/mL suspension 40 mg  40 mg Oral or J tube Daily Carlos Carmichael MD   40 mg at 09/17/24 0858    polyethylene glycol (MIRALAX) Packet 17 g  17 g Per J Tube Daily Carlos Carmichael MD   17 g at 09/09/24 0745    sennosides (SENOKOT) syrup 5 mL  5 mL Per J Tube BID Carlos Carmichael MD   5 mL at 09/09/24 2013    sodium chloride (PF) 0.9% PF flush 3 mL  3 mL Intracatheter Q8H Carlos Carmichael MD   3 mL at 09/17/24 0700       Physical Exam:     Admit Weight: 71.8 kg (158 lb 4.6 oz)    Current Vitals:   /63 (BP Location: Left arm)   Pulse 91   Temp 98.6  F (37  C) (Oral)   Resp 16   Ht 1.676 m (5' 6\")   Wt 71.6 kg (157 lb 12.8 oz)   SpO2 99%   BMI 25.47 kg/m      Vital sign ranges:    Temp:  [98.6  F (37  C)-98.9  F (37.2  C)] 98.6  F (37  C)  Pulse:  [81-91] 91  Resp:  [16-18] 16  BP: ()/(63-71) 104/63  SpO2:  [95 %-99 %] 99 %  Patient Vitals for the past 24 hrs:   BP Temp Temp src Pulse Resp SpO2 Weight   09/17/24 1006 104/63 98.6  F (37  C) Oral 91 16 99 % --   09/17/24 0632 102/67 98.6  F (37  C) Oral 84 16 96 % --   09/17/24 0141 106/71 98.8  F (37.1  C) Oral 88 16 97 % --   09/17/24 0019 -- -- -- -- -- -- 71.6 kg (157 lb 12.8 oz)   09/16/24 2156 110/71 98.6  F (37  C) Oral 83 16 95 % --   09/16/24 1737 96/64 98.9  F (37.2  C) Oral 81 18 98 % --     General Appearance: NAD  Skin: normal, no suspicious lesions or rashes  Heart: Perfused, regular rate  Lungs: Nonlabored " resps on RA  Abdomen: The abdomen is soft, and moderately tender. The wound is Healing well. Tube/Drain sites are: GJ in place. KELLY: serous. GJ tube site with erythema under hub lower aspect; covered.   : martinez is not present.    Extremities: edema: none noted  Neuro: A&Ox4    Data:   CMP  Recent Labs   Lab 09/17/24  0749 09/17/24  0631 09/16/24  0814 09/16/24  0607 09/15/24  0757 09/15/24  0615 09/14/24  0832 09/14/24  0628 09/11/24  0554 09/11/24  0535   NA  --  136  --  137  --  133*  --  137   < > 137   POTASSIUM  --  4.7  --  4.7  --  4.4  --  4.0   < > 4.0   CHLORIDE  --  103  --  105  --  101  --  105   < > 106   CO2  --  25  --  25  --  25  --  26   < > 25   * 134*   < > 154*   < > 178*   < > 136*   < > 110*   BUN  --  15.9  --  14.9  --  13.6  --  13.2   < > 10.8   CR  --  0.53  --  0.53  --  0.52  --  0.50*   < > 0.55   GFRESTIMATED  --  >90  --  >90  --  >90  --  >90   < > >90   CARLA  --  7.8*  --  7.7*  --  7.6*  --  7.7*   < > 7.3*   MAG  --   --   --   --   --  1.9  --  2.0   < > 1.8   PHOS  --   --   --   --   --  4.3  --  3.6   < > 2.7   AMYLASE  --   --   --   --   --   --   --   --   --  4*   LIPASE  --   --   --   --   --   --   --   --   --  6*   ALBUMIN  --   --   --   --   --   --   --   --   --  2.2*   BILITOTAL  --   --   --   --   --   --   --   --   --  0.2   ALKPHOS  --   --   --   --   --   --   --   --   --  93   AST  --   --   --   --   --   --   --   --   --  19   ALT  --   --   --   --   --   --   --   --   --  25    < > = values in this interval not displayed.     CBC  Recent Labs   Lab 09/17/24  0631 09/16/24  0607   HGB 10.0* 9.5*   WBC 26.2* 23.7*   PLT 1,446* 1,347*     Coags  Recent Labs   Lab 09/13/24  0417 09/12/24  0541   INR  --  1.09   PTT 32 32

## 2024-09-18 ENCOUNTER — TELEPHONE (OUTPATIENT)
Dept: TRANSPLANT | Facility: CLINIC | Age: 39
End: 2024-09-18

## 2024-09-18 ENCOUNTER — INFUSION THERAPY VISIT (OUTPATIENT)
Dept: INFUSION THERAPY | Facility: CLINIC | Age: 39
End: 2024-09-18
Attending: NURSE PRACTITIONER
Payer: COMMERCIAL

## 2024-09-18 VITALS
RESPIRATION RATE: 16 BRPM | TEMPERATURE: 98.3 F | WEIGHT: 152.5 LBS | BODY MASS INDEX: 24.61 KG/M2 | OXYGEN SATURATION: 96 % | SYSTOLIC BLOOD PRESSURE: 100 MMHG | HEART RATE: 109 BPM | DIASTOLIC BLOOD PRESSURE: 68 MMHG

## 2024-09-18 DIAGNOSIS — Z94.83 S/P PANCREATIC ISLET CELL TRANSPLANTATION (H): Primary | ICD-10-CM

## 2024-09-18 DIAGNOSIS — K86.1 CHRONIC RECURRENT PANCREATITIS (H): Primary | ICD-10-CM

## 2024-09-18 DIAGNOSIS — K86.89 PANCREATIC INSUFFICIENCY: ICD-10-CM

## 2024-09-18 DIAGNOSIS — Z94.83 S/P PANCREATIC ISLET CELL TRANSPLANTATION (H): ICD-10-CM

## 2024-09-18 DIAGNOSIS — G89.18 ACUTE POST-OPERATIVE PAIN: ICD-10-CM

## 2024-09-18 LAB
ALBUMIN SERPL BCG-MCNC: 2.7 G/DL (ref 3.5–5.2)
ALP SERPL-CCNC: 226 U/L (ref 40–150)
ALT SERPL W P-5'-P-CCNC: 31 U/L (ref 0–50)
ANION GAP SERPL CALCULATED.3IONS-SCNC: 7 MMOL/L (ref 7–15)
AST SERPL W P-5'-P-CCNC: 22 U/L (ref 0–45)
BASOPHILS # BLD MANUAL: 0 10E3/UL (ref 0–0.2)
BASOPHILS NFR BLD MANUAL: 0 %
BILIRUB SERPL-MCNC: <0.2 MG/DL
BUN SERPL-MCNC: 18.1 MG/DL (ref 6–20)
CALCIUM SERPL-MCNC: 8 MG/DL (ref 8.8–10.4)
CHLORIDE SERPL-SCNC: 103 MMOL/L (ref 98–107)
CREAT SERPL-MCNC: 0.53 MG/DL (ref 0.51–0.95)
EGFRCR SERPLBLD CKD-EPI 2021: >90 ML/MIN/1.73M2
EOSINOPHIL # BLD MANUAL: 0.6 10E3/UL (ref 0–0.7)
EOSINOPHIL NFR BLD MANUAL: 2 %
ERYTHROCYTE [DISTWIDTH] IN BLOOD BY AUTOMATED COUNT: 13.8 % (ref 10–15)
GLUCOSE SERPL-MCNC: 266 MG/DL (ref 70–99)
HCO3 SERPL-SCNC: 25 MMOL/L (ref 22–29)
HCT VFR BLD AUTO: 29.8 % (ref 35–47)
HGB BLD-MCNC: 9.5 G/DL (ref 11.7–15.7)
LYMPHOCYTES # BLD MANUAL: 0.6 10E3/UL (ref 0.8–5.3)
LYMPHOCYTES NFR BLD MANUAL: 2 %
MCH RBC QN AUTO: 28.2 PG (ref 26.5–33)
MCHC RBC AUTO-ENTMCNC: 31.9 G/DL (ref 31.5–36.5)
MCV RBC AUTO: 88 FL (ref 78–100)
METAMYELOCYTES # BLD MANUAL: 0.3 10E3/UL
METAMYELOCYTES NFR BLD MANUAL: 1 %
MONOCYTES # BLD MANUAL: 2.3 10E3/UL (ref 0–1.3)
MONOCYTES NFR BLD MANUAL: 8 %
NEUTROPHILS # BLD MANUAL: 25.5 10E3/UL (ref 1.6–8.3)
NEUTROPHILS NFR BLD MANUAL: 87 %
NRBC # BLD AUTO: 0 10E3/UL
NRBC BLD AUTO-RTO: 0 /100
PLAT MORPH BLD: ABNORMAL
PLATELET # BLD AUTO: 1637 10E3/UL (ref 150–450)
POTASSIUM SERPL-SCNC: 4.3 MMOL/L (ref 3.4–5.3)
PROT SERPL-MCNC: 5.5 G/DL (ref 6.4–8.3)
RBC # BLD AUTO: 3.37 10E6/UL (ref 3.8–5.2)
RBC MORPH BLD: ABNORMAL
SODIUM SERPL-SCNC: 135 MMOL/L (ref 135–145)
WBC # BLD AUTO: 29.3 10E3/UL (ref 4–11)

## 2024-09-18 PROCEDURE — 250N000011 HC RX IP 250 OP 636: Performed by: SURGERY

## 2024-09-18 PROCEDURE — 85007 BL SMEAR W/DIFF WBC COUNT: CPT | Performed by: NURSE PRACTITIONER

## 2024-09-18 PROCEDURE — 82247 BILIRUBIN TOTAL: CPT | Performed by: NURSE PRACTITIONER

## 2024-09-18 PROCEDURE — 36415 COLL VENOUS BLD VENIPUNCTURE: CPT | Performed by: NURSE PRACTITIONER

## 2024-09-18 PROCEDURE — 258N000003 HC RX IP 258 OP 636: Performed by: SURGERY

## 2024-09-18 PROCEDURE — 85027 COMPLETE CBC AUTOMATED: CPT | Performed by: NURSE PRACTITIONER

## 2024-09-18 PROCEDURE — 84155 ASSAY OF PROTEIN SERUM: CPT | Performed by: NURSE PRACTITIONER

## 2024-09-18 PROCEDURE — 96360 HYDRATION IV INFUSION INIT: CPT

## 2024-09-18 RX ORDER — HEPARIN SODIUM,PORCINE 10 UNIT/ML
5-20 VIAL (ML) INTRAVENOUS DAILY PRN
OUTPATIENT
Start: 2024-09-18

## 2024-09-18 RX ORDER — HEPARIN SODIUM (PORCINE) LOCK FLUSH IV SOLN 100 UNIT/ML 100 UNIT/ML
5 SOLUTION INTRAVENOUS
Status: CANCELLED | OUTPATIENT
Start: 2024-09-18

## 2024-09-18 RX ORDER — ONDANSETRON 2 MG/ML
4 INJECTION INTRAMUSCULAR; INTRAVENOUS ONCE
OUTPATIENT
Start: 2024-09-18 | End: 2024-09-18

## 2024-09-18 RX ORDER — HEPARIN SODIUM (PORCINE) LOCK FLUSH IV SOLN 100 UNIT/ML 100 UNIT/ML
5 SOLUTION INTRAVENOUS
Status: DISCONTINUED | OUTPATIENT
Start: 2024-09-18 | End: 2024-09-18 | Stop reason: HOSPADM

## 2024-09-18 RX ADMIN — SODIUM CHLORIDE, POTASSIUM CHLORIDE, SODIUM LACTATE AND CALCIUM CHLORIDE 1000 ML: 600; 310; 30; 20 INJECTION, SOLUTION INTRAVENOUS at 08:43

## 2024-09-18 RX ADMIN — Medication 5 ML: at 09:47

## 2024-09-18 ASSESSMENT — PAIN SCALES - GENERAL: PAINLEVEL: EXTREME PAIN (8)

## 2024-09-18 NOTE — TELEPHONE ENCOUNTER
Patient is at [facility] doing good. I was in the ATC with her today, and will see her again tomorrow in clinic to see Dr Mead. No issues to report, and she is starting to advance diet now to full liquids.

## 2024-09-18 NOTE — TELEPHONE ENCOUNTER
Prior Authorization Not Needed per Insurance    Medication: CREON [id number] UNITS PO CPEP  Insurance Company: [employer] Non-Specialty PA's - Phone [phone] Fax [phone]  Expected CoPay: $    Pharmacy Filling the Rx: [facility] [place], [place] - [address] [place] [address] 1-400  Pharmacy Notified: Yes  Patient Notified: Yes            Per call to [employer] at [phone] - they stated that no PA is required by the plan and that this is covered it just needs a cost exceeds max override. I informed her that the pharmacy tried to call and they were told that a PA needed to be done first. She looked at the records and didn't see any record of the pharmacy calling. I messaged Mehdi and gave her this update and they will work on the cost exceeds max.    Thank you,  Ana Monroy Oncology/Transplant Liaison  Phone: [phone]  Fax: [phone]

## 2024-09-18 NOTE — PROGRESS NOTES
Infusion Nursing Note:  Ciera Perkins presents today for   Chief Complaint   Patient presents with    Eval/Assessment     Post TPAIT    Infusion     IV hydration       Patient seen by provider today: Tee Perdue RN coordinator assessed patient during [facility] appt.   present during visit today: Not Applicable.    Note:   -Orders from Carlos Carmichael MD completed. Frequency: once.  -1L LR IV over 60min.    -Patient assessed in conjunction with DEBBIE Oliveira coordinator. Transplant team will follow up.    Intravenous Access:  Implanted Port accessed by RN.    Treatment Conditions:  Not Applicable.    Post Infusion Assessment:  Patient tolerated infusion without incident.  Blood return noted pre and post infusion.  Site patent and intact, free from redness, edema or discomfort.  No evidence of extravasations.  Access discontinued per protocol.     Discharge Plan:   Discharge instructions reviewed with: Patient and her , Dinesh.  Patient and/or family verbalized understanding of discharge instructions and all questions answered.  AVS to patient via MYCHART.      Patient will follow up with care team.     Patient discharged in stable condition accompanied by: .  Departure Mode: Wheelchair.      Delfina Ca RN    /68 (Patient Position: Standing)   Pulse 109   Temp 98.3  F (36.8  C)   Resp 16   Wt 69.2 kg (152 lb 8 oz)   LMP  (LMP Unknown)   SpO2 96%   BMI 24.61 kg/m      Administrations This Visit       heparin lock flush 100 unit/mL injection 5 mL       Admin Date  09/18/2024 Action  $Given Dose  5 mL Route  Intracatheter Documented By  Delfina Ca, DEBBIE              lactated ringers BOLUS 1,000 mL       Admin Date  09/18/2024 Action  $New Bag Dose  1,000 mL Rate  1,000 mL/hr Route  Intravenous Documented By  Delfina Ca, DEBBIE

## 2024-09-19 ENCOUNTER — OFFICE VISIT (OUTPATIENT)
Dept: TRANSPLANT | Facility: CLINIC | Age: 39
End: 2024-09-19
Attending: SURGERY
Payer: COMMERCIAL

## 2024-09-19 VITALS
BODY MASS INDEX: 25.78 KG/M2 | OXYGEN SATURATION: 97 % | DIASTOLIC BLOOD PRESSURE: 65 MMHG | WEIGHT: 159.7 LBS | HEART RATE: 95 BPM | SYSTOLIC BLOOD PRESSURE: 98 MMHG

## 2024-09-19 DIAGNOSIS — G89.18 ACUTE POST-OPERATIVE PAIN: Primary | ICD-10-CM

## 2024-09-19 DIAGNOSIS — K86.1 IDIOPATHIC CHRONIC PANCREATITIS (H): Primary | ICD-10-CM

## 2024-09-19 DIAGNOSIS — Z94.83 S/P PANCREATIC ISLET CELL TRANSPLANTATION (H): ICD-10-CM

## 2024-09-19 PROCEDURE — 99213 OFFICE O/P EST LOW 20 MIN: CPT | Performed by: SURGERY

## 2024-09-19 PROCEDURE — 99024 POSTOP FOLLOW-UP VISIT: CPT | Performed by: SURGERY

## 2024-09-19 RX ORDER — HYDROMORPHONE HYDROCHLORIDE 2 MG/1
6 TABLET ORAL EVERY 4 HOURS PRN
Qty: 126 TABLET | Refills: 0 | Status: ON HOLD | OUTPATIENT
Start: 2024-09-19 | End: 2024-10-06

## 2024-09-19 NOTE — Clinical Note
9/19/2024      Ciera Perkins  [address]  [place] [address]      Dear Colleague,    Thank you for referring your patient, Ciera Tellezhl, to the [facility]. Please see a copy of my visit note below.    No notes on file    Again, thank you for allowing me to participate in the care of your patient.        Sincerely,        Carlos Carmichael MD

## 2024-09-20 LAB
BACTERIA BLD CULT: NO GROWTH
BACTERIA BLD CULT: NO GROWTH

## 2024-09-23 ENCOUNTER — TELEPHONE (OUTPATIENT)
Dept: TRANSPLANT | Facility: CLINIC | Age: 39
End: 2024-09-23
Payer: COMMERCIAL

## 2024-09-23 DIAGNOSIS — G89.18 ACUTE POST-OPERATIVE PAIN: Primary | ICD-10-CM

## 2024-09-23 DIAGNOSIS — F41.9 ANXIETY: ICD-10-CM

## 2024-09-23 RX ORDER — GABAPENTIN 250 MG/5ML
600 SOLUTION ORAL 2 TIMES DAILY
Qty: 470 ML | Refills: 1 | Status: SHIPPED | OUTPATIENT
Start: 2024-09-23 | End: 2024-10-01

## 2024-09-23 RX ORDER — HYDROXYZINE HYDROCHLORIDE 25 MG/1
25 TABLET, FILM COATED ORAL 3 TIMES DAILY PRN
Qty: 42 TABLET | Refills: 1 | Status: ON HOLD | OUTPATIENT
Start: 2024-09-23 | End: 2024-10-07

## 2024-09-23 RX ORDER — HYDROMORPHONE HYDROCHLORIDE 1 MG/ML
6 SOLUTION ORAL EVERY 4 HOURS PRN
Qty: 500 ML | Refills: 0 | Status: SHIPPED | OUTPATIENT
Start: 2024-09-23 | End: 2024-10-01

## 2024-09-23 NOTE — TELEPHONE ENCOUNTER
Spoke with Ciera about multiple messages about pain around GJ tube. Pain has worsened over the weekend; drainage has increased as well requiring 2-3 bandage changes daily. Denies fever, chills, nausea, changes to incision.       Reports using 6 mg Dilaudid (2 mg tablets) every 4 hours as prescribed, but reports that she has only 4 tablets remaining. Verified with pharmacy staff at [facility] after phone call with patient that patient picked up 126 tablets of 2 mg Dilaudid on 9/19/24.     Using 300 mg Gabapentin BID as well.     Update to Dr Carlos Carmichael to discuss pain management plan and coordinator will contact patient with updates.     Mahnaz Buckner, RN Transplant Coordinator on September 23, 2024 1:37 PM

## 2024-09-23 NOTE — TELEPHONE ENCOUNTER
Patient Call: General  Route to LPN    Reason for call: Pt called to speak to coordinator. Pt has severe pain near feeding tube, is running low on Dilaudid. Please call back ASAP    Send any prescriptions to [facility]    Call back needed? Yes    Return Call Needed  Same as documented in contacts section  When to return call?: Same day: Route High Priority

## 2024-09-24 ENCOUNTER — TELEPHONE (OUTPATIENT)
Dept: TRANSPLANT | Facility: CLINIC | Age: 39
End: 2024-09-24
Payer: COMMERCIAL

## 2024-09-24 DIAGNOSIS — K86.89 PANCREATIC INSUFFICIENCY: ICD-10-CM

## 2024-09-24 DIAGNOSIS — Z94.83 S/P PANCREATIC ISLET CELL TRANSPLANTATION (H): Primary | ICD-10-CM

## 2024-09-25 ENCOUNTER — TELEPHONE (OUTPATIENT)
Dept: TRANSPLANT | Facility: CLINIC | Age: 39
End: 2024-09-25
Payer: COMMERCIAL

## 2024-09-25 NOTE — TELEPHONE ENCOUNTER
Ciera is doing great, and eating about 800-1000 calories a day now.  I stopped her tube feeds this morning, and we will assess in clinic on Thursday to see if we can remove her GJ tube which is causing most of her current pain. Blood sugars are good, she had a dexcom but an old phone so cannot connect to [employer], so she is mycharting us her numbers daily.

## 2024-09-26 ENCOUNTER — LAB (OUTPATIENT)
Dept: LAB | Facility: CLINIC | Age: 39
End: 2024-09-26
Attending: SURGERY
Payer: COMMERCIAL

## 2024-09-26 ENCOUNTER — OFFICE VISIT (OUTPATIENT)
Dept: TRANSPLANT | Facility: CLINIC | Age: 39
End: 2024-09-26
Attending: SURGERY
Payer: COMMERCIAL

## 2024-09-26 VITALS
WEIGHT: 155.8 LBS | OXYGEN SATURATION: 97 % | DIASTOLIC BLOOD PRESSURE: 69 MMHG | HEART RATE: 82 BPM | SYSTOLIC BLOOD PRESSURE: 102 MMHG | BODY MASS INDEX: 25.15 KG/M2

## 2024-09-26 DIAGNOSIS — G89.18 ACUTE POST-OPERATIVE PAIN: ICD-10-CM

## 2024-09-26 DIAGNOSIS — Z94.83 S/P PANCREATIC ISLET CELL TRANSPLANTATION (H): ICD-10-CM

## 2024-09-26 DIAGNOSIS — K86.89 PANCREATIC INSUFFICIENCY: ICD-10-CM

## 2024-09-26 DIAGNOSIS — Z94.83 S/P PANCREATIC ISLET CELL TRANSPLANTATION (H): Primary | ICD-10-CM

## 2024-09-26 LAB
ALBUMIN SERPL BCG-MCNC: 3.3 G/DL (ref 3.5–5.2)
ALP SERPL-CCNC: 146 U/L (ref 40–150)
ALT SERPL W P-5'-P-CCNC: 29 U/L (ref 0–50)
ANION GAP SERPL CALCULATED.3IONS-SCNC: 10 MMOL/L (ref 7–15)
AST SERPL W P-5'-P-CCNC: 36 U/L (ref 0–45)
BASOPHILS # BLD AUTO: 0.2 10E3/UL (ref 0–0.2)
BASOPHILS NFR BLD AUTO: 2 %
BILIRUB SERPL-MCNC: <0.2 MG/DL
BUN SERPL-MCNC: 11.1 MG/DL (ref 6–20)
CALCIUM SERPL-MCNC: 9.1 MG/DL (ref 8.8–10.4)
CHLORIDE SERPL-SCNC: 105 MMOL/L (ref 98–107)
CREAT SERPL-MCNC: 0.55 MG/DL (ref 0.51–0.95)
EGFRCR SERPLBLD CKD-EPI 2021: >90 ML/MIN/1.73M2
EOSINOPHIL # BLD AUTO: 0.9 10E3/UL (ref 0–0.7)
EOSINOPHIL NFR BLD AUTO: 7 %
ERYTHROCYTE [DISTWIDTH] IN BLOOD BY AUTOMATED COUNT: 13.4 % (ref 10–15)
GLUCOSE SERPL-MCNC: 110 MG/DL (ref 70–99)
HCO3 SERPL-SCNC: 24 MMOL/L (ref 22–29)
HCT VFR BLD AUTO: 32.8 % (ref 35–47)
HGB BLD-MCNC: 10.5 G/DL (ref 11.7–15.7)
IMM GRANULOCYTES # BLD: 0.1 10E3/UL
IMM GRANULOCYTES NFR BLD: 1 %
LYMPHOCYTES # BLD AUTO: 2.4 10E3/UL (ref 0.8–5.3)
LYMPHOCYTES NFR BLD AUTO: 19 %
MCH RBC QN AUTO: 27.1 PG (ref 26.5–33)
MCHC RBC AUTO-ENTMCNC: 32 G/DL (ref 31.5–36.5)
MCV RBC AUTO: 85 FL (ref 78–100)
MONOCYTES # BLD AUTO: 1.5 10E3/UL (ref 0–1.3)
MONOCYTES NFR BLD AUTO: 12 %
NEUTROPHILS # BLD AUTO: 7.6 10E3/UL (ref 1.6–8.3)
NEUTROPHILS NFR BLD AUTO: 60 %
NRBC # BLD AUTO: 0 10E3/UL
NRBC BLD AUTO-RTO: 0 /100
PLAT MORPH BLD: NORMAL
PLATELET # BLD AUTO: 1377 10E3/UL (ref 150–450)
POTASSIUM SERPL-SCNC: 4 MMOL/L (ref 3.4–5.3)
PROT SERPL-MCNC: 7 G/DL (ref 6.4–8.3)
RBC # BLD AUTO: 3.88 10E6/UL (ref 3.8–5.2)
RBC MORPH BLD: NORMAL
SODIUM SERPL-SCNC: 139 MMOL/L (ref 135–145)
WBC # BLD AUTO: 12.6 10E3/UL (ref 4–11)

## 2024-09-26 PROCEDURE — 99213 OFFICE O/P EST LOW 20 MIN: CPT | Performed by: SURGERY

## 2024-09-26 PROCEDURE — 80053 COMPREHEN METABOLIC PANEL: CPT | Performed by: PATHOLOGY

## 2024-09-26 PROCEDURE — 85025 COMPLETE CBC W/AUTO DIFF WBC: CPT | Performed by: PATHOLOGY

## 2024-09-26 PROCEDURE — 99024 POSTOP FOLLOW-UP VISIT: CPT | Performed by: SURGERY

## 2024-09-26 PROCEDURE — 36415 COLL VENOUS BLD VENIPUNCTURE: CPT | Performed by: PATHOLOGY

## 2024-09-26 NOTE — Clinical Note
9/26/2024      Ciera Perkins  [address]  [place] [address]      Dear Colleague,    Thank you for referring your patient, Ciera Tellezhl, to the [facility]. Please see a copy of my visit note below.    No notes on file    Again, thank you for allowing me to participate in the care of your patient.        Sincerely,        Carlos Carmichael MD

## 2024-09-30 NOTE — CONFIDENTIAL NOTE
RECORDS RECEIVED FROM: internal    DATE RECEIVED: 10.4.24    NOTES (FOR ALL VISITS) STATUS DETAILS   OFFICE NOTES from referring provider internal    Dinah Marin MD      OFFICE NOTES from other specialist     ED NOTES internal /ce Internal- 9.6.24 Ramath    [facility] - 8/18/24 luisothy  7/28/24 Amy   5.21.24 house   4.24.24 sozener   4.10.24 galicia   More in [facility]    OPERATIVE REPORT  (thyroid, pituitary, adrenal, parathyroid) ce [facility]   9.6.24   MEDICATION LIST internal     IMAGING      XR (Chest) ce [facility] - 8.9.24, 6.9.24, 4.9.24, 3.20.24 more in epic    CT (HEAD/NECK/CHEST/ABDOMEN) ce [facility] - 8.12.24, 8.9.24, 8.6.24, 7.28.24, 5.26.24 more in [facility]    LABS     DIABETES: HBGA1C, CREATININE, FASTING LIPIDS, MICROALBUMIN URINE, POTASSIUM, TSH, T4    THYROID: TSH, T4, CBC, THYRODLONULIN, TOTAL T3, FREE T4, CALCITONIN, CEA internal /ce       Action 9.30.24 sv    Action Taken Image request sent to [facility]   Ct-  8.12.24, 8.9.24, 8.6.24, 7.28.24, 5.26.24 more in epic  Cxr- 8.9.24, 6.9.24, 4.9.24, 3.20.24 more in epic       Action 10.2.24 sv    Action Taken 2nd Image request sent to [facility]   Ct-  8.12.24, 8.9.24, 8.6.24, 7.28.24, 5.26.24 more in epic  Cxr- 8.9.24, 6.9.24, 4.9.24, 3.20.24 more in epic       Action 10.3.24 sv    Action Taken 3rd Image request sent to [facility]   Ct-  8.12.24, 8.9.24, 8.6.24, 7.28.24, 5.26.24 more in epic  Cxr- 8.9.24, 6.9.24, 4.9.24, 3.20.24 more in epic     Called pt and lvm about gathering outside imaging

## 2024-10-01 ENCOUNTER — TELEPHONE (OUTPATIENT)
Dept: TRANSPLANT | Facility: CLINIC | Age: 39
End: 2024-10-01
Payer: COMMERCIAL

## 2024-10-01 ENCOUNTER — DOCUMENTATION ONLY (OUTPATIENT)
Dept: TRANSPLANT | Facility: CLINIC | Age: 39
End: 2024-10-01
Payer: COMMERCIAL

## 2024-10-01 DIAGNOSIS — Z94.83 S/P PANCREATIC ISLET CELL TRANSPLANTATION (H): Primary | ICD-10-CM

## 2024-10-01 DIAGNOSIS — K86.89 PANCREATIC INSUFFICIENCY: ICD-10-CM

## 2024-10-01 DIAGNOSIS — G89.18 ACUTE POST-OPERATIVE PAIN: ICD-10-CM

## 2024-10-01 DIAGNOSIS — K21.9 GERD (GASTROESOPHAGEAL REFLUX DISEASE): ICD-10-CM

## 2024-10-01 RX ORDER — PANTOPRAZOLE SODIUM 40 MG/1
40 TABLET, DELAYED RELEASE ORAL DAILY
Qty: 30 TABLET | Refills: 1 | Status: ON HOLD | OUTPATIENT
Start: 2024-10-01 | End: 2024-10-06

## 2024-10-01 RX ORDER — GABAPENTIN 600 MG/1
600 TABLET ORAL 2 TIMES DAILY
Qty: 60 TABLET | Refills: 1 | Status: ON HOLD | OUTPATIENT
Start: 2024-10-01 | End: 2024-10-07

## 2024-10-01 RX ORDER — HYDROMORPHONE HYDROCHLORIDE 1 MG/ML
4 SOLUTION ORAL EVERY 4 HOURS PRN
Qty: 500 ML | Refills: 0 | Status: ON HOLD | OUTPATIENT
Start: 2024-10-01 | End: 2024-10-07

## 2024-10-01 RX ORDER — PEDIATRIC MULTIVIT 61/D3/VIT K 1500-800
1 CAPSULE ORAL DAILY
Qty: 30 CAPSULE | Refills: 1 | Status: SHIPPED | OUTPATIENT
Start: 2024-10-01

## 2024-10-01 NOTE — TELEPHONE ENCOUNTER
Pt is 25 days post TPIAT and doing great.  GJ tube pulled last week with no issues and is healing great. Eating great, having regular bowel movements and not other issues to report.

## 2024-10-02 ENCOUNTER — APPOINTMENT (OUTPATIENT)
Dept: CT IMAGING | Facility: CLINIC | Age: 39
End: 2024-10-02
Payer: COMMERCIAL

## 2024-10-02 ENCOUNTER — TELEPHONE (OUTPATIENT)
Dept: TRANSPLANT | Facility: CLINIC | Age: 39
End: 2024-10-02
Payer: COMMERCIAL

## 2024-10-02 ENCOUNTER — HOSPITAL ENCOUNTER (INPATIENT)
Facility: CLINIC | Age: 39
LOS: 5 days | Discharge: HOME | End: 2024-10-07
Attending: EMERGENCY MEDICINE | Admitting: SURGERY
Payer: COMMERCIAL

## 2024-10-02 DIAGNOSIS — Z90.410 DIABETES MELLITUS SECONDARY TO PANCREATECTOMY (H): ICD-10-CM

## 2024-10-02 DIAGNOSIS — K86.89 PANCREATIC INSUFFICIENCY: ICD-10-CM

## 2024-10-02 DIAGNOSIS — G89.18 ACUTE POST-OPERATIVE PAIN: ICD-10-CM

## 2024-10-02 DIAGNOSIS — G89.18 POSTOPERATIVE PAIN: ICD-10-CM

## 2024-10-02 DIAGNOSIS — F41.9 ANXIETY: ICD-10-CM

## 2024-10-02 DIAGNOSIS — E89.1 DIABETES MELLITUS SECONDARY TO PANCREATECTOMY (H): ICD-10-CM

## 2024-10-02 DIAGNOSIS — K21.9 GERD (GASTROESOPHAGEAL REFLUX DISEASE): ICD-10-CM

## 2024-10-02 DIAGNOSIS — R10.33 PERIUMBILICAL ABDOMINAL PAIN: Primary | ICD-10-CM

## 2024-10-02 DIAGNOSIS — Z94.83 S/P PANCREATIC ISLET CELL TRANSPLANTATION (H): ICD-10-CM

## 2024-10-02 DIAGNOSIS — E13.9 DIABETES MELLITUS SECONDARY TO PANCREATECTOMY (H): ICD-10-CM

## 2024-10-02 LAB
ALBUMIN SERPL BCG-MCNC: 3.3 G/DL (ref 3.5–5.2)
ALP SERPL-CCNC: 190 U/L (ref 40–150)
ALT SERPL W P-5'-P-CCNC: 110 U/L (ref 0–50)
ANION GAP SERPL CALCULATED.3IONS-SCNC: 11 MMOL/L (ref 7–15)
AST SERPL W P-5'-P-CCNC: 133 U/L (ref 0–45)
BASOPHILS # BLD AUTO: 0.1 10E3/UL (ref 0–0.2)
BASOPHILS NFR BLD AUTO: 1 %
BILIRUB SERPL-MCNC: 0.2 MG/DL
BUN SERPL-MCNC: 9 MG/DL (ref 6–20)
CALCIUM SERPL-MCNC: 8.7 MG/DL (ref 8.8–10.4)
CHLORIDE SERPL-SCNC: 108 MMOL/L (ref 98–107)
CREAT SERPL-MCNC: 0.52 MG/DL (ref 0.51–0.95)
EGFRCR SERPLBLD CKD-EPI 2021: >90 ML/MIN/1.73M2
EOSINOPHIL # BLD AUTO: 0.6 10E3/UL (ref 0–0.7)
EOSINOPHIL NFR BLD AUTO: 5 %
ERYTHROCYTE [DISTWIDTH] IN BLOOD BY AUTOMATED COUNT: 13.3 % (ref 10–15)
GLUCOSE SERPL-MCNC: 131 MG/DL (ref 70–99)
HCO3 SERPL-SCNC: 21 MMOL/L (ref 22–29)
HCT VFR BLD AUTO: 33.2 % (ref 35–47)
HGB BLD-MCNC: 10.5 G/DL (ref 11.7–15.7)
IMM GRANULOCYTES # BLD: 0 10E3/UL
IMM GRANULOCYTES NFR BLD: 0 %
LACTATE SERPL-SCNC: 0.8 MMOL/L (ref 0.7–2)
LIPASE SERPL-CCNC: 6 U/L (ref 13–60)
LYMPHOCYTES # BLD AUTO: 3 10E3/UL (ref 0.8–5.3)
LYMPHOCYTES NFR BLD AUTO: 29 %
MCH RBC QN AUTO: 26.6 PG (ref 26.5–33)
MCHC RBC AUTO-ENTMCNC: 31.6 G/DL (ref 31.5–36.5)
MCV RBC AUTO: 84 FL (ref 78–100)
MONOCYTES # BLD AUTO: 1.1 10E3/UL (ref 0–1.3)
MONOCYTES NFR BLD AUTO: 10 %
NEUTROPHILS # BLD AUTO: 5.6 10E3/UL (ref 1.6–8.3)
NEUTROPHILS NFR BLD AUTO: 54 %
NRBC # BLD AUTO: 0 10E3/UL
NRBC BLD AUTO-RTO: 0 /100
PLATELET # BLD AUTO: 923 10E3/UL (ref 150–450)
POTASSIUM SERPL-SCNC: 3.4 MMOL/L (ref 3.4–5.3)
PROT SERPL-MCNC: 6.9 G/DL (ref 6.4–8.3)
RBC # BLD AUTO: 3.94 10E6/UL (ref 3.8–5.2)
SODIUM SERPL-SCNC: 140 MMOL/L (ref 135–145)
WBC # BLD AUTO: 10.4 10E3/UL (ref 4–11)

## 2024-10-02 PROCEDURE — 250N000013 HC RX MED GY IP 250 OP 250 PS 637

## 2024-10-02 PROCEDURE — 250N000011 HC RX IP 250 OP 636

## 2024-10-02 PROCEDURE — 96374 THER/PROPH/DIAG INJ IV PUSH: CPT | Performed by: EMERGENCY MEDICINE

## 2024-10-02 PROCEDURE — 83605 ASSAY OF LACTIC ACID: CPT

## 2024-10-02 PROCEDURE — 80053 COMPREHEN METABOLIC PANEL: CPT

## 2024-10-02 PROCEDURE — 85025 COMPLETE CBC W/AUTO DIFF WBC: CPT

## 2024-10-02 PROCEDURE — 83690 ASSAY OF LIPASE: CPT

## 2024-10-02 PROCEDURE — G1010 CDSM STANSON: HCPCS

## 2024-10-02 PROCEDURE — 74177 CT ABD & PELVIS W/CONTRAST: CPT | Mod: 26 | Performed by: RADIOLOGY

## 2024-10-02 PROCEDURE — 74177 CT ABD & PELVIS W/CONTRAST: CPT | Mod: MG

## 2024-10-02 PROCEDURE — 120N000011 HC R&B TRANSPLANT UMMC

## 2024-10-02 PROCEDURE — 250N000011 HC RX IP 250 OP 636: Performed by: EMERGENCY MEDICINE

## 2024-10-02 PROCEDURE — 99285 EMERGENCY DEPT VISIT HI MDM: CPT | Performed by: EMERGENCY MEDICINE

## 2024-10-02 PROCEDURE — 36415 COLL VENOUS BLD VENIPUNCTURE: CPT

## 2024-10-02 PROCEDURE — 99285 EMERGENCY DEPT VISIT HI MDM: CPT | Mod: 25 | Performed by: EMERGENCY MEDICINE

## 2024-10-02 PROCEDURE — 96376 TX/PRO/DX INJ SAME DRUG ADON: CPT | Performed by: EMERGENCY MEDICINE

## 2024-10-02 PROCEDURE — G1010 CDSM STANSON: HCPCS | Performed by: RADIOLOGY

## 2024-10-02 PROCEDURE — 250N000009 HC RX 250

## 2024-10-02 RX ORDER — AMOXICILLIN 250 MG
2 CAPSULE ORAL 2 TIMES DAILY PRN
Status: DISCONTINUED | OUTPATIENT
Start: 2024-10-02 | End: 2024-10-07 | Stop reason: HOSPADM

## 2024-10-02 RX ORDER — AMOXICILLIN 250 MG
1 CAPSULE ORAL 2 TIMES DAILY PRN
Status: DISCONTINUED | OUTPATIENT
Start: 2024-10-02 | End: 2024-10-07 | Stop reason: HOSPADM

## 2024-10-02 RX ORDER — DEXTROSE MONOHYDRATE 25 G/50ML
25-50 INJECTION, SOLUTION INTRAVENOUS
Status: DISCONTINUED | OUTPATIENT
Start: 2024-10-02 | End: 2024-10-07 | Stop reason: HOSPADM

## 2024-10-02 RX ORDER — GABAPENTIN 600 MG/1
600 TABLET ORAL 2 TIMES DAILY
Status: DISCONTINUED | OUTPATIENT
Start: 2024-10-02 | End: 2024-10-04

## 2024-10-02 RX ORDER — HYDROMORPHONE HYDROCHLORIDE 1 MG/ML
0.5 INJECTION, SOLUTION INTRAMUSCULAR; INTRAVENOUS; SUBCUTANEOUS
Status: DISCONTINUED | OUTPATIENT
Start: 2024-10-02 | End: 2024-10-02

## 2024-10-02 RX ORDER — NICOTINE POLACRILEX 4 MG
15-30 LOZENGE BUCCAL
Status: DISCONTINUED | OUTPATIENT
Start: 2024-10-02 | End: 2024-10-07 | Stop reason: HOSPADM

## 2024-10-02 RX ORDER — ACETAMINOPHEN 650 MG/1
650 SUPPOSITORY RECTAL EVERY 4 HOURS PRN
Status: DISCONTINUED | OUTPATIENT
Start: 2024-10-02 | End: 2024-10-04

## 2024-10-02 RX ORDER — ONDANSETRON 2 MG/ML
4 INJECTION INTRAMUSCULAR; INTRAVENOUS EVERY 6 HOURS PRN
Status: DISCONTINUED | OUTPATIENT
Start: 2024-10-02 | End: 2024-10-07 | Stop reason: HOSPADM

## 2024-10-02 RX ORDER — IOPAMIDOL 755 MG/ML
95 INJECTION, SOLUTION INTRAVASCULAR ONCE
Status: COMPLETED | OUTPATIENT
Start: 2024-10-02 | End: 2024-10-02

## 2024-10-02 RX ORDER — DILTIAZEM HYDROCHLORIDE 60 MG/1
60 CAPSULE, EXTENDED RELEASE ORAL 2 TIMES DAILY
Status: DISCONTINUED | OUTPATIENT
Start: 2024-10-02 | End: 2024-10-07 | Stop reason: HOSPADM

## 2024-10-02 RX ORDER — PANTOPRAZOLE SODIUM 40 MG/1
40 TABLET, DELAYED RELEASE ORAL DAILY
Status: DISCONTINUED | OUTPATIENT
Start: 2024-10-03 | End: 2024-10-07 | Stop reason: HOSPADM

## 2024-10-02 RX ORDER — HYDROMORPHONE HYDROCHLORIDE 2 MG/1
6 TABLET ORAL EVERY 4 HOURS PRN
Status: DISCONTINUED | OUTPATIENT
Start: 2024-10-02 | End: 2024-10-02

## 2024-10-02 RX ORDER — ACETAMINOPHEN 325 MG/1
650 TABLET ORAL EVERY 4 HOURS PRN
Status: DISCONTINUED | OUTPATIENT
Start: 2024-10-02 | End: 2024-10-04

## 2024-10-02 RX ORDER — HYDROMORPHONE HYDROCHLORIDE 1 MG/ML
0.5 INJECTION, SOLUTION INTRAMUSCULAR; INTRAVENOUS; SUBCUTANEOUS EVERY 30 MIN PRN
Status: COMPLETED | OUTPATIENT
Start: 2024-10-02 | End: 2024-10-02

## 2024-10-02 RX ORDER — HYDROXYZINE HYDROCHLORIDE 25 MG/1
25 TABLET, FILM COATED ORAL 3 TIMES DAILY PRN
Status: DISCONTINUED | OUTPATIENT
Start: 2024-10-02 | End: 2024-10-03

## 2024-10-02 RX ORDER — PEDIATRIC MULTIVIT 61/D3/VIT K 1500-800
1 CAPSULE ORAL DAILY
Status: DISCONTINUED | OUTPATIENT
Start: 2024-10-03 | End: 2024-10-07 | Stop reason: HOSPADM

## 2024-10-02 RX ORDER — HYDROMORPHONE HYDROCHLORIDE 1 MG/ML
0.5 INJECTION, SOLUTION INTRAMUSCULAR; INTRAVENOUS; SUBCUTANEOUS
Status: COMPLETED | OUTPATIENT
Start: 2024-10-02 | End: 2024-10-02

## 2024-10-02 RX ORDER — METHOCARBAMOL 750 MG/1
750 TABLET, FILM COATED ORAL 3 TIMES DAILY
Status: DISCONTINUED | OUTPATIENT
Start: 2024-10-02 | End: 2024-10-03

## 2024-10-02 RX ORDER — HYDROMORPHONE HYDROCHLORIDE 1 MG/ML
6 SOLUTION ORAL EVERY 4 HOURS PRN
Status: DISCONTINUED | OUTPATIENT
Start: 2024-10-02 | End: 2024-10-03

## 2024-10-02 RX ORDER — ONDANSETRON 4 MG/1
4 TABLET, ORALLY DISINTEGRATING ORAL EVERY 6 HOURS PRN
Status: DISCONTINUED | OUTPATIENT
Start: 2024-10-02 | End: 2024-10-07 | Stop reason: HOSPADM

## 2024-10-02 RX ADMIN — HYDROMORPHONE HYDROCHLORIDE 1 MG: 1 INJECTION, SOLUTION INTRAMUSCULAR; INTRAVENOUS; SUBCUTANEOUS at 17:54

## 2024-10-02 RX ADMIN — HYDROMORPHONE HYDROCHLORIDE 0.5 MG: 1 INJECTION, SOLUTION INTRAMUSCULAR; INTRAVENOUS; SUBCUTANEOUS at 20:05

## 2024-10-02 RX ADMIN — DILTIAZEM HYDROCHLORIDE 60 MG: 60 CAPSULE, EXTENDED RELEASE ORAL at 20:33

## 2024-10-02 RX ADMIN — GABAPENTIN 600 MG: 600 TABLET, FILM COATED ORAL at 20:07

## 2024-10-02 RX ADMIN — HYDROMORPHONE HYDROCHLORIDE 1 MG: 1 INJECTION, SOLUTION INTRAMUSCULAR; INTRAVENOUS; SUBCUTANEOUS at 16:31

## 2024-10-02 RX ADMIN — IOPAMIDOL 95 ML: 755 INJECTION, SOLUTION INTRAVENOUS at 15:55

## 2024-10-02 RX ADMIN — HYDROMORPHONE HYDROCHLORIDE 0.5 MG: 1 INJECTION, SOLUTION INTRAMUSCULAR; INTRAVENOUS; SUBCUTANEOUS at 22:07

## 2024-10-02 RX ADMIN — Medication 2 CAPSULE: at 20:34

## 2024-10-02 RX ADMIN — HYDROMORPHONE HYDROCHLORIDE 6 MG: 2 TABLET ORAL at 20:38

## 2024-10-02 RX ADMIN — SODIUM CHLORIDE, PRESERVATIVE FREE 74 ML: 5 INJECTION INTRAVENOUS at 15:55

## 2024-10-02 RX ADMIN — HYDROMORPHONE HYDROCHLORIDE 0.5 MG: 1 INJECTION, SOLUTION INTRAMUSCULAR; INTRAVENOUS; SUBCUTANEOUS at 14:11

## 2024-10-02 RX ADMIN — HYDROMORPHONE HYDROCHLORIDE 0.5 MG: 1 INJECTION, SOLUTION INTRAMUSCULAR; INTRAVENOUS; SUBCUTANEOUS at 14:45

## 2024-10-02 RX ADMIN — HYDROMORPHONE HYDROCHLORIDE 0.5 MG: 1 INJECTION, SOLUTION INTRAMUSCULAR; INTRAVENOUS; SUBCUTANEOUS at 15:41

## 2024-10-02 RX ADMIN — METHOCARBAMOL 750 MG: 750 TABLET ORAL at 20:07

## 2024-10-02 RX ADMIN — HYDROMORPHONE HYDROCHLORIDE 0.5 MG: 1 INJECTION, SOLUTION INTRAMUSCULAR; INTRAVENOUS; SUBCUTANEOUS at 14:44

## 2024-10-02 ASSESSMENT — ACTIVITIES OF DAILY LIVING (ADL)
ADLS_ACUITY_SCORE: 34
ADLS_ACUITY_SCORE: 36
ADLS_ACUITY_SCORE: 36
ADLS_ACUITY_SCORE: 34
ADLS_ACUITY_SCORE: 36

## 2024-10-02 ASSESSMENT — COLUMBIA-SUICIDE SEVERITY RATING SCALE - C-SSRS
1. IN THE PAST MONTH, HAVE YOU WISHED YOU WERE DEAD OR WISHED YOU COULD GO TO SLEEP AND NOT WAKE UP?: NO
2. HAVE YOU ACTUALLY HAD ANY THOUGHTS OF KILLING YOURSELF IN THE PAST MONTH?: NO
6. HAVE YOU EVER DONE ANYTHING, STARTED TO DO ANYTHING, OR PREPARED TO DO ANYTHING TO END YOUR LIFE?: NO

## 2024-10-02 NOTE — ED PROVIDER NOTES
ED Provider Note  [facility]      History     Chief Complaint   Patient presents with    Post-op Problem     The history is provided by the patient and medical records.     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    Patient reports doing well postoperatively and just starting to increase her activity level last night by doing the dishes and some other housework. She was able to sleep without issue but at 5am this morning, she awoke with severe, sharp periumbilical abdominal pain that was unrelieved with her home pain medications. In addition to this, she developed loose stools over the past day or so as well as chills. She discussed this with her surgical team who advised her to present to the ED. She otherwise denies any recent illness, headache, lightheadedness, chest pain, shortness of breath, cough, nausea, vomiting, blood in her stool or urine, pain with urination, or leg swelling.     Past Medical History  Past Medical History:   Diagnosis Date    Chronic abdominal pain     Chronic pancreatitis (H)     CFTR gene mutation    History of pancreatectomy 09/06/2024    History of smoking     Nicotine dependence due to vaping non-tobacco product     Port-A-Cath in place     Post-pancreatectomy diabetes (H)     Superior mesenteric artery stenosis (H)      Past Surgical History:   Procedure Laterality Date    APPENDECTOMY      CHOLECYSTECTOMY      COLONOSCOPY      EGD      ENDOSCOPIC ULTRASOUND UPPER GASTROINTESTINAL TRACT (GI)      HYSTERECTOMY      IR FEEDING TUBE PLACEMENT W ARUN/MD      IR NG TUBE PLACEMENT REQ RAD & FLUORO  05/24/2021    IR NG TUBE PLACEMENT REQ RAD &  FLUORO  05/21/2021    PANCREATECTOMY, TRANSPLANT AUTO ISLET CELL, COMBINED N/A 9/6/2024    Procedure: Total pancreatectomy with autologous islet transplant, splenectomy, gastrojejunostomy placement, Lysis of Adhesion;  Surgeon: Carlos Carmichael MD;  Location: [facility] OR    TONSILLECTOMY       Alcohol Swabs PADS  blood glucose (NO BRAND SPECIFIED) lancets standard  blood glucose (NO BRAND SPECIFIED) test strip  blood glucose monitoring (ONETOUCH VERIO) meter device kit  Continuous Glucose Sensor (DEXCOM G7 SENSOR) MISC  diltiazem ER (CARDIZEM SR) 60 MG 12 hr capsule  gabapentin (NEURONTIN) 600 MG tablet  Glucagon (BAQSIMI) 3 MG/DOSE nasal powder  HYDROmorphone (DILAUDID) 2 MG tablet  HYDROmorphone, STANDARD CONC, (DILAUDID) 1 MG/ML oral solution  hydrOXYzine HCl (ATARAX) 25 MG tablet  insulin aspart (NOVOLOG PEN) 100 UNIT/ML pen  insulin glargine (LANTUS PEN) 100 UNIT/ML pen  insulin pen needle (32G X 4 MM) 32G X 4 MM miscellaneous  lipase-protease-amylase (CREON) 70411-69447-523985 units CPEP per EC capsule  methocarbamol (ROBAXIN) 750 MG tablet  mvw complete formulation (PEDIATRIC) oral solution  mvw complete formulation (SOFTGELS) capsule  naloxone (NARCAN) 4 MG/0.1ML nasal spray  pantoprazole (PROTONIX) 40 MG EC tablet  sennosides (SENOKOT) 8.8 MG/5ML syrup  Sharps Container MISC  Transparent Dressings (BF4408 STANDARD) MISC      Allergies   Allergen Reactions    Aspirin Anaphylaxis and Hives     HIVES (UTICARIA)    Has tolerated toradol injections during 11/8/23 admission    Bee Venom Anaphylaxis    Adhesive Tape Blisters     Family History  Family History   Problem Relation Age of Onset    Depression Mother     Cerebrovascular Disease Mother     Aneurysm Mother         brain    Osteoporosis Mother     Unknown/Adopted Father     Deep Vein Thrombosis Maternal Grandmother     Heart Failure Maternal Grandmother     Unknown/Adopted Paternal Grandmother     Unknown/Adopted Paternal Grandfather      Anesthesia Reaction No family hx of      Social History   Social History     Tobacco Use    Smoking status: Former     Current packs/day: 0.00     Average packs/day: 1 pack/day for 8.0 years (8.0 ttl pk-yrs)     Types: Cigarettes     Start date: 2012     Quit date: 2020     Years since quittin.7    Smokeless tobacco: Never    Tobacco comments:     Currently Vapes   Substance Use Topics    Alcohol use: Not Currently    Drug use: Never      Past medical history, past surgical history, medications, allergies, family history, and social history were reviewed with the patient. No additional pertinent items.   A medically appropriate review of systems was performed with pertinent positives and negatives noted in the HPI, and all other systems negative.    Physical Exam   BP: 131/89  Pulse: 75  Temp: 98  F (36.7  C)  Resp: 20  SpO2: 95 %  Physical Exam  Constitutional:       General: She is in acute distress.   HENT:      Head: Normocephalic and atraumatic.   Eyes:      Extraocular Movements: Extraocular movements intact.      Conjunctiva/sclera: Conjunctivae normal.   Cardiovascular:      Rate and Rhythm: Normal rate and regular rhythm.      Heart sounds: No murmur heard.     Comments: Port in R chest  Pulmonary:      Effort: Pulmonary effort is normal. No respiratory distress.      Breath sounds: Normal breath sounds.   Abdominal:      General: There is no distension.      Palpations: Abdomen is soft.      Tenderness: There is abdominal tenderness (periumbilical, RUQ at site of prior GJ tube).      Comments: Midline surgical scar with staples w/o surrounding erythema, drainage. Bandage in place over GJ tube site w/o surrounding erythema or drainage.   Musculoskeletal:      Right lower leg: No edema.      Left lower leg: No edema.   Skin:     General: Skin is warm and dry.   Neurological:      General: No focal deficit present.      Mental Status: She is alert and oriented to person, place, and time.    Psychiatric:         Mood and Affect: Mood normal.         Behavior: Behavior normal.       ED Course, Procedures, & Data     ED Course as of 10/02/24 1448   Wed Oct 02, 2024   1425 Lactic acid whole blood   1426 CBC with platelets differential(!)  Hb stable, no leukocytosis   1440 Comprehensive metabolic panel(!)  Slight bump in LFTs from prior   1441 Lipase(!)  Lipase low as expected     Procedures                Results for orders placed or performed during the hospital encounter of 10/02/24   Comprehensive metabolic panel     Status: Abnormal   Result Value Ref Range    Sodium 140 135 - 145 mmol/L    Potassium 3.4 3.4 - 5.3 mmol/L    Carbon Dioxide (CO2) 21 (L) 22 - 29 mmol/L    Anion Gap 11 7 - 15 mmol/L    Urea Nitrogen 9.0 6.0 - 20.0 mg/dL    Creatinine 0.52 0.51 - 0.95 mg/dL    GFR Estimate >90 >60 mL/min/1.73m2    Calcium 8.7 (L) 8.8 - 10.4 mg/dL    Chloride 108 (H) 98 - 107 mmol/L    Glucose 131 (H) 70 - 99 mg/dL    Alkaline Phosphatase 190 (H) 40 - 150 U/L     (H) 0 - 45 U/L     (H) 0 - 50 U/L    Protein Total 6.9 6.4 - 8.3 g/dL    Albumin 3.3 (L) 3.5 - 5.2 g/dL    Bilirubin Total 0.2 <=1.2 mg/dL   Lipase     Status: Abnormal   Result Value Ref Range    Lipase 6 (L) 13 - 60 U/L   Lactic acid whole blood     Status: Normal   Result Value Ref Range    Lactic Acid 0.8 0.7 - 2.0 mmol/L   CBC with platelets and differential     Status: Abnormal   Result Value Ref Range    WBC Count 10.4 4.0 - 11.0 10e3/uL    RBC Count 3.94 3.80 - 5.20 10e6/uL    Hemoglobin 10.5 (L) 11.7 - 15.7 g/dL    Hematocrit 33.2 (L) 35.0 - 47.0 %    MCV 84 78 - 100 fL    MCH 26.6 26.5 - 33.0 pg    MCHC 31.6 31.5 - 36.5 g/dL    RDW 13.3 10.0 - 15.0 %    Platelet Count 923 (H) 150 - 450 10e3/uL    % Neutrophils 54 %    % Lymphocytes 29 %    % Monocytes 10 %    % Eosinophils 5 %    % Basophils 1 %    % Immature Granulocytes 0 %    NRBCs per 100 WBC 0 <1 /100    Absolute Neutrophils 5.6 1.6 - 8.3 10e3/uL    Absolute  Lymphocytes 3.0 0.8 - 5.3 10e3/uL    Absolute Monocytes 1.1 0.0 - 1.3 10e3/uL    Absolute Eosinophils 0.6 0.0 - 0.7 10e3/uL    Absolute Basophils 0.1 0.0 - 0.2 10e3/uL    Absolute Immature Granulocytes 0.0 <=0.4 10e3/uL    Absolute NRBCs 0.0 10e3/uL   CBC with platelets differential     Status: Abnormal    Narrative    The following orders were created for panel order CBC with platelets differential.  Procedure                               Abnormality         Status                     ---------                               -----------         ------                     CBC with platelets and d...[[id number]]  Abnormal            Final result                 Please view results for these tests on the individual orders.     Medications   HYDROmorphone (PF) (DILAUDID) injection 0.5 mg (0.5 mg Intravenous $Given 10/2/24 1445)   HYDROmorphone (PF) (DILAUDID) injection 0.5 mg (0.5 mg Intravenous $Given 10/2/24 1411)     Labs Ordered and Resulted from Time of ED Arrival to Time of ED Departure   COMPREHENSIVE METABOLIC PANEL - Abnormal       Result Value    Sodium 140      Potassium 3.4      Carbon Dioxide (CO2) 21 (*)     Anion Gap 11      Urea Nitrogen 9.0      Creatinine 0.52      GFR Estimate >90      Calcium 8.7 (*)     Chloride 108 (*)     Glucose 131 (*)     Alkaline Phosphatase 190 (*)      (*)      (*)     Protein Total 6.9      Albumin 3.3 (*)     Bilirubin Total 0.2     LIPASE - Abnormal    Lipase 6 (*)    CBC WITH PLATELETS AND DIFFERENTIAL - Abnormal    WBC Count 10.4      RBC Count 3.94      Hemoglobin 10.5 (*)     Hematocrit 33.2 (*)     MCV 84      MCH 26.6      MCHC 31.6      RDW 13.3      Platelet Count 923 (*)     % Neutrophils 54      % Lymphocytes 29      % Monocytes 10      % Eosinophils 5      % Basophils 1      % Immature Granulocytes 0      NRBCs per 100 WBC 0      Absolute Neutrophils 5.6      Absolute Lymphocytes 3.0      Absolute Monocytes 1.1      Absolute Eosinophils 0.6       Absolute Basophils 0.1      Absolute Immature Granulocytes 0.0      Absolute NRBCs 0.0     LACTIC ACID WHOLE BLOOD - Normal    Lactic Acid 0.8       CT Abdomen Pelvis w Contrast    (Results Pending)          Assessment & Plan    Cirea Perkins is a 39 year old female w/ PMH of 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 Vitally stable, exam w/ midline surgical scar w/ staples in place and no surrounding erythema or drainage, exquisitely tender to palpation in periumbilical region and near prior GJ tube site. CBC w/o leukocytosis, Hb stable. CMP w/ slight bump in LFTs compared to 9/26. Lactate unremarkable, lipase low as expected.  CT abdomen pelvis shows omental infarction, likely cause of patient's pain.  There is also some peritoneal edema, ventral fascial dehiscence with 1 cm of diastases and small left pleural effusion.  I discussed the case with Transplant Surgery who will admit to their service.    --    ED Attending Physician Attestation    I Irvin Campos DO, cared for this patient with the Resident. I have performed a history and physical examination of the patient and discussed management with the resident. I reviewed the resident's documentation above and agree with the documented findings and plan of care.          Irvin Campos DO  Emergency Medicine        I have reviewed the nursing notes. I have reviewed the findings, diagnosis, plan and need for follow up with the patient.    New Prescriptions    No medications on file       Final diagnoses:   None       David Anderson MD  Internal Medicine, PGY-1    Patient staffed with attending physician Dr. Irvin Campos.  [facility] EMERGENCY DEPARTMENT  10/2/2024     Irvin Campos DO  10/02/24 1954        Irvin Campos,   10/02/24 1954

## 2024-10-02 NOTE — TELEPHONE ENCOUNTER
Patient Call: General  Route to LPN    Reason for call: patient stated she contacted pharmacy regarding script for   HYDROmorphone, STANDARD CONC, (DILAUDID) 1 MG/ML oral solution   And was told that the provider needs to call the pharmacy. Patient is requesting Coordinator, to give her a call regarding this issue.    Call back needed? Yes    Return Call Needed  Same as documented in contacts section  When to return call?: Same day: Route High Priority

## 2024-10-02 NOTE — TELEPHONE ENCOUNTER
Talked with patient multiple times about this.  She over used her pain medications and we cannot prescribe anymore.  Advised her to go to ED if experiencing moderate to severe pain or withdrawal symptoms

## 2024-10-02 NOTE — ED TRIAGE NOTES
"Arrives by w/c with uncontrolled post op pain. Per patient she recently had surgery on 9/6 \"TPIAT\". Reports starting to have severe pain at 500am this.      Triage Assessment (Adult)       Row Name 10/02/24 1314          Triage Assessment    Airway WDL WDL        Respiratory WDL    Respiratory WDL WDL        Skin Circulation/Temperature WDL    Skin Circulation/Temperature WDL WDL        Cardiac WDL    Cardiac WDL WDL        Peripheral/Neurovascular WDL    Peripheral Neurovascular WDL WDL        Cognitive/Neuro/Behavioral WDL    Cognitive/Neuro/Behavioral WDL WDL                     "

## 2024-10-03 ENCOUNTER — TELEPHONE (OUTPATIENT)
Dept: EDUCATION SERVICES | Facility: CLINIC | Age: 39
End: 2024-10-03

## 2024-10-03 LAB
ANION GAP SERPL CALCULATED.3IONS-SCNC: 7 MMOL/L (ref 7–15)
BUN SERPL-MCNC: 11.3 MG/DL (ref 6–20)
CALCIUM SERPL-MCNC: 8.5 MG/DL (ref 8.8–10.4)
CHLORIDE SERPL-SCNC: 109 MMOL/L (ref 98–107)
CREAT SERPL-MCNC: 0.55 MG/DL (ref 0.51–0.95)
EGFRCR SERPLBLD CKD-EPI 2021: >90 ML/MIN/1.73M2
ERYTHROCYTE [DISTWIDTH] IN BLOOD BY AUTOMATED COUNT: 13.5 % (ref 10–15)
GLUCOSE BLDC GLUCOMTR-MCNC: 118 MG/DL (ref 70–99)
GLUCOSE BLDC GLUCOMTR-MCNC: 136 MG/DL (ref 70–99)
GLUCOSE BLDC GLUCOMTR-MCNC: 155 MG/DL (ref 70–99)
GLUCOSE BLDC GLUCOMTR-MCNC: 164 MG/DL (ref 70–99)
GLUCOSE BLDC GLUCOMTR-MCNC: 168 MG/DL (ref 70–99)
GLUCOSE BLDC GLUCOMTR-MCNC: 52 MG/DL (ref 70–99)
GLUCOSE BLDC GLUCOMTR-MCNC: 52 MG/DL (ref 70–99)
GLUCOSE BLDC GLUCOMTR-MCNC: 60 MG/DL (ref 70–99)
GLUCOSE BLDC GLUCOMTR-MCNC: 85 MG/DL (ref 70–99)
GLUCOSE BLDC GLUCOMTR-MCNC: 95 MG/DL (ref 70–99)
GLUCOSE SERPL-MCNC: 101 MG/DL (ref 70–99)
HCO3 SERPL-SCNC: 24 MMOL/L (ref 22–29)
HCT VFR BLD AUTO: 30.5 % (ref 35–47)
HGB BLD-MCNC: 9.5 G/DL (ref 11.7–15.7)
MCH RBC QN AUTO: 26.5 PG (ref 26.5–33)
MCHC RBC AUTO-ENTMCNC: 31.1 G/DL (ref 31.5–36.5)
MCV RBC AUTO: 85 FL (ref 78–100)
PLATELET # BLD AUTO: 802 10E3/UL (ref 150–450)
POTASSIUM SERPL-SCNC: 3.4 MMOL/L (ref 3.4–5.3)
RBC # BLD AUTO: 3.58 10E6/UL (ref 3.8–5.2)
SODIUM SERPL-SCNC: 140 MMOL/L (ref 135–145)
WBC # BLD AUTO: 10.8 10E3/UL (ref 4–11)

## 2024-10-03 PROCEDURE — 250N000013 HC RX MED GY IP 250 OP 250 PS 637

## 2024-10-03 PROCEDURE — 82962 GLUCOSE BLOOD TEST: CPT

## 2024-10-03 PROCEDURE — 250N000013 HC RX MED GY IP 250 OP 250 PS 637: Performed by: SURGERY

## 2024-10-03 PROCEDURE — 36415 COLL VENOUS BLD VENIPUNCTURE: CPT

## 2024-10-03 PROCEDURE — 250N000011 HC RX IP 250 OP 636

## 2024-10-03 PROCEDURE — 250N000012 HC RX MED GY IP 250 OP 636 PS 637

## 2024-10-03 PROCEDURE — 250N000013 HC RX MED GY IP 250 OP 250 PS 637: Performed by: PHYSICIAN ASSISTANT

## 2024-10-03 PROCEDURE — 80048 BASIC METABOLIC PNL TOTAL CA: CPT

## 2024-10-03 PROCEDURE — 85027 COMPLETE CBC AUTOMATED: CPT

## 2024-10-03 PROCEDURE — 250N000011 HC RX IP 250 OP 636: Performed by: PHYSICIAN ASSISTANT

## 2024-10-03 PROCEDURE — 120N000011 HC R&B TRANSPLANT UMMC

## 2024-10-03 RX ORDER — HYDROMORPHONE HYDROCHLORIDE 1 MG/ML
0.5 INJECTION, SOLUTION INTRAMUSCULAR; INTRAVENOUS; SUBCUTANEOUS
Status: DISCONTINUED | OUTPATIENT
Start: 2024-10-03 | End: 2024-10-04

## 2024-10-03 RX ORDER — HYDROMORPHONE HYDROCHLORIDE 1 MG/ML
6 SOLUTION ORAL EVERY 4 HOURS PRN
Status: DISCONTINUED | OUTPATIENT
Start: 2024-10-03 | End: 2024-10-07 | Stop reason: HOSPADM

## 2024-10-03 RX ORDER — HYDROMORPHONE HYDROCHLORIDE 1 MG/ML
8 SOLUTION ORAL EVERY 4 HOURS PRN
Status: DISCONTINUED | OUTPATIENT
Start: 2024-10-03 | End: 2024-10-07 | Stop reason: HOSPADM

## 2024-10-03 RX ORDER — METHOCARBAMOL 750 MG/1
750 TABLET, FILM COATED ORAL 4 TIMES DAILY
Status: DISCONTINUED | OUTPATIENT
Start: 2024-10-03 | End: 2024-10-04

## 2024-10-03 RX ORDER — HYDROXYZINE HYDROCHLORIDE 25 MG/1
25 TABLET, FILM COATED ORAL 4 TIMES DAILY PRN
Status: DISCONTINUED | OUTPATIENT
Start: 2024-10-03 | End: 2024-10-07 | Stop reason: HOSPADM

## 2024-10-03 RX ADMIN — HYDROMORPHONE HYDROCHLORIDE 1 MG: 1 INJECTION, SOLUTION INTRAMUSCULAR; INTRAVENOUS; SUBCUTANEOUS at 08:04

## 2024-10-03 RX ADMIN — HYDROXYZINE HYDROCHLORIDE 25 MG: 25 TABLET, FILM COATED ORAL at 14:11

## 2024-10-03 RX ADMIN — INSULIN ASPART 1 UNITS: 100 INJECTION, SOLUTION INTRAVENOUS; SUBCUTANEOUS at 20:30

## 2024-10-03 RX ADMIN — Medication 1 CAPSULE: at 09:35

## 2024-10-03 RX ADMIN — METHOCARBAMOL 750 MG: 750 TABLET ORAL at 20:26

## 2024-10-03 RX ADMIN — GABAPENTIN 600 MG: 600 TABLET, FILM COATED ORAL at 20:27

## 2024-10-03 RX ADMIN — HYDROMORPHONE HYDROCHLORIDE 6 MG: 5 SOLUTION ORAL at 07:02

## 2024-10-03 RX ADMIN — HYDROMORPHONE HYDROCHLORIDE 0.5 MG: 1 INJECTION, SOLUTION INTRAMUSCULAR; INTRAVENOUS; SUBCUTANEOUS at 22:09

## 2024-10-03 RX ADMIN — HYDROMORPHONE HYDROCHLORIDE 8 MG: 5 SOLUTION ORAL at 20:26

## 2024-10-03 RX ADMIN — GABAPENTIN 600 MG: 600 TABLET, FILM COATED ORAL at 07:42

## 2024-10-03 RX ADMIN — DEXTROSE 15 G: 15 GEL ORAL at 19:03

## 2024-10-03 RX ADMIN — HYDROMORPHONE HYDROCHLORIDE 8 MG: 5 SOLUTION ORAL at 16:20

## 2024-10-03 RX ADMIN — ACETAMINOPHEN 650 MG: 325 TABLET ORAL at 22:09

## 2024-10-03 RX ADMIN — METHOCARBAMOL 750 MG: 750 TABLET ORAL at 07:42

## 2024-10-03 RX ADMIN — HYDROMORPHONE HYDROCHLORIDE 1 MG: 1 INJECTION, SOLUTION INTRAMUSCULAR; INTRAVENOUS; SUBCUTANEOUS at 12:23

## 2024-10-03 RX ADMIN — METHOCARBAMOL 750 MG: 750 TABLET ORAL at 14:11

## 2024-10-03 RX ADMIN — PANTOPRAZOLE SODIUM 40 MG: 40 TABLET, DELAYED RELEASE ORAL at 07:42

## 2024-10-03 RX ADMIN — HYDROMORPHONE HYDROCHLORIDE 0.5 MG: 1 INJECTION, SOLUTION INTRAMUSCULAR; INTRAVENOUS; SUBCUTANEOUS at 17:51

## 2024-10-03 RX ADMIN — HYDROMORPHONE HYDROCHLORIDE 6 MG: 5 SOLUTION ORAL at 11:07

## 2024-10-03 RX ADMIN — HYDROXYZINE HYDROCHLORIDE 25 MG: 25 TABLET, FILM COATED ORAL at 07:42

## 2024-10-03 RX ADMIN — HYDROMORPHONE HYDROCHLORIDE 1 MG: 1 INJECTION, SOLUTION INTRAMUSCULAR; INTRAVENOUS; SUBCUTANEOUS at 00:31

## 2024-10-03 RX ADMIN — INSULIN GLARGINE 46 UNITS: 100 INJECTION, SOLUTION SUBCUTANEOUS at 07:42

## 2024-10-03 RX ADMIN — DILTIAZEM HYDROCHLORIDE 60 MG: 60 CAPSULE, EXTENDED RELEASE ORAL at 20:27

## 2024-10-03 RX ADMIN — HYDROMORPHONE HYDROCHLORIDE 1 MG: 1 INJECTION, SOLUTION INTRAMUSCULAR; INTRAVENOUS; SUBCUTANEOUS at 05:12

## 2024-10-03 RX ADMIN — DILTIAZEM HYDROCHLORIDE 60 MG: 60 CAPSULE, EXTENDED RELEASE ORAL at 09:35

## 2024-10-03 RX ADMIN — Medication 2 CAPSULE: at 18:44

## 2024-10-03 RX ADMIN — INSULIN ASPART 3 UNITS: 100 INJECTION, SOLUTION INTRAVENOUS; SUBCUTANEOUS at 16:16

## 2024-10-03 RX ADMIN — INSULIN ASPART 2 UNITS: 100 INJECTION, SOLUTION INTRAVENOUS; SUBCUTANEOUS at 12:06

## 2024-10-03 RX ADMIN — INSULIN ASPART 3 UNITS: 100 INJECTION, SOLUTION INTRAVENOUS; SUBCUTANEOUS at 06:43

## 2024-10-03 RX ADMIN — DEXTROSE 15 G: 15 GEL ORAL at 10:37

## 2024-10-03 RX ADMIN — ACETAMINOPHEN 650 MG: 325 TABLET ORAL at 07:42

## 2024-10-03 RX ADMIN — HYDROXYZINE HYDROCHLORIDE 25 MG: 25 TABLET ORAL at 17:11

## 2024-10-03 RX ADMIN — Medication 2 CAPSULE: at 12:05

## 2024-10-03 RX ADMIN — HYDROMORPHONE HYDROCHLORIDE 6 MG: 5 SOLUTION ORAL at 03:11

## 2024-10-03 ASSESSMENT — ACTIVITIES OF DAILY LIVING (ADL)
ADLS_ACUITY_SCORE: 36
ADLS_ACUITY_SCORE: 40
ADLS_ACUITY_SCORE: 40
ADLS_ACUITY_SCORE: 36
ADLS_ACUITY_SCORE: 40
ADLS_ACUITY_SCORE: 36

## 2024-10-03 NOTE — PROVIDER NOTIFICATION
Sunnid  Purge Transplant Pancreas Team:    Pt is requesting for an increase in her iv Dilaudid to 1 mg. Pt rate pain 9/10 and stated the 0.5 mg is not helping. Thank you

## 2024-10-03 NOTE — PROGRESS NOTES
Pancreatitis Service - Daily Progress Note  10/03/2024    Assessment & Plan: Ciera Perkins is a 39 year old female who presents with acute on chronic abdominal pain. 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     Cardiorespiratory: Stable   GI/Nutrition: GJ tube has been removed, patient tolerating a regular diet  Fluid/Electrolytes: No IVF  : No martinez indicated  Post-pancreatectomy diabetes: Resume lantus 46 units and Novolog  Infection: No acute issues  Pain control: Acute pain thought to be musculoskeletal in origin related in part to extra activity at home. Also ran out of dilaudid at home, states that liquid dilaudid works better for her than pills. Also ran out of pain meds at home. Dilaudid 6 mg every 4 hours PRN, will increase to 6-8 mg every 4 hours PRN. Did receive IV dilaudid overnight.  Activity: Activity as tolerated  Anticipated LOS/Discharge: Will stay one more night to optimize pain control    Medical Decision Making: Medium  Subsequent visit 95453 (moderate level decision making)    KEYA/Fellow/Resident Provider: Lesly Renae PA-C     Faculty: Carlos Carmichael MD  __________________________________________________________________  Transplant History: Admitted 10/2/2024 for acute on chronic pain  9/6/2024 (Islet), Postoperative day: 27     Interval History: History is obtained from the patient and electronic health record  Overnight events: Admitted overnight due to acute left sided and shar-umbilical abdominal pain.    ROS:   A 10-point review of systems was negative except as noted above.    Curent Meds:  Current Facility-Administered Medications   Medication Dose Route Frequency Provider Last Rate Last Admin    diltiazem ER (CARDIZEM SR) 12 hr capsule 60 mg  60 mg  Oral BID Deuce Salcido MD   60 mg at 10/03/24 0935    gabapentin (NEURONTIN) tablet 600 mg  600 mg Oral BID Deuce Salcido MD   600 mg at 10/03/24 0742    insulin aspart (NovoLOG) injection (RAPID ACTING)  1-10 Units Subcutaneous Q4H Deuce Salcido MD   3 Units at 10/03/24 0643    insulin glargine (LANTUS PEN) injection 46 Units  46 Units Subcutaneous QAM AC Deuce Salcido MD   46 Units at 10/03/24 0742    lipase-protease-amylase (CREON 24) [id number] units per EC capsule 2 capsule  2 capsule Oral TID w/meals Deuce Salcido MD   2 capsule at 10/02/24 2034    methocarbamol (ROBAXIN) tablet 750 mg  750 mg Oral TID Deuce Salcido MD   750 mg at 10/03/24 0742    mvw complete formulation (SOFTGELS) capsule 1 capsule  1 capsule Oral Daily Deuce Salcido MD   1 capsule at 10/03/24 0935    pantoprazole (PROTONIX) EC tablet 40 mg  40 mg Oral Daily Deuce Salcido MD   40 mg at 10/03/24 0742       Physical Exam:     Admit      Current Vitals:   /77 (BP Location: Right arm)   Pulse 75   Temp 98.2  F (36.8  C) (Oral)   Resp 18   LMP  (LMP Unknown)   SpO2 98%          Vital sign ranges:    Temp:  [98  F (36.7  C)-99.1  F (37.3  C)] 98.2  F (36.8  C)  Pulse:  [75] 75  Resp:  [18-20] 18  BP: (113-133)/(77-89) 124/77  SpO2:  [95 %-99 %] 98 %  Patient Vitals for the past 24 hrs:   BP Temp Temp src Pulse Resp SpO2   10/03/24 0657 124/77 98.2  F (36.8  C) Oral -- 18 98 %   10/02/24 2139 115/77 99.1  F (37.3  C) Oral -- 18 98 %   10/02/24 2029 121/84 -- -- -- -- 99 %   10/02/24 1745 113/77 -- -- -- -- 96 %   10/02/24 1416 133/81 -- -- -- -- 97 %   10/02/24 1312 131/89 98  F (36.7  C) Oral 75 20 95 %     General Appearance: in no apparent distress.   Skin: normal, warm  Heart: perfused  Lungs: Nonlabored resps on RA  Abdomen: The abdomen is soft, tender to light palpation. Midline incision stapled. Old GJ site and drain site covered.  : martinez is not present.    Extremities: edema: absent.    Data:   CMP  Recent Labs   Lab  "10/03/24  1107 10/03/24  1051 10/03/24  1034 10/03/24  0800 10/03/24  0104 10/02/24  1346 09/26/24  1332   NA  --   --   --  140  --  140 139   POTASSIUM  --   --   --  3.4  --  3.4 4.0   CHLORIDE  --   --   --  109*  --  108* 105   CO2  --   --   --  24  --  21* 24   GLC 95 85   < > 101*   < > 131* 110*   BUN  --   --   --  11.3  --  9.0 11.1   CR  --   --   --  0.55  --  0.52 0.55   GFRESTIMATED  --   --   --  >90  --  >90 >90   CARLA  --   --   --  8.5*  --  8.7* 9.1   LIPASE  --   --   --   --   --  6*  --    ALBUMIN  --   --   --   --   --  3.3* 3.3*   BILITOTAL  --   --   --   --   --  0.2 <0.2   ALKPHOS  --   --   --   --   --  190* 146   AST  --   --   --   --   --  133* 36   ALT  --   --   --   --   --  110* 29    < > = values in this interval not displayed.     CBC  Recent Labs   Lab 10/03/24  0800 10/02/24  1346   HGB 9.5* 10.5*   WBC 10.8 10.4   * 923*     CoagsNo lab results found in last 7 days.    Invalid input(s): \"XA\"   Urinalysis  Recent Labs   Lab Test 09/03/24  0912   COLOR Yellow   APPEARANCE Clear   URINEGLC Negative   URINEBILI Negative   URINEKETONE Negative   SG 1.026   UBLD Negative   URINEPH 5.5   PROTEIN 10*   NITRITE Negative   LEUKEST Negative   RBCU <1   WBCU 2       "

## 2024-10-03 NOTE — PLAN OF CARE
Goal Outcome Evaluation:    2140-0700  /77 (BP Location: Right arm)   Pulse 75   Temp 98.2  F (36.8  C) (Oral)   Resp 18   LMP  (LMP Unknown)   SpO2 98%     Pt. A&O, VSS, RA  C/o of abd pain, managed with prn iv and solution Dilaudid. Pt stated current pain is not working, paged for   Chest port, SL  No bm, voiding spontaneously  Bg 118 and 168.    Pt didn't get up due to pain  No acute pain, continue w/poc

## 2024-10-03 NOTE — PROGRESS NOTES
Neuro: A&Ox4, able to make needs known, calls appropriately   Cardiac: VSS, can be cesia  Respiratory: Sating 96% on RA.  GI/: Adequate urine output, voiding via bathroom  Diet/appetite: Tolerating regular diet. Eating well.  Activity:  SBA, up to chair and in halls.  Pain: uncontrolled pain, taking oral dilaudid, IV diluadid, oral atarax, robaxin, tyelnol, and gabapentin.    Skin: No new deficits noted.  LDA's: R chest port    Plan: Continue with POC. Notify primary team with changes.    Patient pleasant to work with and cooperative cares. Patient has uncontrolled pain on current pain regimen, called numerous times in tears.     Patient had hypoglycemic episode at 1034, BG 52. Had 2 packs of maria luz crackers and 240 ml of apple juice. BG blaine to 85 ay 1051 and then 95 at 1107. Ordered lunch and BG was stable from there.

## 2024-10-03 NOTE — PROVIDER NOTIFICATION
Notified Dr. Clements via page:     ED36 DW. the sliding scale the patient is dropped her earlier to 52 and now to 60, do you want to change sliding scale dosage? Pt just told me she is only on 24 units of lantus at home and here she has 46 units ordered.     Luna Jimenez RN  [phone]

## 2024-10-03 NOTE — H&P
[facility]    History and Physical - Transplant Surgery Service       Date of Admission:  10/2/2024    Assessment & Plan: Surgery   Ciera Perkins is a 39 year old female admitted on 10/2/2024. 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  She is status post total pancreatectomy with islet autotransplantation, splenectomy, and GJ tube placement on September 6, 2024.  The patient presents today with abdominal pain which started at 5 AM this morning.  She reports no fever or chills.  Patient does not report any increased erythema at her incisional site.  Laboratory values show alkaline phosphatase of 190, ALT at 110 and AST at 133.  Blood glucose is 131.  The patient's lipase at 6.  There is no leukocytosis present.  The patient is again anemic at 10.5.  Platelet count is 923.  Imaging shows focal areas of possible postoperative omental infarction.  There are some diffuse peritoneal edema small ascites.  Midline ventral fascial dehiscence approximately 1 cm of the diastases and epigastrium.  Physical exam is unremarkable aside from some lower abdominal tenderness to palpation.  At this time the transplant surgery team will plan for the patient to be admitted to observation to assist with managing her pain.    -Admit to transplant surgery  -Regular diet ordered  -Multimodal pain regimen  - Diabetic medications ordered  - Monitor hemodynamic stability  - AM labs  - Bowel regimen  - Zofran as an anti-emetic           Diet:  NPO  DVT Prophylaxis: Pneumatic Compression Devices  Sousa Catheter: Not present  Lines: PRESENT             Drains: PRESENT          - May have additional drains, review Avatar  Cardiac Monitoring: None  Code Status:  Full    Clinically Significant Risk Factors Present on Admission              # Hypoalbuminemia: Lowest albumin = 3.3 g/dL at 10/2/2024   "1:46 PM, will monitor as appropriate        # Anemia: based on hgb <11      # DMII: A1C = 6.7 % (Ref range: <5.7 %) within past 6 months   # Overweight: Estimated body mass index is 25.15 kg/m  as calculated from the following:    Height as of 9/6/24: 1.676 m (5' 6\").    Weight as of 9/26/24: 70.7 kg (155 lb 12.8 oz).       # Financial/Environmental Concerns:           Disposition Plan      Expected Discharge Date: 10/04/2024                 The patient's care was discussed with the Attending Physician, Dr. Carmichael, Chief Resident/Fellow, and transplant surgery Team.    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  She is status post total pancreatectomy with islet autotransplantation, splenectomy, and GJ tube placement on September 6, 2024.  The patient presents today with abdominal pain which started at 5 AM this morning.  The patient reports that she thinks this was because primarily from her \"overdoing it\".  She was doing more activities than usual.  She does not report that she has had any fevers or chills during this bout of abdominal pain.  She presented to the ED but the pain would not subside.  She additionally has been having bouts of sciatica for about a week in her right lower extremity.  Patient reports that her pain was 9 out of 10.      Past Medical History    Past Medical History:   Diagnosis Date    Chronic abdominal pain     Chronic pancreatitis (H)     CFTR gene mutation    History of " pancreatectomy 2024    History of smoking     Nicotine dependence due to vaping non-tobacco product     Port-A-Cath in place     Post-pancreatectomy diabetes (H)     Superior mesenteric artery stenosis (H)        Past Surgical History   Past Surgical History:   Procedure Laterality Date    APPENDECTOMY      CHOLECYSTECTOMY      COLONOSCOPY      EGD      ENDOSCOPIC ULTRASOUND UPPER GASTROINTESTINAL TRACT (GI)      HYSTERECTOMY      IR FEEDING TUBE PLACEMENT W ARUN/MD      IR NG TUBE PLACEMENT REQ RAD & FLUORO  2021    IR NG TUBE PLACEMENT REQ RAD & FLUORO  2021    PANCREATECTOMY, TRANSPLANT AUTO ISLET CELL, COMBINED N/A 2024    Procedure: Total pancreatectomy with autologous islet transplant, splenectomy, gastrojejunostomy placement, Lysis of Adhesion;  Surgeon: Carlos Carmichael MD;  Location: [facility] OR    TONSILLECTOMY         Prior to Admission Medications   Prior to Admission Medications   Prescriptions Last Dose Informant Patient Reported? Taking?   Alcohol Swabs PADS   No No   Sig: Use to swab the area of the injection or kindra as directed Per insurance coverage   Continuous Glucose Sensor (DEXCOM G7 SENSOR) MISC   No No   Si each every 10 days. Change sensor every 10 days   Glucagon (BAQSIMI) 3 MG/DOSE nasal powder   No No   Sig: Spray 1 spray (3 mg) in nostril as needed for low blood sugar. in the event of unconscious hypoglycemia or hypoglycemic seizure. May repeat dose if no response after 15 minutes.   HYDROmorphone (DILAUDID) 2 MG tablet   No No   Sig: Take 3 tablets (6 mg) by mouth every 4 hours as needed for moderate to severe pain.   HYDROmorphone, STANDARD CONC, (DILAUDID) 1 MG/ML oral solution   No No   Sig: Take 4 mLs (4 mg) by mouth or J tube every 4 hours as needed for severe pain.   Sharps Container MISC   No No   Sig: Use as directed to dispose of needles, lancets and other sharps   Transparent Dressings ([id number] STANDARD) MISC   No No   Si each daily.  Around G/J tube site   blood glucose (NO BRAND SPECIFIED) lancets standard   No No   Sig: To use to test glucose level in the blood Use to test blood sugar 4-6 times daily as directed. To accompany glucose monitor brands per insurance coverage. One Touch Delica lancets   blood glucose (NO BRAND SPECIFIED) test strip   No No   Sig: Use to test blood sugar 4-6 times daily or as directed.   blood glucose monitoring (ONETOUCH VERIO) meter device kit   No No   Sig: Use to test blood sugar 4-6 times daily or as directed.   diltiazem ER (CARDIZEM SR) 60 MG 12 hr capsule   No No   Sig: Take 1 capsule (60 mg) by mouth 2 times daily.   gabapentin (NEURONTIN) 600 MG tablet   No No   Sig: Take 1 tablet (600 mg) by mouth 2 times daily.   hydrOXYzine HCl (ATARAX) 25 MG tablet   No No   Sig: Take 1 tablet (25 mg) by mouth 3 times daily as needed for anxiety.   insulin aspart (NOVOLOG PEN) 100 UNIT/ML pen   No No   Sig: Inject 1-10 Units subcutaneously every 4 hours.   insulin glargine (LANTUS PEN) 100 UNIT/ML pen   No No   Sig: Inject 46 Units subcutaneously every 24 hours.   insulin pen needle (32G X 4 MM) 32G X 4 MM miscellaneous   No No   Sig: Use as directed by provider Per insurance coverage   lipase-protease-amylase (CREON) [id number] units CPEP per EC capsule   No No   Sig: Take 1-2 pills with snacks, and 3-4 for meals.  Daily possible total of 16 pills   methocarbamol (ROBAXIN) 750 MG tablet   No No   Sig: Take 1 tablet (750 mg) by mouth 3 times daily.   mvw complete formulation (PEDIATRIC) oral solution   No No   Sig: Take 1.5 mLs by mouth or J tube daily.   mvw complete formulation (SOFTGELS) capsule   No No   Sig: Take 1 capsule by mouth daily.   naloxone (NARCAN) 4 MG/0.1ML nasal spray   Yes No   Sig: Spray 4 mg into one nostril alternating nostrils as needed for opioid reversal. every 2-3 minutes until assistance arrives   pantoprazole (PROTONIX) 40 MG EC tablet   No No   Sig: Take 1 tablet (40 mg) by mouth  daily.   sennosides (SENOKOT) 8.8 MG/5ML syrup   No No   Sig: Place 5-10 mLs into J tube 2 times daily as needed for constipation.      Facility-Administered Medications: None        Review of Systems    The 10 point Review of Systems is negative other than noted in the HPI or here.  Abdominal pain    Social History   I have reviewed this patient's social history and updated it with pertinent information if needed.  Social History     Tobacco Use    Smoking status: Former     Current packs/day: 0.00     Average packs/day: 1 pack/day for 8.0 years (8.0 ttl pk-yrs)     Types: Cigarettes     Start date: 2012     Quit date: 2020     Years since quittin.7    Smokeless tobacco: Never    Tobacco comments:     Currently Vapes   Substance Use Topics    Alcohol use: Not Currently    Drug use: Never         Allergies   Allergies   Allergen Reactions    Aspirin Anaphylaxis and Hives     HIVES (UTICARIA)    Has tolerated toradol injections during 23 admission    Bee Venom Anaphylaxis    Adhesive Tape Blisters        Physical Exam   Vital Signs: Temp: 98  F (36.7  C) Temp src: Oral BP: 133/81 Pulse: 75   Resp: 20 SpO2: 97 %      Weight: 0 lbs 0 ozNo intake or output data in the 24 hours ending 10/02/24 1903  Constitutional: awake, alert, cooperative, no apparent distress, and appears stated age  ENT: normocephalic, without obvious abnormality, atraumatic  Respiratory: No increased work of breathing, good air exchange, clear to auscultation bilaterally, no crackles or wheezing  Cardiovascular: regular rate and rhythm and normal S1 and S2.  Radial pulses palpable pedal pulses palpable  GI: Midline abdominal incisional scar with staples in place no new erythema; see picture below. Soft, non-distended, tender to palpation along lower abdomen. No masses palpated, no hepatosplenomegally.  GJ takedown site without leakage.  Skin: no bruising or bleeding and normal skin color, texture, turgor  Musculoskeletal: no lower  extremity pitting edema present  there is no redness, warmth, or swelling of the joints  Neurologic: Mental Status Exam:  Level of Alertness:   awake  Cranial Nerves:  cranial nerves II-XII are grossly intact  Motor Exam:  moves all extremities well and symmetrically  Motor exam is symmetrical 5 out of 5 all extremities bilaterally  Sensory:  Sensory intact  Neuropsychiatric: General: normal, calm, and normal eye contact    Midline incision    Data     I have personally reviewed the following data over the past 24 hrs:    10.4  \   10.5 (L)   / 923 (H)     140 108 (H) 9.0 /  131 (H)   3.4 21 (L) 0.52 \     ALT: 110 (H) AST: 133 (H) AP: 190 (H) TBILI: 0.2   ALB: 3.3 (L) TOT PROTEIN: 6.9 LIPASE: 6 (L)     Procal: N/A CRP: N/A Lactic Acid: 0.8         Imaging results reviewed over the past 24 hrs:   Recent Results (from the past 24 hour(s))   CT Abdomen Pelvis w Contrast    Narrative    EXAMINATION: CT ABDOMEN PELVIS W CONTRAST, 10/2/2024 4:21 PM    INDICATION: post op abdominal pain, previous pancreatectomy with islet  cell transplant, splenectomy, and incisional hernia repair (9/6/2024).  Prior SMA stenosis with balloon angioplasty.    COMPARISON STUDY: Abdominal ultrasound 9/11/2024. CT abdomen and  pelvis 1/27/2024.    TECHNIQUE: CT scan of the abdomen and pelvis was performed on  multidetector CT scanner using volumetric acquisition technique and  images were reconstructed in multiple planes with variable thickness  and reviewed on dedicated workstations.     CONTRAST: 75 mL of Isovue-370    CT scan radiation dose is optimized to minimum requisite dose using  automated dose modulation techniques.    FINDINGS:  Lower thorax: Small left pleural effusion with adjacent compressive  atelectasis.    Liver: No mass. No intrahepatic biliary ductal dilation.      Biliary System: Gallbladder surgically absent.    Adrenal glands: No mass or nodules    Spleen: Splenectomy.    Kidneys: No suspicious mass, obstructing calculus  or hydronephrosis.    Pancreas: Surgically absent.    Gastrointestinal tract :Post-operative changes of appendectomy. Normal  caliber small bowel.  Diverticulosis.    Pelvis: Urinary bladder is normal.  Prominent bilateral ovaries.    Vasculature: Patent major abdominal vasculature.    Mesentery/peritoneum/retroperitoneum: Small volume ascites throughout  the abdomen.  Focal areas of peritoneal rim enhancement with central  fat density in the left abdomen, for example lateral to the proximal  descending colon on series 4, image 174 and lateral to the proximal  jejunum on series 4, image 278, are suggestive of omental fat  necrosis.     Lymph nodes: Prominent reactive lymph nodes throughout the mesentery.    Osseous structures: No aggressive or acute osseous lesion.      Soft tissues: Fibrotic scarring along the midline incision. 1 cm  diastases in the ventral abdominal wall fascia (3/34). Scarring along  the previous G-tube location.      Impression    IMPRESSION:   1. Focal areas of peritoneal rim enhancement with central fat density  in the left abdomen suggestive of postoperative omental infarction,  could be the etiology of abdominal pain.   2. Diffuse peritoneal edema and small volume ascites throughout the  abdomen, expected within the postoperative period.   3. Small left pleural effusion.  4. Midline ventral fascial dehiscence with approximately 1 cm of  diastasis in the epigastrium.    I have personally reviewed the examination and initial interpretation  and I agree with the findings.    TOI NEGRON MD         SYSTEM ID:  [id number]

## 2024-10-03 NOTE — TELEPHONE ENCOUNTER
Patient needs to be rescheduled for their virtual visit due to Reason for Reschedule: In-Patient    Scheduling team, please refer to service line late cancellation/no-show policies and reach out to patient at a later date for rescheduling.    Appointment mode: Video  Provider: Doreen Goodwin

## 2024-10-04 ENCOUNTER — PRE VISIT (OUTPATIENT)
Dept: ENDOCRINOLOGY | Facility: CLINIC | Age: 39
End: 2024-10-04

## 2024-10-04 LAB
GLUCOSE BLDC GLUCOMTR-MCNC: 117 MG/DL (ref 70–99)
GLUCOSE BLDC GLUCOMTR-MCNC: 132 MG/DL (ref 70–99)
GLUCOSE BLDC GLUCOMTR-MCNC: 135 MG/DL (ref 70–99)
GLUCOSE BLDC GLUCOMTR-MCNC: 161 MG/DL (ref 70–99)
GLUCOSE BLDC GLUCOMTR-MCNC: 172 MG/DL (ref 70–99)
GLUCOSE BLDC GLUCOMTR-MCNC: 174 MG/DL (ref 70–99)
GLUCOSE BLDC GLUCOMTR-MCNC: 197 MG/DL (ref 70–99)
GLUCOSE BLDC GLUCOMTR-MCNC: 62 MG/DL (ref 70–99)
GLUCOSE BLDC GLUCOMTR-MCNC: 63 MG/DL (ref 70–99)
GLUCOSE BLDC GLUCOMTR-MCNC: 70 MG/DL (ref 70–99)
GLUCOSE BLDC GLUCOMTR-MCNC: 72 MG/DL (ref 70–99)

## 2024-10-04 PROCEDURE — 82962 GLUCOSE BLOOD TEST: CPT

## 2024-10-04 PROCEDURE — 250N000013 HC RX MED GY IP 250 OP 250 PS 637

## 2024-10-04 PROCEDURE — 250N000011 HC RX IP 250 OP 636: Mod: JW | Performed by: NURSE PRACTITIONER

## 2024-10-04 PROCEDURE — 250N000013 HC RX MED GY IP 250 OP 250 PS 637: Performed by: NURSE PRACTITIONER

## 2024-10-04 PROCEDURE — 250N000011 HC RX IP 250 OP 636: Performed by: PHYSICIAN ASSISTANT

## 2024-10-04 PROCEDURE — 250N000013 HC RX MED GY IP 250 OP 250 PS 637: Performed by: PHYSICIAN ASSISTANT

## 2024-10-04 PROCEDURE — 120N000011 HC R&B TRANSPLANT UMMC

## 2024-10-04 PROCEDURE — 250N000013 HC RX MED GY IP 250 OP 250 PS 637: Performed by: SURGERY

## 2024-10-04 PROCEDURE — 258N000001 HC RX 258: Performed by: SURGERY

## 2024-10-04 RX ORDER — METHOCARBAMOL 750 MG/1
750 TABLET, FILM COATED ORAL EVERY 6 HOURS
Status: DISCONTINUED | OUTPATIENT
Start: 2024-10-04 | End: 2024-10-07 | Stop reason: HOSPADM

## 2024-10-04 RX ORDER — METHOCARBAMOL 750 MG/1
750 TABLET, FILM COATED ORAL
Status: DISCONTINUED | OUTPATIENT
Start: 2024-10-04 | End: 2024-10-04

## 2024-10-04 RX ORDER — GABAPENTIN 600 MG/1
600 TABLET ORAL EVERY 8 HOURS SCHEDULED
Status: DISCONTINUED | OUTPATIENT
Start: 2024-10-04 | End: 2024-10-07 | Stop reason: HOSPADM

## 2024-10-04 RX ORDER — GABAPENTIN 600 MG/1
600 TABLET ORAL EVERY 8 HOURS SCHEDULED
Status: DISCONTINUED | OUTPATIENT
Start: 2024-10-04 | End: 2024-10-04

## 2024-10-04 RX ORDER — HYDROMORPHONE HYDROCHLORIDE 1 MG/ML
0.3 INJECTION, SOLUTION INTRAMUSCULAR; INTRAVENOUS; SUBCUTANEOUS ONCE
Status: COMPLETED | OUTPATIENT
Start: 2024-10-04 | End: 2024-10-04

## 2024-10-04 RX ORDER — ACETAMINOPHEN 325 MG/1
650 TABLET ORAL EVERY 6 HOURS
Status: DISCONTINUED | OUTPATIENT
Start: 2024-10-04 | End: 2024-10-07 | Stop reason: HOSPADM

## 2024-10-04 RX ORDER — LIDOCAINE 4 G/G
2 PATCH TOPICAL
Status: DISCONTINUED | OUTPATIENT
Start: 2024-10-05 | End: 2024-10-07 | Stop reason: HOSPADM

## 2024-10-04 RX ADMIN — HYDROMORPHONE HYDROCHLORIDE 0.5 MG: 1 INJECTION, SOLUTION INTRAMUSCULAR; INTRAVENOUS; SUBCUTANEOUS at 04:06

## 2024-10-04 RX ADMIN — HYDROMORPHONE HYDROCHLORIDE 8 MG: 5 SOLUTION ORAL at 13:46

## 2024-10-04 RX ADMIN — DILTIAZEM HYDROCHLORIDE 60 MG: 60 CAPSULE, EXTENDED RELEASE ORAL at 08:16

## 2024-10-04 RX ADMIN — HYDROXYZINE HYDROCHLORIDE 25 MG: 25 TABLET ORAL at 12:29

## 2024-10-04 RX ADMIN — GABAPENTIN 600 MG: 600 TABLET, FILM COATED ORAL at 12:29

## 2024-10-04 RX ADMIN — INSULIN ASPART 3 UNITS: 100 INJECTION, SOLUTION INTRAVENOUS; SUBCUTANEOUS at 20:06

## 2024-10-04 RX ADMIN — Medication 1 CAPSULE: at 08:16

## 2024-10-04 RX ADMIN — DEXTROSE 15 G: 15 GEL ORAL at 03:17

## 2024-10-04 RX ADMIN — INSULIN ASPART 1 UNITS: 100 INJECTION, SOLUTION INTRAVENOUS; SUBCUTANEOUS at 04:11

## 2024-10-04 RX ADMIN — METHOCARBAMOL TABLETS 750 MG: 750 TABLET, COATED ORAL at 16:27

## 2024-10-04 RX ADMIN — HYDROXYZINE HYDROCHLORIDE 25 MG: 25 TABLET ORAL at 18:08

## 2024-10-04 RX ADMIN — ACETAMINOPHEN 650 MG: 325 TABLET ORAL at 09:43

## 2024-10-04 RX ADMIN — ACETAMINOPHEN 650 MG: 325 TABLET ORAL at 13:47

## 2024-10-04 RX ADMIN — HYDROMORPHONE HYDROCHLORIDE 8 MG: 5 SOLUTION ORAL at 18:09

## 2024-10-04 RX ADMIN — HYDROMORPHONE HYDROCHLORIDE 8 MG: 5 SOLUTION ORAL at 01:06

## 2024-10-04 RX ADMIN — INSULIN ASPART 3 UNITS: 100 INJECTION, SOLUTION INTRAVENOUS; SUBCUTANEOUS at 23:53

## 2024-10-04 RX ADMIN — Medication 2 CAPSULE: at 08:17

## 2024-10-04 RX ADMIN — METHOCARBAMOL 750 MG: 750 TABLET ORAL at 08:07

## 2024-10-04 RX ADMIN — PANTOPRAZOLE SODIUM 40 MG: 40 TABLET, DELAYED RELEASE ORAL at 08:12

## 2024-10-04 RX ADMIN — ACETAMINOPHEN 650 MG: 325 TABLET ORAL at 20:01

## 2024-10-04 RX ADMIN — HYDROMORPHONE HYDROCHLORIDE 6 MG: 1 SOLUTION ORAL at 09:43

## 2024-10-04 RX ADMIN — HYDROMORPHONE HYDROCHLORIDE 0.3 MG: 1 INJECTION, SOLUTION INTRAMUSCULAR; INTRAVENOUS; SUBCUTANEOUS at 12:28

## 2024-10-04 RX ADMIN — Medication 2 CAPSULE: at 12:30

## 2024-10-04 RX ADMIN — HYDROMORPHONE HYDROCHLORIDE 8 MG: 5 SOLUTION ORAL at 22:00

## 2024-10-04 RX ADMIN — HYDROXYZINE HYDROCHLORIDE 25 MG: 25 TABLET ORAL at 23:56

## 2024-10-04 RX ADMIN — DEXTROSE MONOHYDRATE 50 ML: 25 INJECTION, SOLUTION INTRAVENOUS at 08:05

## 2024-10-04 RX ADMIN — HYDROXYZINE HYDROCHLORIDE 25 MG: 25 TABLET ORAL at 01:07

## 2024-10-04 RX ADMIN — HYDROMORPHONE HYDROCHLORIDE 0.5 MG: 1 INJECTION, SOLUTION INTRAMUSCULAR; INTRAVENOUS; SUBCUTANEOUS at 08:00

## 2024-10-04 RX ADMIN — METHOCARBAMOL TABLETS 750 MG: 750 TABLET, COATED ORAL at 22:01

## 2024-10-04 RX ADMIN — GABAPENTIN 600 MG: 600 TABLET, FILM COATED ORAL at 22:01

## 2024-10-04 ASSESSMENT — ACTIVITIES OF DAILY LIVING (ADL)
ADLS_ACUITY_SCORE: 36
ADLS_ACUITY_SCORE: 40
ADLS_ACUITY_SCORE: 36
ADLS_ACUITY_SCORE: 40
ADLS_ACUITY_SCORE: 40
ADLS_ACUITY_SCORE: 36
ADLS_ACUITY_SCORE: 36
ADLS_ACUITY_SCORE: 40
ADLS_ACUITY_SCORE: 40
ADLS_ACUITY_SCORE: 36
ADLS_ACUITY_SCORE: 40
ADLS_ACUITY_SCORE: 40
ADLS_ACUITY_SCORE: 36
ADLS_ACUITY_SCORE: 40
ADLS_ACUITY_SCORE: 36
ADLS_ACUITY_SCORE: 36
ADLS_ACUITY_SCORE: 40
ADLS_ACUITY_SCORE: 36
ADLS_ACUITY_SCORE: 36

## 2024-10-04 NOTE — PROGRESS NOTES
Pancreatitis Service - Daily Progress Note  10/04/2024    Assessment & Plan: Ciera Perkins is a 39 year old female who presents with acute on chronic abdominal pain. 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     Cardiorespiratory: Stable   GI/Nutrition: GJ tube has been removed, patient tolerating a regular diet.  Fluid/Electrolytes: No IVF.  : No martinez indicated.  Post-pancreatectomy diabetes: Lantus held this AM with hypoglycemia; hypoglycemia overnight. Sliding scale insulin available.   Infection: No acute issues  Pain control: Acute pain thought to be musculoskeletal in origin related in part to extra activity at home. She ran out of dilaudid at home, states that liquid dilaudid works better for her than pills. Dilaudid 6 to 8 mg every 4 hours PRN. PRN IV Dilaudid available. Schedule Tylenol. Gabapentin every 8 hours. Scheduled Robaxin every 6 hours. Lidoderm patches.   Activity: Activity as tolerated  Anticipated LOS/Discharge: Transfer to  when bed available, anticipate discharge when pain control optimized.     Medical Decision Making: Medium  Subsequent visit 36025 (moderate level decision making)    KEYA/Fellow/Resident Provider:   PATTI Willis, CNP  Adult Solid Organ Transplant   Contact: Vocera Web Console    Faculty: Carlos Carmichael MD  __________________________________________________________________  Transplant History: Admitted 10/2/2024 for acute on chronic pain  9/6/2024 (Islet), Postoperative day: 28     Interval History: History is obtained from the patient and electronic health record  Overnight events: Persistent pain low in abdomen, low BG this AM.     ROS:   A 10-point review of systems was negative except as noted above.    Curent Meds:  Current  Facility-Administered Medications   Medication Dose Route Frequency Provider Last Rate Last Admin    acetaminophen (TYLENOL) tablet 650 mg  650 mg Oral Q6H Dilcia Watt NP        diltiazem ER (CARDIZEM SR) 12 hr capsule 60 mg  60 mg Oral BID Deuce Salcido MD   60 mg at 10/04/24 0816    gabapentin (NEURONTIN) tablet 600 mg  600 mg Oral Q8H [facility] Dilcia Watt NP        insulin aspart (NovoLOG) injection (RAPID ACTING)  1-10 Units Subcutaneous Q4H Deuce Salcido MD   1 Units at 10/04/24 0411    [Held by provider] insulin glargine (LANTUS PEN) injection 24 Units  24 Units Subcutaneous QAM AC Jsu Clements MD        [START ON 10/5/2024] Lidocaine (LIDOCARE) 4 % Patch 2 patch  2 patch Transdermal Q24H Dilcia Watt NP        lipase-protease-amylase (CREON 24) [id number] units per EC capsule 2 capsule  2 capsule Oral TID w/meals Deuce Salcido MD   2 capsule at 10/04/24 0817    methocarbamol (ROBAXIN) tablet 750 mg  750 mg Oral 4x Daily Lesly Renae PA-C   750 mg at 10/04/24 0807    mvw complete formulation (SOFTGELS) capsule 1 capsule  1 capsule Oral Daily Deuce Salcido MD   1 capsule at 10/04/24 0816    pantoprazole (PROTONIX) EC tablet 40 mg  40 mg Oral Daily Deuce Salcido MD   40 mg at 10/04/24 0812       Physical Exam:     Admit      Current Vitals:   /70 (BP Location: Right arm)   Pulse 58   Temp 98.3  F (36.8  C) (Oral)   Resp 18   LMP  (LMP Unknown)   SpO2 100%          Vital sign ranges:    Temp:  [97.7  F (36.5  C)-98.3  F (36.8  C)] 98.3  F (36.8  C)  Pulse:  [58-62] 58  Resp:  [18] 18  BP: (109-117)/(70-72) 117/70  SpO2:  [98 %-100 %] 100 %  Patient Vitals for the past 24 hrs:   BP Temp Temp src Pulse Resp SpO2   10/04/24 0754 117/70 98.3  F (36.8  C) Oral 58 18 100 %   10/03/24 2024 109/72 97.7  F (36.5  C) Oral 62 18 98 %     General Appearance: Appears to be in pain, otherwise no obvious distress.   Skin: She appears adequately perfused.   Heart: She appears  "adequately perfused.   Lungs: Nonlabored resps on RA.  Abdomen: The patient refused abdominal exam.   : Sousa is not present.    Extremities: edema: Absent.    Data:   CMP  Recent Labs   Lab 10/04/24  0824 10/04/24  0752 10/03/24  1034 10/03/24  0800 10/03/24  0104 10/02/24  1346   NA  --   --   --  140  --  140   POTASSIUM  --   --   --  3.4  --  3.4   CHLORIDE  --   --   --  109*  --  108*   CO2  --   --   --  24  --  21*   * 63*   < > 101*   < > 131*   BUN  --   --   --  11.3  --  9.0   CR  --   --   --  0.55  --  0.52   GFRESTIMATED  --   --   --  >90  --  >90   CARLA  --   --   --  8.5*  --  8.7*   LIPASE  --   --   --   --   --  6*   ALBUMIN  --   --   --   --   --  3.3*   BILITOTAL  --   --   --   --   --  0.2   ALKPHOS  --   --   --   --   --  190*   AST  --   --   --   --   --  133*   ALT  --   --   --   --   --  110*    < > = values in this interval not displayed.     CBC  Recent Labs   Lab 10/03/24  0800 10/02/24  1346   HGB 9.5* 10.5*   WBC 10.8 10.4   * 923*     CoagsNo lab results found in last 7 days.    Invalid input(s): \"XA\"   Urinalysis  Recent Labs   Lab Test 09/03/24  0912   COLOR Yellow   APPEARANCE Clear   URINEGLC Negative   URINEBILI Negative   URINEKETONE Negative   SG 1.026   UBLD Negative   URINEPH 5.5   PROTEIN 10*   NITRITE Negative   LEUKEST Negative   RBCU <1   WBCU 2       "

## 2024-10-04 NOTE — PROGRESS NOTES
Pt blood glucose 60 at 1846, pt ate dinner with cake and juice and soda.     Pt blood glucose 52 at 1900, gave pt 15 g of glucose gel with apple juice     Notified provider

## 2024-10-04 NOTE — PLAN OF CARE
Goal Outcome Evaluation:      Plan of Care Reviewed With: patient    Overall Patient Progress: improvingOverall Patient Progress: improving    Neuro: A&Ox4.   Cardiac: SR. VSS.   Respiratory: Sating 97% on RA.  GI/: Adequate urine output. BM X1  Diet/appetite: Tolerating regular diet. Eating well  Activity: Up with [facility] to the [facility] [place]  Pain: Patient c/o pain not well managed on current pain regimen, Pain services consulted.  Skin: No new deficits noted.  LDA's: Right Chest port, single lumen. Saline locked    Plan: Continue with POC. Notify primary team with changes.   Patient has had couple episodes of hypoglycemia. Dextrose injection given per order.  Continue to monitor blood glucose every 4 hours and as needed. Encourage PO intake

## 2024-10-04 NOTE — PLAN OF CARE
Goal Outcome Evaluation:      Plan of Care Reviewed With: patient    Overall Patient Progress: improvingOverall Patient Progress: improving    -vital signs normal or at patient baseline: Met  -adequate pain control on oral analgesics: Not met  - Patient has pain control enough to be discharged home: Not met

## 2024-10-04 NOTE — PROVIDER NOTIFICATION
MD notified BS @ 0314,  6.3, glucose 15 g, orange juice 236 mls given. BS @ 0400 132, insulin per sliding scale given. Pt called at 0406 c/o pain 8/10 Dilaudid given. Pt was drowsy and disoriented to Date and time.

## 2024-10-04 NOTE — PROVIDER NOTIFICATION
Surgery Transplant Pancreas team paged    RE: Patient has had low blood glucose this morning at 0300 B and 0800 blood glucose was 63. Dextrose injection administered per protocol

## 2024-10-04 NOTE — DISCHARGE SUMMARY
[facility]    Discharge Summary  Transplant Surgery    Date of Admission:  10/2/2024  Date of Discharge:  10/7/2024 12:47 PM  Discharging Provider: Lesly Renae PA-C/Dr. Carmichael    Discharge Diagnoses   Acute on chronic abdominal pain    History of Present Illness   Ciera Perkins is a 39 year old female who presents with acute on chronic abdominal pain. 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     She returned to [facility] ED on 10/2/2024 for pain management. She ran out of Dilaudid at home; prior to arrival she reported taking 2 mg every 4 hours as needed. Notes from the OP Clinic with fill history including fills on 9/23/2024 of Dilaudid 250 ml and on 9/25 of Dilaudid 248 ml; the earliest refill would be available on 10/5/2024 and she ran out Dilaudid on 10/1/2024.    Hospital Course   Cardiorespiratory: Stable   GI/Nutrition: GJ tube has been removed, patient tolerating a regular diet.  Fluid/Electrolytes: No IVF  : No martinez indicated  Post-pancreatectomy diabetes: Lantus reduced to 8 units daily prior to discharge. She does have endocrine follow up scheduled for 10/11.  Infection: No acute issues  Pain control: Acute pain thought to be musculoskeletal in origin related in part to extra activity at home. Also ran out of dilaudid at home, states that liquid dilaudid works better for her than pills. At the time of discharge pain was managed with Dilaudid 6-8 mg every 4 hours as needed, Gabapentin 800 mg ever 8 hours, Lidoderm patches, PRN Hydroxyzine and robaxin 750 mg 4 times daily as needed.   Activity: Activity as tolerated    Significant Results and Procedures   None    Code Status   Full    Primary Care Physician   Hayden  Chad Houston    Physical Exam   Temp: 98.1  F (36.7  C) Temp src: Oral BP: 100/64 (RN notified) Pulse: 59 (RN notified)   Resp: 16 SpO2: 97 % O2 Device: None (Room air)    There were no vitals filed for this visit.  Vital Signs with Ranges  Temp:  [98.1  F (36.7  C)-98.5  F (36.9  C)] 98.1  F (36.7  C)  Pulse:  [59-92] 59  Resp:  [16] 16  BP: ()/(62-70) 100/64  SpO2:  [96 %-97 %] 97 %  I/O last 3 completed shifts:  In: 270 [P.O.:270]  Out: 800 [Urine:800]    Constitutional: Awake, alert, cooperative, no apparent distress, and appears stated age.  Eyes: Lids and lashes normal, pupils equal, round, sclera clear, conjunctiva normal.  ENT: Normocephalic, without obvious abnormality, atraumatic  Respiratory: Nonlabored resps on RA  Cardiovascular: Perfused  GI: Soft, non-distended, tender, midline incision healing, staples removed  Genitourinary:  Voiding  Skin: No bruising or bleeding, normal skin color, texture, turgor  Musculoskeletal: There is no redness, warmth, or swelling of the joints.   Neurologic: Awake, alert, oriented to name, place and time.  Neuropsychiatric: Calm, normal eye contact, alert, normal affect, oriented to self, place, time and situation, memory for past and recent events intact and thought process normal.    Time Spent on this Encounter   ILesly PA-C, personally saw the patient today and spent less than or equal to 30 minutes discharging this patient.    Discharge Disposition   Discharged to stay [facility]  Condition at discharge: Stable    Consultations This Hospital Stay   PAIN MANAGEMENT ADULT IP CONSULT    Discharge Orders      Reason for your hospital stay    Patient was admitted for acute on chronic pain, likely musculoskeletal. Improving prior to discharge.     Activity    Your activity upon discharge: Walk at least four times a day, lift no greater than 10 pounds for 6 weeks from the time of surgery.  After 6 weeks time please return to your normal exercise  routine.  No driving while taking narcotics or until 3 weeks after surgery.     Adult [facility] Follow-up and recommended labs and tests    Follow up with diabetes team as scheduled.    Follow up with Dr. Carmichael in 1-2 weeks.    Appointments on [place] and/or [facility] (with [facility] or [facility] provider or service). Call [phone] if you haven't heard regarding these appointments within 7 days of discharge.     Monitor and record    Blood glucose: 6 times a day     When to contact your care team    Call your transplant coordinator at [phone] if you have any of the following: sustained temperature greater than 101.5, increased pain, or difficulty obtaining or taking your medications.    If it is outside of office hours, please call the hospital switchboard at [phone] and ask to have the transplant surgery fellow paged for urgent medical questions.     Wound care and dressings    Instructions to care for your wound at home: Keep wound clean and dry     Diet    Follow this diet upon discharge: Regular Diet Adult     Discharge Medications   Discharge Medication List as of 10/7/2024 11:04 AM        START taking these medications    Details   acetaminophen (TYLENOL) 325 MG tablet Take 2 tablets (650 mg) by mouth every 6 hours as needed for mild pain., Disp-100 tablet, R-0, E-Prescribe      senna-docusate (SENOKOT-S/PERICOLACE) 8.6-50 MG tablet Take 1-2 tablets by mouth 2 times daily as needed for constipation., Disp-60 tablet, R-0, E-Prescribe           CONTINUE these medications which have CHANGED    Details   gabapentin (NEURONTIN) 600 MG tablet Take 1 tablet (600 mg) by mouth 3 times daily., Disp-90 tablet, R-1, E-Prescribe      HYDROmorphone, STANDARD CONC, (DILAUDID) 1 MG/ML oral solution Take 6-8 mLs (6-8 mg) by mouth or J tube every 4 hours as needed for moderate to severe pain. For 3 days, then reduce to 6 mg every 4 hours PRN for 3 days, then reduce to 4 mg every 4 hours PRN., Disp-500 mL, R-0,  E-Prescribe      hydrOXYzine HCl (ATARAX) 25 MG tablet Take 1 tablet (25 mg) by mouth every 6 hours as needed for anxiety or other (adjuvant pain)., Disp-100 tablet, R-1, E-Prescribe      insulin glargine (LANTUS PEN) 100 UNIT/ML pen Inject 8 Units subcutaneously every 24 hours., Disp-15 mL, R-2, E-PrescribeIf Lantus is not covered by insurance, may substitute Basaglar or Semglee or other insulin glargine product per insurance preference at same dose and frequency.        methocarbamol (ROBAXIN) 750 MG tablet Take 1 tablet (750 mg) by mouth 4 times daily., Disp-120 tablet, R-0, E-Prescribe           CONTINUE these medications which have NOT CHANGED    Details   diltiazem ER (CARDIZEM SR) 60 MG 12 hr capsule Take 1 capsule (60 mg) by mouth 2 times daily., Disp-60 capsule, R-2, E-Prescribe      insulin aspart (NOVOLOG PEN) 100 UNIT/ML pen Inject 1-10 Units subcutaneously every 4 hours., Disp-15 mL, R-2, E-Prescribe      lipase-protease-amylase (CREON) 96053-38210-438207 units CPEP per EC capsule Take 1-2 pills with snacks, and 3-4 for meals.  Daily possible total of 16 pills, Disp-500 capsule, R-1, E-Prescribe      naloxone (NARCAN) 4 MG/0.1ML nasal spray Spray 4 mg into one nostril alternating nostrils as needed for opioid reversal. every 2-3 minutes until assistance arrives, Historical      omeprazole (PRILOSEC) 20 MG DR capsule Take 20 mg by mouth every morning., Historical      Alcohol Swabs PADS Use to swab the area of the injection or kindra as directed Per insurance coverage, Disp-200 each, R-1, E-Prescribe      blood glucose (NO BRAND SPECIFIED) lancets standard To use to test glucose level in the blood Use to test blood sugar 4-6 times daily as directed. To accompany glucose monitor brands per insurance coverage. One Touch Delica lancetsDisp-200 each, [id number]      blood glucose (NO BRAND SPECIFIED) test strip Use to test blood sugar 4-6 times daily or as directed., Disp-200 strip, R-11, E-Prescribe       blood glucose monitoring (ONETOUCH VERIO) meter device kit Use to test blood sugar 4-6 times daily or as directed.Disp-1 kit, [id number]      Continuous Glucose Sensor (DEXCOM G7 SENSOR) MISC 1 each every 10 days. Change sensor every 10 days, Disp-9 each, R-5, E-Prescribe      Glucagon (BAQSIMI) 3 MG/DOSE nasal powder Spray 1 spray (3 mg) in nostril as needed for low blood sugar. in the event of unconscious hypoglycemia or hypoglycemic seizure. May repeat dose if no response after 15 minutes., Disp-2 each, R-1, E-Prescribe      insulin pen needle (32G X 4 MM) 32G X 4 MM miscellaneous Use as directed by provider Per insurance coverageDisp-200 each, [id number]      mvw complete formulation (SOFTGELS) capsule Take 1 capsule by mouth daily., Disp-30 capsule, R-1, E-PrescribeNDC: [address]-71      Sharps Container MISC Use as directed to dispose of needles, lancets and other sharps, Disp-1 each, R-1, E-Prescribe      Transparent Dressings (HD2193 STANDARD) MISC 1 each daily. Around G/J tube site, Disp-50 each, R-0, E-Prescribe           STOP taking these medications       HYDROmorphone (DILAUDID) 2 MG tablet Comments:   Reason for Stopping:         mvw complete formulation (PEDIATRIC) oral solution Comments:   Reason for Stopping:         pantoprazole (PROTONIX) 40 MG EC tablet Comments:   Reason for Stopping:         sennosides (SENOKOT) 8.8 MG/5ML syrup Comments:   Reason for Stopping:             Allergies   Allergies   Allergen Reactions    Aspirin Anaphylaxis and Hives     HIVES (UTICARIA)    Has tolerated toradol injections during 11/8/23 admission    Bee Venom Anaphylaxis    Adhesive Tape Blisters     Data   Most Recent 3 CBC's:  Recent Labs   Lab Test 10/07/24  0814 10/03/24  0800 10/02/24  1346   WBC 13.3* 10.8 10.4   HGB 9.9* 9.5* 10.5*   MCV 85 85 84   * 802* 923*     Most Recent 3 BMP's:  Recent Labs   Lab Test 10/07/24  0814 10/07/24  0813 10/07/24  0450 10/03/24  1034 10/03/24  0800  10/03/24  0104 10/02/24  1346     --   --   --  140  --  140   POTASSIUM 4.4  --   --   --  3.4  --  3.4   CHLORIDE 106  --   --   --  109*  --  108*   CO2 24  --   --   --  24  --  21*   BUN 12.0  --   --   --  11.3  --  9.0   CR 0.62  --   --   --  0.55  --  0.52   ANIONGAP 9  --   --   --  7  --  11   CARLA 8.3*  --   --   --  8.5*  --  8.7*   * 148* 89   < > 101*   < > 131*    < > = values in this interval not displayed.

## 2024-10-05 LAB
GLUCOSE BLDC GLUCOMTR-MCNC: 110 MG/DL (ref 70–99)
GLUCOSE BLDC GLUCOMTR-MCNC: 111 MG/DL (ref 70–99)
GLUCOSE BLDC GLUCOMTR-MCNC: 133 MG/DL (ref 70–99)
GLUCOSE BLDC GLUCOMTR-MCNC: 166 MG/DL (ref 70–99)
GLUCOSE BLDC GLUCOMTR-MCNC: 226 MG/DL (ref 70–99)
GLUCOSE BLDC GLUCOMTR-MCNC: 241 MG/DL (ref 70–99)

## 2024-10-05 PROCEDURE — 250N000013 HC RX MED GY IP 250 OP 250 PS 637: Performed by: PHYSICIAN ASSISTANT

## 2024-10-05 PROCEDURE — 250N000013 HC RX MED GY IP 250 OP 250 PS 637: Performed by: NURSE PRACTITIONER

## 2024-10-05 PROCEDURE — 82962 GLUCOSE BLOOD TEST: CPT

## 2024-10-05 PROCEDURE — 120N000011 HC R&B TRANSPLANT UMMC

## 2024-10-05 PROCEDURE — 250N000013 HC RX MED GY IP 250 OP 250 PS 637

## 2024-10-05 RX ADMIN — INSULIN ASPART 3 UNITS: 100 INJECTION, SOLUTION INTRAVENOUS; SUBCUTANEOUS at 16:23

## 2024-10-05 RX ADMIN — HYDROMORPHONE HYDROCHLORIDE 8 MG: 5 SOLUTION ORAL at 23:16

## 2024-10-05 RX ADMIN — METHOCARBAMOL TABLETS 750 MG: 750 TABLET, COATED ORAL at 02:10

## 2024-10-05 RX ADMIN — METHOCARBAMOL TABLETS 750 MG: 750 TABLET, COATED ORAL at 20:39

## 2024-10-05 RX ADMIN — DILTIAZEM HYDROCHLORIDE 60 MG: 60 CAPSULE, EXTENDED RELEASE ORAL at 20:33

## 2024-10-05 RX ADMIN — INSULIN ASPART 6 UNITS: 100 INJECTION, SOLUTION INTRAVENOUS; SUBCUTANEOUS at 20:25

## 2024-10-05 RX ADMIN — HYDROMORPHONE HYDROCHLORIDE 8 MG: 5 SOLUTION ORAL at 15:10

## 2024-10-05 RX ADMIN — Medication 2 CAPSULE: at 19:17

## 2024-10-05 RX ADMIN — GABAPENTIN 600 MG: 600 TABLET, FILM COATED ORAL at 22:54

## 2024-10-05 RX ADMIN — Medication 1 CAPSULE: at 08:58

## 2024-10-05 RX ADMIN — METHOCARBAMOL TABLETS 750 MG: 750 TABLET, COATED ORAL at 14:17

## 2024-10-05 RX ADMIN — INSULIN ASPART 1 UNITS: 100 INJECTION, SOLUTION INTRAVENOUS; SUBCUTANEOUS at 08:59

## 2024-10-05 RX ADMIN — HYDROXYZINE HYDROCHLORIDE 25 MG: 25 TABLET ORAL at 10:21

## 2024-10-05 RX ADMIN — ACETAMINOPHEN 650 MG: 325 TABLET ORAL at 14:17

## 2024-10-05 RX ADMIN — HYDROMORPHONE HYDROCHLORIDE 8 MG: 5 SOLUTION ORAL at 06:13

## 2024-10-05 RX ADMIN — ACETAMINOPHEN 650 MG: 325 TABLET ORAL at 02:10

## 2024-10-05 RX ADMIN — HYDROMORPHONE HYDROCHLORIDE 8 MG: 5 SOLUTION ORAL at 19:12

## 2024-10-05 RX ADMIN — INSULIN ASPART 7 UNITS: 100 INJECTION, SOLUTION INTRAVENOUS; SUBCUTANEOUS at 23:11

## 2024-10-05 RX ADMIN — GABAPENTIN 600 MG: 600 TABLET, FILM COATED ORAL at 14:17

## 2024-10-05 RX ADMIN — DILTIAZEM HYDROCHLORIDE 60 MG: 60 CAPSULE, EXTENDED RELEASE ORAL at 08:58

## 2024-10-05 RX ADMIN — Medication 2 CAPSULE: at 08:59

## 2024-10-05 RX ADMIN — GABAPENTIN 600 MG: 600 TABLET, FILM COATED ORAL at 06:13

## 2024-10-05 RX ADMIN — HYDROMORPHONE HYDROCHLORIDE 8 MG: 5 SOLUTION ORAL at 10:21

## 2024-10-05 RX ADMIN — HYDROMORPHONE HYDROCHLORIDE 8 MG: 5 SOLUTION ORAL at 02:11

## 2024-10-05 RX ADMIN — ACETAMINOPHEN 650 MG: 325 TABLET ORAL at 08:58

## 2024-10-05 RX ADMIN — METHOCARBAMOL TABLETS 750 MG: 750 TABLET, COATED ORAL at 08:58

## 2024-10-05 RX ADMIN — ACETAMINOPHEN 650 MG: 325 TABLET ORAL at 19:18

## 2024-10-05 RX ADMIN — PANTOPRAZOLE SODIUM 40 MG: 40 TABLET, DELAYED RELEASE ORAL at 08:58

## 2024-10-05 ASSESSMENT — ACTIVITIES OF DAILY LIVING (ADL)
ADLS_ACUITY_SCORE: 36

## 2024-10-05 NOTE — PROGRESS NOTES
Pancreatitis Service - Daily Progress Note  10/05/2024    Assessment & Plan: Ciera Perkins is a 39 year old female who presents with acute on chronic abdominal pain. 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     Cardiorespiratory: Stable   GI/Nutrition: GJ tube has been removed, patient tolerating a regular diet.  Fluid/Electrolytes: No IVF.  : No martinez indicated.  Post-pancreatectomy diabetes: Lantus held 10/4 due to low BGM, she was taking 18 units Lantus daily at home.   Infection: No acute issues  Pain control: Acute pain thought to be musculoskeletal in origin related in part to extra activity at home. She ran out of dilaudid at home and could not fill through insurance until 10/5; OP clinic unable to provide additional prescriptions. Refills on 9/23 for 250 ml and 9/25 for 248 ml for 6 mg every 4 hours PRN, she ran out prior to coming to ED on 10/2.   Discussed with Pain Service on 10/4 with recommendation to reduce PO Dilaudid to 4 mg every 4 hours PRN in a stepwise fashion to taper off and avoid IV Dilaudid. Dilaudid 6 to 8 mg every 4 hours PRN with plan to reduce as able. PRN hydroxyzine. Schedule Tylenol. Gabapentin every 8 hours (increased 10/4). Scheduled Robaxin every 6 hours. Lidoderm patches.     OP Pain Medicine Specialist: Dr. Miller, [place], [place]. OP Consultation notes have noted Dr. Miller feels Dilaudid is contributing to her pain and weaning off has been very difficult. Previously on Ketamine 40 mg PO TID.   OP PCP: Dr. Hayden Houston, [place], [place]. Ms. Perkins is agreeable to care coordination with Dr. Houston as Dr. Houston or another OP provider in [place] will need to finish Dilaudid taper once she returns to [place].     Activity: Activity as tolerated  Anticipated LOS/Discharge: Transfer to  7A when bed available, anticipate discharge when pain control optimized.     Medical Decision Making: Medium  Subsequent visit 84926 (moderate level decision making)    KEYA/Fellow/Resident Provider:   PATTI Willis CNP  Adult Solid Organ Transplant   Contact: Vocera Web Console    Faculty: Carlos Carmichael MD   __________________________________________________________________  Transplant History: Admitted 10/2/2024 for acute on chronic pain  9/6/2024 (Islet), Postoperative day: 29     Interval History: History is obtained from the patient and electronic health record  Overnight events: Continues to have pain, able to eat and drink.     ROS:   A 10-point review of systems was negative except as noted above.    Curent Meds:  Current Facility-Administered Medications   Medication Dose Route Frequency Provider Last Rate Last Admin    acetaminophen (TYLENOL) tablet 650 mg  650 mg Oral Q6H Dilcia Watt NP   650 mg at 10/05/24 0210    diltiazem ER (CARDIZEM SR) 12 hr capsule 60 mg  60 mg Oral BID Deuce Salcido MD   60 mg at 10/04/24 0816    gabapentin (NEURONTIN) tablet 600 mg  600 mg Oral Q8H KENDALL Dilcia Watt NP   600 mg at 10/05/24 0613    insulin aspart (NovoLOG) injection (RAPID ACTING)  1-10 Units Subcutaneous Q4H Deuce Salcido MD   3 Units at 10/04/24 2353    [Held by provider] insulin glargine (LANTUS PEN) injection 24 Units  24 Units Subcutaneous QAM AC Jus Clements MD        Lidocaine (LIDOCARE) 4 % Patch 2 patch  2 patch Transdermal Q24H Dilcia Watt NP        lipase-protease-amylase (CREON 24) [id number] units per EC capsule 2 capsule  2 capsule Oral TID w/meals Deuce Salcido MD   2 capsule at 10/04/24 1230    methocarbamol (ROBAXIN) tablet 750 mg  750 mg Oral Q6H Dilcia Watt NP   750 mg at 10/05/24 0210    mvw complete formulation (SOFTGELS) capsule 1 capsule  1 capsule Oral Daily Deuce Salcido MD   1 capsule at 10/04/24 0816    pantoprazole (PROTONIX) EC tablet  40 mg  40 mg Oral Daily Deuce Salcido MD   40 mg at 10/04/24 0812       Physical Exam:     Admit      Current Vitals:   /70 (BP Location: Right arm, Patient Position: Semi-Oliveira's, Cuff Size: Adult Regular)   Pulse 63   Temp 97.8  F (36.6  C) (Oral)   Resp 16   LMP  (LMP Unknown)   SpO2 99%          Vital sign ranges:    Temp:  [97.8  F (36.6  C)-99.2  F (37.3  C)] 97.8  F (36.6  C)  Pulse:  [53-66] 63  Resp:  [14-18] 16  BP: (106-117)/(69-75) 112/70  SpO2:  [95 %-100 %] 99 %  Patient Vitals for the past 24 hrs:   BP Temp Temp src Pulse Resp SpO2   10/05/24 0359 112/70 97.8  F (36.6  C) Oral 63 16 99 %   10/04/24 2356 108/69 98.1  F (36.7  C) Oral 57 14 95 %   10/04/24 1958 107/75 98.2  F (36.8  C) Oral 66 16 96 %   10/04/24 1555 115/75 98  F (36.7  C) Oral 53 18 97 %   10/04/24 1215 106/74 99.2  F (37.3  C) Oral 64 18 98 %   10/04/24 0754 117/70 98.3  F (36.8  C) Oral 58 18 100 %     General Appearance: Appears to be in pain, otherwise no obvious distress.   Skin: She appears adequately perfused.   Heart: She appears adequately perfused.   Lungs: Nonlabored resps on RA.  Abdomen: Midline incision with appropriate healing, intact with staples. GJ tube site covered with clean, dry dression.   : Sousa is not present.    Extremities: edema: Absent.    Data:   CMP  Recent Labs   Lab 10/05/24  0404 10/04/24  2353 10/03/24  1034 10/03/24  0800 10/03/24  0104 10/02/24  1346   NA  --   --   --  140  --  140   POTASSIUM  --   --   --  3.4  --  3.4   CHLORIDE  --   --   --  109*  --  108*   CO2  --   --   --  24  --  21*   * 174*   < > 101*   < > 131*   BUN  --   --   --  11.3  --  9.0   CR  --   --   --  0.55  --  0.52   GFRESTIMATED  --   --   --  >90  --  >90   CARLA  --   --   --  8.5*  --  8.7*   LIPASE  --   --   --   --   --  6*   ALBUMIN  --   --   --   --   --  3.3*   BILITOTAL  --   --   --   --   --  0.2   ALKPHOS  --   --   --   --   --  190*   AST  --   --   --   --   --  133*   ALT  --   --    --   --   --  110*    < > = values in this interval not displayed.     CBC  Recent Labs   Lab 10/03/24  0800 10/02/24  1346   HGB 9.5* 10.5*   WBC 10.8 10.4   * 923*       Urinalysis  Recent Labs   Lab Test 09/03/24  0912   COLOR Yellow   APPEARANCE Clear   URINEGLC Negative   URINEBILI Negative   URINEKETONE Negative   SG 1.026   UBLD Negative   URINEPH 5.5   PROTEIN 10*   NITRITE Negative   LEUKEST Negative   RBCU <1   WBCU 2

## 2024-10-05 NOTE — PLAN OF CARE
Observation Goals:    -diagnostic tests and consults completed and resulted: Met  -vital signs normal or at patient baseline: Met   -adequate pain control on oral analgesics: Not met. Progressing. Patient still needing PRN dilaudid q4hrs  - Patient has pain control enough to be discharged home: Not met.     Patient's pain not yet controlled. Tolerating regular diet with no report of nausea. Managing abdominal pain with scheduled and PRN pain meds. Midline incision with stables, no drainage noted. BG Q4hrs. Continue POC.

## 2024-10-05 NOTE — PROGRESS NOTES
Goal Outcome Evaluation:       Plan of Care Reviewed With: patient     Overall Patient Progress: no change      -diagnostic tests and consults completed and resulted: met  -vital signs normal or at patient baseline: met  -adequate pain control on oral analgesics: not met  - Patient has pain control enough to be discharged home: not met

## 2024-10-05 NOTE — PROGRESS NOTES
Observation Goals:  -diagnostic tests and consults completed and resulted: Met  -vital signs normal or at patient baseline: Met   -adequate pain control on oral analgesics: Not met. Progressing. Patient still needing PRN dilaudid q4hrs  - Patient has pain control enough to be discharged home: Not met

## 2024-10-06 LAB
GLUCOSE BLDC GLUCOMTR-MCNC: 125 MG/DL (ref 70–99)
GLUCOSE BLDC GLUCOMTR-MCNC: 130 MG/DL (ref 70–99)
GLUCOSE BLDC GLUCOMTR-MCNC: 132 MG/DL (ref 70–99)
GLUCOSE BLDC GLUCOMTR-MCNC: 165 MG/DL (ref 70–99)
GLUCOSE BLDC GLUCOMTR-MCNC: 184 MG/DL (ref 70–99)
GLUCOSE BLDC GLUCOMTR-MCNC: 205 MG/DL (ref 70–99)

## 2024-10-06 PROCEDURE — 250N000012 HC RX MED GY IP 250 OP 636 PS 637: Performed by: NURSE PRACTITIONER

## 2024-10-06 PROCEDURE — 250N000013 HC RX MED GY IP 250 OP 250 PS 637: Performed by: PHYSICIAN ASSISTANT

## 2024-10-06 PROCEDURE — 250N000013 HC RX MED GY IP 250 OP 250 PS 637

## 2024-10-06 PROCEDURE — 120N000011 HC R&B TRANSPLANT UMMC

## 2024-10-06 PROCEDURE — 250N000013 HC RX MED GY IP 250 OP 250 PS 637: Performed by: NURSE PRACTITIONER

## 2024-10-06 RX ORDER — HEPARIN SODIUM,PORCINE 10 UNIT/ML
3 VIAL (ML) INTRAVENOUS
Status: DISCONTINUED | OUTPATIENT
Start: 2024-10-06 | End: 2024-10-07 | Stop reason: HOSPADM

## 2024-10-06 RX ADMIN — METHOCARBAMOL TABLETS 750 MG: 750 TABLET, COATED ORAL at 03:43

## 2024-10-06 RX ADMIN — HYDROXYZINE HYDROCHLORIDE 25 MG: 25 TABLET ORAL at 08:25

## 2024-10-06 RX ADMIN — METHOCARBAMOL TABLETS 750 MG: 750 TABLET, COATED ORAL at 21:35

## 2024-10-06 RX ADMIN — ACETAMINOPHEN 650 MG: 325 TABLET ORAL at 02:27

## 2024-10-06 RX ADMIN — GABAPENTIN 600 MG: 600 TABLET, FILM COATED ORAL at 13:15

## 2024-10-06 RX ADMIN — PANTOPRAZOLE SODIUM 40 MG: 40 TABLET, DELAYED RELEASE ORAL at 08:25

## 2024-10-06 RX ADMIN — HYDROMORPHONE HYDROCHLORIDE 8 MG: 5 SOLUTION ORAL at 03:43

## 2024-10-06 RX ADMIN — METHOCARBAMOL TABLETS 750 MG: 750 TABLET, COATED ORAL at 17:00

## 2024-10-06 RX ADMIN — HYDROMORPHONE HYDROCHLORIDE 6 MG: 1 SOLUTION ORAL at 21:35

## 2024-10-06 RX ADMIN — DILTIAZEM HYDROCHLORIDE 60 MG: 60 CAPSULE, EXTENDED RELEASE ORAL at 08:33

## 2024-10-06 RX ADMIN — HYDROMORPHONE HYDROCHLORIDE 8 MG: 5 SOLUTION ORAL at 16:59

## 2024-10-06 RX ADMIN — HYDROXYZINE HYDROCHLORIDE 25 MG: 25 TABLET ORAL at 17:00

## 2024-10-06 RX ADMIN — INSULIN ASPART 1 UNITS: 100 INJECTION, SOLUTION INTRAVENOUS; SUBCUTANEOUS at 08:39

## 2024-10-06 RX ADMIN — METHOCARBAMOL TABLETS 750 MG: 750 TABLET, COATED ORAL at 08:25

## 2024-10-06 RX ADMIN — ACETAMINOPHEN 650 MG: 325 TABLET ORAL at 20:31

## 2024-10-06 RX ADMIN — GABAPENTIN 600 MG: 600 TABLET, FILM COATED ORAL at 06:28

## 2024-10-06 RX ADMIN — HYDROMORPHONE HYDROCHLORIDE 8 MG: 5 SOLUTION ORAL at 13:13

## 2024-10-06 RX ADMIN — Medication 2 CAPSULE: at 13:15

## 2024-10-06 RX ADMIN — INSULIN GLARGINE 8 UNITS: 100 INJECTION, SOLUTION SUBCUTANEOUS at 10:00

## 2024-10-06 RX ADMIN — INSULIN ASPART 3 UNITS: 100 INJECTION, SOLUTION INTRAVENOUS; SUBCUTANEOUS at 13:15

## 2024-10-06 RX ADMIN — ACETAMINOPHEN 650 MG: 325 TABLET ORAL at 08:24

## 2024-10-06 RX ADMIN — DILTIAZEM HYDROCHLORIDE 60 MG: 60 CAPSULE, EXTENDED RELEASE ORAL at 20:31

## 2024-10-06 RX ADMIN — HYDROMORPHONE HYDROCHLORIDE 8 MG: 5 SOLUTION ORAL at 08:24

## 2024-10-06 RX ADMIN — Medication 2 CAPSULE: at 08:34

## 2024-10-06 RX ADMIN — Medication 2 CAPSULE: at 17:09

## 2024-10-06 RX ADMIN — INSULIN ASPART 4 UNITS: 100 INJECTION, SOLUTION INTRAVENOUS; SUBCUTANEOUS at 20:34

## 2024-10-06 RX ADMIN — Medication 1 CAPSULE: at 08:34

## 2024-10-06 RX ADMIN — ACETAMINOPHEN 650 MG: 325 TABLET ORAL at 13:15

## 2024-10-06 RX ADMIN — INSULIN ASPART 1 UNITS: 100 INJECTION, SOLUTION INTRAVENOUS; SUBCUTANEOUS at 04:54

## 2024-10-06 RX ADMIN — GABAPENTIN 600 MG: 600 TABLET, FILM COATED ORAL at 22:28

## 2024-10-06 RX ADMIN — HYDROXYZINE HYDROCHLORIDE 25 MG: 25 TABLET ORAL at 01:03

## 2024-10-06 ASSESSMENT — ACTIVITIES OF DAILY LIVING (ADL)
ADLS_ACUITY_SCORE: 35
ADLS_ACUITY_SCORE: 35
ADLS_ACUITY_SCORE: 36
ADLS_ACUITY_SCORE: 18
ADLS_ACUITY_SCORE: 36
ADLS_ACUITY_SCORE: 18
ADLS_ACUITY_SCORE: 36
ADLS_ACUITY_SCORE: 35
ADLS_ACUITY_SCORE: 35
ADLS_ACUITY_SCORE: 36
ADLS_ACUITY_SCORE: 35
ADLS_ACUITY_SCORE: 18
ADLS_ACUITY_SCORE: 35
ADLS_ACUITY_SCORE: 35
ADLS_ACUITY_SCORE: 18
ADLS_ACUITY_SCORE: 35
ADLS_ACUITY_SCORE: 36
ADLS_ACUITY_SCORE: 36

## 2024-10-06 NOTE — PROGRESS NOTES
Pancreatitis Service - Daily Progress Note  10/06/2024    Assessment & Plan: Ciera Perkins is a 39 year old female who presents with acute on chronic abdominal pain. 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     Cardiorespiratory: Stable   GI/Nutrition: GJ tube has been removed, patient tolerating a regular diet.  Fluid/Electrolytes: No IVF.  : No martinez indicated.  Post-pancreatectomy diabetes: Lantus held 10/4 due to low BGM, she was taking 18 units Lantus daily at home. Lantus 8 units every day initiated 10/6.   Infection: No acute issues  Pain control: Acute pain thought to be musculoskeletal in origin related in part to extra activity at home. She ran out of dilaudid at home and could not fill through insurance until 10/5; OP clinic unable to provide additional prescriptions. Refills on 9/23 for 250 ml and 9/25 for 248 ml for 6 mg every 4 hours PRN, she ran out prior to coming to ED on 10/2.   Discussed with Pain Service on 10/4 with recommendation to reduce PO Dilaudid to 4 mg every 4 hours PRN in a stepwise fashion to taper off and avoid IV Dilaudid. Dilaudid 6 to 8 mg every 4 hours PRN with plan to reduce as able. PRN hydroxyzine. Schedule Tylenol. Gabapentin 600 mg every 8 hours (increased 10/4). Scheduled Robaxin every 6 hours. Lidoderm patches.     OP Pain Medicine Specialist: Dr. Miller, [place], [place]. OP Consultation notes have noted Dr. Miller feels Dilaudid is contributing to her pain and weaning off has been very difficult. Previously on Ketamine 40 mg PO TID.   OP PCP: Dr. Hayden Houston, [place], [place]. Ms. Perkins is agreeable to care coordination with Dr. Houston as Dr. Houston or another OP provider in [place] will need to finish Dilaudid taper once she returns to [place].     Activity: Activity as  tolerated  Anticipated LOS/Discharge: Anticipate discharge when pain control optimized.     Medical Decision Making: Medium  Subsequent visit 33354 (moderate level decision making)    KEYA/Fellow/Resident Provider:   PATTI Willis CNP  Adult Solid Organ Transplant   Contact: Vocera Web Console    Faculty: Carlos Carmcihael MD   __________________________________________________________________  Transplant History: Admitted 10/2/2024 for acute on chronic pain  9/6/2024 (Islet), Postoperative day: 30     Interval History: History is obtained from the patient and electronic health record  Overnight events: Continues to have pain. Tolerating food, fluids. Passing urine and stool.     ROS:   A 10-point review of systems was negative except as noted above.    Curent Meds:  Current Facility-Administered Medications   Medication Dose Route Frequency Provider Last Rate Last Admin    acetaminophen (TYLENOL) tablet 650 mg  650 mg Oral Q6H Dilcia Watt NP   650 mg at 10/06/24 0227    diltiazem ER (CARDIZEM SR) 12 hr capsule 60 mg  60 mg Oral BID Deuce Salcido MD   60 mg at 10/05/24 2033    gabapentin (NEURONTIN) tablet 600 mg  600 mg Oral Q8H KENDALL Dilcia Watt NP   600 mg at 10/05/24 2254    insulin aspart (NovoLOG) injection (RAPID ACTING)  1-10 Units Subcutaneous Q4H Deuce Salcido MD   1 Units at 10/06/24 0454    [Held by provider] insulin glargine (LANTUS PEN) injection 24 Units  24 Units Subcutaneous QAM AC Jus Clements MD        Lidocaine (LIDOCARE) 4 % Patch 2 patch  2 patch Transdermal Q24H Dilcia Watt NP        lipase-protease-amylase (CREON 24) 61388-70430-276346 units per EC capsule 2 capsule  2 capsule Oral TID w/meals Deuce Salcido MD   2 capsule at 10/05/24 1917    methocarbamol (ROBAXIN) tablet 750 mg  750 mg Oral Q6H Dilcia Watt NP   750 mg at 10/06/24 0343    mvw complete formulation (SOFTGELS) capsule 1 capsule  1 capsule Oral Daily Deuce Salcido MD   1 capsule at 10/05/24  0858    pantoprazole (PROTONIX) EC tablet 40 mg  40 mg Oral Daily Deuce Salcido MD   40 mg at 10/05/24 0858       Physical Exam:     Admit      Current Vitals:   BP 99/58 (BP Location: Right arm)   Pulse 60   Temp 98.4  F (36.9  C) (Oral)   Resp 18   LMP  (LMP Unknown)   SpO2 96%          Vital sign ranges:    Temp:  [98  F (36.7  C)-98.6  F (37  C)] 98.4  F (36.9  C)  Pulse:  [60-76] 60  Resp:  [16-18] 18  BP: ()/(58-81) 99/58  SpO2:  [96 %] 96 %  Patient Vitals for the past 24 hrs:   BP Temp Temp src Pulse Resp SpO2   10/06/24 0553 99/58 98.4  F (36.9  C) Oral 60 18 96 %   10/05/24 2359 119/81 98.4  F (36.9  C) Oral 76 18 --   10/05/24 2331 112/70 98.1  F (36.7  C) Oral 67 18 96 %   10/05/24 1610 108/73 98.3  F (36.8  C) Oral 72 16 96 %   10/05/24 1227 112/75 98.6  F (37  C) Oral 69 18 96 %   10/05/24 0901 111/74 98  F (36.7  C) Oral 62 16 --     General Appearance: Appears to be in pain, otherwise no obvious distress.   Skin: She appears adequately perfused.   Heart: She appears adequately perfused.   Lungs: Nonlabored resps on RA.  Abdomen: Midline incision with appropriate healing, intact with staples. GJ tube site covered with clean, dry dression.   : Sousa is not present.    Extremities: edema: Absent.    Data:   CMP  Recent Labs   Lab 10/06/24  0443 10/05/24  2253 10/03/24  1034 10/03/24  0800 10/03/24  0104 10/02/24  1346   NA  --   --   --  140  --  140   POTASSIUM  --   --   --  3.4  --  3.4   CHLORIDE  --   --   --  109*  --  108*   CO2  --   --   --  24  --  21*   * 241*   < > 101*   < > 131*   BUN  --   --   --  11.3  --  9.0   CR  --   --   --  0.55  --  0.52   GFRESTIMATED  --   --   --  >90  --  >90   CARLA  --   --   --  8.5*  --  8.7*   LIPASE  --   --   --   --   --  6*   ALBUMIN  --   --   --   --   --  3.3*   BILITOTAL  --   --   --   --   --  0.2   ALKPHOS  --   --   --   --   --  190*   AST  --   --   --   --   --  133*   ALT  --   --   --   --   --  110*    < > = values in  this interval not displayed.     CBC  Recent Labs   Lab 10/03/24  0800 10/02/24  1346   HGB 9.5* 10.5*   WBC 10.8 10.4   * 923*       Urinalysis  Recent Labs   Lab Test 09/03/24  0912   COLOR Yellow   APPEARANCE Clear   URINEGLC Negative   URINEBILI Negative   URINEKETONE Negative   SG 1.026   UBLD Negative   URINEPH 5.5   PROTEIN 10*   NITRITE Negative   LEUKEST Negative   RBCU <1   WBCU 2

## 2024-10-06 NOTE — PLAN OF CARE
Goal Outcome Evaluation:      Plan of Care Reviewed With: patient    Overall Patient Progress: no changeOverall Patient Progress: no change    Outcome Evaluation: Pain is not managed. Pt is reporting 9/10 abdominal pain. provided her heat pads and paged the team regarding her pain.    /81 (BP Location: Right arm, Patient Position: Semi-Oliveira's, Cuff Size: Adult Regular)   Pulse 76   Temp 98.4  F (36.9  C) (Oral)   Resp 18   LMP  (LMP Unknown)   SpO2 96%     Shift: 4944-8593  Isolation Status: None  VS: stable on RA, afebrile  Neuro: Aox4  Behaviors: restless   BG: Q4 130 gave 1 unit SS  Labs: Am labs pending   Respiratory: WDL  Cardiac: WDL  Pain/Nausea: Reports 8-9/10 pain   PRN: Q4 Dilaudid 1x, atarax schedule robaxin, gabapentin,     Diet: regular   IV Access: R port cath ( needs hep lock)   Infusion(s): none  Lines/Drains: none  GI/: pt reports she voided and had a BM before she got to the unit   Skin: Incision, staples. No purulent drainage noted   Mobility: indep  Events/Education: Na  Plan: Cont w/ POC

## 2024-10-06 NOTE — PLAN OF CARE
Goal Outcome Evaluation:      Plan of Care Reviewed With: patient, spouse    Overall Patient Progress: no changeOverall Patient Progress: no change    Outcome Evaluation: Pain continues; however patient did seem to respond well to PO pain meds- increased activity and in good spirits intermittently throughout shift.    /69 (BP Location: Right arm)   Pulse 59   Temp 98.2  F (36.8  C) (Oral)   Resp 16   LMP  (LMP Unknown)   SpO2 96%      4809-2792  AVSS on RA. BG ACHS; 100's-160's. Regular diet; good appetite and PO intake. Adequate urine output- saving most. Last BM yesterday 10/5. Midline abdominal incision- stapled and open to air- healing very well (almost completely healed). Anticipating staples to be removed soon? (Patient and  asking about this- this evening). Old left G-j tube site; per patient report her abdominal pain has been radiating to that site; describes pain as spasms.New dressing has been placed; healing well. Pain has been managed with scheduled robaxin and PRN atarax and dilaudid. Patient appeared to be in better spirits this afternoon/ pain controlled. Anticipating patient will feel better tonight- received pain meds this evening. Alert and oriented x4. Up ad ihsan. Right chest port; saline locked. Calls appropriately. Continue plan of care; please notify MD with any changes.

## 2024-10-06 NOTE — PLAN OF CARE
Goal Outcome Evaluation:      Plan of Care Reviewed With: patient    Overall Patient Progress: no changeOverall Patient Progress: no change    Outcome Evaluation: pt pain is not improving with current medication, still too severe for baseline function. Pt states that dosage may be too little as her body may have adjusted to the meds.      Cares from: - 8586-1846    VS & Pain: /73 (BP Location: Right arm, Patient Position: Semi-Oliveira's, Cuff Size: Adult Regular)   Pulse 72   Temp 98.3  F (36.8  C) (Oral)   Resp 16   LMP  (LMP Unknown)   SpO2 96%   Neuro: WDL  Respiratory: WDL  Cardiac: WDL  Peripheral neurovascular: WDL  GI/: extensive hx of pancreatitis, s/p splenectomy, G/J tube placement, removal 10/3- covered with dressing. Midline incision with staples. Intense and severe pain, guarding and sobbing.  Nutrition: Reg diet  Skin: Incision, staples. No purulent drainage noted  Musculoskeletal: WDL  Lines: PIV. Saline locked  Activity: Independent, but difficult to complete ADLs due to pain  Labs/Electrolytes: Hyperglycemic, insulin given  Events this shift: none    Pt transferred to , report given to Ellie LEWIS. Pt transported in wheelchair with belongings.

## 2024-10-06 NOTE — PROGRESS NOTES
Observation Goals:     -diagnostic tests and consults completed and resulted: Met  -vital signs normal or at patient baseline: Met   -adequate pain control on oral analgesics: Not met. Progressing. Patient still needing PRN dilaudid q4hrs  - Patient has pain control enough to be discharged home: Not met.      Patient's pain not yet controlled. Managing abdominal pain with scheduled and PRN pain meds but is not adequate. Midline incision with staples, no purulent drainage noted. BG Q4hrs.

## 2024-10-06 NOTE — PHARMACY-ADMISSION MEDICATION HISTORY
Pharmacist Admission Medication History    Admission medication history is complete. The information provided in this note is only as accurate as the sources available at the time of the update.    Information Source(s): Patient via in-person    Pertinent Information: Patient knows medications well. The patient notes she is out of [facility] Completed formulation oral solution. She is interested in have this changed to the [facility] Complete capsules at discharge (on her med list but she has not started/used).    Changes made to PTA medication list:  Added:  --Omeprazole 20 mg by mouth daily  Deleted:   --Hydromorphone 2mg tablets -- take 3 tablets (6 mg) by mouth every 4 hours as needed for moderate to severe pain -- patients stated she is now using liquid formulation.  --Pantoprazole 40 mg by mouth daily (uses omeprazole at home)  Changed:   --Gabapentin dose is now 800 mg by mouth twice daily (increased from 600 mg twice daily)  --Insulin glargine dose is now 18 units subcutaneous daily (reduced from 46 units daily and 24 units daily)    Allergies reviewed with patient and updates made in EHR: yes -- no changed needed    Medication History Completed By: Mohan Malave [facility] 10/6/2024 9:26 AM    [facility] Med List   Medication Sig Note Last Dose    diltiazem ER (CARDIZEM SR) 60 MG 12 hr capsule Take 1 capsule (60 mg) by mouth 2 times daily.  Past Week    gabapentin (NEURONTIN) 600 MG tablet Take 1 tablet (600 mg) by mouth 2 times daily. (Patient taking differently: Take 800 mg by mouth 2 times daily.)  Past Week    HYDROmorphone, STANDARD CONC, (DILAUDID) 1 MG/ML oral solution Take 4 mLs (4 mg) by mouth or J tube every 4 hours as needed for severe pain.  Past Week    hydrOXYzine HCl (ATARAX) 25 MG tablet Take 1 tablet (25 mg) by mouth 3 times daily as needed for anxiety.  Past Week    insulin aspart (NOVOLOG PEN) 100 UNIT/ML pen Inject 1-10 Units subcutaneously every 4 hours.  Past Week    insulin glargine (LANTUS PEN) 100 UNIT/ML  pen Inject 46 Units subcutaneously every 24 hours. (Patient taking differently: Inject 18 Units subcutaneously every 24 hours.) 10/4/2024: Home dose changed to 24 units q24 hr per patient report to transplant [facility] on October 4, 2024   Past Week    lipase-protease-amylase (CREON) [id number] units CPEP per EC capsule Take 1-2 pills with snacks, and 3-4 for meals.  Daily possible total of 16 pills  Past Week    methocarbamol (ROBAXIN) 750 MG tablet Take 1 tablet (750 mg) by mouth 3 times daily.  Past Week    mvw complete formulation (PEDIATRIC) oral solution Take 1.5 mLs by mouth or J tube daily.  Past Week    naloxone (NARCAN) 4 MG/0.1ML nasal spray Spray 4 mg into one nostril alternating nostrils as needed for opioid reversal. every 2-3 minutes until assistance arrives  Unknown    omeprazole (PRILOSEC) 20 MG DR capsule Take 20 mg by mouth every morning.  Past Week    sennosides (SENOKOT) 8.8 MG/5ML syrup Place 5-10 mLs into J tube 2 times daily as needed for constipation.  Past Week

## 2024-10-07 VITALS
OXYGEN SATURATION: 97 % | RESPIRATION RATE: 16 BRPM | TEMPERATURE: 98.1 F | SYSTOLIC BLOOD PRESSURE: 100 MMHG | DIASTOLIC BLOOD PRESSURE: 64 MMHG | HEART RATE: 59 BPM

## 2024-10-07 LAB
ALBUMIN SERPL BCG-MCNC: 3 G/DL (ref 3.5–5.2)
ALP SERPL-CCNC: 170 U/L (ref 40–150)
ALT SERPL W P-5'-P-CCNC: 75 U/L (ref 0–50)
ANION GAP SERPL CALCULATED.3IONS-SCNC: 9 MMOL/L (ref 7–15)
AST SERPL W P-5'-P-CCNC: 75 U/L (ref 0–45)
BILIRUB SERPL-MCNC: <0.2 MG/DL
BUN SERPL-MCNC: 12 MG/DL (ref 6–20)
CALCIUM SERPL-MCNC: 8.3 MG/DL (ref 8.8–10.4)
CHLORIDE SERPL-SCNC: 106 MMOL/L (ref 98–107)
CREAT SERPL-MCNC: 0.62 MG/DL (ref 0.51–0.95)
EGFRCR SERPLBLD CKD-EPI 2021: >90 ML/MIN/1.73M2
ERYTHROCYTE [DISTWIDTH] IN BLOOD BY AUTOMATED COUNT: 13.7 % (ref 10–15)
GLUCOSE BLDC GLUCOMTR-MCNC: 148 MG/DL (ref 70–99)
GLUCOSE BLDC GLUCOMTR-MCNC: 172 MG/DL (ref 70–99)
GLUCOSE BLDC GLUCOMTR-MCNC: 89 MG/DL (ref 70–99)
GLUCOSE SERPL-MCNC: 174 MG/DL (ref 70–99)
HCO3 SERPL-SCNC: 24 MMOL/L (ref 22–29)
HCT VFR BLD AUTO: 32.1 % (ref 35–47)
HGB BLD-MCNC: 9.9 G/DL (ref 11.7–15.7)
MCH RBC QN AUTO: 26.3 PG (ref 26.5–33)
MCHC RBC AUTO-ENTMCNC: 30.8 G/DL (ref 31.5–36.5)
MCV RBC AUTO: 85 FL (ref 78–100)
PLATELET # BLD AUTO: 780 10E3/UL (ref 150–450)
POTASSIUM SERPL-SCNC: 4.4 MMOL/L (ref 3.4–5.3)
PROT SERPL-MCNC: 6.2 G/DL (ref 6.4–8.3)
RBC # BLD AUTO: 3.77 10E6/UL (ref 3.8–5.2)
SODIUM SERPL-SCNC: 139 MMOL/L (ref 135–145)
WBC # BLD AUTO: 13.3 10E3/UL (ref 4–11)

## 2024-10-07 PROCEDURE — 82040 ASSAY OF SERUM ALBUMIN: CPT | Performed by: PHYSICIAN ASSISTANT

## 2024-10-07 PROCEDURE — 250N000013 HC RX MED GY IP 250 OP 250 PS 637: Performed by: PHYSICIAN ASSISTANT

## 2024-10-07 PROCEDURE — 250N000011 HC RX IP 250 OP 636: Performed by: PHYSICIAN ASSISTANT

## 2024-10-07 PROCEDURE — 85027 COMPLETE CBC AUTOMATED: CPT | Performed by: PHYSICIAN ASSISTANT

## 2024-10-07 PROCEDURE — 250N000013 HC RX MED GY IP 250 OP 250 PS 637

## 2024-10-07 PROCEDURE — 36415 COLL VENOUS BLD VENIPUNCTURE: CPT | Performed by: PHYSICIAN ASSISTANT

## 2024-10-07 PROCEDURE — 250N000013 HC RX MED GY IP 250 OP 250 PS 637: Performed by: NURSE PRACTITIONER

## 2024-10-07 RX ORDER — HYDROMORPHONE HYDROCHLORIDE 1 MG/ML
6-8 SOLUTION ORAL EVERY 4 HOURS PRN
Qty: 500 ML | Refills: 0 | Status: SHIPPED | OUTPATIENT
Start: 2024-10-07

## 2024-10-07 RX ORDER — ACETAMINOPHEN 325 MG/1
650 TABLET ORAL EVERY 6 HOURS PRN
Qty: 100 TABLET | Refills: 0 | Status: SHIPPED | OUTPATIENT
Start: 2024-10-07

## 2024-10-07 RX ORDER — METHOCARBAMOL 750 MG/1
750 TABLET, FILM COATED ORAL 4 TIMES DAILY
Qty: 120 TABLET | Refills: 0 | Status: SHIPPED | OUTPATIENT
Start: 2024-10-07 | End: 2024-10-07

## 2024-10-07 RX ORDER — GABAPENTIN 600 MG/1
600 TABLET ORAL 3 TIMES DAILY
Qty: 90 TABLET | Refills: 1 | Status: SHIPPED | OUTPATIENT
Start: 2024-10-07

## 2024-10-07 RX ORDER — HEPARIN SODIUM,PORCINE 10 UNIT/ML
5-10 VIAL (ML) INTRAVENOUS
Status: DISCONTINUED | OUTPATIENT
Start: 2024-10-07 | End: 2024-10-07 | Stop reason: HOSPADM

## 2024-10-07 RX ORDER — HYDROXYZINE HYDROCHLORIDE 25 MG/1
25 TABLET, FILM COATED ORAL EVERY 6 HOURS PRN
Qty: 100 TABLET | Refills: 1 | Status: SHIPPED | OUTPATIENT
Start: 2024-10-07

## 2024-10-07 RX ORDER — HEPARIN SODIUM,PORCINE 10 UNIT/ML
5-10 VIAL (ML) INTRAVENOUS EVERY 24 HOURS
Status: DISCONTINUED | OUTPATIENT
Start: 2024-10-07 | End: 2024-10-07 | Stop reason: HOSPADM

## 2024-10-07 RX ORDER — AMOXICILLIN 250 MG
1-2 CAPSULE ORAL 2 TIMES DAILY PRN
Qty: 60 TABLET | Refills: 0 | Status: SHIPPED | OUTPATIENT
Start: 2024-10-07

## 2024-10-07 RX ORDER — HEPARIN SODIUM (PORCINE) LOCK FLUSH IV SOLN 100 UNIT/ML 100 UNIT/ML
5-10 SOLUTION INTRAVENOUS
Status: DISCONTINUED | OUTPATIENT
Start: 2024-10-07 | End: 2024-10-07 | Stop reason: HOSPADM

## 2024-10-07 RX ORDER — METHOCARBAMOL 750 MG/1
750 TABLET, FILM COATED ORAL 4 TIMES DAILY PRN
Qty: 120 TABLET | Refills: 0 | Status: SHIPPED | OUTPATIENT
Start: 2024-10-07

## 2024-10-07 RX ADMIN — METHOCARBAMOL TABLETS 750 MG: 750 TABLET, COATED ORAL at 04:51

## 2024-10-07 RX ADMIN — INSULIN ASPART 3 UNITS: 100 INJECTION, SOLUTION INTRAVENOUS; SUBCUTANEOUS at 00:35

## 2024-10-07 RX ADMIN — PANTOPRAZOLE SODIUM 40 MG: 40 TABLET, DELAYED RELEASE ORAL at 08:57

## 2024-10-07 RX ADMIN — HYDROMORPHONE HYDROCHLORIDE 6 MG: 1 SOLUTION ORAL at 01:51

## 2024-10-07 RX ADMIN — HYDROMORPHONE HYDROCHLORIDE 6 MG: 1 SOLUTION ORAL at 06:28

## 2024-10-07 RX ADMIN — Medication 2 CAPSULE: at 08:55

## 2024-10-07 RX ADMIN — ACETAMINOPHEN 650 MG: 325 TABLET ORAL at 01:51

## 2024-10-07 RX ADMIN — Medication 1 CAPSULE: at 08:57

## 2024-10-07 RX ADMIN — INSULIN GLARGINE 8 UNITS: 100 INJECTION, SOLUTION SUBCUTANEOUS at 08:56

## 2024-10-07 RX ADMIN — INSULIN ASPART 2 UNITS: 100 INJECTION, SOLUTION INTRAVENOUS; SUBCUTANEOUS at 08:56

## 2024-10-07 RX ADMIN — DILTIAZEM HYDROCHLORIDE 60 MG: 60 CAPSULE, EXTENDED RELEASE ORAL at 09:16

## 2024-10-07 RX ADMIN — Medication 5 ML: at 12:15

## 2024-10-07 RX ADMIN — METHOCARBAMOL TABLETS 750 MG: 750 TABLET, COATED ORAL at 08:57

## 2024-10-07 RX ADMIN — ACETAMINOPHEN 650 MG: 325 TABLET ORAL at 08:57

## 2024-10-07 RX ADMIN — HYDROMORPHONE HYDROCHLORIDE 8 MG: 5 SOLUTION ORAL at 10:43

## 2024-10-07 RX ADMIN — GABAPENTIN 600 MG: 600 TABLET, FILM COATED ORAL at 06:58

## 2024-10-07 ASSESSMENT — ACTIVITIES OF DAILY LIVING (ADL)
ADLS_ACUITY_SCORE: 18
DEPENDENT_IADLS:: INDEPENDENT
ADLS_ACUITY_SCORE: 18

## 2024-10-07 NOTE — CONSULTS
Care Management Initial Consult    General Information  Assessment completed with: Patient, Spouse or significant other,    Type of CM/SW Visit: Initial Assessment    Primary Care Provider verified and updated as needed: No   Readmission within the last 30 days:        Reason for Consult: discharge planning  Advance Care Planning: Advance Care Planning Reviewed: verified with patient          Communication Assessment  Patient's communication style: spoken language (English or Bilingual)             Cognitive  Cognitive/Neuro/Behavioral:                        Living Environment:   People in home: spouse, child(roel), adult     Current living Arrangements: house      Able to return to prior arrangements: yes       Family/Social Support:  Care provided by: self  Provides care for: no one  Marital Status:   Support system: , Children, Parent(s), Friend          Description of Support System: Supportive, Involved    Support Assessment: Adequate family and caregiver support    Current Resources:   Patient receiving home care services: No        Community Resources: None  Equipment currently used at home: none  Supplies currently used at home: None    Employment/Financial:  Employment Status: disabled        Financial Concerns: none           Does the patient's insurance plan have a 3 day qualifying hospital stay waiver?  No    Lifestyle & Psychosocial Needs:  Social Determinants of Health     Food Insecurity: Low Risk  (10/6/2024)    Food Insecurity     Within the past 12 months, did you worry that your food would run out before you got money to buy more?: No     Within the past 12 months, did the food you bought just not last and you didn t have money to get more?: No   Depression: Not at risk (9/4/2024)    PHQ-2     PHQ-2 Score: 0   Housing Stability: Low Risk  (10/6/2024)    Housing Stability     Do you have housing? : Yes     Are you worried about losing your housing?: No   Tobacco Use: Medium Risk  (9/19/2024)    Patient History     Smoking Tobacco Use: Former     Smokeless Tobacco Use: Never     Passive Exposure: Not on file   Financial Resource Strain: Low Risk  (10/6/2024)    Financial Resource Strain     Within the past 12 months, have you or your family members you live with been unable to get utilities (heat, electricity) when it was really needed?: No   Alcohol Use: Not At Risk (8/19/2024)    Received from [facility]    AUDIT-C     Frequency of Alcohol Consumption: Never     Average Number of Drinks: Patient does not drink     Frequency of Binge Drinking: Never   Transportation Needs: Low Risk  (10/6/2024)    Transportation Needs     Within the past 12 months, has lack of transportation kept you from medical appointments, getting your medicines, non-medical meetings or appointments, work, or from getting things that you need?: No   Physical Activity: Medium Risk (6/13/2022)    Received from [facility], [facility]    Physical Activity     Physical Activity: 10   Interpersonal Safety: Low Risk  (10/6/2024)    Interpersonal Safety     Do you feel physically and emotionally safe where you currently live?: Yes     Within the past 12 months, have you been hit, slapped, kicked or otherwise physically hurt by someone?: No     Within the past 12 months, have you been humiliated or emotionally abused in other ways by your partner or ex-partner?: No   Stress: No Stress Concern Present (7/23/2021)    Received from [facility] of Occupational Health - Occupational Stress Questionnaire     Feeling of Stress : Not at all   Social Connections: Socially Isolated (7/23/2021)    Received from [facility]    Social Connection and Isolation Panel [NHANES]     Frequency of Communication with Friends and Family: Once a week     Frequency of Social Gatherings with Friends  "and Family: Once a week     Attends Confucianist Services: Never     Active Member of Clubs or Organizations: No     Attends Club or Organization Meetings: Never     Marital Status:    Health Literacy: Not on file       Functional Status:  Prior to admission patient needed assistance:   Dependent ADLs:: Independent  Dependent IADLs:: Independent       Mental Health Status:  Mental Health Status: No Current Concerns       Chemical Dependency Status:  Chemical Dependency Status: No Current Concerns             Values/Beliefs:  Spiritual, Cultural Beliefs, Confucianist Practices, Values that affect care: no               Discussed  Partnership in Safe Discharge Planning  document with patient/family: No    Additional Information: [employer] work met with pt and her , Dinesh at bedside. Pt alert and oriented, able to track questions appropriately. Pt denies any acute mental or emotional health concerns at time of visit. Her main concern is related to whether is discharging from the hospital to [facility] or back to [place], as she reports she heard her care has officially been \"transferred\" back to her providers in [place]. SW updated ISLET coordinator regarding pt's question. Pt denied any additional questions or concerns at this time.       Next Steps: Pt and  will discharge to [facility] when medically stable.     KOKO Barrera, [facility]  Outpatient Kidney/Pancreas/Auto Islet Transplant Program  Ph: [phone]            "

## 2024-10-07 NOTE — PLAN OF CARE
/62 (BP Location: Right arm)   Pulse 63   Temp 98.3  F (36.8  C) (Oral)   Resp 16   LMP  (LMP Unknown)   SpO2 96%     Shift: 1565-1810  Isolation Status: None  VS: stable on RA, afebrile  Neuro: Aox4  Behaviors: restless   BG: Q4 on SS  Labs: Am labs pending   Respiratory: WDL  Cardiac: WDL  Pain/Nausea: Reports 8-9/10 pain   PRN: Q4 Dilaudid 2x, schedule robaxin, gabapentin,      Diet: regular   IV Access: R port cath Hep locked   Infusion(s): none  Lines/Drains: none  GI/: pt reports she voided and had a BM before she got to the unit   Skin: Incision, staples. No purulent drainage noted   Mobility: indep  Events/Education: Na  Plan: Cont w/ POC

## 2024-10-07 NOTE — PROGRESS NOTES
DISCHARGE:  D: Patient with orders to discharge to [facility].   I: Discharge instructions, medications, & follow ups reviewed with patient. Copy of discharge summary given to patient. Port de accessed. All belongings packed & sent with patient. Medications filled & picked up at discharge pharmacy. Paperwork faxed.   A: Patient in stable condition. AVSS. Pt had no further questions regarding discharge instructions and medications. Patient transferred out by wheelchair & left with spouse.   P: Plan for virtual follow up on the 11th

## 2024-10-08 ENCOUNTER — TELEPHONE (OUTPATIENT)
Dept: TRANSPLANT | Facility: CLINIC | Age: 39
End: 2024-10-08
Payer: COMMERCIAL

## 2024-10-08 NOTE — TELEPHONE ENCOUNTER
Patient Call: General  Route to LPN    Reason for call: Pt called to speak to coordinator regarding return home. Please call back after speaking to surgeon    Call back needed? Yes    Return Call Needed  Same as documented in contacts section  When to return call?: Same day: Route High Priority

## 2024-10-09 ENCOUNTER — TELEPHONE (OUTPATIENT)
Dept: TRANSPLANT | Facility: CLINIC | Age: 39
End: 2024-10-09
Payer: COMMERCIAL

## 2024-10-09 NOTE — TELEPHONE ENCOUNTER
Patient confirmed scheduled appointment:  Date: 10/10   Time: 10:30 am   Visit type: new diabetes   Provider: Becca   Location: [facility] [place]  Testing/imaging:   Additional notes: Spoke to pt and re-master appt that was on 10/4 to 10/10 w Becca due to pt being inpatient at the time. Pt was re-master w sole on 10/11 from 10/3 w makayla due to in patient as well    Patient needs to be rescheduled for their virtual visit due to Reason for Reschedule: In-Patient     Scheduling team, please refer to service line late cancellation/no-show policies and reach out to patient at a later date for rescheduling.     Appointment mode: Video  Provider: Doreen Kaur on 10/9/2024 at 9:40 AM

## 2024-10-09 NOTE — CONFIDENTIAL NOTE
RECORDS RECEIVED FROM: internal /ce    DATE RECEIVED: 10.4.24    NOTES (FOR ALL VISITS) STATUS DETAILS   OFFICE NOTES from referring provider internal    Dinah Marin MD      OFFICE NOTES from other specialist       ED NOTES internal /ce Internal- 9.6.24 Ramath     [facility] - 8/18/24 tomothy  7/28/24 Amy   5.21.24 house   4.24.24 vinitazener   4.10.24 galicia   More in [facility]    OPERATIVE REPORT  (thyroid, pituitary, adrenal, parathyroid) ce [facility]   9.6.24   MEDICATION LIST internal      IMAGING        XR (Chest) ce [facility] - 8.9.24, 6.9.24, 4.9.24, 3.20.24 more in epic    CT (HEAD/NECK/CHEST/ABDOMEN) ce [facility] - 8.12.24, 8.9.24, 8.6.24, 7.28.24, 5.26.24 more in [facility]    LABS       DIABETES: HBGA1C, CREATININE, FASTING LIPIDS, MICROALBUMIN URINE, POTASSIUM, TSH, T4     THYROID: TSH, T4, CBC, THYRODLONULIN, TOTAL T3, FREE T4, CALCITONIN, CEA internal /ce         Action 9.30.24 sv    Action Taken Image request sent to [facility]   Ct-  8.12.24, 8.9.24, 8.6.24, 7.28.24, 5.26.24 more in epic  Cxr- 8.9.24, 6.9.24, 4.9.24, 3.20.24 more in epic       Action 10.2.24 sv    Action Taken 2nd Image request sent to [facility]   Ct-  8.12.24, 8.9.24, 8.6.24, 7.28.24, 5.26.24 more in epic  Cxr- 8.9.24, 6.9.24, 4.9.24, 3.20.24 more in epic       Action 10.3.24 sv    Action Taken 3rd Image request sent to [facility]   Ct-  8.12.24, 8.9.24, 8.6.24, 7.28.24, 5.26.24 more in epic  Cxr- 8.9.24, 6.9.24, 4.9.24, 3.20.24 more in epic     Called pt and lvm about gathering outside imaging

## 2024-10-09 NOTE — TELEPHONE ENCOUNTER
Talked with patient and is doing good post hospital admission.  Pain is doing good and patient states she is only taking 3mg of Dilaudid every 4 hours.  Patient will be in in clinic tomorrow to see surgeon

## 2024-10-10 ENCOUNTER — PRE VISIT (OUTPATIENT)
Dept: ENDOCRINOLOGY | Facility: CLINIC | Age: 39
End: 2024-10-10

## 2024-10-10 ENCOUNTER — OFFICE VISIT (OUTPATIENT)
Dept: ENDOCRINOLOGY | Facility: CLINIC | Age: 39
End: 2024-10-10
Attending: PEDIATRICS
Payer: COMMERCIAL

## 2024-10-10 ENCOUNTER — OFFICE VISIT (OUTPATIENT)
Dept: TRANSPLANT | Facility: CLINIC | Age: 39
End: 2024-10-10
Attending: SURGERY
Payer: COMMERCIAL

## 2024-10-10 VITALS
OXYGEN SATURATION: 98 % | SYSTOLIC BLOOD PRESSURE: 102 MMHG | BODY MASS INDEX: 24.33 KG/M2 | HEIGHT: 67 IN | DIASTOLIC BLOOD PRESSURE: 68 MMHG | HEART RATE: 77 BPM | WEIGHT: 155 LBS

## 2024-10-10 VITALS
HEART RATE: 69 BPM | BODY MASS INDEX: 25.77 KG/M2 | DIASTOLIC BLOOD PRESSURE: 76 MMHG | WEIGHT: 162.1 LBS | SYSTOLIC BLOOD PRESSURE: 120 MMHG | OXYGEN SATURATION: 96 %

## 2024-10-10 DIAGNOSIS — K86.1 IDIOPATHIC CHRONIC PANCREATITIS (H): Primary | ICD-10-CM

## 2024-10-10 DIAGNOSIS — E08.9 DIABETES MELLITUS SECONDARY TO PANCREATIC INSUFFICIENCY (H): ICD-10-CM

## 2024-10-10 DIAGNOSIS — K86.89 DIABETES MELLITUS SECONDARY TO PANCREATIC INSUFFICIENCY (H): ICD-10-CM

## 2024-10-10 DIAGNOSIS — K86.1 CHRONIC PANCREATITIS, UNSPECIFIED PANCREATITIS TYPE (H): ICD-10-CM

## 2024-10-10 DIAGNOSIS — K86.1 CHRONIC RECURRENT PANCREATITIS (H): ICD-10-CM

## 2024-10-10 PROCEDURE — 99213 OFFICE O/P EST LOW 20 MIN: CPT | Performed by: SURGERY

## 2024-10-10 PROCEDURE — 99024 POSTOP FOLLOW-UP VISIT: CPT | Performed by: SURGERY

## 2024-10-10 PROCEDURE — 99205 OFFICE O/P NEW HI 60 MIN: CPT | Mod: 24 | Performed by: STUDENT IN AN ORGANIZED HEALTH CARE EDUCATION/TRAINING PROGRAM

## 2024-10-10 ASSESSMENT — PAIN SCALES - GENERAL: PAINLEVEL: SEVERE PAIN (6)

## 2024-10-10 NOTE — PROGRESS NOTES
Endocrinology Clinic Visit 10/10/2024    NAME:  Ciera Perkins  PCP:  Hayden Houston  MRN:  [id number]  Reason for Consult: Pancreatogenic s/pTPAIT  Requesting Provider:  Dinah Marin    Chief Complaint     Chief Complaint   Patient presents with    Diabetes       History of Present Illness     Ciera Perkins is a 39 year old female who is seen in clinic for s/p TPAIT diabetes management.  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 She is now s/p TPAIT on 9/6/2024, splenectomy, and G/J placement with repair of small incisional hernia on 9/6/24 .    Preoperative glycemic assessment showed that she has prediabetes.  However few years ago she was diagnosed with pancreatogenic DM from reviewing of the records her A1c was 9.3 in January 2021.  Historical diabetes medications:  Tradjenta, glimepiride, metformin, Basaglar.  She was discharged from the hospital on 9/6/2024 on tube feed was receiving Lantus 46 units NovoLog correction scale every 4 hours of 1: 20 for BG above 120.  At that time was on continuous tube feeds 45 mL/h.  Following the first discharge from the hospital tube feed was discontinued and doses of Lantus were reduced and started on NovoLog with meals.  She was readmitted to the hospital on 10/2/2024 discharged on 10/7/2024 presented with acute on chronic abdominal pain at that time was discharged on Lantus 8 units, NovoLog correction scale of 1: 20 for BG above 120.    Today she stated she uses:  Lantus 8 units daily.  NovoLog correction scale of 1: 20 for BG above 120.  Carb counting skills:        Previous A1C in records reviewed.  Hemoglobin A1c 6.7% on 9/6/2024  Weight is 70 kg     Diabetes Care/Complications:   Nephropathy: No history of nephropathy no albuminuria on 9/3/2024, creatinine 0.62 on 10/7/2024  Retinopathy: no recent visit with  the ophthalmology   HLD: LDL 76 on 9/3/2024  Neuropathy: No history of neuropathy.  HTN: No history of hypertension.      Previous DM related Hospitalization:  None    Blood Glucose Monitoring: Dexcom G7  CGMS Data: Last 14 days  GMI 6.7%.  Average glucose 140 mg/dL.  CV 30.8%.  Time CGM in use 99.6%.  TIR 48%  Low 2%  Very low <1 percent    Overall Pattern: Over the night readings remains close to 70 mg/dL with some lows, during the day her BG increased significantly following eating and comes down to the goal of  between meals.        Diet:   Eats breakfast sometimes at 08:00-09:00 am and late lunch around 03:00 pm but the times of these meals is not fixed.         Family hx of DM: maternal uncle and aunt DM type 2     Problem List     Patient Active Problem List   Diagnosis    Chronic recurrent pancreatitis (H)    Diabetes mellitus, type 2 (H)    Atrial fibrillation with RVR (H)    Atrial flutter (H)    History of pancreatectomy    Pancreatic insufficiency    Diabetes mellitus secondary to pancreatectomy (H)    Acute post-operative pain    Open wound    GERD (gastroesophageal reflux disease)    Moderate malnutrition (H)    Leukocytosis    Anemia    Thrombocytosis    S/P pancreatic islet cell transplantation (H)    Postoperative pain        Medications     Current Outpatient Medications   Medication Sig Dispense Refill    acetaminophen (TYLENOL) 325 MG tablet Take 2 tablets (650 mg) by mouth every 6 hours as needed for mild pain. 100 tablet 0    Alcohol Swabs PADS Use to swab the area of the injection or kindra as directed Per insurance coverage 200 each 1    blood glucose (NO BRAND SPECIFIED) lancets standard To use to test glucose level in the blood Use to test blood sugar 4-6 times daily as directed. To accompany glucose monitor brands per insurance coverage. One Touch Delica lancets 200 each 1    blood glucose (NO BRAND SPECIFIED) test strip Use to test blood sugar 4-6 times daily or as directed. 200  strip 11    blood glucose monitoring (ONETOUCH VERIO) meter device kit Use to test blood sugar 4-6 times daily or as directed. 1 kit 0    Continuous Glucose Sensor (DEXCOM G7 SENSOR) MISC 1 each every 10 days. Change sensor every 10 days 9 each 5    diltiazem ER (CARDIZEM SR) 60 MG 12 hr capsule Take 1 capsule (60 mg) by mouth 2 times daily. 60 capsule 2    gabapentin (NEURONTIN) 600 MG tablet Take 1 tablet (600 mg) by mouth 3 times daily. 90 tablet 1    Glucagon (BAQSIMI) 3 MG/DOSE nasal powder Spray 1 spray (3 mg) in nostril as needed for low blood sugar. in the event of unconscious hypoglycemia or hypoglycemic seizure. May repeat dose if no response after 15 minutes. 2 each 1    HYDROmorphone, STANDARD CONC, (DILAUDID) 1 MG/ML oral solution Take 6-8 mLs (6-8 mg) by mouth or J tube every 4 hours as needed for moderate to severe pain. For 3 days, then reduce to 6 mg every 4 hours PRN for 3 days, then reduce to 4 mg every 4 hours PRN. 500 mL 0    hydrOXYzine HCl (ATARAX) 25 MG tablet Take 1 tablet (25 mg) by mouth every 6 hours as needed for anxiety or other (adjuvant pain). 100 tablet 1    insulin aspart (NOVOLOG PEN) 100 UNIT/ML pen Inject 1-10 Units subcutaneously every 4 hours. 15 mL 2    insulin glargine (LANTUS PEN) 100 UNIT/ML pen Inject 8 Units subcutaneously every 24 hours. 15 mL 2    insulin pen needle (32G X 4 MM) 32G X 4 MM miscellaneous Use as directed by provider Per insurance coverage 200 each 1    lipase-protease-amylase (CREON) 42991-21616-943619 units CPEP per EC capsule Take 1-2 pills with snacks, and 3-4 for meals.  Daily possible total of 16 pills 500 capsule 1    methocarbamol (ROBAXIN) 750 MG tablet Take 1 tablet (750 mg) by mouth 4 times daily as needed for muscle spasms. 120 tablet 0    mvw complete formulation (SOFTGELS) capsule Take 1 capsule by mouth daily. 30 capsule 1    naloxone (NARCAN) 4 MG/0.1ML nasal spray Spray 4 mg into one nostril alternating nostrils as needed for opioid  reversal. every 2-3 minutes until assistance arrives      omeprazole (PRILOSEC) 20 MG DR capsule Take 20 mg by mouth every morning.      senna-docusate (SENOKOT-S/PERICOLACE) 8.6-50 MG tablet Take 1-2 tablets by mouth 2 times daily as needed for constipation. 60 tablet 0    Sharps Container MISC Use as directed to dispose of needles, lancets and other sharps 1 each 1    Transparent Dressings (NE0438 STANDARD) MISC 1 each daily. Around G/J tube site 50 each 0     No current facility-administered medications for this visit.        Allergies     Allergies   Allergen Reactions    Aspirin Anaphylaxis and Hives     HIVES (UTICARIA)    Has tolerated toradol injections during 11/8/23 admission    Bee Venom Anaphylaxis    Wasp Venom Anaphylaxis    Adhesive Tape Blisters    Dicyclomine Other (See Comments)       Medical / Surgical History     Past Medical History:   Diagnosis Date    Chronic abdominal pain     Chronic pancreatitis (H)     CFTR gene mutation    History of pancreatectomy 09/06/2024    History of smoking     Nicotine dependence due to vaping non-tobacco product     Port-A-Cath in place     Post-pancreatectomy diabetes (H)     Superior mesenteric artery stenosis (H)      Past Surgical History:   Procedure Laterality Date    APPENDECTOMY      CHOLECYSTECTOMY      COLONOSCOPY      EGD      ENDOSCOPIC ULTRASOUND UPPER GASTROINTESTINAL TRACT (GI)      HYSTERECTOMY      IR FEEDING TUBE PLACEMENT W FLUORO/MD      IR NG TUBE PLACEMENT REQ RAD & FLUORO  05/24/2021    IR NG TUBE PLACEMENT REQ RAD & FLUORO  05/21/2021    PANCREATECTOMY, TRANSPLANT AUTO ISLET CELL, COMBINED N/A 9/6/2024    Procedure: Total pancreatectomy with autologous islet transplant, splenectomy, gastrojejunostomy placement, Lysis of Adhesion;  Surgeon: Carlos Carmichael MD;  Location: [facility] OR    TONSILLECTOMY         Social History     Social History     Socioeconomic History    Marital status:      Spouse name: Not on file     Number of children: 2    Years of education: Not on file    Highest education level: Not on file   Occupational History    Occupation: on disability   Tobacco Use    Smoking status: Former     Current packs/day: 0.00     Average packs/day: 1 pack/day for 8.0 years (8.0 ttl pk-yrs)     Types: Cigarettes     Start date: 2012     Quit date: 2020     Years since quittin.7    Smokeless tobacco: Never    Tobacco comments:     Currently Vapes   Substance and Sexual Activity    Alcohol use: Not Currently    Drug use: Never    Sexual activity: Not on file   Other Topics Concern    Not on file   Social History Narrative    Not on file     Social Determinants of Health     Financial Resource Strain: Low Risk  (10/6/2024)    Financial Resource Strain     Within the past 12 months, have you or your family members you live with been unable to get utilities (heat, electricity) when it was really needed?: No   Food Insecurity: Low Risk  (10/6/2024)    Food Insecurity     Within the past 12 months, did you worry that your food would run out before you got money to buy more?: No     Within the past 12 months, did the food you bought just not last and you didn t have money to get more?: No   Transportation Needs: Low Risk  (10/6/2024)    Transportation Needs     Within the past 12 months, has lack of transportation kept you from medical appointments, getting your medicines, non-medical meetings or appointments, work, or from getting things that you need?: No   Physical Activity: Medium Risk (2022)    Received from [facility], [facility], [facility], [facility]    Physical Activity     Physical Activity: 10   Stress: No Stress Concern Present (2021)    Received from [employer], [employer] [facility] of Occupational Health - Occupational Stress Questionnaire     Feeling of Stress : Not at all   Social Connections: Socially Isolated (2021)    [facility]    Social Connection and Isolation Panel [NHANES]     Frequency of Communication with Friends and Family: Once a week     Frequency of Social Gatherings with Friends and Family: Once a week     Attends Jehovah's witness Services: Never     Active Member of Clubs or Organizations: No     Attends Club or Organization Meetings: Never     Marital Status:    Interpersonal Safety: Low Risk  (10/6/2024)    Interpersonal Safety     Do you feel physically and emotionally safe where you currently live?: Yes     Within the past 12 months, have you been hit, slapped, kicked or otherwise physically hurt by someone?: No     Within the past 12 months, have you been humiliated or emotionally abused in other ways by your partner or ex-partner?: No   Housing Stability: Low Risk  (10/6/2024)    Housing Stability     Do you have housing? : Yes     Are you worried about losing your housing?: No       Family History     Family History   Problem Relation Age of Onset    Depression Mother     Cerebrovascular Disease Mother     Aneurysm Mother         brain    Osteoporosis Mother     Unknown/Adopted Father     Deep Vein Thrombosis Maternal Grandmother     Heart Failure Maternal Grandmother     Unknown/Adopted Paternal Grandmother     Unknown/Adopted Paternal Grandfather     Anesthesia Reaction No family hx of        ROS     12 ROS completed, pertinent positive and negative in HPI    Physical Exam   /68   Pulse 77   Ht 1.689 m (5' 6.5\")   Wt 70.3 kg (155 lb)   LMP  (LMP Unknown)   SpO2 98%   BMI 24.64 kg/m       General: Comfortable, no obvious distress, normal body habitus  Eyes: Sclera anicteric, moist conjunctiva  HENT: Atraumatic,   CV: normal rate.   Resp:  good effort, no evidence of loud wheezing   Skin: No rashes, lesions, or subcutaneous nodules on exposed skin.   Psych: Alert and oriented x 3. Appropriate affect, good insight  Extremities: No peripheral edema  Neuro: Moves all four " "extremities. No focal deficits on limited exam.     Labs/Imaging     Pertinent Labs were reviewed and discussed briefly.  Radiology Results were  reviewed and discussed briefly.    Summary of recent findings:   Lab Results   Component Value Date    A1C 6.7 09/06/2024    A1C 7.0 09/03/2024    A1C 6.2 05/08/2024       TSH   Date Value Ref Range Status   09/10/2024 1.70 0.30 - 4.20 uIU/mL Final       Creatinine   Date Value Ref Range Status   10/07/2024 0.62 0.51 - 0.95 mg/dL Final       Recent Labs   Lab Test 09/03/24  0830 05/08/24  0853   CHOL 148 128   HDL 43* 42*   LDL 76 72   TRIG 143 69       No results found for: \"[id number]\", \"[id number]\", \"[id number]\"    I personally reviewed the patient's outside records from [employer] [facility] and Care Everywhere. Summary of pertinent findings in HPI.    Impression / Plan     1.  Pancreatogenic DM history of recurrent pancreatitis:  2.  S/p TPIAT 9/6/2024: Goal BG  mg/dL  Off tube feed for the last 10 days, currently on Lantus 8 units daily, NovoLog correction scale of 1: 20 for BG above 120 every 4 hours.  Reviewing her Dexcom for the last  week: Heart readings remains within the goal over the night however her BG increased significantly following eating then dropped to the goal between meals.  She is not using Premeal insulin she is just using the correction scale.    Plan:  -Continue Lantus 8 units daily.  -To start using NovoLog 4 units with regular meals and 2 units with small meals and snacks  -To continue to use the correction scale before meals and at the bedtime.  -She has an appointment tomorrow with the diabetes educator if she learns how to count her carbohydrates can start using NovoLog with a carb ratio of 1 unit: 25 g carbohydrates with meals and snacks.  -She is moving back to [place], she contacted her PCP and asked for referral to endocrinology.  She was advised today to follow-up on the referral and to get an appointment with the endocrinologist in " [place].  -Medications and refill orders were reviewed today.      Test and/or medications prescribed today:  No orders of the defined types were placed in this encounter.        Follow up: Moving back to [place] , will establish care in [place] with a local  endocrinologist.      KEDAR Rodriguez  Endocrinology, Diabetes and Metabolism  [facility]    74 minutes spent on the date of the encounter doing chart review, history and exam, documentation and further activities per the note  Note: Chart documentation done in part with Dragon Voice Recognition software. Although reviewed after completion, some word and grammatical errors may remain.  Please consider this when interpreting information in this chart

## 2024-10-10 NOTE — PROGRESS NOTES
Outcome for 10/10/24 10:37 AM: Data obtained via Dexcom website  Emani Raya CMA

## 2024-10-10 NOTE — PATIENT INSTRUCTIONS
- To continue Lantus 8 units daily   - To continue the current correction scale before meals  and bedtime   - To start using Novolog 4 units with regular meals and 2 units with small meals and snacks to take it 5-15 minutes before the first bite.   - To make sure to have an appointment with endocrinology in [place]   - You have an appointment tomorrow with the diabetes educator.   - If you learn how to count the carbohydrates to start using this ratio of Novolog to carbohydrates instead of using fixed doses with meals: of 1 unit : 25 gram of carbohydrates with meals and snacks .

## 2024-10-10 NOTE — Clinical Note
10/10/2024      Ciera Perkins  [address]  [place] [address]      Dear Colleague,    Thank you for referring your patient, Ciera Perkins, to the [facility]. Please see a copy of my visit note below.    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 Evaluation to discuss surgical options. S/p TPIAT 9/6/24    Removed feeding tube. Eating well overall. Continues to get stronger and more active.     Analgesic management: currently on chronic narcotics but hopeful to stop following surgery. Advised that this may persist following surgery but the goal is at least to stop acute pain episodes and make her pain manageable, if not completely resolve it over time. We will work with her home pain providers on pain management and weaning schedules after surgery.   On diaudid PO QID. Understands weaning is a slow process after surgery but is hopeful to come off    Functional status: has had significant weight loss and debility with pancreatitis. Will need to increase exercise tolerance prior to surgery to facilitate recovery  improved    Medical Decision Making: High  Total time: 30 minutes    Carlos Carmichael MD     __________________________________________________________________  Transplant History: Assessment of recurrent acute on chronic pancreatitis and ongoing pain demands    Interval History:   Ciera Perkins is a very pleasant 40 yo F from [place] here for assessment of recurrent acute on chronic pancreatitis. She was in her usual state of health prior to 2021, when she was treated with prednisone for an upper respiratory infection. During that time, she developed acute pancreatitis with evolution into necrotizing pancreatitis. Since that time, she has had a recurring/ongoing pain syndrome.    She does have baseline pain for which she uses dilaudid at  home. During pain episodes, she has severe mid/upper epigastric pain with radiation into her back. She also notes nausea and limited ability to eat and drink during these episodes. Eating does not seem to trigger them, but this is unclear given her nausea and other concerns. She has had feeding tubes and celiac plexus blocks in the past in attempts to manage these symptoms without any durable success. She has never had endoscopic ductal management as her disease process does not seem to have involved ductal obstruction or stone burdens.   Interval history:    Continues with ongoing pain, some intermittent relief with PO dilaudid. PO intake limited but has been maintaining weight. Eager to proceed with surgery tomorrow.       ROS:   A 10-point review of systems was negative except as noted above.    Curent Meds:  No current facility-administered medications for this visit.       Physical Exam:     Admit Weight: 70.3 kg (155 lb)    Current Vitals:   /76   Pulse 69   Wt 73.5 kg (162 lb 1.6 oz)   LMP  (LMP Unknown)   SpO2 96%   BMI 25.77 kg/m           Vital sign ranges:       No data found.  General Appearance: in no apparent distress.   Skin: normal, warm, dry, no suspicious lesions or rashes  Heart: regular rate and rhythm, normal S1 and S2  Lungs: clear to auscultation  Abdomen: The abdomen is abdomen soft, slightly rounded. Tender to palpation in upper/midepigastrium. No hernia. Incisions consistent with surgical history   Extremities: edema: absent.     Data:   CMP@LABRCNTIPR(na:2,potassium:2,chloride:2,co2:2,g,bun:2,cr:2,gfrestimated:2,gfrestblack:2,dany:2,icapoc:2,icaw:2,ma,phos:2,amylase:2,lipase:2,uamy24:3,albumin:2,bilitotal:2,biliconj:2,bilidelta:2,alkphos:2,ast:2,alt:2,fbili:1)@  CBC@LABRCNTIPR(hgb:2,wbc:2,a,plt:2,a1c:1)@  Coags@LABRCNTIPR(inr:2,ptt:2,Xa:2)@   Urinalysis  Recent Labs   Lab Test 24  0912   COLOR Yellow   APPEARANCE Clear   URINEGLC Negative   URINEBILI Negative    URINEKETONE Negative   SG 1.026   UBLD Negative   URINEPH 5.5   PROTEIN 10*   NITRITE Negative   LEUKEST Negative   RBCU <1   WBCU 2       CT reviewed personally. Findings in assessment/plan section      Again, thank you for allowing me to participate in the care of your patient.        Sincerely,        Carlos Carmichael MD

## 2024-10-10 NOTE — LETTER
10/10/2024       RE: Ciera Perkins  [address]  [facility] [address]     Dear Colleague,    Thank you for referring your patient, Ciera Perkins, to the [facility] ENDOCRINOLOGY CLINIC [place] at [facility]. Please see a copy of my visit note below.    Endocrinology Clinic Visit 10/10/2024    NAME:  Ciera Perkins  PCP:  Hayden Houston  MRN:  [id number]  Reason for Consult: Pancreatogenic s/pTPAIT  Requesting Provider:  Dinah Marin    Chief Complaint     Chief Complaint   Patient presents with     Diabetes       History of Present Illness     Ciera Perkins is a 39 year old female who is seen in clinic for s/p TPAIT diabetes management.  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 She is now s/p TPAIT on 9/6/2024, splenectomy, and G/J placement with repair of small incisional hernia on 9/6/24 .    Preoperative glycemic assessment showed that she has prediabetes.  However few years ago she was diagnosed with pancreatogenic DM from reviewing of the records her A1c was 9.3 in January 2021.  Historical diabetes medications:  Tradjenta, glimepiride, metformin, Basaglar.  She was discharged from the hospital on 9/6/2024 on tube feed was receiving Lantus 46 units NovoLog correction scale every 4 hours of 1: 20 for BG above 120.  At that time was on continuous tube feeds 45 mL/h.  Following the first discharge from the hospital tube feed was discontinued and doses of Lantus were reduced and started on NovoLog with meals.  She was readmitted to the hospital on 10/2/2024 discharged on 10/7/2024 presented with acute on chronic abdominal pain at that time was discharged on Lantus 8 units, NovoLog correction scale of 1: 20 for BG above 120.    Today she stated she uses:  Lantus 8 units  daily.  NovoLog correction scale of 1: 20 for BG above 120.  Carb counting skills:        Previous A1C in records reviewed.  Hemoglobin A1c 6.7% on 9/6/2024  Weight is 70 kg     Diabetes Care/Complications:   Nephropathy: No history of nephropathy no albuminuria on 9/3/2024, creatinine 0.62 on 10/7/2024  Retinopathy: no recent visit with the ophthalmology   HLD: LDL 76 on 9/3/2024  Neuropathy: No history of neuropathy.  HTN: No history of hypertension.      Previous DM related Hospitalization:  None    Blood Glucose Monitoring: Dexcom G7  CGMS Data: Last 14 days  GMI 6.7%.  Average glucose 140 mg/dL.  CV 30.8%.  Time CGM in use 99.6%.  TIR 48%  Low 2%  Very low <1 percent    Overall Pattern: Over the night readings remains close to 70 mg/dL with some lows, during the day her BG increased significantly following eating and comes down to the goal of  between meals.        Diet:   Eats breakfast sometimes at 08:00-09:00 am and late lunch around 03:00 pm but the times of these meals is not fixed.         Family hx of DM: maternal uncle and aunt DM type 2     Problem List     Patient Active Problem List   Diagnosis     Chronic recurrent pancreatitis (H)     Diabetes mellitus, type 2 (H)     Atrial fibrillation with RVR (H)     Atrial flutter (H)     History of pancreatectomy     Pancreatic insufficiency     Diabetes mellitus secondary to pancreatectomy (H)     Acute post-operative pain     Open wound     GERD (gastroesophageal reflux disease)     Moderate malnutrition (H)     Leukocytosis     Anemia     Thrombocytosis     S/P pancreatic islet cell transplantation (H)     Postoperative pain        Medications     Current Outpatient Medications   Medication Sig Dispense Refill     acetaminophen (TYLENOL) 325 MG tablet Take 2 tablets (650 mg) by mouth every 6 hours as needed for mild pain. 100 tablet 0     Alcohol Swabs PADS Use to swab the area of the injection or kindra as directed Per insurance coverage 200 each 1      blood glucose (NO BRAND SPECIFIED) lancets standard To use to test glucose level in the blood Use to test blood sugar 4-6 times daily as directed. To accompany glucose monitor brands per insurance coverage. One Touch Delica lancets 200 each 1     blood glucose (NO BRAND SPECIFIED) test strip Use to test blood sugar 4-6 times daily or as directed. 200 strip 11     blood glucose monitoring (ONETOUCH VERIO) meter device kit Use to test blood sugar 4-6 times daily or as directed. 1 kit 0     Continuous Glucose Sensor (DEXCOM G7 SENSOR) MISC 1 each every 10 days. Change sensor every 10 days 9 each 5     diltiazem ER (CARDIZEM SR) 60 MG 12 hr capsule Take 1 capsule (60 mg) by mouth 2 times daily. 60 capsule 2     gabapentin (NEURONTIN) 600 MG tablet Take 1 tablet (600 mg) by mouth 3 times daily. 90 tablet 1     Glucagon (BAQSIMI) 3 MG/DOSE nasal powder Spray 1 spray (3 mg) in nostril as needed for low blood sugar. in the event of unconscious hypoglycemia or hypoglycemic seizure. May repeat dose if no response after 15 minutes. 2 each 1     HYDROmorphone, STANDARD CONC, (DILAUDID) 1 MG/ML oral solution Take 6-8 mLs (6-8 mg) by mouth or J tube every 4 hours as needed for moderate to severe pain. For 3 days, then reduce to 6 mg every 4 hours PRN for 3 days, then reduce to 4 mg every 4 hours PRN. 500 mL 0     hydrOXYzine HCl (ATARAX) 25 MG tablet Take 1 tablet (25 mg) by mouth every 6 hours as needed for anxiety or other (adjuvant pain). 100 tablet 1     insulin aspart (NOVOLOG PEN) 100 UNIT/ML pen Inject 1-10 Units subcutaneously every 4 hours. 15 mL 2     insulin glargine (LANTUS PEN) 100 UNIT/ML pen Inject 8 Units subcutaneously every 24 hours. 15 mL 2     insulin pen needle (32G X 4 MM) 32G X 4 MM miscellaneous Use as directed by provider Per insurance coverage 200 each 1     lipase-protease-amylase (CREON) 57706-88755-473050 units CPEP per EC capsule Take 1-2 pills with snacks, and 3-4 for meals.  Daily possible  total of 16 pills 500 capsule 1     methocarbamol (ROBAXIN) 750 MG tablet Take 1 tablet (750 mg) by mouth 4 times daily as needed for muscle spasms. 120 tablet 0     mvw complete formulation (SOFTGELS) capsule Take 1 capsule by mouth daily. 30 capsule 1     naloxone (NARCAN) 4 MG/0.1ML nasal spray Spray 4 mg into one nostril alternating nostrils as needed for opioid reversal. every 2-3 minutes until assistance arrives       omeprazole (PRILOSEC) 20 MG DR capsule Take 20 mg by mouth every morning.       senna-docusate (SENOKOT-S/PERICOLACE) 8.6-50 MG tablet Take 1-2 tablets by mouth 2 times daily as needed for constipation. 60 tablet 0     Sharps Container MISC Use as directed to dispose of needles, lancets and other sharps 1 each 1     Transparent Dressings (ZW7543 STANDARD) MISC 1 each daily. Around G/J tube site 50 each 0     No current facility-administered medications for this visit.        Allergies     Allergies   Allergen Reactions     Aspirin Anaphylaxis and Hives     HIVES (UTICARIA)    Has tolerated toradol injections during 11/8/23 admission     Bee Venom Anaphylaxis     Wasp Venom Anaphylaxis     Adhesive Tape Blisters     Dicyclomine Other (See Comments)       Medical / Surgical History     Past Medical History:   Diagnosis Date     Chronic abdominal pain      Chronic pancreatitis (H)     CFTR gene mutation     History of pancreatectomy 09/06/2024     History of smoking      Nicotine dependence due to vaping non-tobacco product      Port-A-Cath in place      Post-pancreatectomy diabetes (H)      Superior mesenteric artery stenosis (H)      Past Surgical History:   Procedure Laterality Date     APPENDECTOMY       CHOLECYSTECTOMY       COLONOSCOPY       EGD       ENDOSCOPIC ULTRASOUND UPPER GASTROINTESTINAL TRACT (GI)       HYSTERECTOMY       IR FEEDING TUBE PLACEMENT W FLUORO/MD       IR NG TUBE PLACEMENT REQ RAD & FLUORO  05/24/2021     IR NG TUBE PLACEMENT REQ RAD & FLUORO  05/21/2021      PANCREATECTOMY, TRANSPLANT AUTO ISLET CELL, COMBINED N/A 2024    Procedure: Total pancreatectomy with autologous islet transplant, splenectomy, gastrojejunostomy placement, Lysis of Adhesion;  Surgeon: Carlos Carmichael MD;  Location: [facility] OR     TONSILLECTOMY         Social History     Social History     Socioeconomic History     Marital status:      Spouse name: Not on file     Number of children: 2     Years of education: Not on file     Highest education level: Not on file   Occupational History     Occupation: on disability   Tobacco Use     Smoking status: Former     Current packs/day: 0.00     Average packs/day: 1 pack/day for 8.0 years (8.0 ttl pk-yrs)     Types: Cigarettes     Start date: 2012     Quit date: 2020     Years since quittin.7     Smokeless tobacco: Never     Tobacco comments:     Currently Vapes   Substance and Sexual Activity     Alcohol use: Not Currently     Drug use: Never     Sexual activity: Not on file   Other Topics Concern     Not on file   Social History Narrative     Not on file     Social Determinants of Health     Financial Resource Strain: Low Risk  (10/6/2024)    Financial Resource Strain      Within the past 12 months, have you or your family members you live with been unable to get utilities (heat, electricity) when it was really needed?: No   Food Insecurity: Low Risk  (10/6/2024)    Food Insecurity      Within the past 12 months, did you worry that your food would run out before you got money to buy more?: No      Within the past 12 months, did the food you bought just not last and you didn t have money to get more?: No   Transportation Needs: Low Risk  (10/6/2024)    Transportation Needs      Within the past 12 months, has lack of transportation kept you from medical appointments, getting your medicines, non-medical meetings or appointments, work, or from getting things that you need?: No   Physical Activity: Medium Risk (2022)     "Received from [facility], [facility], [facility], [facility]    Physical Activity      Physical Activity: 10   Stress: No Stress Concern Present (7/23/2021)    Received from [facility]    [employer] - Occupational Stress Questionnaire      Feeling of Stress : Not at all   Social Connections: Socially Isolated (7/23/2021)    Received from [facility]    Social Connection and Isolation Panel [NHANES]      Frequency of Communication with Friends and Family: Once a week      Frequency of Social Gatherings with Friends and Family: Once a week      Attends Quaker Services: Never      Active Member of Clubs or Organizations: No      Attends Club or Organization Meetings: Never      Marital Status:    Interpersonal Safety: Low Risk  (10/6/2024)    Interpersonal Safety      Do you feel physically and emotionally safe where you currently live?: Yes      Within the past 12 months, have you been hit, slapped, kicked or otherwise physically hurt by someone?: No      Within the past 12 months, have you been humiliated or emotionally abused in other ways by your partner or ex-partner?: No   Housing Stability: Low Risk  (10/6/2024)    Housing Stability      Do you have housing? : Yes      Are you worried about losing your housing?: No       Family History     Family History   Problem Relation Age of Onset     Depression Mother      Cerebrovascular Disease Mother      Aneurysm Mother         brain     Osteoporosis Mother      Unknown/Adopted Father      Deep Vein Thrombosis Maternal Grandmother      Heart Failure Maternal Grandmother      Unknown/Adopted Paternal Grandmother      Unknown/Adopted Paternal Grandfather      Anesthesia Reaction No family hx of        ROS     12 ROS completed, pertinent positive and negative in HPI    Physical Exam   /68   Pulse 77   Ht 1.689 m (5' 6.5\")   Wt 70.3 kg (155 lb)  " " LMP  (LMP Unknown)   SpO2 98%   BMI 24.64 kg/m       General: Comfortable, no obvious distress, normal body habitus  Eyes: Sclera anicteric, moist conjunctiva  HENT: Atraumatic,   CV: normal rate.   Resp:  good effort, no evidence of loud wheezing   Skin: No rashes, lesions, or subcutaneous nodules on exposed skin.   Psych: Alert and oriented x 3. Appropriate affect, good insight  Extremities: No peripheral edema  Neuro: Moves all four extremities. No focal deficits on limited exam.     Labs/Imaging     Pertinent Labs were reviewed and discussed briefly.  Radiology Results were  reviewed and discussed briefly.    Summary of recent findings:   Lab Results   Component Value Date    A1C 6.7 09/06/2024    A1C 7.0 09/03/2024    A1C 6.2 05/08/2024       TSH   Date Value Ref Range Status   09/10/2024 1.70 0.30 - 4.20 uIU/mL Final       Creatinine   Date Value Ref Range Status   10/07/2024 0.62 0.51 - 0.95 mg/dL Final       Recent Labs   Lab Test 09/03/24  0830 05/08/24  0853   CHOL 148 128   HDL 43* 42*   LDL 76 72   TRIG 143 69       No results found for: \"[id number]\", \"[id number]\", \"[id number]\"    I personally reviewed the patient's outside records from [facility] EMR and Care Everywhere. Summary of pertinent findings in HPI.    Impression / Plan     1.  Pancreatogenic DM history of recurrent pancreatitis:  2.  S/p TPIAT 9/6/2024: Goal BG  mg/dL  Off tube feed for the last 10 days, currently on Lantus 8 units daily, NovoLog correction scale of 1: 20 for BG above 120 every 4 hours.  Reviewing her Dexcom for the last  week: Heart readings remains within the goal over the night however her BG increased significantly following eating then dropped to the goal between meals.  She is not using Premeal insulin she is just using the correction scale.    Plan:  -Continue Lantus 8 units daily.  -To start using NovoLog 4 units with regular meals and 2 units with small meals and snacks  -To continue to use the correction scale " before meals and at the bedtime.  -She has an appointment tomorrow with the diabetes educator if she learns how to count her carbohydrates can start using NovoLog with a carb ratio of 1 unit: 25 g carbohydrates with meals and snacks.  -She is moving back to [place], she contacted her PCP and asked for referral to endocrinology.  She was advised today to follow-up on the referral and to get an appointment with the endocrinologist in [place].  -Medications and refill orders were reviewed today.      Test and/or medications prescribed today:  No orders of the defined types were placed in this encounter.        Follow up: Moving back to [place] , will establish care in [place] with a local  endocrinologist.      KEDAR Rodriguez  Endocrinology, Diabetes and Metabolism  [facility]    74 minutes spent on the date of the encounter doing chart review, history and exam, documentation and further activities per the note  Note: Chart documentation done in part with Dragon Voice Recognition software. Although reviewed after completion, some word and grammatical errors may remain.  Please consider this when interpreting information in this chart      Outcome for 10/10/24 10:37 AM: Data obtained via Dexcom website  Emani Raya CMA                                Again, thank you for allowing me to participate in the care of your patient.      Sincerely,    KEDAR Rodriguez

## 2024-10-11 ENCOUNTER — VIRTUAL VISIT (OUTPATIENT)
Dept: EDUCATION SERVICES | Facility: CLINIC | Age: 39
End: 2024-10-11
Payer: COMMERCIAL

## 2024-10-11 DIAGNOSIS — K86.89 DIABETES MELLITUS SECONDARY TO PANCREATIC INSUFFICIENCY (H): ICD-10-CM

## 2024-10-11 DIAGNOSIS — K86.1 CHRONIC PANCREATITIS (H): ICD-10-CM

## 2024-10-11 DIAGNOSIS — E08.9 DIABETES MELLITUS SECONDARY TO PANCREATIC INSUFFICIENCY (H): ICD-10-CM

## 2024-10-11 DIAGNOSIS — K86.1 CHRONIC RECURRENT PANCREATITIS (H): ICD-10-CM

## 2024-10-11 PROCEDURE — 99207 PR NO BILLABLE SERVICE THIS VISIT: CPT | Mod: 95 | Performed by: NUTRITIONIST

## 2024-10-11 ASSESSMENT — PAIN SCALES - GENERAL: PAINLEVEL: MODERATE PAIN (4)

## 2024-10-11 NOTE — PROGRESS NOTES
Virtual Visit Details    Type of service:  Video Visit     Joined the call at 10/11/2024, 9:31:51 am.  Left the call at 10/11/2024, 9:44:31 am.  You were on the call for 12 minutes 39 seconds     Originating Location (pt. Location): Home    Distant Location (provider location):  Off-site  Platform used for Video Visit: Lantronix    Diabetes Self-Management Education & Support    Ciera Perkins presents today for education related to Post Pancreatectomy Diabetes    Patient is being treated with:  Insulin (less than 3 injections daily)  She is accompanied by self  Sleepy today. Getting ready to head back to [place] tomorrow    PATIENT CONCERNS/REASON FOR REFERRAL Carb counting      ASSESSMENT:    Taking Medication:     Current Diabetes Management per Patient:  Taking diabetes medications? yes:     Diabetes Medication(s)       Diabetic Other       Glucagon (BAQSIMI) 3 MG/DOSE nasal powder Spray 1 spray (3 mg) in nostril as needed for low blood sugar. in the event of unconscious hypoglycemia or hypoglycemic seizure. May repeat dose if no response after 15 minutes.       Insulin       insulin aspart (NOVOLOG PEN) 100 UNIT/ML pen Inject 1-10 Units subcutaneously every 4 hours.     insulin glargine (LANTUS PEN) 100 UNIT/ML pen Inject 8 Units subcutaneously every 24 hours.            Monitoring    Reviewed by Dr. Hudson yesterday    Patient's most recent   Lab Results   Component Value Date    A1C 6.7 09/06/2024      Patient's A1C goal: <7.0    Activity: not assessed    Healthy Eating:   eating mostly a regular diet at this point    Problem Solving:      Patient is at risk of hypoglycemia?: YES  Hospitalizations for hyper or hypoglycemia: No    EDUCATION and INSTRUCTION PROVIDED AT THIS VISIT:       The relationship between carbohydrate and glucose, identifying carbohydrate containing foods, how to use a nutrition facts label, reviewed written and online or jenniffer based resources, how to estimate carbs and how to count carbs when  eating out, measuring foods and estimating portions, what is an insulin to carbohydrate ratio and how is it used. Went through practice scenarios and patient was able to accurately verbalize insulin doses using insulin to carb ratio 1:25g.     Patient-stated goal written and given to Ciera Perkins.  Verbalized and demonstrated understanding of instructions.     Any diabetes medication dose changes were made via the CDE Protocol and Collaborative Practice Agreement with [facility] and  [facility].  A copy of this encounter was provided to patient's referring provider.

## 2024-10-11 NOTE — NURSING NOTE
Current patient location: Patient declined to provide     Is the patient currently in the state of [place]? YES    Visit mode:VIDEO    If the visit is dropped, the patient can be reconnected by: VIDEO VISIT: Text to cell phone:   Telephone Information:   Mobile [phone]       Will anyone else be joining the visit? NO  (If patient encounters technical issues they should call [phone])    Are changes needed to the allergy or medication list? No    Are refills needed on medications prescribed by this physician? NO    Rooming Documentation:  Questionnaire(s) not done per department protocol    Reason for visit: RECHECK Shelby Kocher VVF

## 2024-10-14 ENCOUNTER — TELEPHONE (OUTPATIENT)
Dept: TRANSPLANT | Facility: CLINIC | Age: 39
End: 2024-10-14
Payer: COMMERCIAL

## 2024-10-14 NOTE — TELEPHONE ENCOUNTER
Pharmacy needing to Clarify directions on patient's Humalog insulin that was just sent to them. They provided fax number as [phone]  
Spoke with Percy at [facility] regarding Humalog order.     Pharmacy is needing approximate daily maximum dosing for Humalog/Novolog. Gave ok for up to 60 units daily and ok to switch between Humalog and Novolog based on insurance formulary coverage.       Mahnaz Buckner RN Transplant Coordinator on October 14, 2024 4:35 PM     
Admission Reconciliation is Completed  Discharge Reconciliation is Completed

## 2024-10-15 ENCOUNTER — DOCUMENTATION ONLY (OUTPATIENT)
Dept: TRANSPLANT | Facility: CLINIC | Age: 39
End: 2024-10-15
Payer: COMMERCIAL

## 2024-10-16 NOTE — PROGRESS NOTES
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 Evaluation to discuss surgical options. S/p TPIAT 9/6/24    Removed feeding tube. Eating well overall. Continues to get stronger and more active.     Analgesic management: currently on chronic narcotics but hopeful to stop following surgery. Advised that this may persist following surgery but the goal is at least to stop acute pain episodes and make her pain manageable, if not completely resolve it over time. We will work with her home pain providers on pain management and weaning schedules after surgery.   On diaudid PO QID. Understands weaning is a slow process after surgery but is hopeful to come off    Functional status: has had significant weight loss and debility with pancreatitis. Will need to increase exercise tolerance prior to surgery to facilitate recovery  improved    Medical Decision Making: High  Total time: 30 minutes    Carlos Carmichael MD     __________________________________________________________________  Transplant History: Assessment of recurrent acute on chronic pancreatitis and ongoing pain demands    Interval History:   Ciera Perkins is a very pleasant 38 yo F from [place] here for assessment of recurrent acute on chronic pancreatitis. She was in her usual state of health prior to 2021, when she was treated with prednisone for an upper respiratory infection. During that time, she developed acute pancreatitis with evolution into necrotizing pancreatitis. Since that time, she has had a recurring/ongoing pain syndrome.    She does have baseline pain for which she uses dilaudid at home. During pain episodes, she has severe mid/upper epigastric pain with radiation into her back. She also notes nausea and limited ability to eat and drink during these episodes. Eating does not seem to trigger them, but this is unclear given her  nausea and other concerns. She has had feeding tubes and celiac plexus blocks in the past in attempts to manage these symptoms without any durable success. She has never had endoscopic ductal management as her disease process does not seem to have involved ductal obstruction or stone burdens.   Interval history:    Continues with ongoing pain, some intermittent relief with PO dilaudid. PO intake limited but has been maintaining weight. Eager to proceed with surgery tomorrow.       ROS:   A 10-point review of systems was negative except as noted above.    Curent Meds:  No current facility-administered medications for this visit.       Physical Exam:     Admit Weight: 70.3 kg (155 lb)    Current Vitals:   /76   Pulse 69   Wt 73.5 kg (162 lb 1.6 oz)   LMP  (LMP Unknown)   SpO2 96%   BMI 25.77 kg/m           Vital sign ranges:       No data found.  General Appearance: in no apparent distress.   Skin: normal, warm, dry, no suspicious lesions or rashes  Heart: regular rate and rhythm, normal S1 and S2  Lungs: clear to auscultation  Abdomen: The abdomen is abdomen soft, slightly rounded. Tender to palpation in upper/midepigastrium. No hernia. Incisions consistent with surgical history   Extremities: edema: absent.     Data:   CMP@LABRCNTIPR(na:2,potassium:2,chloride:2,co2:2,g,bun:2,cr:2,gfrestimated:2,gfrestblack:2,dany:2,icapoc:2,icaw:2,ma,phos:2,amylase:2,lipase:2,uamy24:3,albumin:2,bilitotal:2,biliconj:2,bilidelta:2,alkphos:2,ast:2,alt:2,fbili:1)@  CBC@LABRCNTIPR(hgb:2,wbc:2,a,plt:2,a1c:1)@  Coags@LABRCNTIPR(inr:2,ptt:2,Xa:2)@   Urinalysis  Recent Labs   Lab Test 24  0912   COLOR Yellow   APPEARANCE Clear   URINEGLC Negative   URINEBILI Negative   URINEKETONE Negative   SG 1.026   UBLD Negative   URINEPH 5.5   PROTEIN 10*   NITRITE Negative   LEUKEST Negative   RBCU <1   WBCU 2       CT reviewed personally. Findings in assessment/plan section     PROTEIN 10*   NITRITE Negative   LEUKEST Negative   RBCU <1   WBCU 2       CT reviewed personally. Findings in assessment/plan section

## 2024-11-20 ENCOUNTER — TELEPHONE (OUTPATIENT)
Dept: TRANSPLANT | Facility: CLINIC | Age: 39
End: 2024-11-20
Payer: COMMERCIAL

## 2024-11-20 DIAGNOSIS — K86.1 CHRONIC RECURRENT PANCREATITIS (H): Primary | ICD-10-CM

## 2024-11-20 NOTE — TELEPHONE ENCOUNTER
Called to check in with patient and she is currently inpatient with a bowel obstruction.  Will connect with patient after discharge

## 2024-12-09 NOTE — PROGRESS NOTES
Assessment & Plan: recovering well after TPIAT.     -continues to work on increasing PO calorie intake. Still on TF but weaning down  -using dilaudid solution via J tube. Having insurance issues with the liquid medication, will see if pills have same effect  -functional status good, ambulating well  -endocrinology follow up as scheduled           Carlos Carmichael MD      __________________________________________________________________  Transplant History: sp TPIAT  8/16/2024 ([facility]),     Interval History: History is obtained from the patient  Recovering very well overall. Tolerating G tube clamped. Taking in some liquids by mouth but not substantial calories yet. BMs regular.      ROS:   A 10-point review of systems was negative except as noted above.     Curent Meds:  Inpatient Administered Meds   No current facility-administered medications for this visit.            Physical Exam:            BP 98/65   Pulse 95   Wt 72.4 kg (159 lb 11.2 oz)   LMP  (LMP Unknown)   SpO2 97%   BMI 25.78 kg/m               Vital sign ranges:    No data found.  General Appearance: in no apparent distress.   Skin: normal, warm, dry, no suspicious lesions or rashes  Heart: regular rate and rhythm, normal S1 and S2  Lungs: clear to auscultation  Abdomen: The abdomen is soft, flat, appropriately tender. Midline incision healing well. KELLY site well healed. No erythema or induration at GJ site   Extremities: edema: absent.      Data: labs reviewed, unconcerning

## 2024-12-15 ENCOUNTER — HEALTH MAINTENANCE LETTER (OUTPATIENT)
Age: 39
End: 2024-12-15

## 2024-12-17 NOTE — PROGRESS NOTES
Assessment & Plan: recovering well after TPIAT.     -continues to work on increasing PO calorie intake. Stopped TF, eating more calories. Remove GJ at next visit if doing well  -pain controlled well with liquid dialudid. Weaning slowly  -functional status good, ambulating well  -endocrinology follow up as scheduled           Carlos Carmichael MD      __________________________________________________________________  Transplant History: sp TPIAT  8/16/2024 ([facility]),     Interval History: History is obtained from the patient  Recovering very well overall. Taking in more by mouth, tolerating caloric intake, able to stop tube feeds. Regular BMs.      ROS:   A 10-point review of systems was negative except as noted above.     Curent Meds:  Inpatient Administered Meds   No current facility-administered medications for this visit.            Physical Exam:            /69   Pulse 82   Wt 70.7 kg (155 lb 12.8 oz)   LMP  (LMP Unknown)   SpO2 97%   BMI 25.15 kg/m               Vital sign ranges:    No data found.  General Appearance: in no apparent distress.   Skin: normal, warm, dry, no suspicious lesions or rashes  Heart: regular rate and rhythm, normal S1 and S2  Lungs: clear to auscultation  Abdomen: The abdomen is soft, flat, appropriately tender. Midline incision healing well. KELLY site well healed. No erythema or induration at GJ site   Extremities: edema: absent.      Data: labs reviewed, unconcerning

## 2025-03-20 ENCOUNTER — LAB (OUTPATIENT)
Dept: LAB | Facility: CLINIC | Age: 40
End: 2025-03-20
Payer: COMMERCIAL

## 2025-03-20 ENCOUNTER — TELEPHONE (OUTPATIENT)
Dept: TRANSPLANT | Facility: CLINIC | Age: 40
End: 2025-03-20

## 2025-03-20 ENCOUNTER — ALLIED HEALTH/NURSE VISIT (OUTPATIENT)
Dept: TRANSPLANT | Facility: CLINIC | Age: 40
End: 2025-03-20
Attending: NURSE PRACTITIONER
Payer: MEDICARE

## 2025-03-20 DIAGNOSIS — K86.89 PANCREATIC INSUFFICIENCY: ICD-10-CM

## 2025-03-20 DIAGNOSIS — K86.1 CHRONIC RECURRENT PANCREATITIS (H): Primary | ICD-10-CM

## 2025-03-20 DIAGNOSIS — R63.39 OTHER FEEDING DIFFICULTIES: ICD-10-CM

## 2025-03-20 DIAGNOSIS — R63.8 OTHER SYMPTOMS AND SIGNS CONCERNING FOOD AND FLUID INTAKE: ICD-10-CM

## 2025-03-20 DIAGNOSIS — K86.1 CHRONIC RECURRENT PANCREATITIS (H): ICD-10-CM

## 2025-03-20 DIAGNOSIS — Z94.83 S/P PANCREATIC ISLET CELL TRANSPLANTATION (H): ICD-10-CM

## 2025-03-20 DIAGNOSIS — F45.8 OTHER SOMATOFORM DISORDERS: ICD-10-CM

## 2025-03-20 DIAGNOSIS — R11.10 VOMITING, UNSPECIFIED: ICD-10-CM

## 2025-03-20 LAB
ALBUMIN SERPL BCG-MCNC: 3.7 G/DL (ref 3.5–5.2)
ALP SERPL-CCNC: 151 U/L (ref 40–150)
ALT SERPL W P-5'-P-CCNC: 58 U/L (ref 0–50)
ANION GAP SERPL CALCULATED.3IONS-SCNC: 11 MMOL/L (ref 7–15)
AST SERPL W P-5'-P-CCNC: 41 U/L (ref 0–45)
BASOPHILS # BLD AUTO: 0.1 10E3/UL (ref 0–0.2)
BASOPHILS NFR BLD AUTO: 1 %
BILIRUB SERPL-MCNC: 0.2 MG/DL
BUN SERPL-MCNC: 7.5 MG/DL (ref 6–20)
C PEPTIDE SERPL-MCNC: 1 NG/ML (ref 0.9–6.9)
CALCIUM SERPL-MCNC: 8.6 MG/DL (ref 8.8–10.4)
CHLORIDE SERPL-SCNC: 113 MMOL/L (ref 98–107)
CHOLEST SERPL-MCNC: 87 MG/DL
CREAT SERPL-MCNC: 0.65 MG/DL (ref 0.51–0.95)
EGFRCR SERPLBLD CKD-EPI 2021: >90 ML/MIN/1.73M2
EOSINOPHIL # BLD AUTO: 0.2 10E3/UL (ref 0–0.7)
EOSINOPHIL NFR BLD AUTO: 1 %
ERYTHROCYTE [DISTWIDTH] IN BLOOD BY AUTOMATED COUNT: 14.2 % (ref 10–15)
EST. AVERAGE GLUCOSE BLD GHB EST-MCNC: 183 MG/DL
FASTING STATUS PATIENT QL REPORTED: YES
FERRITIN SERPL-MCNC: 857 NG/ML (ref 6–175)
GLUCOSE SERPL-MCNC: 226 MG/DL (ref 70–99)
HBA1C MFR BLD: 8 %
HCO3 SERPL-SCNC: 19 MMOL/L (ref 22–29)
HCT VFR BLD AUTO: 40.2 % (ref 35–47)
HDLC SERPL-MCNC: 29 MG/DL
HGB BLD-MCNC: 13.3 G/DL (ref 11.7–15.7)
IMM GRANULOCYTES # BLD: 0 10E3/UL
IMM GRANULOCYTES NFR BLD: 0 %
IRON BINDING CAPACITY (ROCHE): 211 UG/DL (ref 240–430)
IRON SATN MFR SERPL: 52 % (ref 15–46)
IRON SERPL-MCNC: 109 UG/DL (ref 37–145)
LDLC SERPL CALC-MCNC: 40 MG/DL
LYMPHOCYTES # BLD AUTO: 2.9 10E3/UL (ref 0.8–5.3)
LYMPHOCYTES NFR BLD AUTO: 24 %
MCH RBC QN AUTO: 31 PG (ref 26.5–33)
MCHC RBC AUTO-ENTMCNC: 33.1 G/DL (ref 31.5–36.5)
MCV RBC AUTO: 94 FL (ref 78–100)
MONOCYTES # BLD AUTO: 1 10E3/UL (ref 0–1.3)
MONOCYTES NFR BLD AUTO: 8 %
NEUTROPHILS # BLD AUTO: 7.8 10E3/UL (ref 1.6–8.3)
NEUTROPHILS NFR BLD AUTO: 65 %
NONHDLC SERPL-MCNC: 58 MG/DL
NRBC # BLD AUTO: 0 10E3/UL
NRBC BLD AUTO-RTO: 0 /100
PLATELET # BLD AUTO: 388 10E3/UL (ref 150–450)
POTASSIUM SERPL-SCNC: 4 MMOL/L (ref 3.4–5.3)
PREALB SERPL-MCNC: 15.3 MG/DL (ref 20–40)
PROT SERPL-MCNC: 6.5 G/DL (ref 6.4–8.3)
RBC # BLD AUTO: 4.29 10E6/UL (ref 3.8–5.2)
SODIUM SERPL-SCNC: 143 MMOL/L (ref 135–145)
TRIGL SERPL-MCNC: 91 MG/DL
WBC # BLD AUTO: 11.9 10E3/UL (ref 4–11)

## 2025-03-20 PROCEDURE — 84681 ASSAY OF C-PEPTIDE: CPT | Performed by: PEDIATRICS

## 2025-03-20 PROCEDURE — 82607 VITAMIN B-12: CPT | Performed by: PEDIATRICS

## 2025-03-20 PROCEDURE — 84134 ASSAY OF PREALBUMIN: CPT | Performed by: PEDIATRICS

## 2025-03-20 PROCEDURE — 83036 HEMOGLOBIN GLYCOSYLATED A1C: CPT | Performed by: PEDIATRICS

## 2025-03-20 PROCEDURE — 82306 VITAMIN D 25 HYDROXY: CPT | Performed by: PEDIATRICS

## 2025-03-20 NOTE — PROGRESS NOTES
Upon arrival and rooming patient she stated that she was not fasting and says she was not told to fast. I contacted Mahnaz the Coordinator and let her know that patient was not fasting and that her Glucose was 248. We asked her to please reschedule her lab and boost for 3/21/2025  Tammie Ruth on 3/20/2025 at 9:49 AM

## 2025-03-20 NOTE — TELEPHONE ENCOUNTER
Provider Call: General  Route to LPN    Reason for call: Tammie from [facility] called to speak to RNCC regarding pt's testing.    Call back needed? Yes    Return Call Needed  Same as documented in contacts section  When to return call?: Same day: Route High Priority

## 2025-03-21 ENCOUNTER — TELEPHONE (OUTPATIENT)
Dept: ENDOCRINOLOGY | Facility: CLINIC | Age: 40
End: 2025-03-21

## 2025-03-21 LAB — VIT B12 SERPL-MCNC: 412 PG/ML (ref 232–1245)

## 2025-03-21 PROCEDURE — 84681 ASSAY OF C-PEPTIDE: CPT | Performed by: PEDIATRICS

## 2025-03-21 PROCEDURE — 99000 SPECIMEN HANDLING OFFICE-LAB: CPT | Performed by: PATHOLOGY

## 2025-03-22 LAB — ZINC SERPL-MCNC: 61.3 UG/DL

## 2025-03-23 LAB
A-TOCOPHEROL VIT E SERPL-MCNC: 4.3 MG/L
ANNOTATION COMMENT IMP: ABNORMAL
BETA+GAMMA TOCOPHEROL SERPL-MCNC: 0.7 MG/L
DEPRECATED CALCIDIOL+CALCIFEROL SERPL-MC: <11 UG/L (ref 20–75)
RETINYL PALMITATE SERPL-MCNC: <0.02 MG/L
VIT A SERPL-MCNC: 0.25 MG/L
VITAMIN D2 SERPL-MCNC: <5 UG/L
VITAMIN D3 SERPL-MCNC: 6 UG/L

## 2025-03-25 ENCOUNTER — CARE COORDINATION (OUTPATIENT)
Dept: TRANSPLANT | Facility: CLINIC | Age: 40
End: 2025-03-25
Payer: COMMERCIAL

## 2025-03-25 DIAGNOSIS — K86.1 CHRONIC RECURRENT PANCREATITIS (H): Primary | ICD-10-CM

## 2025-03-25 LAB
A-LINOLENATE SERPL-SCNC: 30 NMOL/ML (ref 50–130)
AA SERPL-SCNC: 474 NMOL/ML (ref 520–1490)
ARACHIDATE SERPL-SCNC: 24 NMOL/ML (ref 50–90)
CLINICAL BIOCHEMIST REVIEW: ABNORMAL
DHA SERPL-SCNC: 80 NMOL/ML (ref 30–250)
DOCOSAPENTAENATE W6 SERPL-SCNC: 18 NMOL/ML (ref 10–70)
DOCOSATETRAENOATE SERPL-SCNC: 18 NMOL/ML (ref 10–80)
DOCOSENOATE SERPL-SCNC: 5 NMOL/ML (ref 4–13)
DPA SERPL-SCNC: 22 NMOL/ML (ref 20–210)
EPA SERPL-SCNC: 15 NMOL/ML (ref 14–100)
FA SERPL-SCNC: 7.6 MMOL/L (ref 7.3–16.8)
G-LINOLENATE SERPL-SCNC: 22 NMOL/ML (ref 16–150)
HEXADECENOATE SERPL-SCNC: 45 NMOL/ML (ref 25–105)
HOMO-G LINOLENATE SERPL-SCNC: 146 NMOL/ML (ref 50–250)
LAURATE SERPL-SCNC: 17 NMOL/ML (ref 6–90)
LINOLEATE SERPL-SCNC: 1631 NMOL/ML (ref 2270–3850)
MEAD ACID SERPL-SCNC: 22 NMOL/ML (ref 7–30)
MONOUNSAT FA SERPL-SCNC: 2.5 MMOL/L (ref 1.3–5.8)
MYRISTATE SERPL-SCNC: 123 NMOL/ML (ref 30–450)
NERVONATE SERPL-SCNC: 89 NMOL/ML (ref 60–100)
OCTADECANOATE SERPL-SCNC: 511 NMOL/ML (ref 590–1170)
OLEATE SERPL-SCNC: 1710 NMOL/ML (ref 650–3500)
PALMITATE SERPL-SCNC: 1863 NMOL/ML (ref 1480–3730)
PALMITOLEATE SERPL-SCNC: 350 NMOL/ML (ref 110–1130)
POLYUNSAT FA SERPL-SCNC: 2.5 MMOL/L (ref 3.2–5.8)
SAT FA SERPL-SCNC: 2.6 MMOL/L (ref 2.5–5.5)
TRIENOATE/AA SERPL-SRTO: 0.05 {RATIO} (ref 0.01–0.04)
VACCENATE SERPL-SCNC: 291 NMOL/ML (ref 280–740)
W3 FA SERPL-SCNC: 0.1 MMOL/L (ref 0.2–0.5)
W6 FA SERPL-SCNC: 2.3 MMOL/L (ref 3–5.4)

## 2025-03-25 NOTE — PROGRESS NOTES
In follow up to our clinic visit on Thursday, I was able to connect with Ciera's primary endocrinologist regarding my discussion with her in clinic on Thursday and discussion re possible insulin pump with automated insulin delivery and low dose carb ratio for premeal boluses.  Dr. Pastrana will connect with Ciera regarding OP5 pump.    Dinah Marin

## 2025-06-08 ENCOUNTER — HEALTH MAINTENANCE LETTER (OUTPATIENT)
Age: 40
End: 2025-06-08

## 2025-06-29 ENCOUNTER — HEALTH MAINTENANCE LETTER (OUTPATIENT)
Age: 40
End: 2025-06-29

## 2025-09-03 ENCOUNTER — ALLIED HEALTH/NURSE VISIT (OUTPATIENT)
Dept: TRANSPLANT | Facility: CLINIC | Age: 40
End: 2025-09-03
Attending: PEDIATRICS
Payer: MEDICARE

## 2025-09-03 ENCOUNTER — LAB (OUTPATIENT)
Dept: LAB | Facility: CLINIC | Age: 40
End: 2025-09-03
Attending: PEDIATRICS
Payer: COMMERCIAL

## 2025-09-03 VITALS — BODY MASS INDEX: 25.73 KG/M2 | WEIGHT: 161.82 LBS

## 2025-09-03 DIAGNOSIS — E89.1 POST-PANCREATECTOMY DIABETES (H): ICD-10-CM

## 2025-09-03 DIAGNOSIS — K86.89 PANCREATIC INSUFFICIENCY: ICD-10-CM

## 2025-09-03 DIAGNOSIS — Z94.83 S/P PANCREATIC ISLET CELL TRANSPLANTATION (H): ICD-10-CM

## 2025-09-03 DIAGNOSIS — E13.9 POST-PANCREATECTOMY DIABETES (H): ICD-10-CM

## 2025-09-03 DIAGNOSIS — E11.9 TYPE 2 DIABETES MELLITUS WITHOUT COMPLICATION, UNSPECIFIED WHETHER LONG TERM INSULIN USE (H): ICD-10-CM

## 2025-09-03 DIAGNOSIS — Z90.410 POST-PANCREATECTOMY DIABETES (H): ICD-10-CM

## 2025-09-03 DIAGNOSIS — K86.1 CHRONIC RECURRENT PANCREATITIS (H): Primary | ICD-10-CM

## 2025-09-03 LAB
ACANTHOCYTES BLD QL SMEAR: SLIGHT
ALBUMIN SERPL BCG-MCNC: 3.9 G/DL (ref 3.5–5.2)
ALP SERPL-CCNC: 187 U/L (ref 40–150)
ALT SERPL W P-5'-P-CCNC: 90 U/L (ref 0–50)
ANION GAP SERPL CALCULATED.3IONS-SCNC: 14 MMOL/L (ref 7–15)
AST SERPL W P-5'-P-CCNC: 50 U/L (ref 0–45)
BASOPHILS # BLD MANUAL: 0.09 10E3/UL (ref 0–0.2)
BASOPHILS NFR BLD MANUAL: 0.9 %
BILIRUB SERPL-MCNC: 0.6 MG/DL
BUN SERPL-MCNC: 5.3 MG/DL (ref 6–20)
BURR CELLS BLD QL SMEAR: SLIGHT
C PEPTIDE SERPL-MCNC: 0.1 NG/ML (ref 0.9–6.9)
C PEPTIDE SERPL-MCNC: 0.9 NG/ML (ref 0.9–6.9)
CALCIUM SERPL-MCNC: 9.2 MG/DL (ref 8.8–10.4)
CHLORIDE SERPL-SCNC: 105 MMOL/L (ref 98–107)
CHOLEST SERPL-MCNC: 92 MG/DL
CREAT SERPL-MCNC: 0.8 MG/DL (ref 0.51–0.95)
CREAT UR-MCNC: 88.2 MG/DL
EGFRCR SERPLBLD CKD-EPI 2021: >90 ML/MIN/1.73M2
EOSINOPHIL # BLD MANUAL: 0 10E3/UL (ref 0–0.7)
EOSINOPHIL NFR BLD MANUAL: 0 %
ERYTHROCYTE [DISTWIDTH] IN BLOOD BY AUTOMATED COUNT: 13.7 % (ref 10–15)
EST. AVERAGE GLUCOSE BLD GHB EST-MCNC: 140 MG/DL
FASTING STATUS PATIENT QL REPORTED: YES
FERRITIN SERPL-MCNC: 511 NG/ML (ref 6–175)
FRAGMENTS BLD QL SMEAR: SLIGHT
GLUCOSE SERPL-MCNC: 181 MG/DL (ref 70–99)
GLUCOSE SERPL-MCNC: 190 MG/DL (ref 70–99)
GLUCOSE SERPL-MCNC: 56 MG/DL (ref 70–99)
HBA1C MFR BLD: 6.5 %
HCO3 SERPL-SCNC: 23 MMOL/L (ref 22–29)
HCT VFR BLD AUTO: 38.4 % (ref 35–47)
HDLC SERPL-MCNC: 31 MG/DL
HGB BLD-MCNC: 12.6 G/DL (ref 11.7–15.7)
IRON BINDING CAPACITY (ROCHE): 244 UG/DL (ref 240–430)
IRON SATN MFR SERPL: 42 % (ref 15–46)
IRON SERPL-MCNC: 102 UG/DL (ref 37–145)
LDLC SERPL CALC-MCNC: 52 MG/DL
LYMPHOCYTES # BLD MANUAL: 3.03 10E3/UL (ref 0.8–5.3)
LYMPHOCYTES NFR BLD MANUAL: 28.7 %
MCH RBC QN AUTO: 30.4 PG (ref 26.5–33)
MCHC RBC AUTO-ENTMCNC: 32.8 G/DL (ref 31.5–36.5)
MCV RBC AUTO: 92.5 FL (ref 78–100)
MICROALBUMIN UR-MCNC: <12 MG/L
MICROALBUMIN/CREAT UR: NORMAL MG/G{CREAT}
MONOCYTES # BLD MANUAL: 0.28 10E3/UL (ref 0–1.3)
MONOCYTES NFR BLD MANUAL: 2.6 %
NEUTROPHILS # BLD MANUAL: 7.16 10E3/UL (ref 1.6–8.3)
NEUTROPHILS NFR BLD MANUAL: 67.8 %
NONHDLC SERPL-MCNC: 61 MG/DL
PLAT MORPH BLD: ABNORMAL
PLATELET # BLD AUTO: 315 10E3/UL (ref 150–450)
POTASSIUM SERPL-SCNC: 3.7 MMOL/L (ref 3.4–5.3)
PREALB SERPL-MCNC: 9.5 MG/DL (ref 20–40)
PROT SERPL-MCNC: 7 G/DL (ref 6.4–8.3)
RBC # BLD AUTO: 4.15 10E6/UL (ref 3.8–5.2)
RBC MORPH BLD: ABNORMAL
SODIUM SERPL-SCNC: 142 MMOL/L (ref 135–145)
TRIGL SERPL-MCNC: 44 MG/DL
VIT B12 SERPL-MCNC: 507 PG/ML (ref 232–1245)
WBC # BLD AUTO: 10.55 10E3/UL (ref 4–11)

## 2025-09-03 PROCEDURE — 84681 ASSAY OF C-PEPTIDE: CPT | Performed by: PEDIATRICS

## 2025-09-03 PROCEDURE — 83036 HEMOGLOBIN GLYCOSYLATED A1C: CPT | Performed by: PEDIATRICS

## 2025-09-03 PROCEDURE — 82570 ASSAY OF URINE CREATININE: CPT | Performed by: PEDIATRICS

## 2025-09-03 PROCEDURE — 84134 ASSAY OF PREALBUMIN: CPT | Performed by: PEDIATRICS

## 2025-09-03 PROCEDURE — 82607 VITAMIN B-12: CPT | Performed by: PEDIATRICS

## 2025-09-04 ENCOUNTER — OFFICE VISIT (OUTPATIENT)
Dept: TRANSPLANT | Facility: CLINIC | Age: 40
End: 2025-09-04
Attending: PEDIATRICS
Payer: COMMERCIAL

## 2025-09-04 VITALS
SYSTOLIC BLOOD PRESSURE: 125 MMHG | BODY MASS INDEX: 25.28 KG/M2 | OXYGEN SATURATION: 98 % | HEART RATE: 48 BPM | DIASTOLIC BLOOD PRESSURE: 84 MMHG | WEIGHT: 159 LBS

## 2025-09-04 DIAGNOSIS — K86.1 CHRONIC RECURRENT PANCREATITIS (H): Primary | ICD-10-CM

## 2025-09-04 DIAGNOSIS — E10.649 TYPE 1 DIABETES MELLITUS WITH HYPOGLYCEMIA AND WITHOUT COMA (H): ICD-10-CM

## 2025-09-04 LAB
C PEPTIDE SERPL-MCNC: 1.5 NG/ML (ref 0.9–6.9)
FASTING STATUS PATIENT QL REPORTED: YES

## 2025-09-04 PROCEDURE — 99213 OFFICE O/P EST LOW 20 MIN: CPT | Performed by: PEDIATRICS

## 2025-09-04 ASSESSMENT — PAIN SCALES - GENERAL: PAINLEVEL_OUTOF10: NO PAIN (0)

## (undated) DEVICE — CLIP HORIZON LG ORANGE 004200

## (undated) DEVICE — SOL WATER IRRIG 1000ML BOTTLE 2F7114

## (undated) DEVICE — SU SILK 3-0 SH CR 8X18" C013D

## (undated) DEVICE — STPL ENDO LINEAR CUT ECHELON GST 45MM COMPACT PCEE45A

## (undated) DEVICE — KIT MICROINTRODUCER COAXIAL MINISTICK MAX 5FRX10CM 45-756

## (undated) DEVICE — STPL LINEAR CUT 75MM TLC75

## (undated) DEVICE — STPL LINEAR 30X2.5MM VASC TX30V

## (undated) DEVICE — SU PDS II 4-0 RB-1 27" Z304H

## (undated) DEVICE — DRAPE FLUID WARMING 52 X 60" ORS-321

## (undated) DEVICE — NDL COUNTER 20CT 31142493

## (undated) DEVICE — Device

## (undated) DEVICE — GEL ULTRASOUND AQUASONIC 20GM 01-01

## (undated) DEVICE — SU SILK 2-0 TIE 12X30" A305H

## (undated) DEVICE — SU PDS II 0 TP-1 60" Z991G

## (undated) DEVICE — SU ETHILON 3-0 PS-1 18" 1663H

## (undated) DEVICE — STPL POWERED ECHELON 45MM PSEE45A

## (undated) DEVICE — STPL RELOAD LINEAR 30X3.5MM XR30B

## (undated) DEVICE — STPL RELOAD LINEAR CUT 75MM TCR75

## (undated) DEVICE — SU SILK 3-0 SH 30" K832H

## (undated) DEVICE — SU SILK 0 TIE 6X30" A306H

## (undated) DEVICE — SU SILK 3-0 TIE 12X30" A304H

## (undated) DEVICE — NDL BLUNT 15GA 1.5"

## (undated) DEVICE — DRSG PRIMAPORE 02X3" 7133

## (undated) DEVICE — NDL BX TRU CUT 14GA 4.5" 2N2702X

## (undated) DEVICE — SU VICRYL+ 3-0 27IN SH UND VCP416H

## (undated) DEVICE — SU PDS II 3-0 SH 27" Z316H

## (undated) DEVICE — SUCTION MANIFOLD NEPTUNE 2 SYS 4 PORT 0702-020-000

## (undated) DEVICE — WIPE PREMOIST CLEANSING WASHCLOTHS 7988

## (undated) DEVICE — DECANTER BAG 2002S

## (undated) DEVICE — TUBING PRESSURE W/MALE TO FEMALE CONNECTOR 24" 50P124

## (undated) DEVICE — FILTER HEPA FLUID TRAP NEPTUNE 0703-040-001

## (undated) DEVICE — SU PROLENE 6-0 RB-2DA 30" 8711H

## (undated) DEVICE — BLADE CLIPPER SGL USE 9680

## (undated) DEVICE — DRAIN PENROSE 5/8X18" LATEX 30416-058

## (undated) DEVICE — SU PROLENE 4-0 RB-1DA 18" 8757H

## (undated) DEVICE — SPONGE LAP 18X18" X8435

## (undated) DEVICE — SU SILK 2-0 SH 30" K833H

## (undated) DEVICE — DRAIN SYSTEM VALVE NG TUBE CLSD RSVR OVERFLOW ENFIT 43-4100

## (undated) DEVICE — LINEN TOWEL PACK X30 5481

## (undated) DEVICE — TUBING IV 69" STERILE 1C8160S

## (undated) DEVICE — SU PROLENE 5-0 RB-1DA 36"  8556H

## (undated) DEVICE — SU PROLENE 4-0 SHDA 36" 8521H

## (undated) DEVICE — SOL NACL 0.9% IRRIG 1000ML BOTTLE 2F7124

## (undated) DEVICE — STPL LINEAR CUT 30X3.5MM TX30B

## (undated) DEVICE — SU PDS II 5-0 RB-1 DA 30" Z320H

## (undated) DEVICE — SU ETHILON 2-0 FS 18" 664H

## (undated) DEVICE — SU SILK 1 TIE 6X30" A307H

## (undated) DEVICE — DRAPE ISOLATION BAG 1003

## (undated) DEVICE — TUBE TRANS GASTROJEJUNOSTOMY ENFIT 22FRX45CM

## (undated) DEVICE — DRAIN JACKSON PRATT RESERVOIR 100ML SU130-1305

## (undated) DEVICE — CLIP HORIZON SM RED WIDE SLOT 001201

## (undated) DEVICE — DRAIN JACKSON PRATT ROUND SIL 19FR W/TROCAR LF JP-2232

## (undated) DEVICE — SU CHROMIC 4-0 SH 27" G121H

## (undated) DEVICE — SU SILK 4-0 TIE 12X30" A303H

## (undated) DEVICE — SURGICEL ABSORBABLE HEMOSTAT SNOW 4"X4" 2083

## (undated) DEVICE — DRAPE IOBAN INCISE 23X17" 6650EZ

## (undated) DEVICE — WIPES FOLEY CARE SURESTEP PROVON DFC100

## (undated) DEVICE — CONNECTOR STOPCOCK 3 WAY MALE LL HI-FLO MX9311L

## (undated) DEVICE — DRAIN JACKSON PRATT 10FR ROUND SU130-1321

## (undated) DEVICE — LINEN TOWEL PACK X6 WHITE 5487

## (undated) DEVICE — DRSG MEDIPORE 3 1/2X13 3/4" 3573

## (undated) RX ORDER — HYDROMORPHONE HCL IN WATER/PF 6 MG/30 ML
PATIENT CONTROLLED ANALGESIA SYRINGE INTRAVENOUS
Status: DISPENSED
Start: 2024-09-06

## (undated) RX ORDER — FENTANYL CITRATE 50 UG/ML
INJECTION, SOLUTION INTRAMUSCULAR; INTRAVENOUS
Status: DISPENSED
Start: 2024-09-06

## (undated) RX ORDER — DEXTROSE, SODIUM CHLORIDE, SODIUM LACTATE, POTASSIUM CHLORIDE, AND CALCIUM CHLORIDE 5; .6; .31; .03; .02 G/100ML; G/100ML; G/100ML; G/100ML; G/100ML
INJECTION, SOLUTION INTRAVENOUS
Status: DISPENSED
Start: 2024-09-06

## (undated) RX ORDER — CALCIUM CHLORIDE 100 MG/ML
INJECTION INTRAVENOUS; INTRAVENTRICULAR
Status: DISPENSED
Start: 2024-09-06

## (undated) RX ORDER — HYDROMORPHONE HYDROCHLORIDE 1 MG/ML
INJECTION, SOLUTION INTRAMUSCULAR; INTRAVENOUS; SUBCUTANEOUS
Status: DISPENSED
Start: 2024-09-06

## (undated) RX ORDER — FENTANYL CITRATE-0.9 % NACL/PF 10 MCG/ML
PLASTIC BAG, INJECTION (ML) INTRAVENOUS
Status: DISPENSED
Start: 2024-09-06

## (undated) RX ORDER — EPHEDRINE SULFATE 50 MG/ML
INJECTION, SOLUTION INTRAMUSCULAR; INTRAVENOUS; SUBCUTANEOUS
Status: DISPENSED
Start: 2024-09-06

## (undated) RX ORDER — PROPOFOL 10 MG/ML
INJECTION, EMULSION INTRAVENOUS
Status: DISPENSED
Start: 2024-09-06

## (undated) RX ORDER — HEPARIN SODIUM 1000 [USP'U]/ML
INJECTION, SOLUTION INTRAVENOUS; SUBCUTANEOUS
Status: DISPENSED
Start: 2024-09-06

## (undated) RX ORDER — SODIUM CHLORIDE, SODIUM LACTATE, POTASSIUM CHLORIDE, CALCIUM CHLORIDE 600; 310; 30; 20 MG/100ML; MG/100ML; MG/100ML; MG/100ML
INJECTION, SOLUTION INTRAVENOUS
Status: DISPENSED
Start: 2024-09-06

## (undated) RX ORDER — ACETAMINOPHEN 325 MG/1
TABLET ORAL
Status: DISPENSED
Start: 2024-09-06

## (undated) RX ORDER — ERTAPENEM 1 G/1
INJECTION, POWDER, LYOPHILIZED, FOR SOLUTION INTRAMUSCULAR; INTRAVENOUS
Status: DISPENSED
Start: 2024-09-06